# Patient Record
Sex: FEMALE | Race: WHITE | NOT HISPANIC OR LATINO | URBAN - METROPOLITAN AREA
[De-identification: names, ages, dates, MRNs, and addresses within clinical notes are randomized per-mention and may not be internally consistent; named-entity substitution may affect disease eponyms.]

---

## 2017-07-26 ENCOUNTER — INPATIENT (INPATIENT)
Facility: HOSPITAL | Age: 62
LOS: 2 days | Discharge: HOME | DRG: 601 | End: 2017-07-29
Attending: SURGERY | Admitting: SURGERY
Payer: MEDICARE

## 2017-07-26 VITALS
DIASTOLIC BLOOD PRESSURE: 86 MMHG | TEMPERATURE: 100 F | RESPIRATION RATE: 16 BRPM | SYSTOLIC BLOOD PRESSURE: 134 MMHG | OXYGEN SATURATION: 99 % | HEART RATE: 86 BPM | WEIGHT: 250 LBS | HEIGHT: 64 IN

## 2017-07-26 DIAGNOSIS — N61.1 ABSCESS OF THE BREAST AND NIPPLE: ICD-10-CM

## 2017-07-26 LAB
ALBUMIN SERPL ELPH-MCNC: 4 G/DL — SIGNIFICANT CHANGE UP (ref 3.3–5.2)
ALP SERPL-CCNC: 55 U/L — SIGNIFICANT CHANGE UP (ref 40–120)
ALT FLD-CCNC: 14 U/L — SIGNIFICANT CHANGE UP
ANION GAP SERPL CALC-SCNC: 15 MMOL/L — SIGNIFICANT CHANGE UP (ref 5–17)
AST SERPL-CCNC: 15 U/L — SIGNIFICANT CHANGE UP
BASOPHILS # BLD AUTO: 0.1 K/UL — SIGNIFICANT CHANGE UP (ref 0–0.2)
BILIRUB SERPL-MCNC: 1.4 MG/DL — SIGNIFICANT CHANGE UP (ref 0.4–2)
BUN SERPL-MCNC: 12 MG/DL — SIGNIFICANT CHANGE UP (ref 8–20)
CALCIUM SERPL-MCNC: 9.3 MG/DL — SIGNIFICANT CHANGE UP (ref 8.6–10.2)
CHLORIDE SERPL-SCNC: 100 MMOL/L — SIGNIFICANT CHANGE UP (ref 98–107)
CO2 SERPL-SCNC: 24 MMOL/L — SIGNIFICANT CHANGE UP (ref 22–29)
CREAT SERPL-MCNC: 0.67 MG/DL — SIGNIFICANT CHANGE UP (ref 0.5–1.3)
EOSINOPHIL # BLD AUTO: 0.1 K/UL — SIGNIFICANT CHANGE UP (ref 0–0.5)
EOSINOPHIL NFR BLD AUTO: 1 % — SIGNIFICANT CHANGE UP (ref 0–5)
GLUCOSE SERPL-MCNC: 119 MG/DL — HIGH (ref 70–115)
HCT VFR BLD CALC: 37.2 % — SIGNIFICANT CHANGE UP (ref 37–47)
HGB BLD-MCNC: 12.5 G/DL — SIGNIFICANT CHANGE UP (ref 12–16)
LACTATE BLDV-MCNC: 0.8 MMOL/L — SIGNIFICANT CHANGE UP (ref 0.5–2)
LYMPHOCYTES # BLD AUTO: 17 % — LOW (ref 20–55)
LYMPHOCYTES # BLD AUTO: 2.5 K/UL — SIGNIFICANT CHANGE UP (ref 1–4.8)
MCHC RBC-ENTMCNC: 30 PG — SIGNIFICANT CHANGE UP (ref 27–31)
MCHC RBC-ENTMCNC: 33.6 G/DL — SIGNIFICANT CHANGE UP (ref 32–36)
MCV RBC AUTO: 89.2 FL — SIGNIFICANT CHANGE UP (ref 81–99)
MONOCYTES # BLD AUTO: 2 K/UL — HIGH (ref 0–0.8)
MONOCYTES NFR BLD AUTO: 11 % — HIGH (ref 3–10)
NEUTROPHILS # BLD AUTO: 10.5 K/UL — HIGH (ref 1.8–8)
NEUTROPHILS NFR BLD AUTO: 71 % — SIGNIFICANT CHANGE UP (ref 37–73)
PLAT MORPH BLD: NORMAL — SIGNIFICANT CHANGE UP
PLATELET # BLD AUTO: 380 K/UL — SIGNIFICANT CHANGE UP (ref 150–400)
POTASSIUM SERPL-MCNC: 4 MMOL/L — SIGNIFICANT CHANGE UP (ref 3.5–5.3)
POTASSIUM SERPL-SCNC: 4 MMOL/L — SIGNIFICANT CHANGE UP (ref 3.5–5.3)
PROT SERPL-MCNC: 8.2 G/DL — SIGNIFICANT CHANGE UP (ref 6.6–8.7)
RBC # BLD: 4.17 M/UL — LOW (ref 4.4–5.2)
RBC # FLD: 14.8 % — SIGNIFICANT CHANGE UP (ref 11–15.6)
RBC BLD AUTO: SIGNIFICANT CHANGE UP
SODIUM SERPL-SCNC: 139 MMOL/L — SIGNIFICANT CHANGE UP (ref 135–145)
WBC # BLD: 15.1 K/UL — HIGH (ref 4.8–10.8)
WBC # FLD AUTO: 15.1 K/UL — HIGH (ref 4.8–10.8)

## 2017-07-26 PROCEDURE — 99223 1ST HOSP IP/OBS HIGH 75: CPT | Mod: 57,AI,GC

## 2017-07-26 PROCEDURE — 99285 EMERGENCY DEPT VISIT HI MDM: CPT

## 2017-07-26 PROCEDURE — 76604 US EXAM CHEST: CPT | Mod: 26

## 2017-07-26 PROCEDURE — 19000 PUNCTURE ASPIR CYST BREAST: CPT

## 2017-07-26 RX ORDER — OXYCODONE AND ACETAMINOPHEN 5; 325 MG/1; MG/1
1 TABLET ORAL EVERY 6 HOURS
Qty: 0 | Refills: 0 | Status: DISCONTINUED | OUTPATIENT
Start: 2017-07-26 | End: 2017-07-29

## 2017-07-26 RX ORDER — SODIUM CHLORIDE 9 MG/ML
3 INJECTION INTRAMUSCULAR; INTRAVENOUS; SUBCUTANEOUS EVERY 8 HOURS
Qty: 0 | Refills: 0 | Status: DISCONTINUED | OUTPATIENT
Start: 2017-07-26 | End: 2017-07-29

## 2017-07-26 RX ORDER — IBUPROFEN 200 MG
600 TABLET ORAL EVERY 6 HOURS
Qty: 0 | Refills: 0 | Status: DISCONTINUED | OUTPATIENT
Start: 2017-07-26 | End: 2017-07-27

## 2017-07-26 RX ORDER — VANCOMYCIN HCL 1 G
1000 VIAL (EA) INTRAVENOUS ONCE
Qty: 0 | Refills: 0 | Status: COMPLETED | OUTPATIENT
Start: 2017-07-26 | End: 2017-07-26

## 2017-07-26 RX ADMIN — Medication 600 MILLIGRAM(S): at 23:10

## 2017-07-26 RX ADMIN — Medication 250 MILLIGRAM(S): at 14:06

## 2017-07-26 RX ADMIN — Medication 600 MILLIGRAM(S): at 22:50

## 2017-07-26 RX ADMIN — SODIUM CHLORIDE 3 MILLILITER(S): 9 INJECTION INTRAMUSCULAR; INTRAVENOUS; SUBCUTANEOUS at 14:06

## 2017-07-26 RX ADMIN — Medication 100 MILLIGRAM(S): at 22:50

## 2017-07-26 RX ADMIN — SODIUM CHLORIDE 3 MILLILITER(S): 9 INJECTION INTRAMUSCULAR; INTRAVENOUS; SUBCUTANEOUS at 22:51

## 2017-07-26 NOTE — ED ADULT TRIAGE NOTE - CHIEF COMPLAINT QUOTE
patient reports swelling and discharge from right breast and drainage from right eye. sent from [facility]

## 2017-07-26 NOTE — ED ADULT NURSE NOTE - CHIEF COMPLAINT QUOTE
patient reports swelling and discharge from right breast and drainage from right eye. sent from [facility]

## 2017-07-26 NOTE — ED PROCEDURE NOTE - PROCEDURE ADDITIONAL DETAILS
Sterile precautions were followed during procedure. Attempt to visualized abscess through ultrasound was unsuccessful to right breast abscess. Ultrasound Guided Needle Aspiration of Right Breast Abscess  Sterile precautions were followed during procedure. Attempt to visualize abscess through ultrasound was unsuccessful to right breast. Ultrasound Guided Needle Aspiration of Right Breast Abscess  After obtaining informed consent, breast was prepped and draped in a sterile fashion.  1% lidocaine s/ epi used to numb skin above what appeared to be apex of collection.  18 gauge needle used under US guidance- min blood expressed.

## 2017-07-26 NOTE — H&P ADULT - ASSESSMENT
62yo female presents with right breast abscess  -Bedside ultrasound guidance drainage of right breast abscess was unsuccessful  -Will admit pt to ACS for observation  -CT guided drainage or IR drainage tomorrow  -Regular diet  -IV Clindamycin 60yo female presents with right breast abscess  -Bedside ultrasound guidance needle aspiration of right breast abscess was unsuccessful  -Will admit pt to ACS for observation  -CT guided drainage or IR drainage tomorrow  -Regular diet  -IV Clindamycin

## 2017-07-26 NOTE — ED STATDOCS - OBJECTIVE STATEMENT
60 y/o F w/ PMHx of presents to the ED c/o fever, chills, R breast swelling and discharge along with conjunctivitis in the R eye x1 week. Pt denies cough, rhinorrhea or any other complaints at this time. 60 y/o F w/ no significant PMHx presents to the ED c/o fever, chills, R breast swelling and discharge along with conjunctivitis in the R eye x1 week. Pt denies cough, rhinorrhea or any other complaints at this time.

## 2017-07-26 NOTE — ED STATDOCS - SHIFT CHANGE DETAILS
61 yr old female presents with RT breast abscess, finding of evaded WBC, surgical consult seeing pt, will revaluate after.

## 2017-07-26 NOTE — ED STATDOCS - ATTENDING CONTRIBUTION TO CARE
I, Roge Gutierrez, performed the initial face to face bedside interview with this patient regarding history of present illness, review of symptoms and relevant past medical, social and family history.  I completed an independent physical examination.  I was the initial provider who evaluated this patient. I have signed out the follow up of any pending tests (i.e. labs, radiological studies) to the ACP.  I have communicated the patient’s plan of care and disposition with the ACP.

## 2017-07-26 NOTE — H&P ADULT - ATTENDING COMMENTS
I have read, reviewed, edited and approve of above H+P.  Please see procedure note.  Will plan on IR guided drainage in AM, treat cellulitis c/ IV abx.  Pot plan for eval by breast surgeon on this admission.

## 2017-07-26 NOTE — H&P ADULT - HISTORY OF PRESENT ILLNESS
62yo female presents with one week of intermittent drainage of brownish fluid from right areolar of breast. One day ago pt reported increased swelling and erythema with subjective fever. She visited an urgent clinic in AM who referred pt to ED. She denies nausea, vomiting, and back pain. No complaints to left breast. Pt had similar sx two years ago which was treated with PO antibiotics. Pt last mammogram was one year ago which was benign.     Allergies: none  Past Medical hx: None  Surgical hx: None  Family hx: Breast cancer-sister and maternal aunt

## 2017-07-26 NOTE — ED STATDOCS - PROGRESS NOTE DETAILS
PA NOTE: pt discuseed at length with FLASH Vaughn, PE/HPI/ROS confirmed.  Pt evaluated by surgical consult who would like pt to be Admitted to hospital for further evaluation.

## 2017-07-26 NOTE — H&P ADULT - NSHPLABSRESULTS_GEN_ALL_CORE
US Chest: Suspect abscess in the subcutaneous soft tissues of the right breast 9:00 position 4 cm from nipple margin. Note this examination does not substitute for a formal breast ultrasound or mammogram for evaluation of other pathologies. US Chest: Suspect abscess in the subcutaneous soft tissues of the right breast 9:00 position 4 cm from nipple margin. Radiology noted this examination does not substitute for a formal breast ultrasound or mammogram for evaluation of other pathologies.

## 2017-07-26 NOTE — ED ADULT NURSE NOTE - OBJECTIVE STATEMENT
Pt admitted to ED c/o pain, redness and swelling to rt breast x a few days. Sm amt sero-sanguinous fld from nipple.  States + fever yesterday. pt states similar episode same breast tong 1 yr ago.

## 2017-07-26 NOTE — H&P ADULT - NSHPPHYSICALEXAM_GEN_ALL_CORE
Constitutional: Obese female in no acute distress  HEENT: Atraumatic. PEERLA.   Pulm: Clear to auscultation bilaterally  Right Breast: Erythema extending from nipple superiorly to mid-breast.   CV: S1/S2  Abd: Soft, nontender Constitutional: Obese female in no acute distress  HEENT: Atraumatic. PEERLA.   Pulm: Clear to auscultation bilaterally  Right Breast: Erythema extending from nipple superiorly to mid-breast. Brownish discharge noted to lateral areolar. Breast is tender to palpation  Left Breast: nontender. non-erythemous  CV: S1/S2  Abd: Soft, nontender

## 2017-07-27 LAB
APTT BLD: 28.8 SEC — SIGNIFICANT CHANGE UP (ref 27.5–37.4)
INR BLD: 1.26 RATIO — HIGH (ref 0.88–1.16)
PROTHROM AB SERPL-ACNC: 13.9 SEC — HIGH (ref 9.8–12.7)

## 2017-07-27 PROCEDURE — 10160 PNXR ASPIR ABSC HMTMA BULLA: CPT

## 2017-07-27 PROCEDURE — 76942 ECHO GUIDE FOR BIOPSY: CPT | Mod: 26

## 2017-07-27 RX ORDER — ACETAMINOPHEN 500 MG
650 TABLET ORAL EVERY 6 HOURS
Qty: 0 | Refills: 0 | Status: DISCONTINUED | OUTPATIENT
Start: 2017-07-27 | End: 2017-07-29

## 2017-07-27 RX ORDER — DOCUSATE SODIUM 100 MG
100 CAPSULE ORAL THREE TIMES A DAY
Qty: 0 | Refills: 0 | Status: DISCONTINUED | OUTPATIENT
Start: 2017-07-27 | End: 2017-07-29

## 2017-07-27 RX ORDER — ENOXAPARIN SODIUM 100 MG/ML
40 INJECTION SUBCUTANEOUS DAILY
Qty: 0 | Refills: 0 | Status: DISCONTINUED | OUTPATIENT
Start: 2017-07-27 | End: 2017-07-27

## 2017-07-27 RX ADMIN — Medication 100 MILLIGRAM(S): at 06:04

## 2017-07-27 RX ADMIN — Medication 100 MILLIGRAM(S): at 13:08

## 2017-07-27 RX ADMIN — OXYCODONE AND ACETAMINOPHEN 1 TABLET(S): 5; 325 TABLET ORAL at 22:16

## 2017-07-27 RX ADMIN — SODIUM CHLORIDE 3 MILLILITER(S): 9 INJECTION INTRAMUSCULAR; INTRAVENOUS; SUBCUTANEOUS at 13:03

## 2017-07-27 RX ADMIN — SODIUM CHLORIDE 3 MILLILITER(S): 9 INJECTION INTRAMUSCULAR; INTRAVENOUS; SUBCUTANEOUS at 06:07

## 2017-07-27 RX ADMIN — Medication 650 MILLIGRAM(S): at 06:06

## 2017-07-27 RX ADMIN — Medication 100 MILLIGRAM(S): at 21:04

## 2017-07-27 RX ADMIN — SODIUM CHLORIDE 3 MILLILITER(S): 9 INJECTION INTRAMUSCULAR; INTRAVENOUS; SUBCUTANEOUS at 21:07

## 2017-07-27 RX ADMIN — OXYCODONE AND ACETAMINOPHEN 1 TABLET(S): 5; 325 TABLET ORAL at 21:04

## 2017-07-27 RX ADMIN — Medication 650 MILLIGRAM(S): at 06:55

## 2017-07-27 NOTE — CONSULT NOTE ADULT - PROBLEM SELECTOR RECOMMENDATION 9
Right breast ultrasound guided drainage-with cultures sent  Rx abx (may continue clindamycin until cultures are back)  Check glucose*  May follow up in office for continued care  [address] [address] [phone] (can ask for Evelin Walker; ) for apointment

## 2017-07-27 NOTE — ED ADULT NURSE REASSESSMENT NOTE - NS ED NURSE REASSESS COMMENT FT1
Spoke with Radiology charge nurse, Anuradha Carbajal at nurses station listening to conversation about when patient will go to IR procedure-as per charge nurse in radiology they will sending for patient in 30 minutes.
IV SL placed. labs sent inc blood cultures. IV ABX started. Sono completed.
Patient recd from [facility] Johnson, discussed plan of care with patient, will continue to monitor.
Pt. remains in hallway, awaiting room. ambulates to bathroom without difficulty, no distress noted, continue to monitor and maintain safety
Pt. received at 2200 from [facility], pt. to be admitted to surgery for abscess to right breast. previously drained by surgery, bandage currently in place, medicated for pain. antibiotic to be given
Pt received Alert and Oriented to person, place, and time pt states she is feeling well, awaiting procedure.

## 2017-07-27 NOTE — PROGRESS NOTE ADULT - SUBJECTIVE AND OBJECTIVE BOX
INTERVAL HPI/OVERNIGHT EVENTS:  Pt seen and examined at bedside. No acute events overnight. Pt reports resolution of fever. Right Breast pain from abscess well controlled with pain meds.      SUBJECTIVE:      MEDICATIONS  (STANDING):  sodium chloride 0.9% lock flush 3 milliLiter(s) IV Push every 8 hours  clindamycin IVPB   IV Intermittent   clindamycin IVPB 600 milliGRAM(s) IV Intermittent every 8 hours  enoxaparin Injectable 40 milliGRAM(s) SubCutaneous daily  docusate sodium 100 milliGRAM(s) Oral three times a day    MEDICATIONS  (PRN):  oxyCODONE    5 mG/acetaminophen 325 mG 1 Tablet(s) Oral every 6 hours PRN Moderate Pain  acetaminophen   Tablet. 650 milliGRAM(s) Oral every 6 hours PRN Mild Pain (1 - 3)      Vital Signs Last 24 Hrs  T(C): 37.1 (27 Jul 2017 03:39), Max: 37.6 (26 Jul 2017 12:59)  T(F): 98.8 (27 Jul 2017 03:39), Max: 99.7 (26 Jul 2017 12:59)  HR: 77 (27 Jul 2017 03:39) (77 - 86)  BP: 122/75 (27 Jul 2017 03:39) (120/79 - 134/86)  BP(mean): --  RR: 18 (27 Jul 2017 03:39) (16 - 18)  SpO2: 96% (27 Jul 2017 03:39) (96% - 99%)    PE  Gen: No acute distress  Pulm: Clear to auscultation bilaterally  CV: s1/s2  Abd: Soft, nontender      I&O's Detail      LABS:                        12.5   15.1  )-----------( 380      ( 26 Jul 2017 14:10 )             37.2     07-26    139  |  100  |  12.0  ----------------------------<  119<H>  4.0   |  24.0  |  0.67    Ca    9.3      26 Jul 2017 14:10    TPro  8.2  /  Alb  4.0  /  TBili  1.4  /  DBili  x   /  AST  15  /  ALT  14  /  AlkPhos  55  07-26    PT/INR - ( 27 Jul 2017 08:46 )   PT: 13.9 sec;   INR: 1.26 ratio         PTT - ( 27 Jul 2017 08:46 )  PTT:28.8 sec      RADIOLOGY & ADDITIONAL STUDIES:

## 2017-07-27 NOTE — PROGRESS NOTE ADULT - ASSESSMENT
62yo female presents with right breast abscess  -Planning draining of abscess (IR vs CT guided drainage)  -Pain control PRN  -IV clindamycin  -Diet: Regular  DVT: SCDs

## 2017-07-27 NOTE — CONSULT NOTE ADULT - ASSESSMENT
62 yo postmenopausal female with morbid obesity presents with right breast mastitis/abscess.  Recommendation for antibiotic course, sitz baths, and ultrasound guided drainage.

## 2017-07-27 NOTE — CONSULT NOTE ADULT - SUBJECTIVE AND OBJECTIVE BOX
I had the pleasure of seeing Natasha Toussaint in the ED for right breast infection.  She is a 60 yo postmenopausal female who is morbidly obese.    She denies DM, or smoking.  She states that for the past week she has noticed increasingly worsening right breast pain, swelling and redness until a couple of days ago when she noticed drainage from the superior aspect.  She also reports subjective fevers.    She is G0, started menses at age 13 with menopause at age 50    She denies a personal history of malignancy.  Family history is significant for sister dx with breast cancer at age 45 and alive and well now age 60, also her MAunt dx with breast cancer >age 60 and PAunt dx with brain cancer >age 60    She reports annual bilateral screening mammogram and last mammogram within the year in [facility].    She is scheduled to undergo US guided FNA of right breast    Exam: No supraclavicular or axillary adenopathy  Right breast; edema throughout the breast with inverted nipple areolar complex   Drainage noted at the 10-11:00 position 6cm from the nipple (superior)  Indurated and erythema throughout breast.  Tenderness limits examination

## 2017-07-28 ENCOUNTER — TRANSCRIPTION ENCOUNTER (OUTPATIENT)
Age: 62
End: 2017-07-28

## 2017-07-28 LAB
ANION GAP SERPL CALC-SCNC: 13 MMOL/L — SIGNIFICANT CHANGE UP (ref 5–17)
BASOPHILS # BLD AUTO: 0.1 K/UL — SIGNIFICANT CHANGE UP (ref 0–0.2)
BASOPHILS NFR BLD AUTO: 0.6 % — SIGNIFICANT CHANGE UP (ref 0–2)
BUN SERPL-MCNC: 16 MG/DL — SIGNIFICANT CHANGE UP (ref 8–20)
CALCIUM SERPL-MCNC: 9.1 MG/DL — SIGNIFICANT CHANGE UP (ref 8.6–10.2)
CHLORIDE SERPL-SCNC: 100 MMOL/L — SIGNIFICANT CHANGE UP (ref 98–107)
CO2 SERPL-SCNC: 27 MMOL/L — SIGNIFICANT CHANGE UP (ref 22–29)
CREAT SERPL-MCNC: 0.65 MG/DL — SIGNIFICANT CHANGE UP (ref 0.5–1.3)
EOSINOPHIL # BLD AUTO: 0.3 K/UL — SIGNIFICANT CHANGE UP (ref 0–0.5)
EOSINOPHIL NFR BLD AUTO: 2.9 % — SIGNIFICANT CHANGE UP (ref 0–6)
GLUCOSE SERPL-MCNC: 110 MG/DL — SIGNIFICANT CHANGE UP (ref 70–115)
HCT VFR BLD CALC: 37.9 % — SIGNIFICANT CHANGE UP (ref 37–47)
HGB BLD-MCNC: 12.2 G/DL — SIGNIFICANT CHANGE UP (ref 12–16)
LYMPHOCYTES # BLD AUTO: 2.5 K/UL — SIGNIFICANT CHANGE UP (ref 1–4.8)
LYMPHOCYTES # BLD AUTO: 25.7 % — SIGNIFICANT CHANGE UP (ref 20–55)
MAGNESIUM SERPL-MCNC: 2.3 MG/DL — SIGNIFICANT CHANGE UP (ref 1.6–2.6)
MCHC RBC-ENTMCNC: 29.6 PG — SIGNIFICANT CHANGE UP (ref 27–31)
MCHC RBC-ENTMCNC: 32.2 G/DL — SIGNIFICANT CHANGE UP (ref 32–36)
MCV RBC AUTO: 92 FL — SIGNIFICANT CHANGE UP (ref 81–99)
MONOCYTES # BLD AUTO: 1.2 K/UL — HIGH (ref 0–0.8)
MONOCYTES NFR BLD AUTO: 12.6 % — HIGH (ref 3–10)
NEUTROPHILS # BLD AUTO: 5.6 K/UL — SIGNIFICANT CHANGE UP (ref 1.8–8)
NEUTROPHILS NFR BLD AUTO: 57.9 % — SIGNIFICANT CHANGE UP (ref 37–73)
PHOSPHATE SERPL-MCNC: 3.6 MG/DL — SIGNIFICANT CHANGE UP (ref 2.4–4.7)
PLATELET # BLD AUTO: 410 K/UL — HIGH (ref 150–400)
POTASSIUM SERPL-MCNC: 4.3 MMOL/L — SIGNIFICANT CHANGE UP (ref 3.5–5.3)
POTASSIUM SERPL-SCNC: 4.3 MMOL/L — SIGNIFICANT CHANGE UP (ref 3.5–5.3)
RBC # BLD: 4.12 M/UL — LOW (ref 4.4–5.2)
RBC # FLD: 14.3 % — SIGNIFICANT CHANGE UP (ref 11–15.6)
SODIUM SERPL-SCNC: 140 MMOL/L — SIGNIFICANT CHANGE UP (ref 135–145)
WBC # BLD: 9.7 K/UL — SIGNIFICANT CHANGE UP (ref 4.8–10.8)
WBC # FLD AUTO: 9.7 K/UL — SIGNIFICANT CHANGE UP (ref 4.8–10.8)

## 2017-07-28 PROCEDURE — 73620 X-RAY EXAM OF FOOT: CPT | Mod: 26,RT

## 2017-07-28 RX ORDER — DOCUSATE SODIUM 100 MG
1 CAPSULE ORAL
Qty: 0 | Refills: 0 | COMMUNITY
Start: 2017-07-28

## 2017-07-28 RX ORDER — OXYCODONE HYDROCHLORIDE 5 MG/1
1 TABLET ORAL
Qty: 24 | Refills: 0 | OUTPATIENT
Start: 2017-07-28

## 2017-07-28 RX ORDER — ACETAMINOPHEN 500 MG
2 TABLET ORAL
Qty: 0 | Refills: 0 | COMMUNITY
Start: 2017-07-28

## 2017-07-28 RX ORDER — CEPHALEXIN 500 MG
500 CAPSULE ORAL EVERY 6 HOURS
Qty: 0 | Refills: 0 | Status: DISCONTINUED | OUTPATIENT
Start: 2017-07-28 | End: 2017-07-29

## 2017-07-28 RX ADMIN — SODIUM CHLORIDE 3 MILLILITER(S): 9 INJECTION INTRAMUSCULAR; INTRAVENOUS; SUBCUTANEOUS at 06:05

## 2017-07-28 RX ADMIN — Medication 100 MILLIGRAM(S): at 06:07

## 2017-07-28 RX ADMIN — Medication 500 MILLIGRAM(S): at 23:04

## 2017-07-28 RX ADMIN — Medication 100 MILLIGRAM(S): at 13:53

## 2017-07-28 RX ADMIN — SODIUM CHLORIDE 3 MILLILITER(S): 9 INJECTION INTRAMUSCULAR; INTRAVENOUS; SUBCUTANEOUS at 13:07

## 2017-07-28 RX ADMIN — Medication 100 MILLIGRAM(S): at 13:47

## 2017-07-28 RX ADMIN — OXYCODONE AND ACETAMINOPHEN 1 TABLET(S): 5; 325 TABLET ORAL at 10:47

## 2017-07-28 RX ADMIN — OXYCODONE AND ACETAMINOPHEN 1 TABLET(S): 5; 325 TABLET ORAL at 04:37

## 2017-07-28 RX ADMIN — SODIUM CHLORIDE 3 MILLILITER(S): 9 INJECTION INTRAMUSCULAR; INTRAVENOUS; SUBCUTANEOUS at 22:06

## 2017-07-28 RX ADMIN — OXYCODONE AND ACETAMINOPHEN 1 TABLET(S): 5; 325 TABLET ORAL at 11:30

## 2017-07-28 RX ADMIN — OXYCODONE AND ACETAMINOPHEN 1 TABLET(S): 5; 325 TABLET ORAL at 20:10

## 2017-07-28 RX ADMIN — OXYCODONE AND ACETAMINOPHEN 1 TABLET(S): 5; 325 TABLET ORAL at 19:40

## 2017-07-28 RX ADMIN — Medication 30 MILLILITER(S): at 01:13

## 2017-07-28 NOTE — PROGRESS NOTE ADULT - SUBJECTIVE AND OBJECTIVE BOX
INTERVAL HPI/OVERNIGHT EVENTS:  Pt was seen and examined at bedside this AM  No complaints overnight  R breast was still erythematous, soft, and only mildly tender  Denies fevers, chills, SOB, chest pain, diarrhea, and constipation overnight      STATUS POST:  IR drainage of R breast abscess    POST OPERATIVE DAY #: 1      MEDICATIONS  (STANDING):  sodium chloride 0.9% lock flush 3 milliLiter(s) IV Push every 8 hours  clindamycin IVPB   IV Intermittent   clindamycin IVPB 600 milliGRAM(s) IV Intermittent every 8 hours  docusate sodium 100 milliGRAM(s) Oral three times a day    MEDICATIONS  (PRN):  oxyCODONE    5 mG/acetaminophen 325 mG 1 Tablet(s) Oral every 6 hours PRN Moderate Pain  acetaminophen   Tablet. 650 milliGRAM(s) Oral every 6 hours PRN Mild Pain (1 - 3)      Vital Signs Last 24 Hrs  T(C): 36.4 (28 Jul 2017 07:28), Max: 37.2 (27 Jul 2017 14:09)  T(F): 97.5 (28 Jul 2017 07:28), Max: 98.9 (27 Jul 2017 14:09)  HR: 68 (28 Jul 2017 07:28) (68 - 78)  BP: 120/78 (28 Jul 2017 07:28) (104/63 - 133/84)  BP(mean): --  RR: 18 (28 Jul 2017 07:28) (18 - 18)  SpO2: 98% (28 Jul 2017 07:28) (93% - 99%)    Physical Exam:    Neurological:  No sensory/motor deficits  HEENT: PERRLA, EOMI  Respiratory: Breath Sounds equal & CTA bilaterally, no accessory muscle use  Cardiovascular: Regular rate & rhythm, normal S1, S2; no murmurs, gallops or rubs  Gastrointestinal: Soft, non-tender, nondistended  Vascular: Equal and normal pulses: 2+ peripheral pulses throughout  Musculoskeletal: No joint pain, swelling or deformity; no limitation of movement  Skin: Right breast erythematous, no drainage    I&O's Detail      LABS:                        12.2   9.7   )-----------( 410      ( 28 Jul 2017 08:53 )             37.9     07-28    140  |  100  |  16.0  ----------------------------<  110  4.3   |  27.0  |  0.65    Ca    9.1      28 Jul 2017 08:51  Phos  3.6     07-28  Mg     2.3     07-28    TPro  8.2  /  Alb  4.0  /  TBili  1.4  /  DBili  x   /  AST  15  /  ALT  14  /  AlkPhos  55  07-26    PT/INR - ( 27 Jul 2017 08:46 )   PT: 13.9 sec;   INR: 1.26 ratio         PTT - ( 27 Jul 2017 08:46 )  PTT:28.8 sec      RADIOLOGY & ADDITIONAL STUDIES:

## 2017-07-28 NOTE — DISCHARGE NOTE ADULT - PATIENT PORTAL LINK FT
“You can access the [facility] Patient Portal, offered by [facility], by registering with the following website: [email or web address]”

## 2017-07-28 NOTE — DISCHARGE NOTE ADULT - PLAN OF CARE
drainage of mass Pt may resume regular diet as tolerated.  Showering is permitted, DO NOT submerge your wound in water x 1 week.  Activity as tolerated.  Follow up Dr Gregg as an outpatient for further workup of breast mass.  Return to the ED immediately for worsening redness or drainage, high fever, worsening pain. Drainage of mass Follow up: Please call and make an appointment with Dr. Gregg for futher work-up of breast mass. Also, please call and make an appointment with your primary care physician as per your usual schedule.   Activity: May return to normal activities as tolerated.  Diet: May continue regular diet.  Medications: Please take all home medications as prescribed by your primary care doctor. Pain medication has been prescribed for you. Please, take it as it has been prescribed, do not drive or operate heavy machinery while taking narcotics.  You are encouraged to take over-the-counter tylenol and/or ibuprofen for pain relief when you feel your pain no longer warrants the use of narcotic pain medications, however DO NOT TAKE percocet and tylenol at the same time as they contain the same active ingredient (acetaminophen). Take only percocet OR tylenol.  Wound Care: Please, keep wound site clean and dry. You may shower, but do not bathe or submerge your wound in water for AT LEAST 1 week.  Patient is advised to RETURN TO THE EMERGENCY DEPARTMENT for any of the following - worsening redness / drainage, worsening pain, high fever, nausea/vomiting, altered mental status, chest pain, shortness of breath, or any other new / worsening symptom.

## 2017-07-28 NOTE — DISCHARGE NOTE ADULT - CARE PROVIDER_API CALL
Arlene Gregg), Surgery  [address] [place] [id number]  Phone: [phone]  Fax: [phone]

## 2017-07-28 NOTE — DISCHARGE NOTE ADULT - MEDICATION SUMMARY - MEDICATIONS TO STOP TAKING
I will STOP taking the medications listed below when I get home from the hospital:    doxycycline monohydrate 100 mg oral tablet  -- 1 tab(s) by mouth 2 times a day  -- Avoid prolonged or excessive exposure to direct and/or artificial sunlight while taking this medication.  Do not take this drug if you are pregnant.  Finish all this medication unless otherwise directed by prescriber.  Medication should be taken with plenty of water.

## 2017-07-28 NOTE — PROGRESS NOTE ADULT - ASSESSMENT
Pt is a 61 F s/p IR drainage of R breast abscess (7/27). Aspiration revealed hypoechoic solid tissue consistent with a mass or phlegmon. Recommends f/u with Dr. Arlene Gregg, breast surgeon as outpatient. WBC down to 9.7 from 15.1. Dispo planning.   - Continue Regular diet  - Continue Clindamycin  - Lovenox and SCDs for DVT PPx  - f/u Dr. Gregg as outpatient

## 2017-07-28 NOTE — DISCHARGE NOTE ADULT - HOSPITAL COURSE
62yo female presents with one week of intermittent drainage of brownish fluid from right areolar of breast. One day ago pt reported increased swelling and erythema with subjective fever. She visited an urgent clinic in AM who referred pt to ED. She denies nausea, vomiting, and back pain. No complaints to left breast. Pt had similar sx two years ago which was treated with PO antibiotics. Pt last mammogram was one year ago which was benign.  US Chest demonstrated possible abscess in subq soft tissue of the right breast at 9:00 position 4cm from nipple margin.  Pt taken to IR on 7/27 for drainage, no abscess noted, hypoechoic solid tissue consistent with mass or phelgmon.  Post procedure, pt doing well.  Stable for d/c home with outpatient follow up Dr Gregg for further evaluation of breast mass.

## 2017-07-28 NOTE — PROGRESS NOTE ADULT - ATTENDING COMMENTS
Pt seen and examined.      No complaints today.    IR unable to drain any purulence.  No systemic signs of infection.  Further evaluation and management as per breast surgery.

## 2017-07-28 NOTE — DISCHARGE NOTE ADULT - CARE PLAN
Principal Discharge DX:	Breast mass in female  Goal:	drainage of mass  Instructions for follow-up, activity and diet:	Pt may resume regular diet as tolerated.  Showering is permitted, DO NOT submerge your wound in water x 1 week.  Activity as tolerated.  Follow up Dr Gregg as an outpatient for further workup of breast mass.  Return to the ED immediately for worsening redness or drainage, high fever, worsening pain. Principal Discharge DX:	Breast mass in female  Goal:	Drainage of mass  Instructions for follow-up, activity and diet:	Follow up: Please call and make an appointment with Dr. Gregg for futher work-up of breast mass. Also, please call and make an appointment with your primary care physician as per your usual schedule.   Activity: May return to normal activities as tolerated.  Diet: May continue regular diet.  Medications: Please take all home medications as prescribed by your primary care doctor. Pain medication has been prescribed for you. Please, take it as it has been prescribed, do not drive or operate heavy machinery while taking narcotics.  You are encouraged to take over-the-counter tylenol and/or ibuprofen for pain relief when you feel your pain no longer warrants the use of narcotic pain medications, however DO NOT TAKE percocet and tylenol at the same time as they contain the same active ingredient (acetaminophen). Take only percocet OR tylenol.  Wound Care: Please, keep wound site clean and dry. You may shower, but do not bathe or submerge your wound in water for AT LEAST 1 week.  Patient is advised to RETURN TO THE EMERGENCY DEPARTMENT for any of the following - worsening redness / drainage, worsening pain, high fever, nausea/vomiting, altered mental status, chest pain, shortness of breath, or any other new / worsening symptom.

## 2017-07-29 VITALS
RESPIRATION RATE: 18 BRPM | TEMPERATURE: 98 F | SYSTOLIC BLOOD PRESSURE: 145 MMHG | OXYGEN SATURATION: 98 % | DIASTOLIC BLOOD PRESSURE: 82 MMHG | HEART RATE: 78 BPM

## 2017-07-29 RX ORDER — CEPHALEXIN 500 MG
1 CAPSULE ORAL
Qty: 28 | Refills: 0 | OUTPATIENT
Start: 2017-07-29 | End: 2017-08-05

## 2017-07-29 RX ADMIN — Medication 100 MILLIGRAM(S): at 14:55

## 2017-07-29 RX ADMIN — OXYCODONE AND ACETAMINOPHEN 1 TABLET(S): 5; 325 TABLET ORAL at 10:35

## 2017-07-29 RX ADMIN — Medication 500 MILLIGRAM(S): at 17:20

## 2017-07-29 RX ADMIN — OXYCODONE AND ACETAMINOPHEN 1 TABLET(S): 5; 325 TABLET ORAL at 04:07

## 2017-07-29 RX ADMIN — OXYCODONE AND ACETAMINOPHEN 1 TABLET(S): 5; 325 TABLET ORAL at 09:45

## 2017-07-29 RX ADMIN — OXYCODONE AND ACETAMINOPHEN 1 TABLET(S): 5; 325 TABLET ORAL at 03:37

## 2017-07-29 RX ADMIN — Medication 1 TABLET(S): at 17:20

## 2017-07-29 RX ADMIN — Medication 500 MILLIGRAM(S): at 12:17

## 2017-07-29 RX ADMIN — Medication 1 TABLET(S): at 06:00

## 2017-07-29 RX ADMIN — OXYCODONE AND ACETAMINOPHEN 1 TABLET(S): 5; 325 TABLET ORAL at 17:20

## 2017-07-29 RX ADMIN — OXYCODONE AND ACETAMINOPHEN 1 TABLET(S): 5; 325 TABLET ORAL at 08:26

## 2017-07-29 RX ADMIN — Medication 100 MILLIGRAM(S): at 06:00

## 2017-07-29 RX ADMIN — Medication 500 MILLIGRAM(S): at 06:00

## 2017-07-31 LAB
CULTURE RESULTS: SIGNIFICANT CHANGE UP
CULTURE RESULTS: SIGNIFICANT CHANGE UP
SPECIMEN SOURCE: SIGNIFICANT CHANGE UP
SPECIMEN SOURCE: SIGNIFICANT CHANGE UP

## 2017-08-01 ENCOUNTER — TRANSCRIPTION ENCOUNTER (OUTPATIENT)
Age: 62
End: 2017-08-01

## 2017-10-06 ENCOUNTER — APPOINTMENT (OUTPATIENT)
Dept: SURGERY | Facility: CLINIC | Age: 62
End: 2017-10-06
Payer: MEDICARE

## 2017-10-06 VITALS
WEIGHT: 293 LBS | HEIGHT: 62 IN | RESPIRATION RATE: 13 BRPM | TEMPERATURE: 98.8 F | BODY MASS INDEX: 53.92 KG/M2 | HEART RATE: 86 BPM | SYSTOLIC BLOOD PRESSURE: 124 MMHG | DIASTOLIC BLOOD PRESSURE: 76 MMHG

## 2017-10-06 DIAGNOSIS — Z87.39 PERSONAL HISTORY OF OTHER DISEASES OF THE MUSCULOSKELETAL SYSTEM AND CONNECTIVE TISSUE: ICD-10-CM

## 2017-10-06 PROCEDURE — 99215 OFFICE O/P EST HI 40 MIN: CPT

## 2017-10-06 PROCEDURE — 99205 OFFICE O/P NEW HI 60 MIN: CPT

## 2017-10-11 PROCEDURE — 80048 BASIC METABOLIC PNL TOTAL CA: CPT

## 2017-10-11 PROCEDURE — 73620 X-RAY EXAM OF FOOT: CPT

## 2017-10-11 PROCEDURE — 99285 EMERGENCY DEPT VISIT HI MDM: CPT | Mod: 25

## 2017-10-11 PROCEDURE — 80053 COMPREHEN METABOLIC PANEL: CPT

## 2017-10-11 PROCEDURE — 85027 COMPLETE CBC AUTOMATED: CPT

## 2017-10-11 PROCEDURE — 85730 THROMBOPLASTIN TIME PARTIAL: CPT

## 2017-10-11 PROCEDURE — 87040 BLOOD CULTURE FOR BACTERIA: CPT

## 2017-10-11 PROCEDURE — 84100 ASSAY OF PHOSPHORUS: CPT

## 2017-10-11 PROCEDURE — 83735 ASSAY OF MAGNESIUM: CPT

## 2017-10-11 PROCEDURE — 76604 US EXAM CHEST: CPT

## 2017-10-11 PROCEDURE — 85610 PROTHROMBIN TIME: CPT

## 2017-10-11 PROCEDURE — 10021 FNA BX W/O IMG GDN 1ST LES: CPT

## 2017-10-11 PROCEDURE — 96374 THER/PROPH/DIAG INJ IV PUSH: CPT | Mod: XU

## 2017-10-11 PROCEDURE — 36415 COLL VENOUS BLD VENIPUNCTURE: CPT

## 2017-10-11 PROCEDURE — 83605 ASSAY OF LACTIC ACID: CPT

## 2017-10-18 ENCOUNTER — APPOINTMENT (OUTPATIENT)
Dept: ULTRASOUND IMAGING | Facility: CLINIC | Age: 62
End: 2017-10-18
Payer: MEDICARE

## 2017-10-18 ENCOUNTER — APPOINTMENT (OUTPATIENT)
Dept: MAMMOGRAPHY | Facility: CLINIC | Age: 62
End: 2017-10-18
Payer: MEDICARE

## 2017-10-18 ENCOUNTER — OUTPATIENT (OUTPATIENT)
Dept: OUTPATIENT SERVICES | Facility: HOSPITAL | Age: 62
LOS: 1 days | End: 2017-10-18
Payer: MEDICARE

## 2017-10-18 DIAGNOSIS — N64.52 NIPPLE DISCHARGE: ICD-10-CM

## 2017-10-18 PROCEDURE — G0279: CPT | Mod: 26

## 2017-10-18 PROCEDURE — 77066 DX MAMMO INCL CAD BI: CPT

## 2017-10-18 PROCEDURE — G0204: CPT | Mod: 26

## 2017-10-18 PROCEDURE — 76641 ULTRASOUND BREAST COMPLETE: CPT

## 2017-10-18 PROCEDURE — G0279: CPT

## 2017-10-18 PROCEDURE — 76641 ULTRASOUND BREAST COMPLETE: CPT | Mod: 26,50

## 2017-10-20 ENCOUNTER — MESSAGE (OUTPATIENT)
Age: 62
End: 2017-10-20

## 2017-10-20 ENCOUNTER — MOBILE ON CALL (OUTPATIENT)
Age: 62
End: 2017-10-20

## 2017-10-24 ENCOUNTER — APPOINTMENT (OUTPATIENT)
Dept: SURGERY | Facility: CLINIC | Age: 62
End: 2017-10-24

## 2017-11-01 ENCOUNTER — OUTPATIENT (OUTPATIENT)
Dept: OUTPATIENT SERVICES | Facility: HOSPITAL | Age: 62
LOS: 1 days | End: 2017-11-01
Payer: MEDICARE

## 2017-11-01 ENCOUNTER — APPOINTMENT (OUTPATIENT)
Dept: ULTRASOUND IMAGING | Facility: CLINIC | Age: 62
End: 2017-11-01
Payer: MEDICARE

## 2017-11-01 ENCOUNTER — APPOINTMENT (OUTPATIENT)
Dept: MAMMOGRAPHY | Facility: CLINIC | Age: 62
End: 2017-11-01
Payer: MEDICARE

## 2017-11-01 ENCOUNTER — RESULT REVIEW (OUTPATIENT)
Age: 62
End: 2017-11-01

## 2017-11-01 DIAGNOSIS — Z00.00 ENCOUNTER FOR GENERAL ADULT MEDICAL EXAMINATION WITHOUT ABNORMAL FINDINGS: ICD-10-CM

## 2017-11-01 PROCEDURE — G0206: CPT | Mod: 26,RT

## 2017-11-01 PROCEDURE — 88305 TISSUE EXAM BY PATHOLOGIST: CPT | Mod: 26

## 2017-11-01 PROCEDURE — 19083 BX BREAST 1ST LESION US IMAG: CPT

## 2017-11-01 PROCEDURE — 77065 DX MAMMO INCL CAD UNI: CPT

## 2017-11-01 PROCEDURE — A4648: CPT

## 2017-11-01 PROCEDURE — 19083 BX BREAST 1ST LESION US IMAG: CPT | Mod: RT

## 2017-11-01 PROCEDURE — 88305 TISSUE EXAM BY PATHOLOGIST: CPT

## 2017-11-01 PROCEDURE — 19084 BX BREAST ADD LESION US IMAG: CPT

## 2017-11-01 PROCEDURE — 19084 BX BREAST ADD LESION US IMAG: CPT | Mod: 59,RT

## 2017-11-03 DIAGNOSIS — N63.13 UNSPECIFIED LUMP IN THE RIGHT BREAST, LOWER OUTER QUADRANT: ICD-10-CM

## 2017-11-03 DIAGNOSIS — R92.8 OTHER ABNORMAL AND INCONCLUSIVE FINDINGS ON DIAGNOSTIC IMAGING OF BREAST: ICD-10-CM

## 2017-11-03 DIAGNOSIS — N63.11 UNSPECIFIED LUMP IN THE RIGHT BREAST, UPPER OUTER QUADRANT: ICD-10-CM

## 2017-11-14 ENCOUNTER — APPOINTMENT (OUTPATIENT)
Dept: SURGERY | Facility: CLINIC | Age: 62
End: 2017-11-14
Payer: MEDICARE

## 2017-11-14 VITALS
TEMPERATURE: 98.2 F | WEIGHT: 293 LBS | DIASTOLIC BLOOD PRESSURE: 95 MMHG | HEIGHT: 62 IN | RESPIRATION RATE: 12 BRPM | SYSTOLIC BLOOD PRESSURE: 175 MMHG | BODY MASS INDEX: 53.92 KG/M2 | HEART RATE: 80 BPM

## 2017-11-14 PROCEDURE — 99215 OFFICE O/P EST HI 40 MIN: CPT

## 2017-11-16 PROCEDURE — 99204 OFFICE O/P NEW MOD 45 MIN: CPT | Mod: 25

## 2017-11-17 ENCOUNTER — OTHER (OUTPATIENT)
Age: 62
End: 2017-11-17

## 2017-11-20 ENCOUNTER — OTHER (OUTPATIENT)
Age: 62
End: 2017-11-20

## 2017-11-20 ENCOUNTER — OUTPATIENT (OUTPATIENT)
Dept: OUTPATIENT SERVICES | Facility: HOSPITAL | Age: 62
LOS: 1 days | Discharge: HOME | End: 2017-11-20

## 2017-11-21 ENCOUNTER — APPOINTMENT (OUTPATIENT)
Dept: PLASTIC SURGERY | Facility: CLINIC | Age: 62
End: 2017-11-21
Payer: MEDICARE

## 2017-11-21 ENCOUNTER — APPOINTMENT (OUTPATIENT)
Dept: MRI IMAGING | Facility: CLINIC | Age: 62
End: 2017-11-21

## 2017-11-21 ENCOUNTER — APPOINTMENT (OUTPATIENT)
Dept: RADIATION ONCOLOGY | Facility: CLINIC | Age: 62
End: 2017-11-21
Payer: MEDICARE

## 2017-11-21 VITALS
WEIGHT: 214 LBS | HEART RATE: 94 BPM | DIASTOLIC BLOOD PRESSURE: 90 MMHG | OXYGEN SATURATION: 94 % | SYSTOLIC BLOOD PRESSURE: 165 MMHG | BODY MASS INDEX: 39.38 KG/M2 | RESPIRATION RATE: 16 BRPM | HEIGHT: 62 IN

## 2017-11-21 VITALS
BODY MASS INDEX: 53.92 KG/M2 | RESPIRATION RATE: 16 BRPM | TEMPERATURE: 98.2 F | WEIGHT: 293 LBS | HEART RATE: 71 BPM | OXYGEN SATURATION: 95 % | DIASTOLIC BLOOD PRESSURE: 86 MMHG | SYSTOLIC BLOOD PRESSURE: 152 MMHG | HEIGHT: 62 IN

## 2017-11-21 PROCEDURE — 99214 OFFICE O/P EST MOD 30 MIN: CPT

## 2017-11-21 PROCEDURE — 99203 OFFICE O/P NEW LOW 30 MIN: CPT

## 2017-11-21 RX ORDER — SULFAMETHOXAZOLE AND TRIMETHOPRIM 400; 80 MG/1; MG/1
400-80 TABLET ORAL
Qty: 14 | Refills: 0 | Status: COMPLETED | COMMUNITY
Start: 2017-07-29 | End: 2017-11-21

## 2017-11-27 ENCOUNTER — APPOINTMENT (OUTPATIENT)
Dept: SURGERY | Facility: CLINIC | Age: 62
End: 2017-11-27
Payer: MEDICARE

## 2017-11-27 PROCEDURE — 99214 OFFICE O/P EST MOD 30 MIN: CPT

## 2017-12-27 ENCOUNTER — APPOINTMENT (OUTPATIENT)
Dept: SURGERY | Facility: CLINIC | Age: 62
End: 2017-12-27
Payer: MEDICARE

## 2017-12-27 PROCEDURE — 99215 OFFICE O/P EST HI 40 MIN: CPT

## 2018-01-16 ENCOUNTER — APPOINTMENT (OUTPATIENT)
Dept: SURGERY | Facility: CLINIC | Age: 63
End: 2018-01-16
Payer: MEDICARE

## 2018-01-16 VITALS — TEMPERATURE: 78 F | OXYGEN SATURATION: 93 % | HEART RATE: 78 BPM | RESPIRATION RATE: 18 BRPM

## 2018-01-16 PROCEDURE — 99215 OFFICE O/P EST HI 40 MIN: CPT

## 2018-01-16 RX ORDER — CEPHALEXIN 500 MG/1
500 CAPSULE ORAL
Qty: 28 | Refills: 0 | Status: DISCONTINUED | COMMUNITY
Start: 2017-07-29 | End: 2018-01-16

## 2018-01-16 RX ORDER — OXYCODONE AND ACETAMINOPHEN 5; 325 MG/1; MG/1
5-325 TABLET ORAL
Qty: 24 | Refills: 0 | Status: DISCONTINUED | COMMUNITY
Start: 2017-07-28 | End: 2018-01-16

## 2018-01-16 RX ORDER — DIAZEPAM 5 MG/1
5 TABLET ORAL
Qty: 5 | Refills: 0 | Status: DISCONTINUED | COMMUNITY
Start: 2017-11-20 | End: 2018-01-16

## 2018-01-16 RX ORDER — ASPIRIN ENTERIC COATED TABLETS 81 MG 81 MG/1
81 TABLET, DELAYED RELEASE ORAL
Refills: 0 | Status: DISCONTINUED | COMMUNITY
End: 2018-01-16

## 2018-01-16 RX ORDER — ACETAMINOPHEN 325 MG/1
TABLET, FILM COATED ORAL
Refills: 0 | Status: DISCONTINUED | COMMUNITY
End: 2018-01-16

## 2018-01-29 ENCOUNTER — APPOINTMENT (OUTPATIENT)
Dept: FAMILY MEDICINE | Facility: CLINIC | Age: 63
End: 2018-01-29
Payer: MEDICARE

## 2018-01-29 VITALS
WEIGHT: 293 LBS | HEIGHT: 62 IN | SYSTOLIC BLOOD PRESSURE: 152 MMHG | RESPIRATION RATE: 14 BRPM | HEART RATE: 80 BPM | TEMPERATURE: 98.3 F | BODY MASS INDEX: 53.92 KG/M2 | OXYGEN SATURATION: 96 % | DIASTOLIC BLOOD PRESSURE: 90 MMHG

## 2018-01-29 PROCEDURE — 99215 OFFICE O/P EST HI 40 MIN: CPT

## 2018-01-30 ENCOUNTER — APPOINTMENT (OUTPATIENT)
Dept: CARDIOLOGY | Facility: CLINIC | Age: 63
End: 2018-01-30
Payer: MEDICARE

## 2018-01-30 ENCOUNTER — OUTPATIENT (OUTPATIENT)
Dept: OUTPATIENT SERVICES | Facility: HOSPITAL | Age: 63
LOS: 1 days | End: 2018-01-30
Payer: COMMERCIAL

## 2018-01-30 ENCOUNTER — NON-APPOINTMENT (OUTPATIENT)
Age: 63
End: 2018-01-30

## 2018-01-30 VITALS
HEIGHT: 63 IN | OXYGEN SATURATION: 96 % | SYSTOLIC BLOOD PRESSURE: 150 MMHG | HEART RATE: 73 BPM | DIASTOLIC BLOOD PRESSURE: 92 MMHG | WEIGHT: 293 LBS | BODY MASS INDEX: 51.91 KG/M2

## 2018-01-30 VITALS
HEART RATE: 86 BPM | DIASTOLIC BLOOD PRESSURE: 101 MMHG | SYSTOLIC BLOOD PRESSURE: 159 MMHG | RESPIRATION RATE: 16 BRPM | TEMPERATURE: 97 F | WEIGHT: 293 LBS | HEIGHT: 63 IN

## 2018-01-30 DIAGNOSIS — D64.9 ANEMIA, UNSPECIFIED: ICD-10-CM

## 2018-01-30 DIAGNOSIS — I10 ESSENTIAL (PRIMARY) HYPERTENSION: ICD-10-CM

## 2018-01-30 DIAGNOSIS — C50.911 MALIGNANT NEOPLASM OF UNSPECIFIED SITE OF RIGHT FEMALE BREAST: ICD-10-CM

## 2018-01-30 DIAGNOSIS — Z98.890 OTHER SPECIFIED POSTPROCEDURAL STATES: Chronic | ICD-10-CM

## 2018-01-30 DIAGNOSIS — Z01.818 ENCOUNTER FOR OTHER PREPROCEDURAL EXAMINATION: ICD-10-CM

## 2018-01-30 LAB
ANION GAP SERPL CALC-SCNC: 15 MMOL/L — SIGNIFICANT CHANGE UP (ref 5–17)
APTT BLD: 31 SEC — SIGNIFICANT CHANGE UP (ref 27.5–37.4)
BASOPHILS # BLD AUTO: 0.1 K/UL — SIGNIFICANT CHANGE UP (ref 0–0.2)
BASOPHILS NFR BLD AUTO: 0.7 % — SIGNIFICANT CHANGE UP (ref 0–2)
BLD GP AB SCN SERPL QL: SIGNIFICANT CHANGE UP
BUN SERPL-MCNC: 15 MG/DL — SIGNIFICANT CHANGE UP (ref 8–20)
CALCIUM SERPL-MCNC: 9.8 MG/DL — SIGNIFICANT CHANGE UP (ref 8.6–10.2)
CHLORIDE SERPL-SCNC: 101 MMOL/L — SIGNIFICANT CHANGE UP (ref 98–107)
CO2 SERPL-SCNC: 26 MMOL/L — SIGNIFICANT CHANGE UP (ref 22–29)
CREAT SERPL-MCNC: 0.7 MG/DL — SIGNIFICANT CHANGE UP (ref 0.5–1.3)
EOSINOPHIL # BLD AUTO: 0.2 K/UL — SIGNIFICANT CHANGE UP (ref 0–0.5)
EOSINOPHIL NFR BLD AUTO: 2 % — SIGNIFICANT CHANGE UP (ref 0–6)
GLUCOSE SERPL-MCNC: 110 MG/DL — SIGNIFICANT CHANGE UP (ref 70–115)
HCT VFR BLD CALC: 42.8 % — SIGNIFICANT CHANGE UP (ref 37–47)
HCV AB S/CO SERPL IA: 0.13 S/CO — SIGNIFICANT CHANGE UP
HCV AB SERPL-IMP: SIGNIFICANT CHANGE UP
HGB BLD-MCNC: 13.9 G/DL — SIGNIFICANT CHANGE UP (ref 12–16)
INR BLD: 1.09 RATIO — SIGNIFICANT CHANGE UP (ref 0.88–1.16)
LYMPHOCYTES # BLD AUTO: 3.1 K/UL — SIGNIFICANT CHANGE UP (ref 1–4.8)
LYMPHOCYTES # BLD AUTO: 35.9 % — SIGNIFICANT CHANGE UP (ref 20–55)
MCHC RBC-ENTMCNC: 29.7 PG — SIGNIFICANT CHANGE UP (ref 27–31)
MCHC RBC-ENTMCNC: 32.5 G/DL — SIGNIFICANT CHANGE UP (ref 32–36)
MCV RBC AUTO: 91.5 FL — SIGNIFICANT CHANGE UP (ref 81–99)
MONOCYTES # BLD AUTO: 0.7 K/UL — SIGNIFICANT CHANGE UP (ref 0–0.8)
MONOCYTES NFR BLD AUTO: 8.6 % — SIGNIFICANT CHANGE UP (ref 3–10)
NEUTROPHILS # BLD AUTO: 4.5 K/UL — SIGNIFICANT CHANGE UP (ref 1.8–8)
NEUTROPHILS NFR BLD AUTO: 52.5 % — SIGNIFICANT CHANGE UP (ref 37–73)
PLATELET # BLD AUTO: 357 K/UL — SIGNIFICANT CHANGE UP (ref 150–400)
POTASSIUM SERPL-MCNC: 3.8 MMOL/L — SIGNIFICANT CHANGE UP (ref 3.5–5.3)
POTASSIUM SERPL-SCNC: 3.8 MMOL/L — SIGNIFICANT CHANGE UP (ref 3.5–5.3)
PROTHROM AB SERPL-ACNC: 12 SEC — SIGNIFICANT CHANGE UP (ref 9.8–12.7)
RBC # BLD: 4.68 M/UL — SIGNIFICANT CHANGE UP (ref 4.4–5.2)
RBC # FLD: 14.3 % — SIGNIFICANT CHANGE UP (ref 11–15.6)
SODIUM SERPL-SCNC: 142 MMOL/L — SIGNIFICANT CHANGE UP (ref 135–145)
TYPE + AB SCN PNL BLD: SIGNIFICANT CHANGE UP
WBC # BLD: 8.6 K/UL — SIGNIFICANT CHANGE UP (ref 4.8–10.8)
WBC # FLD AUTO: 8.6 K/UL — SIGNIFICANT CHANGE UP (ref 4.8–10.8)

## 2018-01-30 PROCEDURE — 85730 THROMBOPLASTIN TIME PARTIAL: CPT

## 2018-01-30 PROCEDURE — 99205 OFFICE O/P NEW HI 60 MIN: CPT

## 2018-01-30 PROCEDURE — G0463: CPT

## 2018-01-30 PROCEDURE — 86900 BLOOD TYPING SEROLOGIC ABO: CPT

## 2018-01-30 PROCEDURE — 86803 HEPATITIS C AB TEST: CPT

## 2018-01-30 PROCEDURE — 86901 BLOOD TYPING SEROLOGIC RH(D): CPT

## 2018-01-30 PROCEDURE — 93000 ELECTROCARDIOGRAM COMPLETE: CPT

## 2018-01-30 PROCEDURE — 85610 PROTHROMBIN TIME: CPT

## 2018-01-30 PROCEDURE — 86850 RBC ANTIBODY SCREEN: CPT

## 2018-01-30 PROCEDURE — 36415 COLL VENOUS BLD VENIPUNCTURE: CPT

## 2018-01-30 PROCEDURE — 80048 BASIC METABOLIC PNL TOTAL CA: CPT

## 2018-01-30 PROCEDURE — 85027 COMPLETE CBC AUTOMATED: CPT

## 2018-01-30 NOTE — H&P PST ADULT - FAMILY HISTORY
Mother  Still living? Yes, Estimated age: 81-90  Maternal family history of emphysema, Age at diagnosis: 51-60     Father  Still living? Yes, Estimated age:   Family history of hypertension in father, Age at diagnosis: 71-80

## 2018-01-30 NOTE — H&P PST ADULT - NSANTHOSAYNRD_GEN_A_CORE
No. DEE DEE screening performed.  STOP BANG Legend: 0-2 = LOW Risk; 3-4 = INTERMEDIATE Risk; 5-8 = HIGH Risk

## 2018-01-30 NOTE — H&P PST ADULT - ASSESSMENT
Pt is a 62 y.o female with PMH of hypertension and anemia undergoing  bilateral mastectomy right sentinal lymph node biopsy. Instructed to hold medications containing ibuprofen and aspirin starting today. Hold all vitamins starting today. Patient started on new BP medication does not recall the name, instructed to take with sip of water.

## 2018-01-30 NOTE — H&P PST ADULT - ATTENDING COMMENTS
Patient understands that she has evidence of right breast cancer (invasive and ductal) and suspicious area in left breast on MRI.  She is undergoing right mastectomy with SLNB possible ALND and left mastectomy with SLNB.  All questions were answered.

## 2018-01-30 NOTE — H&P PST ADULT - HISTORY OF PRESENT ILLNESS
Pt is a 62 y.o female with right breast bloody discharge 2 years ago and swelling lymph nodes treated with antibiotics. Patient did well for a year then right breast bloody discharge started again with increase swelling and positive cancer found in bilateral breasts found on MRI now scheduled for bilateral mastectomy right sentinal lymph node biopsy. Pt is a 62 y.o female with right breast bloody discharge 2 years ago and swelling lymph nodes treated with antibiotics in ED. Patient did well for a year then right breast bloody discharge started again with increase swelling and positive cancer found in bilateral breasts on recent MRI now scheduled for bilateral mastectomy right sentinal lymph node biopsy. Pt is a 62 y.o female with right breast bloody discharge 2 years ago and swelling lymph nodes treated with antibiotics in ED. Patient did well for a year then right breast bloody discharge started again with increase swelling and positive cancer found in right breast and suspicious area on left breast on recent MRI now scheduled for bilateral mastectomy right sentinal lymph node biopsy possible left sentinel node biopsy.

## 2018-01-31 ENCOUNTER — RESULT REVIEW (OUTPATIENT)
Age: 63
End: 2018-01-31

## 2018-02-01 ENCOUNTER — TRANSCRIPTION ENCOUNTER (OUTPATIENT)
Age: 63
End: 2018-02-01

## 2018-02-01 ENCOUNTER — OUTPATIENT (OUTPATIENT)
Dept: OUTPATIENT SERVICES | Facility: HOSPITAL | Age: 63
LOS: 1 days | End: 2018-02-01
Payer: MEDICAID

## 2018-02-01 ENCOUNTER — INPATIENT (INPATIENT)
Facility: HOSPITAL | Age: 63
LOS: 1 days | Discharge: HOME | DRG: 580 | End: 2018-02-03
Attending: SURGERY | Admitting: SURGERY
Payer: COMMERCIAL

## 2018-02-01 ENCOUNTER — APPOINTMENT (OUTPATIENT)
Dept: SURGERY | Facility: HOSPITAL | Age: 63
End: 2018-02-01

## 2018-02-01 VITALS
HEART RATE: 80 BPM | TEMPERATURE: 97 F | DIASTOLIC BLOOD PRESSURE: 81 MMHG | HEIGHT: 63 IN | OXYGEN SATURATION: 96 % | RESPIRATION RATE: 16 BRPM | WEIGHT: 293 LBS | SYSTOLIC BLOOD PRESSURE: 156 MMHG

## 2018-02-01 DIAGNOSIS — Z98.890 OTHER SPECIFIED POSTPROCEDURAL STATES: Chronic | ICD-10-CM

## 2018-02-01 DIAGNOSIS — C50.911 MALIGNANT NEOPLASM OF UNSPECIFIED SITE OF RIGHT FEMALE BREAST: ICD-10-CM

## 2018-02-01 LAB — ABO RH CONFIRMATION: SIGNIFICANT CHANGE UP

## 2018-02-01 PROCEDURE — 71045 X-RAY EXAM CHEST 1 VIEW: CPT | Mod: 26

## 2018-02-01 PROCEDURE — 19303 MAST SIMPLE COMPLETE: CPT | Mod: 50

## 2018-02-01 PROCEDURE — 19303 MAST SIMPLE COMPLETE: CPT | Mod: AS,50

## 2018-02-01 PROCEDURE — 88307 TISSUE EXAM BY PATHOLOGIST: CPT | Mod: 26

## 2018-02-01 PROCEDURE — G9001: CPT

## 2018-02-01 PROCEDURE — 38790 INJECT FOR LYMPHATIC X-RAY: CPT | Mod: 50

## 2018-02-01 PROCEDURE — 38792 RA TRACER ID OF SENTINL NODE: CPT | Mod: 50,59

## 2018-02-01 PROCEDURE — 88334 PATH CONSLTJ SURG CYTO XM EA: CPT | Mod: 26,59

## 2018-02-01 PROCEDURE — 88331 PATH CONSLTJ SURG 1 BLK 1SPC: CPT | Mod: 26

## 2018-02-01 RX ORDER — FENTANYL CITRATE 50 UG/ML
50 INJECTION INTRAVENOUS
Qty: 0 | Refills: 0 | Status: DISCONTINUED | OUTPATIENT
Start: 2018-02-01 | End: 2018-02-01

## 2018-02-01 RX ORDER — SODIUM CHLORIDE 9 MG/ML
1000 INJECTION, SOLUTION INTRAVENOUS
Qty: 0 | Refills: 0 | Status: DISCONTINUED | OUTPATIENT
Start: 2018-02-01 | End: 2018-02-02

## 2018-02-01 RX ORDER — OXYCODONE AND ACETAMINOPHEN 5; 325 MG/1; MG/1
1 TABLET ORAL EVERY 4 HOURS
Qty: 0 | Refills: 0 | Status: DISCONTINUED | OUTPATIENT
Start: 2018-02-01 | End: 2018-02-03

## 2018-02-01 RX ORDER — HYDROMORPHONE HYDROCHLORIDE 2 MG/ML
0.5 INJECTION INTRAMUSCULAR; INTRAVENOUS; SUBCUTANEOUS
Qty: 0 | Refills: 0 | Status: DISCONTINUED | OUTPATIENT
Start: 2018-02-01 | End: 2018-02-03

## 2018-02-01 RX ORDER — SODIUM CHLORIDE 9 MG/ML
1000 INJECTION, SOLUTION INTRAVENOUS
Qty: 0 | Refills: 0 | Status: DISCONTINUED | OUTPATIENT
Start: 2018-02-01 | End: 2018-02-01

## 2018-02-01 RX ORDER — ONDANSETRON 8 MG/1
4 TABLET, FILM COATED ORAL EVERY 6 HOURS
Qty: 0 | Refills: 0 | Status: DISCONTINUED | OUTPATIENT
Start: 2018-02-02 | End: 2018-02-03

## 2018-02-01 RX ORDER — ONDANSETRON 8 MG/1
8 TABLET, FILM COATED ORAL ONCE
Qty: 0 | Refills: 0 | Status: COMPLETED | OUTPATIENT
Start: 2018-02-01 | End: 2018-02-02

## 2018-02-01 RX ORDER — CEFAZOLIN SODIUM 1 G
2000 VIAL (EA) INJECTION ONCE
Qty: 0 | Refills: 0 | Status: DISCONTINUED | OUTPATIENT
Start: 2018-02-01 | End: 2018-02-01

## 2018-02-01 RX ORDER — SODIUM CHLORIDE 9 MG/ML
3 INJECTION INTRAMUSCULAR; INTRAVENOUS; SUBCUTANEOUS EVERY 8 HOURS
Qty: 0 | Refills: 0 | Status: DISCONTINUED | OUTPATIENT
Start: 2018-02-01 | End: 2018-02-01

## 2018-02-01 RX ORDER — OXYCODONE AND ACETAMINOPHEN 5; 325 MG/1; MG/1
2 TABLET ORAL EVERY 4 HOURS
Qty: 0 | Refills: 0 | Status: DISCONTINUED | OUTPATIENT
Start: 2018-02-01 | End: 2018-02-03

## 2018-02-01 RX ORDER — FENTANYL CITRATE 50 UG/ML
25 INJECTION INTRAVENOUS
Qty: 0 | Refills: 0 | Status: DISCONTINUED | OUTPATIENT
Start: 2018-02-01 | End: 2018-02-01

## 2018-02-01 RX ORDER — ONDANSETRON 8 MG/1
4 TABLET, FILM COATED ORAL ONCE
Qty: 0 | Refills: 0 | Status: COMPLETED | OUTPATIENT
Start: 2018-02-01 | End: 2018-02-01

## 2018-02-01 RX ADMIN — OXYCODONE AND ACETAMINOPHEN 1 TABLET(S): 5; 325 TABLET ORAL at 23:52

## 2018-02-01 RX ADMIN — SODIUM CHLORIDE 100 MILLILITER(S): 9 INJECTION, SOLUTION INTRAVENOUS at 22:47

## 2018-02-01 RX ADMIN — OXYCODONE AND ACETAMINOPHEN 1 TABLET(S): 5; 325 TABLET ORAL at 22:52

## 2018-02-01 RX ADMIN — ONDANSETRON 4 MILLIGRAM(S): 8 TABLET, FILM COATED ORAL at 18:37

## 2018-02-01 NOTE — BRIEF OPERATIVE NOTE - PRE-OP DX
Malignant neoplasm of right female breast, unspecified estrogen receptor status, unspecified site of breast  02/01/2018    Active  Trevor Traore

## 2018-02-01 NOTE — ASU PREOP CHECKLIST - SURGICAL CONSENT
Consult urology for chronic dysuria  
Patient called stating that he continues to have the same issue that he was seen for in December.  He wanted to know if he should see a specialist.  Please advise.  
Patient notified.  
done

## 2018-02-01 NOTE — BRIEF OPERATIVE NOTE - PROCEDURE
<<-----Click on this checkbox to enter Procedure Mastectomy, bilateral  02/01/2018    Active  [id number]  [place] node mapping with biopsy  02/01/2018    Active  [id number]

## 2018-02-02 ENCOUNTER — MOBILE ON CALL (OUTPATIENT)
Age: 63
End: 2018-02-02

## 2018-02-02 ENCOUNTER — TRANSCRIPTION ENCOUNTER (OUTPATIENT)
Age: 63
End: 2018-02-02

## 2018-02-02 DIAGNOSIS — Z90.13 ACQUIRED ABSENCE OF BILATERAL BREASTS AND NIPPLES: ICD-10-CM

## 2018-02-02 LAB
ANION GAP SERPL CALC-SCNC: 14 MMOL/L — SIGNIFICANT CHANGE UP (ref 5–17)
BUN SERPL-MCNC: 14 MG/DL — SIGNIFICANT CHANGE UP (ref 8–20)
CALCIUM SERPL-MCNC: 8.6 MG/DL — SIGNIFICANT CHANGE UP (ref 8.6–10.2)
CHLORIDE SERPL-SCNC: 102 MMOL/L — SIGNIFICANT CHANGE UP (ref 98–107)
CO2 SERPL-SCNC: 25 MMOL/L — SIGNIFICANT CHANGE UP (ref 22–29)
CREAT SERPL-MCNC: 0.74 MG/DL — SIGNIFICANT CHANGE UP (ref 0.5–1.3)
GLUCOSE SERPL-MCNC: 147 MG/DL — HIGH (ref 70–115)
HCT VFR BLD CALC: 35.7 % — LOW (ref 37–47)
HGB BLD-MCNC: 11.6 G/DL — LOW (ref 12–16)
MAGNESIUM SERPL-MCNC: 1.9 MG/DL — SIGNIFICANT CHANGE UP (ref 1.6–2.6)
MCHC RBC-ENTMCNC: 30.2 PG — SIGNIFICANT CHANGE UP (ref 27–31)
MCHC RBC-ENTMCNC: 32.5 G/DL — SIGNIFICANT CHANGE UP (ref 32–36)
MCV RBC AUTO: 93 FL — SIGNIFICANT CHANGE UP (ref 81–99)
PHOSPHATE SERPL-MCNC: 3.5 MG/DL — SIGNIFICANT CHANGE UP (ref 2.4–4.7)
PLATELET # BLD AUTO: 329 K/UL — SIGNIFICANT CHANGE UP (ref 150–400)
POTASSIUM SERPL-MCNC: 4.9 MMOL/L — SIGNIFICANT CHANGE UP (ref 3.5–5.3)
POTASSIUM SERPL-SCNC: 4.9 MMOL/L — SIGNIFICANT CHANGE UP (ref 3.5–5.3)
RBC # BLD: 3.84 M/UL — LOW (ref 4.4–5.2)
RBC # FLD: 14.7 % — SIGNIFICANT CHANGE UP (ref 11–15.6)
SODIUM SERPL-SCNC: 141 MMOL/L — SIGNIFICANT CHANGE UP (ref 135–145)
WBC # BLD: 16.4 K/UL — HIGH (ref 4.8–10.8)
WBC # FLD AUTO: 16.4 K/UL — HIGH (ref 4.8–10.8)

## 2018-02-02 RX ORDER — ASPIRIN/CALCIUM CARB/MAGNESIUM 324 MG
2 TABLET ORAL
Qty: 0 | Refills: 0 | COMMUNITY

## 2018-02-02 RX ADMIN — OXYCODONE AND ACETAMINOPHEN 1 TABLET(S): 5; 325 TABLET ORAL at 06:47

## 2018-02-02 RX ADMIN — OXYCODONE AND ACETAMINOPHEN 1 TABLET(S): 5; 325 TABLET ORAL at 20:35

## 2018-02-02 RX ADMIN — ONDANSETRON 8 MILLIGRAM(S): 8 TABLET, FILM COATED ORAL at 01:15

## 2018-02-02 RX ADMIN — SODIUM CHLORIDE 100 MILLILITER(S): 9 INJECTION, SOLUTION INTRAVENOUS at 05:49

## 2018-02-02 RX ADMIN — OXYCODONE AND ACETAMINOPHEN 1 TABLET(S): 5; 325 TABLET ORAL at 20:02

## 2018-02-02 RX ADMIN — OXYCODONE AND ACETAMINOPHEN 1 TABLET(S): 5; 325 TABLET ORAL at 13:00

## 2018-02-02 RX ADMIN — OXYCODONE AND ACETAMINOPHEN 1 TABLET(S): 5; 325 TABLET ORAL at 12:32

## 2018-02-02 RX ADMIN — OXYCODONE AND ACETAMINOPHEN 1 TABLET(S): 5; 325 TABLET ORAL at 05:47

## 2018-02-02 NOTE — DISCHARGE NOTE ADULT - PLAN OF CARE
Alleviation of pain and symptoms, back to baseline WOUND CARE: Apply clean gauze and paper tape over drain site once a day.   BATHING: Please do not submerge wound underwater. You may shower and/or sponge bathe after 48 hours. It is OK to wash drain wound site.  ACTIVITY: No heavy lifting or straining. Otherwise, you may return to your usual level of physical activity. If you are taking narcotic pain medication (such as Percocet) DO NOT drive a car, operate machinery or make important decisions.  DIET: Return to your usual diet.  NOTIFY YOUR SURGEON IF: You have any bleeding that does not stop, any pus draining from your wound(s), any fever (over 100.4 F) or chills, persistent nausea/vomiting, persistent diarrhea, chest pain, shortness of breath, or if your pain is not controlled on your discharge pain medications.  FOLLOW-UP: Please follow up with your primary care physician, Dr. Gregg, and Dr. Souza in one week regarding your hospitalization. Call for appointment upon discharge. WOUND CARE: Apply clean gauze and tape over drain site once a day.   BATHING: Please do not submerge wound underwater. You may shower and/or sponge bathe after 48 hours. It is OK to wash drain wound site.  ACTIVITY: No heavy lifting or straining. Otherwise, you may return to your usual level of physical activity. If you are taking narcotic pain medication (such as Percocet) DO NOT drive a car, operate machinery or make important decisions.  DIET: Return to your usual diet.  NOTIFY YOUR SURGEON IF: You have any bleeding that does not stop, any pus draining from your wound(s), any fever (over 100.4 F) or chills, persistent nausea/vomiting, persistent diarrhea, chest pain, shortness of breath, or if your pain is not controlled on your discharge pain medications.  FOLLOW-UP: Please follow up with your primary care physician, Dr. Gregg, and Dr. Souza in one week regarding your hospitalization. Call for appointment upon discharge. WOUND CARE: Apply clean gauze and tape over drain site once a day.   BATHING: Please do not submerge wounds underwater. You may shower and/or sponge bathe after 48 hours. It is OK to wash drain wound site.  ACTIVITY: No heavy lifting or straining. Otherwise, you may return to your usual level of physical activity. If you are taking narcotic pain medication (such as Percocet) DO NOT drive a car, operate machinery or make important decisions.  DIET: Return to your usual diet.  NOTIFY YOUR SURGEON IF: You have any bleeding that does not stop, any pus draining from your wound(s), any fever (over 100.4 F) or chills, persistent nausea/vomiting, persistent diarrhea, chest pain, shortness of breath, or if your pain is not controlled on your discharge pain medications.  FOLLOW-UP: Please follow up with your primary care physician, Dr. Gregg, and Dr. Souza in one week regarding your hospitalization. Call for appointment upon discharge.

## 2018-02-02 NOTE — DISCHARGE NOTE ADULT - MEDICATION SUMMARY - MEDICATIONS TO TAKE
I will START or STAY ON the medications listed below when I get home from the hospital:    ibuprofen 200 mg oral tablet  -- 2 tab(s) by mouth every 6 hours, As Needed  -- Indication: For Pain    oxyCODONE-acetaminophen 5 mg-325 mg oral tablet  -- 1-2 tab(s) by mouth every 4 hours, As needed MDD:10  -- Indication: For Pain    oxyCODONE-acetaminophen 5 mg-325 mg oral tablet  -- 2 tab(s) by mouth every 4 hours, As needed, Severe Pain (7 - 10)  -- Indication: For pain    amLODIPine 5 mg oral tablet  -- 1 tab(s) by mouth once a day  -- Indication: For HTN    ferrous sulfate 325 mg (65 mg elemental iron) oral tablet  -- 1 tab(s) by mouth once a day  -- Indication: For anemia    Fish Oil 1000 mg oral capsule  -- 3 cap(s) by mouth once a day  -- Indication: For nutritional supplementation    Multiple Vitamins oral tablet  -- 1 tab(s) by mouth once a day  -- Indication: For Nutritional supplementatio

## 2018-02-02 NOTE — DISCHARGE NOTE ADULT - CARE PLAN
Principal Discharge DX:	Breast cancer in female  Goal:	Alleviation of pain and symptoms, back to baseline  Assessment and plan of treatment:	WOUND CARE: Apply clean gauze and paper tape over drain site once a day.   BATHING: Please do not submerge wound underwater. You may shower and/or sponge bathe after 48 hours. It is OK to wash drain wound site.  ACTIVITY: No heavy lifting or straining. Otherwise, you may return to your usual level of physical activity. If you are taking narcotic pain medication (such as Percocet) DO NOT drive a car, operate machinery or make important decisions.  DIET: Return to your usual diet.  NOTIFY YOUR SURGEON IF: You have any bleeding that does not stop, any pus draining from your wound(s), any fever (over 100.4 F) or chills, persistent nausea/vomiting, persistent diarrhea, chest pain, shortness of breath, or if your pain is not controlled on your discharge pain medications.  FOLLOW-UP: Please follow up with your primary care physician, Dr. Gregg, and Dr. Souza in one week regarding your hospitalization. Call for appointment upon discharge. Principal Discharge DX:	Breast cancer in female  Goal:	Alleviation of pain and symptoms, back to baseline  Assessment and plan of treatment:	WOUND CARE: Apply clean gauze and tape over drain site once a day.   BATHING: Please do not submerge wound underwater. You may shower and/or sponge bathe after 48 hours. It is OK to wash drain wound site.  ACTIVITY: No heavy lifting or straining. Otherwise, you may return to your usual level of physical activity. If you are taking narcotic pain medication (such as Percocet) DO NOT drive a car, operate machinery or make important decisions.  DIET: Return to your usual diet.  NOTIFY YOUR SURGEON IF: You have any bleeding that does not stop, any pus draining from your wound(s), any fever (over 100.4 F) or chills, persistent nausea/vomiting, persistent diarrhea, chest pain, shortness of breath, or if your pain is not controlled on your discharge pain medications.  FOLLOW-UP: Please follow up with your primary care physician, Dr. Gregg, and Dr. Souza in one week regarding your hospitalization. Call for appointment upon discharge. Principal Discharge DX:	Breast cancer in female  Goal:	Alleviation of pain and symptoms, back to baseline  Assessment and plan of treatment:	WOUND CARE: Apply clean gauze and tape over drain site once a day.   BATHING: Please do not submerge wounds underwater. You may shower and/or sponge bathe after 48 hours. It is OK to wash drain wound site.  ACTIVITY: No heavy lifting or straining. Otherwise, you may return to your usual level of physical activity. If you are taking narcotic pain medication (such as Percocet) DO NOT drive a car, operate machinery or make important decisions.  DIET: Return to your usual diet.  NOTIFY YOUR SURGEON IF: You have any bleeding that does not stop, any pus draining from your wound(s), any fever (over 100.4 F) or chills, persistent nausea/vomiting, persistent diarrhea, chest pain, shortness of breath, or if your pain is not controlled on your discharge pain medications.  FOLLOW-UP: Please follow up with your primary care physician, Dr. Gregg, and Dr. Souza in one week regarding your hospitalization. Call for appointment upon discharge.

## 2018-02-02 NOTE — DISCHARGE NOTE ADULT - PATIENT PORTAL LINK FT
“You can access the FollowHealth Patient Portal, offered by [facility], by registering with the following website: [email or web address]”

## 2018-02-02 NOTE — DISCHARGE NOTE ADULT - CARE PROVIDER_API CALL
Arlene Gregg), Surgery  [address] [place] [address]  Phone: [phone]  Fax: [phone]    Lokesh Souza (MD), Plastic Surgery; Surgery; Surgery of the Hand  [address]  [address]  [place] [address]  Phone: [phone]  Fax: [phone]

## 2018-02-02 NOTE — DISCHARGE NOTE ADULT - CARE PROVIDERS DIRECT ADDRESSES
[email or web address]

## 2018-02-02 NOTE — DISCHARGE NOTE ADULT - HOSPITAL COURSE
Pt presented to [facility] on 2/1 for bilateral mastectomy. The patient tolerated procedure well, she was extubated and transferred to PACU then floor. Her hospital stay was uneventful. Upon discharge, her pain is well controlled, she is tolerating her diet without nausea or vomiting, voiding and ambulating independently, labs/vitals reviewed and are within normal limits, remains hemodynamically well and stable.   Pt is to f/u with Dr. Gregg and Dr. Souza,  The appropriate medications were sent to her pharmacy. Pt presented to [facility] on 2/1 for bilateral mastectomy. The patient tolerated procedure well, she was extubated and transferred to PACU then floor. Her hospital stay was uneventful. Upon discharge, her pain is well controlled, she is tolerating her diet without nausea or vomiting, voiding and ambulating independently, labs/vitals reviewed and are within normal limits, remains hemodynamically well and stable.     Pt is to f/u with Dr. Gregg and Dr. Souza,    The appropriate medications were sent to her pharmacy.

## 2018-02-02 NOTE — PROGRESS NOTE ADULT - PROBLEM SELECTOR PLAN 1
DC in AM  Ambulate  Follow up with primary care  VNS  Follow up in office in 2 weeks
CHange dressings prn, karley, shola HOUSTON output, ROSEMARIE teaching, home care, discharge home later today, plan per Dr Gregg

## 2018-02-02 NOTE — PROGRESS NOTE ADULT - ASSESSMENT
61 yo postmenopausal female POD 1 s/p bilateral mastectomy with bilateral SLNB for right breast cancer.  Nodes negative on frozen sectioning.  Patient is doing well with some pain control issues.  She will stay overnight and be discharged in the morning with plan for VNS.  1. OOB and ambulate with assistance  2. Regular diet  3. DC IV fluids  4. Pain control (transition to oral pain meds)  5. DC in AM with VNS  6. Follow up in office in 2 weeks to review pathology  7. Follow up with primary care physician
62 y F POD 0 b/l mastectomy with sentinel lymph node biopsy. c/o some nausea, however pain is well controlled at this time. No acute concerns at present.  -pain control prn  -regular diet  -f/u TOV , will bladder scan at 8 hrs post-procedure if she has not voided   -zofran for nausea  -oob, iss  -dvt ppx SCD's   -vitals reviewed, will monitor  -f/u ROSEMARIE outputs  -most likely d/c in AM
POD #1 s/p GETA. A&Ox3/VSS. Denies any dysphagia, hoarseness - mild sore throat. No N/V and pain controlled. No complications r/t yest GA. No questions at this time.
stable

## 2018-02-03 VITALS
RESPIRATION RATE: 20 BRPM | HEART RATE: 78 BPM | OXYGEN SATURATION: 96 % | DIASTOLIC BLOOD PRESSURE: 70 MMHG | SYSTOLIC BLOOD PRESSURE: 124 MMHG | TEMPERATURE: 98 F

## 2018-02-03 PROCEDURE — 88331 PATH CONSLTJ SURG 1 BLK 1SPC: CPT

## 2018-02-03 PROCEDURE — 88333 PATH CONSLTJ SURG CYTO XM 1: CPT

## 2018-02-03 PROCEDURE — 71045 X-RAY EXAM CHEST 1 VIEW: CPT

## 2018-02-03 PROCEDURE — A9541: CPT

## 2018-02-03 PROCEDURE — 80048 BASIC METABOLIC PNL TOTAL CA: CPT

## 2018-02-03 PROCEDURE — 36415 COLL VENOUS BLD VENIPUNCTURE: CPT

## 2018-02-03 PROCEDURE — 83735 ASSAY OF MAGNESIUM: CPT

## 2018-02-03 PROCEDURE — 88307 TISSUE EXAM BY PATHOLOGIST: CPT

## 2018-02-03 PROCEDURE — 85027 COMPLETE CBC AUTOMATED: CPT

## 2018-02-03 PROCEDURE — 38792 RA TRACER ID OF SENTINL NODE: CPT

## 2018-02-03 PROCEDURE — 84100 ASSAY OF PHOSPHORUS: CPT

## 2018-02-03 RX ADMIN — OXYCODONE AND ACETAMINOPHEN 1 TABLET(S): 5; 325 TABLET ORAL at 08:15

## 2018-02-03 RX ADMIN — OXYCODONE AND ACETAMINOPHEN 1 TABLET(S): 5; 325 TABLET ORAL at 08:44

## 2018-02-03 RX ADMIN — OXYCODONE AND ACETAMINOPHEN 1 TABLET(S): 5; 325 TABLET ORAL at 13:45

## 2018-02-03 RX ADMIN — OXYCODONE AND ACETAMINOPHEN 1 TABLET(S): 5; 325 TABLET ORAL at 14:15

## 2018-02-03 RX ADMIN — ONDANSETRON 4 MILLIGRAM(S): 8 TABLET, FILM COATED ORAL at 10:11

## 2018-02-03 RX ADMIN — OXYCODONE AND ACETAMINOPHEN 1 TABLET(S): 5; 325 TABLET ORAL at 01:54

## 2018-02-03 RX ADMIN — OXYCODONE AND ACETAMINOPHEN 1 TABLET(S): 5; 325 TABLET ORAL at 02:30

## 2018-02-03 NOTE — PROGRESS NOTE ADULT - SUBJECTIVE AND OBJECTIVE BOX
Anesthesia Post Op Note:      INTERVAL HPI/OVERNIGHT EVENTS:    MEDICATIONS  (STANDING):  multiple electrolytes Injection Type 1 1000 milliLiter(s) (100 mL/Hr) IV Continuous <Continuous>    MEDICATIONS  (PRN):  HYDROmorphone  Injectable 0.5 milliGRAM(s) IV Push every 3 hours PRN Pain refractory to percocet  ondansetron    Tablet 4 milliGRAM(s) Oral every 6 hours PRN Nausea  oxyCODONE    5 mG/acetaminophen 325 mG 1 Tablet(s) Oral every 4 hours PRN Moderate Pain (4 - 6)  oxyCODONE    5 mG/acetaminophen 325 mG 2 Tablet(s) Oral every 4 hours PRN Severe Pain (7 - 10)      Allergies    latex (Rash)  sulfa drugs (Hives)    Intolerances    codeine (Vomiting)      REVIEW OF SYSTEMS:    CONSTITUTIONAL: No fever, weight loss, or fatigue  EYES: No eye pain, visual disturbances, or discharge  ENMT:  No difficulty hearing, tinnitus, vertigo; No sinus or throat pain  NECK: No pain or stiffness  BREASTS: No pain, masses, or nipple discharge  RESPIRATORY: No cough, wheezing, chills or hemoptysis; No shortness of breath  CARDIOVASCULAR: No chest pain, palpitations, dizziness, or leg swelling  GASTROINTESTINAL: No abdominal or epigastric pain. No nausea, vomiting, or hematemesis; No diarrhea or constipation. No melena or hematochezia.  GENITOURINARY: No dysuria, frequency, hematuria, or incontinence  NEUROLOGICAL: No headaches, memory loss, loss of strength, numbness, or tremors  SKIN: No itching, burning, rashes, or lesions   LYMPH NODES: No enlarged glands  ENDOCRINE: No heat or cold intolerance; No hair loss  MUSCULOSKELETAL: No joint pain or swelling; No muscle, back, or extremity pain  PSYCHIATRIC: No depression, anxiety, mood swings, or difficulty sleeping  HEME/LYMPH: No easy bruising, or bleeding gums  ALLERY AND IMMUNOLOGIC: No hives or eczema    Vital Signs Last 24 Hrs  T(C): 36.8 (02 Feb 2018 05:07), Max: 36.8 (02 Feb 2018 05:07)  T(F): 98.2 (02 Feb 2018 05:07), Max: 98.2 (02 Feb 2018 05:07)  HR: 74 (02 Feb 2018 05:07) (72 - 84)  BP: 111/58 (02 Feb 2018 05:07) (98/79 - 132/71)  BP(mean): --  RR: 16 (02 Feb 2018 05:07) (12 - 18)  SpO2: 100% (02 Feb 2018 05:07) (95% - 100%)        PHYSICAL EXAM:    GENERAL: NAD, well-groomed, well-developed  HEAD:  Atraumatic, Normocephalic  EYES: EOMI, PERRLA, conjunctiva and sclera clear  ENMT: No tonsillar erythema, exudates, or enlargement; Moist mucous membranes, Good dentition, No lesions  NECK: Supple, No JVD, Normal thyroid  NERVOUS SYSTEM:  Alert & Oriented X3, Good concentration; Motor Strength 5/5 B/L upper and lower extremities; DTRs 2+ intact and symmetric  CHEST/LUNG: Clear to percussion bilaterally; No rales, rhonchi, wheezing, or rubs  HEART: Regular rate and rhythm; No murmurs, rubs, or gallops  ABDOMEN: Soft, Nontender, Nondistended; Bowel sounds present  EXTREMITIES:  2+ Peripheral Pulses, No clubbing, cyanosis, or edema  LYMPH: No lymphadenopathy noted  SKIN: No rashes or lesions      LABS:                        11.6   16.4  )-----------( 329      ( 02 Feb 2018 07:04 )             35.7     02-02    141  |  102  |  14.0  ----------------------------<  147<H>  4.9   |  25.0  |  0.74    Ca    8.6      02 Feb 2018 07:04  Phos  3.5     02-02  Mg     1.9     02-02            RADIOLOGY & ADDITIONAL TESTS:
Hospital Day #    POD # 2 s/p bilateral mastectomies, with sentinel node biopsies.     IV: SL    I&O's Summary    02 Feb 2018 07:01  -  03 Feb 2018 07:00  --------------------------------------------------------  IN: 0 mL / OUT: 220 mL / NET: -220 mL        diet: regular- tolerating PO denies nausea or vomiting    Patient: afebrile VSS awake and alert resting in bed comfortable, no acute distress, feeling better today.     T(C): 36.7 (02-02-18 @ 23:52), Max: 37 (02-02-18 @ 19:46)  HR: 77 (02-02-18 @ 23:52) (76 - 77)  BP: 149/69 (02-02-18 @ 23:52) (116/66 - 149/69)  RR: 18 (02-02-18 @ 23:52) (18 - 18)  SpO2: 97% (02-02-18 @ 23:52) (97% - 99%)  Wt(kg): --    chest: good air exchange, clear BS, denies SOB, or chest pain           surgical sites , clean and dry , mild incisional discomfort. relief with meds. with out erythemia, or drainage at present. dry dressings in place.  ROSEMARIE's X 2 in place and draining serosangenous fluid          Left drain with 40cc last shift/ 110 cc 24 hrs.         Right drain with 40cc last shift/ 110cc 24 hrs.   Abdomen: obese, soft non-distended, non-tender on palpation  output: voiding adequate  Extremities: warm to toes , OOB and ambulates at home with cane.                           11.6   16.4  )-----------( 329      ( 02 Feb 2018 07:04 )        **LABS REVIEWED**             35.7     02-02    141  |  102  |  14.0  ----------------------------<  147<H>  4.9   |  25.0  |  0.74    Ca    8.6      02 Feb 2018 07:04  Phos  3.5     02-02  Mg     1.9     02-02          xrays:    PAST MEDICAL & SURGICAL HISTORY:  Anemia  Osteoarthritis: left knee  Gallstones  Breast cancer in female  Hypertension  History of tonsillectomy        discuss with Surgeon present situation and continued care.   Impression: stable POD # 2 tolerated surgery well, slowly improving surgical site clean and stable, ROSEMARIE drains continue with drainage. ( home with drains )   Plan: discharge home today with full follow up instructions given to patient and with pain medication.
Patient is POD 1 s/p bilateral mastectomy with bilateral sentinel lymph node biopsy.  She is doing well with moderate pain, controlled by pain medication.  She is sitting in a chair and denies SOB, chest pain.      ROSEMARIE drains are in place b/l with serousanguinous drainage.     Breast: Right mastectomy with horizontal incision, clean, dry and intact. Right sentinel biopsy was performed through the mastectomy incision. ROSEMARIE in place.   Left mastectomy with horizontal incision, clean, dry and intact.  Left axillary incision, clean, dry and intact. ROSEMARIE in place. No evidence of infection or hematoma.
Post-op Check    Subjective:  Pt seen and examined at bedside for POC. C/o some nausea. Pain well controlled on current regiment. Has not been out of bed since procedure, has not voided yet. Denies chest pain and SOB. vomiting/diarrhea.     STATUS POST:  b/l masectomy, sentinel lymph node biopsy    POST OPERATIVE DAY #: 0    MEDICATIONS  (STANDING):  multiple electrolytes Injection Type 1 1000 milliLiter(s) (100 mL/Hr) IV Continuous <Continuous>    MEDICATIONS  (PRN):  HYDROmorphone  Injectable 0.5 milliGRAM(s) IV Push every 3 hours PRN Pain refractory to percocet  oxyCODONE    5 mG/acetaminophen 325 mG 1 Tablet(s) Oral every 4 hours PRN Moderate Pain (4 - 6)  oxyCODONE    5 mG/acetaminophen 325 mG 2 Tablet(s) Oral every 4 hours PRN Severe Pain (7 - 10)      Vital Signs Last 24 Hrs  T(C): 36.4 (01 Feb 2018 22:25), Max: 36.4 (01 Feb 2018 22:25)  T(F): 97.5 (01 Feb 2018 22:25), Max: 97.5 (01 Feb 2018 22:25)  HR: 78 (01 Feb 2018 22:25) (72 - 83)  BP: 126/77 (01 Feb 2018 22:25) (98/79 - 156/81)  BP(mean): --  RR: 18 (01 Feb 2018 22:25) (12 - 18)  SpO2: 95% (01 Feb 2018 22:25) (95% - 98%)    Physical Exam:    Constitutional: NAD  Neck: No JVD, FROM without pain  Respiratory: no accessory muscle use, CTAB/l, non-labored  cardio: normal s1/s2, RRR  Neurological: A&O x 3; without gross deficit  Chest: abdominal binder in place, incisions with no active bleeding or drainage. ROSEMARIE to left breast with 50 cc sang fluid, ROSEMARIE r breast 50 cc sang fluid.
SURGICAL PA NOTE:     STATUS POST:      Diagnosis:    Pre-Op Diagnosis:  Malignant neoplasm of right female breast, unspecified estrogen receptor status, unspecified site of breast  02/01/2018    Active  Trevor Traore     Post-Op Dx:  Malignant neoplasm of right female breast, unspecified estrogen receptor status, unspecified site of breast  02/01/2018    Active  Trevor Traore    Procedure:    Procedure:  Mastectomy, bilateral  02/01/2018    Active  [id number]  [place] node mapping with biopsy  02/01/2018    Active  [id number].       Operative Findings:  · Operative Findings	No acute findings	    Specimens/Blood Loss/IV/Output/Protocol/VTE:    Specimens/Blood Loss/IV/Output/Protocol/VTE:  · Specimens	bilateral breasts, sentinel node biopsies	  · Estimated Blood Loss	200 milliLiter(s)	      POST OPERATIVE DAY #: 1    Vital Signs Last 24 Hrs  T(C): 36.8 (02 Feb 2018 05:07), Max: 36.8 (02 Feb 2018 05:07)  T(F): 98.2 (02 Feb 2018 05:07), Max: 98.2 (02 Feb 2018 05:07)  HR: 74 (02 Feb 2018 05:07) (72 - 84)  BP: 111/58 (02 Feb 2018 05:07) (98/79 - 156/81)  BP(mean): --  RR: 16 (02 Feb 2018 05:07) (12 - 18)  SpO2: 100% (02 Feb 2018 05:07) (95% - 100%)    HPI:      Malignant neoplasm of right female breast  No h/o HF  Family history of hypertension in father (Father)  Maternal family history of emphysema (Mother)  MEWS Score  Anemia  Osteoarthritis  Gallstones  Breast cancer in female  Hypertension  No pertinent past medical history  Malignant neoplasm of right female breast, unspecified estrogen receptor status, unspecified site of breast  Malignant neoplasm of right female breast, unspecified estrogen receptor status, unspecified site of breast  Mastectomy, bilateral  Breast cancer in female  [place] node mapping with biopsy  Mastectomy, bilateral  History of tonsillectomy  No significant past surgical history  MALIGNANT NEOPLASM OF UNSP SIT  [place] node mapping with biopsy      SUBJECTIVE: Pt seen lying supine with HOB up, minimal c/o incisional pain, able to get OOB for BRP, joel po well, + void    Diet: regular    Activity: OOB    Fevers: [ ]Yes [ x]NO  Chills: [ ] Yes [ x] NO  SOB:  [ ] YES [x ] NO  Dyspnea: [ ]YES [x ]NO  Chest Discomfort: [ ] YES [x ] NO    Nausea: [ ] YES [ x] NO           Vomiting: [ ] YES [x ] NO  Flatus: [x ] YES [ ] NO             Bowel Movement: [ ] YES [x ] NO  Diarrhea: [ ] YES [ x] NO         Void: [x ]YES [ ]No  Constipation: [ ] YES [ ] NO       Pain (0-10):    3          Pain Control Adequate: [ ] YES [ ] NO    Jamin:    [facility]:      I&O's Detail    01 Feb 2018 07:01  -  02 Feb 2018 07:00  --------------------------------------------------------  IN:    multiple electrolytes Injection Type 1: 900 mL  Total IN: 900 mL    OUT:    Bulb: 115 mL    Bulb: 105 mL    Voided: 1000 mL  Total OUT: 1220 mL    Total NET: -320 mL        I&O's Summary    01 Feb 2018 07:01  -  02 Feb 2018 07:00  --------------------------------------------------------  IN: 900 mL / OUT: 1220 mL / NET: -320 mL      I&O's Detail    01 Feb 2018 07:01  -  02 Feb 2018 07:00  --------------------------------------------------------  IN:    multiple electrolytes Injection Type 1: 900 mL  Total IN: 900 mL    OUT:    Bulb: 115 mL    Bulb: 105 mL    Voided: 1000 mL  Total OUT: 1220 mL    Total NET: -320 mL          MEDICATIONS  (STANDING):  multiple electrolytes Injection Type 1 1000 milliLiter(s) (100 mL/Hr) IV Continuous <Continuous>    MEDICATIONS  (PRN):  HYDROmorphone  Injectable 0.5 milliGRAM(s) IV Push every 3 hours PRN Pain refractory to percocet  ondansetron    Tablet 4 milliGRAM(s) Oral every 6 hours PRN Nausea  oxyCODONE    5 mG/acetaminophen 325 mG 1 Tablet(s) Oral every 4 hours PRN Moderate Pain (4 - 6)  oxyCODONE    5 mG/acetaminophen 325 mG 2 Tablet(s) Oral every 4 hours PRN Severe Pain (7 - 10)      LABS:                        11.6   16.4  )-----------( 329      ( 02 Feb 2018 07:04 )             35.7     02-02    141  |  102  |  14.0  ----------------------------<  147<H>  4.9   |  25.0  |  0.74    Ca    8.6      02 Feb 2018 07:04  Phos  3.5     02-02  Mg     1.9     02-02              RADIOLOGY & ADDITIONAL STUDIES:

## 2018-02-07 DIAGNOSIS — R69 ILLNESS, UNSPECIFIED: ICD-10-CM

## 2018-02-12 ENCOUNTER — APPOINTMENT (OUTPATIENT)
Dept: SURGERY | Facility: CLINIC | Age: 63
End: 2018-02-12
Payer: MEDICARE

## 2018-02-12 VITALS
TEMPERATURE: 98.2 F | HEART RATE: 83 BPM | BODY MASS INDEX: 51.91 KG/M2 | DIASTOLIC BLOOD PRESSURE: 83 MMHG | WEIGHT: 293 LBS | HEIGHT: 63 IN | OXYGEN SATURATION: 96 % | SYSTOLIC BLOOD PRESSURE: 144 MMHG

## 2018-02-12 PROCEDURE — 99024 POSTOP FOLLOW-UP VISIT: CPT

## 2018-02-13 LAB — SURGICAL PATHOLOGY FINAL REPORT - CH: SIGNIFICANT CHANGE UP

## 2018-02-15 ENCOUNTER — APPOINTMENT (OUTPATIENT)
Dept: PLASTIC SURGERY | Facility: CLINIC | Age: 63
End: 2018-02-15
Payer: MEDICARE

## 2018-02-15 VITALS
DIASTOLIC BLOOD PRESSURE: 77 MMHG | HEART RATE: 53 BPM | BODY MASS INDEX: 51.91 KG/M2 | HEIGHT: 63 IN | WEIGHT: 293 LBS | OXYGEN SATURATION: 97 % | SYSTOLIC BLOOD PRESSURE: 178 MMHG | TEMPERATURE: 98.4 F

## 2018-02-15 PROCEDURE — 99024 POSTOP FOLLOW-UP VISIT: CPT

## 2018-02-20 ENCOUNTER — APPOINTMENT (OUTPATIENT)
Dept: SURGERY | Facility: CLINIC | Age: 63
End: 2018-02-20
Payer: MEDICARE

## 2018-02-20 VITALS
BODY MASS INDEX: 51.91 KG/M2 | WEIGHT: 293 LBS | HEART RATE: 83 BPM | HEIGHT: 63 IN | DIASTOLIC BLOOD PRESSURE: 85 MMHG | SYSTOLIC BLOOD PRESSURE: 132 MMHG | OXYGEN SATURATION: 95 % | TEMPERATURE: 98.2 F

## 2018-02-20 PROCEDURE — 99024 POSTOP FOLLOW-UP VISIT: CPT

## 2018-03-05 ENCOUNTER — OUTPATIENT (OUTPATIENT)
Dept: OUTPATIENT SERVICES | Facility: HOSPITAL | Age: 63
LOS: 1 days | Discharge: HOME | End: 2018-03-05

## 2018-03-05 ENCOUNTER — OTHER (OUTPATIENT)
Age: 63
End: 2018-03-05

## 2018-03-05 DIAGNOSIS — D05.90 UNSPECIFIED TYPE OF CARCINOMA IN SITU OF UNSPECIFIED BREAST: ICD-10-CM

## 2018-03-05 DIAGNOSIS — Z98.890 OTHER SPECIFIED POSTPROCEDURAL STATES: Chronic | ICD-10-CM

## 2018-03-07 LAB — PATH REPORT ADDENDUM.SYNOPTIC DOC: SIGNIFICANT CHANGE UP

## 2018-03-09 ENCOUNTER — RESULT REVIEW (OUTPATIENT)
Age: 63
End: 2018-03-09

## 2018-03-09 ENCOUNTER — APPOINTMENT (OUTPATIENT)
Dept: HEMATOLOGY ONCOLOGY | Facility: CLINIC | Age: 63
End: 2018-03-09
Payer: MEDICARE

## 2018-03-09 VITALS
TEMPERATURE: 98.4 F | DIASTOLIC BLOOD PRESSURE: 97 MMHG | SYSTOLIC BLOOD PRESSURE: 168 MMHG | HEIGHT: 63.11 IN | WEIGHT: 293 LBS | HEART RATE: 84 BPM | BODY MASS INDEX: 51.91 KG/M2 | OXYGEN SATURATION: 98 %

## 2018-03-09 LAB
BASOPHILS # BLD AUTO: 0.2 K/UL — SIGNIFICANT CHANGE UP (ref 0–0.2)
BASOPHILS NFR BLD AUTO: 2 % — SIGNIFICANT CHANGE UP (ref 0–2)
EOSINOPHIL # BLD AUTO: 0.3 K/UL — SIGNIFICANT CHANGE UP (ref 0–0.5)
EOSINOPHIL NFR BLD AUTO: 3.1 % — SIGNIFICANT CHANGE UP (ref 0–6)
HCT VFR BLD CALC: 41.1 % — SIGNIFICANT CHANGE UP (ref 34.5–45)
HGB BLD-MCNC: 14.1 G/DL — SIGNIFICANT CHANGE UP (ref 11.5–15.5)
LYMPHOCYTES # BLD AUTO: 3.2 K/UL — SIGNIFICANT CHANGE UP (ref 1–3.3)
LYMPHOCYTES # BLD AUTO: 35.1 % — SIGNIFICANT CHANGE UP (ref 13–44)
MCHC RBC-ENTMCNC: 30.8 PG — SIGNIFICANT CHANGE UP (ref 27–34)
MCHC RBC-ENTMCNC: 34.3 GM/DL — SIGNIFICANT CHANGE UP (ref 32–36)
MCV RBC AUTO: 89.7 FL — SIGNIFICANT CHANGE UP (ref 80–100)
MONOCYTES # BLD AUTO: 1 K/UL — HIGH (ref 0–0.9)
MONOCYTES NFR BLD AUTO: 10.8 % — SIGNIFICANT CHANGE UP (ref 2–14)
NEUTROPHILS # BLD AUTO: 4.4 K/UL — SIGNIFICANT CHANGE UP (ref 1.8–7.4)
NEUTROPHILS NFR BLD AUTO: 49 % — SIGNIFICANT CHANGE UP (ref 43–77)
PLATELET # BLD AUTO: 362 K/UL — SIGNIFICANT CHANGE UP (ref 150–400)
RBC # BLD: 4.58 M/UL — SIGNIFICANT CHANGE UP (ref 3.8–5.2)
RBC # FLD: 13 % — SIGNIFICANT CHANGE UP (ref 10.3–14.5)
WBC # BLD: 9 K/UL — SIGNIFICANT CHANGE UP (ref 3.8–10.5)
WBC # FLD AUTO: 9 K/UL — SIGNIFICANT CHANGE UP (ref 3.8–10.5)

## 2018-03-09 PROCEDURE — 99205 OFFICE O/P NEW HI 60 MIN: CPT

## 2018-03-09 RX ORDER — CIPROFLOXACIN HYDROCHLORIDE 500 MG/1
500 TABLET, FILM COATED ORAL
Qty: 14 | Refills: 0 | Status: DISCONTINUED | COMMUNITY
Start: 2018-02-20 | End: 2018-03-09

## 2018-03-12 ENCOUNTER — RESULT REVIEW (OUTPATIENT)
Age: 63
End: 2018-03-12

## 2018-03-20 ENCOUNTER — APPOINTMENT (OUTPATIENT)
Dept: PLASTIC SURGERY | Facility: CLINIC | Age: 63
End: 2018-03-20
Payer: MEDICAID

## 2018-03-20 VITALS
DIASTOLIC BLOOD PRESSURE: 83 MMHG | TEMPERATURE: 98.1 F | HEIGHT: 63 IN | BODY MASS INDEX: 51.91 KG/M2 | SYSTOLIC BLOOD PRESSURE: 148 MMHG | OXYGEN SATURATION: 97 % | RESPIRATION RATE: 14 BRPM | WEIGHT: 293 LBS | HEART RATE: 80 BPM

## 2018-03-20 PROCEDURE — 99024 POSTOP FOLLOW-UP VISIT: CPT

## 2018-03-27 ENCOUNTER — OUTPATIENT (OUTPATIENT)
Dept: OUTPATIENT SERVICES | Facility: HOSPITAL | Age: 63
LOS: 1 days | Discharge: HOME | End: 2018-03-27

## 2018-03-27 ENCOUNTER — APPOINTMENT (OUTPATIENT)
Dept: RADIATION ONCOLOGY | Facility: CLINIC | Age: 63
End: 2018-03-27
Payer: MEDICARE

## 2018-03-27 DIAGNOSIS — Z98.890 OTHER SPECIFIED POSTPROCEDURAL STATES: Chronic | ICD-10-CM

## 2018-03-27 PROCEDURE — 99024 POSTOP FOLLOW-UP VISIT: CPT

## 2018-04-02 ENCOUNTER — FORM ENCOUNTER (OUTPATIENT)
Age: 63
End: 2018-04-02

## 2018-04-03 ENCOUNTER — APPOINTMENT (OUTPATIENT)
Dept: RADIOLOGY | Facility: CLINIC | Age: 63
End: 2018-04-03
Payer: MEDICARE

## 2018-04-03 ENCOUNTER — OUTPATIENT (OUTPATIENT)
Dept: OUTPATIENT SERVICES | Facility: HOSPITAL | Age: 63
LOS: 1 days | End: 2018-04-03
Payer: COMMERCIAL

## 2018-04-03 DIAGNOSIS — C50.911 MALIGNANT NEOPLASM OF UNSPECIFIED SITE OF RIGHT FEMALE BREAST: ICD-10-CM

## 2018-04-03 DIAGNOSIS — Z98.890 OTHER SPECIFIED POSTPROCEDURAL STATES: Chronic | ICD-10-CM

## 2018-04-03 PROCEDURE — 77080 DXA BONE DENSITY AXIAL: CPT

## 2018-04-03 PROCEDURE — 77080 DXA BONE DENSITY AXIAL: CPT | Mod: 26

## 2018-05-11 ENCOUNTER — OUTPATIENT (OUTPATIENT)
Dept: OUTPATIENT SERVICES | Facility: HOSPITAL | Age: 63
LOS: 1 days | Discharge: HOME | End: 2018-05-11

## 2018-05-11 DIAGNOSIS — Z98.890 OTHER SPECIFIED POSTPROCEDURAL STATES: Chronic | ICD-10-CM

## 2018-05-11 DIAGNOSIS — D05.90 UNSPECIFIED TYPE OF CARCINOMA IN SITU OF UNSPECIFIED BREAST: ICD-10-CM

## 2018-05-15 ENCOUNTER — APPOINTMENT (OUTPATIENT)
Dept: SURGERY | Facility: CLINIC | Age: 63
End: 2018-05-15
Payer: MEDICARE

## 2018-05-15 VITALS
SYSTOLIC BLOOD PRESSURE: 150 MMHG | TEMPERATURE: 98.2 F | HEART RATE: 94 BPM | WEIGHT: 293 LBS | DIASTOLIC BLOOD PRESSURE: 84 MMHG | OXYGEN SATURATION: 94 % | BODY MASS INDEX: 51.91 KG/M2 | HEIGHT: 63 IN

## 2018-05-15 PROCEDURE — 99214 OFFICE O/P EST MOD 30 MIN: CPT

## 2018-05-21 ENCOUNTER — APPOINTMENT (OUTPATIENT)
Dept: HEMATOLOGY ONCOLOGY | Facility: CLINIC | Age: 63
End: 2018-05-21
Payer: MEDICARE

## 2018-05-21 VITALS
BODY MASS INDEX: 51.91 KG/M2 | SYSTOLIC BLOOD PRESSURE: 158 MMHG | HEART RATE: 85 BPM | WEIGHT: 293 LBS | HEIGHT: 63 IN | TEMPERATURE: 98.2 F | OXYGEN SATURATION: 90 % | DIASTOLIC BLOOD PRESSURE: 94 MMHG

## 2018-05-21 PROCEDURE — 99214 OFFICE O/P EST MOD 30 MIN: CPT

## 2018-05-21 RX ORDER — ANASTROZOLE TABLETS 1 MG/1
1 TABLET ORAL DAILY
Qty: 90 | Refills: 3 | Status: DISCONTINUED | COMMUNITY
Start: 2018-03-09 | End: 2018-05-21

## 2018-06-11 ENCOUNTER — APPOINTMENT (OUTPATIENT)
Dept: FAMILY MEDICINE | Facility: CLINIC | Age: 63
End: 2018-06-11
Payer: MEDICARE

## 2018-06-11 VITALS
RESPIRATION RATE: 13 BRPM | HEART RATE: 72 BPM | DIASTOLIC BLOOD PRESSURE: 90 MMHG | WEIGHT: 293 LBS | TEMPERATURE: 98.3 F | OXYGEN SATURATION: 93 % | BODY MASS INDEX: 51.91 KG/M2 | SYSTOLIC BLOOD PRESSURE: 162 MMHG | HEIGHT: 63 IN

## 2018-06-11 PROCEDURE — 99213 OFFICE O/P EST LOW 20 MIN: CPT

## 2018-07-09 ENCOUNTER — APPOINTMENT (OUTPATIENT)
Dept: OBGYN | Facility: CLINIC | Age: 63
End: 2018-07-09
Payer: MEDICARE

## 2018-07-09 VITALS
BODY MASS INDEX: 53.92 KG/M2 | HEART RATE: 90 BPM | SYSTOLIC BLOOD PRESSURE: 181 MMHG | WEIGHT: 293 LBS | DIASTOLIC BLOOD PRESSURE: 96 MMHG | HEIGHT: 62 IN

## 2018-07-09 PROCEDURE — 99386 PREV VISIT NEW AGE 40-64: CPT

## 2018-07-12 LAB
C TRACH RRNA SPEC QL NAA+PROBE: NOT DETECTED
HPV HIGH+LOW RISK DNA PNL CVX: NOT DETECTED
N GONORRHOEA RRNA SPEC QL NAA+PROBE: NOT DETECTED
SOURCE TP AMPLIFICATION: NORMAL

## 2018-07-18 LAB — CYTOLOGY CVX/VAG DOC THIN PREP: NORMAL

## 2018-07-26 ENCOUNTER — APPOINTMENT (OUTPATIENT)
Dept: OBGYN | Facility: CLINIC | Age: 63
End: 2018-07-26
Payer: MEDICARE

## 2018-07-26 ENCOUNTER — ASOB RESULT (OUTPATIENT)
Age: 63
End: 2018-07-26

## 2018-07-26 PROBLEM — C50.919 MALIGNANT NEOPLASM OF UNSPECIFIED SITE OF UNSPECIFIED FEMALE BREAST: Chronic | Status: ACTIVE | Noted: 2018-01-30

## 2018-07-26 PROBLEM — I10 ESSENTIAL (PRIMARY) HYPERTENSION: Chronic | Status: ACTIVE | Noted: 2018-01-30

## 2018-07-26 PROBLEM — K80.20 CALCULUS OF GALLBLADDER WITHOUT CHOLECYSTITIS WITHOUT OBSTRUCTION: Chronic | Status: ACTIVE | Noted: 2018-01-30

## 2018-07-26 PROBLEM — D64.9 ANEMIA, UNSPECIFIED: Chronic | Status: ACTIVE | Noted: 2018-01-30

## 2018-07-26 PROCEDURE — 76830 TRANSVAGINAL US NON-OB: CPT

## 2018-08-03 ENCOUNTER — RESULT REVIEW (OUTPATIENT)
Age: 63
End: 2018-08-03

## 2018-08-07 ENCOUNTER — OUTPATIENT (OUTPATIENT)
Dept: OUTPATIENT SERVICES | Facility: HOSPITAL | Age: 63
LOS: 1 days | Discharge: HOME | End: 2018-08-07

## 2018-08-07 DIAGNOSIS — D05.90 UNSPECIFIED TYPE OF CARCINOMA IN SITU OF UNSPECIFIED BREAST: ICD-10-CM

## 2018-08-07 DIAGNOSIS — Z98.890 OTHER SPECIFIED POSTPROCEDURAL STATES: Chronic | ICD-10-CM

## 2018-08-13 ENCOUNTER — APPOINTMENT (OUTPATIENT)
Dept: SURGERY | Facility: CLINIC | Age: 63
End: 2018-08-13
Payer: MEDICARE

## 2018-08-13 ENCOUNTER — RESULT REVIEW (OUTPATIENT)
Age: 63
End: 2018-08-13

## 2018-08-13 ENCOUNTER — APPOINTMENT (OUTPATIENT)
Dept: HEMATOLOGY ONCOLOGY | Facility: CLINIC | Age: 63
End: 2018-08-13
Payer: MEDICARE

## 2018-08-13 VITALS
TEMPERATURE: 98.7 F | BODY MASS INDEX: 53.92 KG/M2 | HEIGHT: 62 IN | WEIGHT: 293 LBS | DIASTOLIC BLOOD PRESSURE: 97 MMHG | SYSTOLIC BLOOD PRESSURE: 185 MMHG | HEART RATE: 86 BPM | OXYGEN SATURATION: 92 %

## 2018-08-13 LAB
BASOPHILS # BLD AUTO: 0.1 K/UL — SIGNIFICANT CHANGE UP (ref 0–0.2)
BASOPHILS NFR BLD AUTO: 1.5 % — SIGNIFICANT CHANGE UP (ref 0–2)
EOSINOPHIL # BLD AUTO: 0.3 K/UL — SIGNIFICANT CHANGE UP (ref 0–0.5)
EOSINOPHIL NFR BLD AUTO: 3.5 % — SIGNIFICANT CHANGE UP (ref 0–6)
HCT VFR BLD CALC: 42.5 % — SIGNIFICANT CHANGE UP (ref 34.5–45)
HGB BLD-MCNC: 13.9 G/DL — SIGNIFICANT CHANGE UP (ref 11.5–15.5)
LYMPHOCYTES # BLD AUTO: 2.4 K/UL — SIGNIFICANT CHANGE UP (ref 1–3.3)
LYMPHOCYTES # BLD AUTO: 26.6 % — SIGNIFICANT CHANGE UP (ref 13–44)
MCHC RBC-ENTMCNC: 29.5 PG — SIGNIFICANT CHANGE UP (ref 27–34)
MCHC RBC-ENTMCNC: 32.7 GM/DL — SIGNIFICANT CHANGE UP (ref 32–36)
MCV RBC AUTO: 90 FL — SIGNIFICANT CHANGE UP (ref 80–100)
MONOCYTES # BLD AUTO: 0.9 K/UL — SIGNIFICANT CHANGE UP (ref 0–0.9)
MONOCYTES NFR BLD AUTO: 9.9 % — SIGNIFICANT CHANGE UP (ref 2–14)
NEUTROPHILS # BLD AUTO: 5.3 K/UL — SIGNIFICANT CHANGE UP (ref 1.8–7.4)
NEUTROPHILS NFR BLD AUTO: 58.5 % — SIGNIFICANT CHANGE UP (ref 43–77)
PLATELET # BLD AUTO: 363 K/UL — SIGNIFICANT CHANGE UP (ref 150–400)
RBC # BLD: 4.72 M/UL — SIGNIFICANT CHANGE UP (ref 3.8–5.2)
RBC # FLD: 13.6 % — SIGNIFICANT CHANGE UP (ref 10.3–14.5)
WBC # BLD: 9.1 K/UL — SIGNIFICANT CHANGE UP (ref 3.8–10.5)
WBC # FLD AUTO: 9.1 K/UL — SIGNIFICANT CHANGE UP (ref 3.8–10.5)

## 2018-08-13 PROCEDURE — 99214 OFFICE O/P EST MOD 30 MIN: CPT

## 2018-08-15 ENCOUNTER — RESULT REVIEW (OUTPATIENT)
Age: 63
End: 2018-08-15

## 2018-08-16 LAB
ALBUMIN SERPL ELPH-MCNC: 4.7 G/DL
ALP BLD-CCNC: 71 U/L
ALT SERPL-CCNC: 26 U/L
ANION GAP SERPL CALC-SCNC: 16 MMOL/L
AST SERPL-CCNC: 22 U/L
BILIRUB SERPL-MCNC: 0.6 MG/DL
BUN SERPL-MCNC: 18 MG/DL
CALCIUM SERPL-MCNC: 11.1 MG/DL
CHLORIDE SERPL-SCNC: 100 MMOL/L
CO2 SERPL-SCNC: 26 MMOL/L
CREAT SERPL-MCNC: 0.78 MG/DL
FERRITIN SERPL-MCNC: 106 NG/ML
IRON SATN MFR SERPL: 18 %
IRON SERPL-MCNC: 65 UG/DL
POTASSIUM SERPL-SCNC: 5.3 MMOL/L
PROT SERPL-MCNC: 7.8 G/DL
SODIUM SERPL-SCNC: 142 MMOL/L
TIBC SERPL-MCNC: 355 UG/DL
UIBC SERPL-MCNC: 290 UG/DL

## 2018-08-20 ENCOUNTER — RESULT REVIEW (OUTPATIENT)
Age: 63
End: 2018-08-20

## 2018-08-20 ENCOUNTER — APPOINTMENT (OUTPATIENT)
Dept: GASTROENTEROLOGY | Facility: CLINIC | Age: 63
End: 2018-08-20
Payer: MEDICARE

## 2018-08-20 VITALS
HEIGHT: 62 IN | DIASTOLIC BLOOD PRESSURE: 82 MMHG | BODY MASS INDEX: 53.92 KG/M2 | HEART RATE: 75 BPM | WEIGHT: 293 LBS | RESPIRATION RATE: 15 BRPM | SYSTOLIC BLOOD PRESSURE: 156 MMHG | OXYGEN SATURATION: 93 %

## 2018-08-20 DIAGNOSIS — Z86.39 PERSONAL HISTORY OF OTHER ENDOCRINE, NUTRITIONAL AND METABOLIC DISEASE: ICD-10-CM

## 2018-08-20 LAB
BASOPHILS # BLD AUTO: 0.1 K/UL — SIGNIFICANT CHANGE UP (ref 0–0.2)
BASOPHILS NFR BLD AUTO: 1.2 % — SIGNIFICANT CHANGE UP (ref 0–2)
EOSINOPHIL # BLD AUTO: 0.2 K/UL — SIGNIFICANT CHANGE UP (ref 0–0.5)
EOSINOPHIL NFR BLD AUTO: 2.3 % — SIGNIFICANT CHANGE UP (ref 0–6)
HCT VFR BLD CALC: 40.1 % — SIGNIFICANT CHANGE UP (ref 34.5–45)
HGB BLD-MCNC: 13.6 G/DL — SIGNIFICANT CHANGE UP (ref 11.5–15.5)
LYMPHOCYTES # BLD AUTO: 3.3 K/UL — SIGNIFICANT CHANGE UP (ref 1–3.3)
LYMPHOCYTES # BLD AUTO: 31.9 % — SIGNIFICANT CHANGE UP (ref 13–44)
MCHC RBC-ENTMCNC: 30.3 PG — SIGNIFICANT CHANGE UP (ref 27–34)
MCHC RBC-ENTMCNC: 34 GM/DL — SIGNIFICANT CHANGE UP (ref 32–36)
MCV RBC AUTO: 89.1 FL — SIGNIFICANT CHANGE UP (ref 80–100)
MONOCYTES # BLD AUTO: 0.9 K/UL — SIGNIFICANT CHANGE UP (ref 0–0.9)
MONOCYTES NFR BLD AUTO: 8.7 % — SIGNIFICANT CHANGE UP (ref 2–14)
NEUTROPHILS # BLD AUTO: 5.7 K/UL — SIGNIFICANT CHANGE UP (ref 1.8–7.4)
NEUTROPHILS NFR BLD AUTO: 55.9 % — SIGNIFICANT CHANGE UP (ref 43–77)
PLATELET # BLD AUTO: 345 K/UL — SIGNIFICANT CHANGE UP (ref 150–400)
RBC # BLD: 4.5 M/UL — SIGNIFICANT CHANGE UP (ref 3.8–5.2)
RBC # FLD: 13.2 % — SIGNIFICANT CHANGE UP (ref 10.3–14.5)
WBC # BLD: 10.2 K/UL — SIGNIFICANT CHANGE UP (ref 3.8–10.5)
WBC # FLD AUTO: 10.2 K/UL — SIGNIFICANT CHANGE UP (ref 3.8–10.5)

## 2018-08-20 PROCEDURE — 99204 OFFICE O/P NEW MOD 45 MIN: CPT

## 2018-08-20 RX ORDER — FERROUS SULFATE 325(65) MG
325 TABLET ORAL
Refills: 0 | Status: DISCONTINUED | COMMUNITY
End: 2018-08-20

## 2018-08-20 RX ORDER — LETROZOLE TABLETS 2.5 MG/1
2.5 TABLET, FILM COATED ORAL
Refills: 0 | Status: DISCONTINUED | COMMUNITY

## 2018-08-27 ENCOUNTER — APPOINTMENT (OUTPATIENT)
Dept: OBGYN | Facility: CLINIC | Age: 63
End: 2018-08-27
Payer: MEDICARE

## 2018-08-27 VITALS
HEART RATE: 94 BPM | WEIGHT: 293 LBS | DIASTOLIC BLOOD PRESSURE: 98 MMHG | HEIGHT: 62 IN | SYSTOLIC BLOOD PRESSURE: 172 MMHG | BODY MASS INDEX: 53.92 KG/M2

## 2018-08-27 PROCEDURE — 99214 OFFICE O/P EST MOD 30 MIN: CPT

## 2018-08-29 ENCOUNTER — MEDICATION RENEWAL (OUTPATIENT)
Age: 63
End: 2018-08-29

## 2018-10-01 ENCOUNTER — OUTPATIENT (OUTPATIENT)
Dept: OUTPATIENT SERVICES | Facility: HOSPITAL | Age: 63
LOS: 1 days | End: 2018-10-01
Payer: COMMERCIAL

## 2018-10-01 VITALS
HEIGHT: 62 IN | DIASTOLIC BLOOD PRESSURE: 98 MMHG | TEMPERATURE: 98 F | RESPIRATION RATE: 20 BRPM | WEIGHT: 293 LBS | SYSTOLIC BLOOD PRESSURE: 147 MMHG | HEART RATE: 81 BPM

## 2018-10-01 DIAGNOSIS — R93.8 ABNORMAL FINDINGS ON DIAGNOSTIC IMAGING OF OTHER SPECIFIED BODY STRUCTURES: ICD-10-CM

## 2018-10-01 DIAGNOSIS — I10 ESSENTIAL (PRIMARY) HYPERTENSION: ICD-10-CM

## 2018-10-01 DIAGNOSIS — Z01.818 ENCOUNTER FOR OTHER PREPROCEDURAL EXAMINATION: ICD-10-CM

## 2018-10-01 DIAGNOSIS — C50.919 MALIGNANT NEOPLASM OF UNSPECIFIED SITE OF UNSPECIFIED FEMALE BREAST: ICD-10-CM

## 2018-10-01 DIAGNOSIS — Z90.13 ACQUIRED ABSENCE OF BILATERAL BREASTS AND NIPPLES: Chronic | ICD-10-CM

## 2018-10-01 DIAGNOSIS — Z12.11 ENCOUNTER FOR SCREENING FOR MALIGNANT NEOPLASM OF COLON: ICD-10-CM

## 2018-10-01 DIAGNOSIS — Z98.890 OTHER SPECIFIED POSTPROCEDURAL STATES: Chronic | ICD-10-CM

## 2018-10-01 DIAGNOSIS — Z29.9 ENCOUNTER FOR PROPHYLACTIC MEASURES, UNSPECIFIED: ICD-10-CM

## 2018-10-01 LAB
ANION GAP SERPL CALC-SCNC: 14 MMOL/L — SIGNIFICANT CHANGE UP (ref 5–17)
APPEARANCE UR: CLEAR — SIGNIFICANT CHANGE UP
APTT BLD: 31.4 SEC — SIGNIFICANT CHANGE UP (ref 27.5–37.4)
BASOPHILS # BLD AUTO: 0.1 K/UL — SIGNIFICANT CHANGE UP (ref 0–0.2)
BASOPHILS NFR BLD AUTO: 0.7 % — SIGNIFICANT CHANGE UP (ref 0–2)
BILIRUB UR-MCNC: NEGATIVE — SIGNIFICANT CHANGE UP
BLD GP AB SCN SERPL QL: SIGNIFICANT CHANGE UP
BUN SERPL-MCNC: 22 MG/DL — HIGH (ref 8–20)
CALCIUM SERPL-MCNC: 10.1 MG/DL — SIGNIFICANT CHANGE UP (ref 8.6–10.2)
CHLORIDE SERPL-SCNC: 101 MMOL/L — SIGNIFICANT CHANGE UP (ref 98–107)
CO2 SERPL-SCNC: 26 MMOL/L — SIGNIFICANT CHANGE UP (ref 22–29)
COLOR SPEC: YELLOW — SIGNIFICANT CHANGE UP
CREAT SERPL-MCNC: 0.65 MG/DL — SIGNIFICANT CHANGE UP (ref 0.5–1.3)
DIFF PNL FLD: ABNORMAL
EOSINOPHIL # BLD AUTO: 0.2 K/UL — SIGNIFICANT CHANGE UP (ref 0–0.5)
EOSINOPHIL NFR BLD AUTO: 2.8 % — SIGNIFICANT CHANGE UP (ref 0–6)
EPI CELLS # UR: SIGNIFICANT CHANGE UP
GLUCOSE SERPL-MCNC: 113 MG/DL — SIGNIFICANT CHANGE UP (ref 70–115)
GLUCOSE UR QL: NEGATIVE MG/DL — SIGNIFICANT CHANGE UP
HCT VFR BLD CALC: 43.4 % — SIGNIFICANT CHANGE UP (ref 37–47)
HGB BLD-MCNC: 13.7 G/DL — SIGNIFICANT CHANGE UP (ref 12–16)
INR BLD: 1.05 RATIO — SIGNIFICANT CHANGE UP (ref 0.88–1.16)
KETONES UR-MCNC: NEGATIVE — SIGNIFICANT CHANGE UP
LEUKOCYTE ESTERASE UR-ACNC: NEGATIVE — SIGNIFICANT CHANGE UP
LYMPHOCYTES # BLD AUTO: 2.7 K/UL — SIGNIFICANT CHANGE UP (ref 1–4.8)
LYMPHOCYTES # BLD AUTO: 32.1 % — SIGNIFICANT CHANGE UP (ref 20–55)
MCHC RBC-ENTMCNC: 28.8 PG — SIGNIFICANT CHANGE UP (ref 27–31)
MCHC RBC-ENTMCNC: 31.6 G/DL — LOW (ref 32–36)
MCV RBC AUTO: 91.4 FL — SIGNIFICANT CHANGE UP (ref 81–99)
MONOCYTES # BLD AUTO: 0.9 K/UL — HIGH (ref 0–0.8)
MONOCYTES NFR BLD AUTO: 10.5 % — HIGH (ref 3–10)
NEUTROPHILS # BLD AUTO: 4.5 K/UL — SIGNIFICANT CHANGE UP (ref 1.8–8)
NEUTROPHILS NFR BLD AUTO: 53.7 % — SIGNIFICANT CHANGE UP (ref 37–73)
NITRITE UR-MCNC: NEGATIVE — SIGNIFICANT CHANGE UP
PH UR: 5 — SIGNIFICANT CHANGE UP (ref 5–8)
PLATELET # BLD AUTO: 365 K/UL — SIGNIFICANT CHANGE UP (ref 150–400)
POTASSIUM SERPL-MCNC: 4.2 MMOL/L — SIGNIFICANT CHANGE UP (ref 3.5–5.3)
POTASSIUM SERPL-SCNC: 4.2 MMOL/L — SIGNIFICANT CHANGE UP (ref 3.5–5.3)
PROT UR-MCNC: NEGATIVE MG/DL — SIGNIFICANT CHANGE UP
PROTHROM AB SERPL-ACNC: 11.6 SEC — SIGNIFICANT CHANGE UP (ref 9.8–12.7)
RBC # BLD: 4.75 M/UL — SIGNIFICANT CHANGE UP (ref 4.4–5.2)
RBC # FLD: 15 % — SIGNIFICANT CHANGE UP (ref 11–15.6)
RBC CASTS # UR COMP ASSIST: SIGNIFICANT CHANGE UP /HPF (ref 0–4)
SODIUM SERPL-SCNC: 141 MMOL/L — SIGNIFICANT CHANGE UP (ref 135–145)
SP GR SPEC: 1.02 — SIGNIFICANT CHANGE UP (ref 1.01–1.02)
TYPE + AB SCN PNL BLD: SIGNIFICANT CHANGE UP
UROBILINOGEN FLD QL: NEGATIVE MG/DL — SIGNIFICANT CHANGE UP
WBC # BLD: 8.4 K/UL — SIGNIFICANT CHANGE UP (ref 4.8–10.8)
WBC # FLD AUTO: 8.4 K/UL — SIGNIFICANT CHANGE UP (ref 4.8–10.8)
WBC UR QL: SIGNIFICANT CHANGE UP

## 2018-10-01 PROCEDURE — 81001 URINALYSIS AUTO W/SCOPE: CPT

## 2018-10-01 PROCEDURE — 80048 BASIC METABOLIC PNL TOTAL CA: CPT

## 2018-10-01 PROCEDURE — 36415 COLL VENOUS BLD VENIPUNCTURE: CPT

## 2018-10-01 PROCEDURE — 85610 PROTHROMBIN TIME: CPT

## 2018-10-01 PROCEDURE — 86901 BLOOD TYPING SEROLOGIC RH(D): CPT

## 2018-10-01 PROCEDURE — G0463: CPT

## 2018-10-01 PROCEDURE — 85730 THROMBOPLASTIN TIME PARTIAL: CPT

## 2018-10-01 PROCEDURE — 93010 ELECTROCARDIOGRAM REPORT: CPT

## 2018-10-01 PROCEDURE — 86850 RBC ANTIBODY SCREEN: CPT

## 2018-10-01 PROCEDURE — 93005 ELECTROCARDIOGRAM TRACING: CPT

## 2018-10-01 PROCEDURE — 86900 BLOOD TYPING SEROLOGIC ABO: CPT

## 2018-10-01 PROCEDURE — 85027 COMPLETE CBC AUTOMATED: CPT

## 2018-10-01 RX ORDER — FERROUS SULFATE 325(65) MG
1 TABLET ORAL
Qty: 0 | Refills: 0 | COMMUNITY

## 2018-10-01 NOTE — H&P PST ADULT - HISTORY OF PRESENT ILLNESS
61 yo  with hx of HTN and  right breast cancer s/p bilateral mastectomy presents with c/o endometrial thickening noted on ultrasound.  She has no complaints. She denies PM vaginal bleeding.   Of note patient schedule for routine colonoscopy with Dr. Mcnair after D and C

## 2018-10-01 NOTE — H&P PST ADULT - ASSESSMENT
63 yo  with hx of HTN and  right breast cancer s/p bilateral mastectomy presents with c/o endometrial thickening noted on ultrasound.     CAPRINI SCORE [CLOT]    AGE RELATED RISK FACTORS                                                       MOBILITY RELATED FACTORS  [ ] Age 41-60 years                                            (1 Point)                  [ ] Bed rest                                                        (1 Point)  [x ] Age: 61-74 years                                           (2 Points)                 [ ] Plaster cast                                                   (2 Points)  [ ] Age= 75 years                                              (3 Points)                 [ ] Bed bound for more than 72 hours                 (2 Points)    DISEASE RELATED RISK FACTORS                                               GENDER SPECIFIC FACTORS  [x ] Edema in the lower extremities                       (1 Point)                  [ ] Pregnancy                                                     (1 Point)  [x] Varicose veins                                               (1 Point)                  [ ] Post-partum < 6 weeks                                   (1 Point)             [x ] BMI > 25 Kg/m2                                            (1 Point)                  [ ] Hormonal therapy  or oral contraception          (1 Point)                 [ ] Sepsis (in the previous month)                        (1 Point)                  [ ] History of pregnancy complications                 (1 point)  [ ] Pneumonia or serious lung disease                                               [ ] Unexplained or recurrent                     (1 Point)           (in the previous month)                               (1 Point)  [ ] Abnormal pulmonary function test                     (1 Point)                 SURGERY RELATED RISK FACTORS  [ ] Acute myocardial infarction                              (1 Point)                 [ ]  Section                                             (1 Point)  [ ] Congestive heart failure (in the previous month)  (1 Point)               [x ] Minor surgery                                                  (1 Point)   [ ] Inflammatory bowel disease                             (1 Point)                 [ ] Arthroscopic surgery                                        (2 Points)  [ ] Central venous access                                      (2 Points)                [ ] General surgery lasting more than 45 minutes   (2 Points)       [ ] Stroke (in the previous month)                          (5 Points)               [ ] Elective arthroplasty                                         (5 Points)                                                                                                                                               HEMATOLOGY RELATED FACTORS                                                 TRAUMA RELATED RISK FACTORS  [ ] Prior episodes of VTE                                     (3 Points)                [ ] Fracture of the hip, pelvis, or leg                       (5 Points)  [ ] Positive family history for VTE                         (3 Points)                 [ ] Acute spinal cord injury (in the previous month)  (5 Points)  [ ] Prothrombin 11156 A                                     (3 Points)                 [ ] Paralysis  (less than 1 month)                             (5 Points)  [ ] Factor V Leiden                                             (3 Points)                  [ ] Multiple Trauma within 1 month                        (5 Points)  [ ] Lupus anticoagulants                                     (3 Points)                                                           [ ] Anticardiolipin antibodies                               (3 Points)                                                       [ ] High homocysteine in the blood                      (3 Points)                                             [ ] Other congenital or acquired thrombophilia      (3 Points)                                                [ ] Heparin induced thrombocytopenia                  (3 Points)                                          Total Score [   6    ]

## 2018-10-01 NOTE — H&P PST ADULT - RS GEN PE MLT RESP DETAILS PC
good air movement/airway patent/breath sounds equal/respirations non-labored/clear to auscultation bilaterally

## 2018-10-01 NOTE — H&P PST ADULT - REASON FOR ADMISSION
I have endometrial thicken  Routine colonoscopy
Bladder non-tender and non-distended. Urine clear yellow.

## 2018-10-02 ENCOUNTER — APPOINTMENT (OUTPATIENT)
Dept: FAMILY MEDICINE | Facility: CLINIC | Age: 63
End: 2018-10-02
Payer: MEDICARE

## 2018-10-02 ENCOUNTER — APPOINTMENT (OUTPATIENT)
Dept: CARDIOLOGY | Facility: CLINIC | Age: 63
End: 2018-10-02
Payer: MEDICARE

## 2018-10-02 VITALS
HEIGHT: 62 IN | DIASTOLIC BLOOD PRESSURE: 80 MMHG | TEMPERATURE: 98.1 F | RESPIRATION RATE: 16 BRPM | HEART RATE: 94 BPM | BODY MASS INDEX: 53.92 KG/M2 | OXYGEN SATURATION: 94 % | WEIGHT: 293 LBS | SYSTOLIC BLOOD PRESSURE: 130 MMHG

## 2018-10-02 VITALS
OXYGEN SATURATION: 96 % | BODY MASS INDEX: 53.92 KG/M2 | SYSTOLIC BLOOD PRESSURE: 159 MMHG | HEART RATE: 87 BPM | DIASTOLIC BLOOD PRESSURE: 87 MMHG | WEIGHT: 293 LBS | HEIGHT: 62 IN

## 2018-10-02 VITALS — SYSTOLIC BLOOD PRESSURE: 148 MMHG | DIASTOLIC BLOOD PRESSURE: 84 MMHG

## 2018-10-02 DIAGNOSIS — R92.8 OTHER ABNORMAL AND INCONCLUSIVE FINDINGS ON DIAGNOSTIC IMAGING OF BREAST: ICD-10-CM

## 2018-10-02 DIAGNOSIS — Z87.2 PERSONAL HISTORY OF DISEASES OF THE SKIN AND SUBCUTANEOUS TISSUE: ICD-10-CM

## 2018-10-02 DIAGNOSIS — Z87.898 PERSONAL HISTORY OF OTHER SPECIFIED CONDITIONS: ICD-10-CM

## 2018-10-02 DIAGNOSIS — N64.52 NIPPLE DISCHARGE: ICD-10-CM

## 2018-10-02 DIAGNOSIS — Z85.3 PERSONAL HISTORY OF MALIGNANT NEOPLASM OF BREAST: ICD-10-CM

## 2018-10-02 DIAGNOSIS — T81.4XXA INFECTION FOLLOWING A PROCEDURE, INITIAL ENCOUNTER: ICD-10-CM

## 2018-10-02 DIAGNOSIS — L53.9 ERYTHEMATOUS CONDITION, UNSPECIFIED: ICD-10-CM

## 2018-10-02 PROCEDURE — 99215 OFFICE O/P EST HI 40 MIN: CPT

## 2018-10-02 PROCEDURE — 99214 OFFICE O/P EST MOD 30 MIN: CPT

## 2018-10-02 RX ORDER — AMLODIPINE BESYLATE 5 MG/1
5 TABLET ORAL DAILY
Qty: 90 | Refills: 3 | Status: DISCONTINUED | COMMUNITY
Start: 2018-01-30 | End: 2018-10-02

## 2018-10-02 NOTE — HISTORY OF PRESENT ILLNESS
[No Pertinent Cardiac History] : no history of aortic stenosis, atrial fibrillation, coronary artery disease, recent myocardial infarction, or implantable device/pacemaker [No Pertinent Pulmonary History] : no history of asthma, COPD, sleep apnea, or smoking [No Adverse Anesthesia Reaction] : no adverse anesthesia reaction in self or family member [Chronic Anticoagulation] : no chronic anticoagulation [Chronic Kidney Disease] : no chronic kidney disease [Diabetes] : no diabetes [FreeTextEntry1] : D and C [FreeTextEntry2] : 10/09/18 [FreeTextEntry3] : Dr Gonzalez

## 2018-10-02 NOTE — PHYSICAL EXAM
[No Acute Distress] : no acute distress [Well Nourished] : well nourished [Well Developed] : well developed [Normal Sclera/Conjunctiva] : normal sclera/conjunctiva [PERRL] : pupils equal round and reactive to light [EOMI] : extraocular movements intact [Normal Outer Ear/Nose] : the outer ears and nose were normal in appearance [Normal Oropharynx] : the oropharynx was normal [Supple] : supple [No Lymphadenopathy] : no lymphadenopathy [No Respiratory Distress] : no respiratory distress  [Clear to Auscultation] : lungs were clear to auscultation bilaterally [No Accessory Muscle Use] : no accessory muscle use [Normal Rate] : normal rate  [Soft] : abdomen soft [Non Tender] : non-tender [Normal Posterior Cervical Nodes] : no posterior cervical lymphadenopathy [Normal Anterior Cervical Nodes] : no anterior cervical lymphadenopathy [Normal Gait] : normal gait [Coordination Grossly Intact] : coordination grossly intact [Normal Affect] : the affect was normal [Normal Insight/Judgement] : insight and judgment were intact [de-identified] : Uses cane to stabilize

## 2018-10-02 NOTE — COUNSELING
[Healthy eating counseling provided] : healthy eating [Activity counseling provided] : activity [Low Fat Diet] : Low fat diet [Low Salt Diet] : Low salt diet [Walking] : Walking [Engage in a relaxing activity] : Engage in a relaxing activity

## 2018-10-03 ENCOUNTER — APPOINTMENT (OUTPATIENT)
Dept: CARDIOLOGY | Facility: CLINIC | Age: 63
End: 2018-10-03

## 2018-10-04 ENCOUNTER — APPOINTMENT (OUTPATIENT)
Dept: CARDIOLOGY | Facility: CLINIC | Age: 63
End: 2018-10-04

## 2018-10-11 ENCOUNTER — FORM ENCOUNTER (OUTPATIENT)
Age: 63
End: 2018-10-11

## 2018-10-11 ENCOUNTER — APPOINTMENT (OUTPATIENT)
Dept: OBGYN | Facility: HOSPITAL | Age: 63
End: 2018-10-11

## 2018-10-12 ENCOUNTER — OUTPATIENT (OUTPATIENT)
Dept: OUTPATIENT SERVICES | Facility: HOSPITAL | Age: 63
LOS: 1 days | End: 2018-10-12
Payer: COMMERCIAL

## 2018-10-12 DIAGNOSIS — R06.09 OTHER FORMS OF DYSPNEA: ICD-10-CM

## 2018-10-12 DIAGNOSIS — Z98.890 OTHER SPECIFIED POSTPROCEDURAL STATES: Chronic | ICD-10-CM

## 2018-10-12 DIAGNOSIS — Z90.13 ACQUIRED ABSENCE OF BILATERAL BREASTS AND NIPPLES: Chronic | ICD-10-CM

## 2018-10-12 DIAGNOSIS — Z01.818 ENCOUNTER FOR OTHER PREPROCEDURAL EXAMINATION: ICD-10-CM

## 2018-10-12 DIAGNOSIS — I10 ESSENTIAL (PRIMARY) HYPERTENSION: ICD-10-CM

## 2018-10-12 PROCEDURE — A9500: CPT

## 2018-10-12 PROCEDURE — 93306 TTE W/DOPPLER COMPLETE: CPT

## 2018-10-12 PROCEDURE — 78452 HT MUSCLE IMAGE SPECT MULT: CPT

## 2018-10-12 PROCEDURE — 78452 HT MUSCLE IMAGE SPECT MULT: CPT | Mod: 26

## 2018-10-12 PROCEDURE — 93016 CV STRESS TEST SUPVJ ONLY: CPT

## 2018-10-12 PROCEDURE — 93018 CV STRESS TEST I&R ONLY: CPT

## 2018-10-12 PROCEDURE — 93306 TTE W/DOPPLER COMPLETE: CPT | Mod: 26

## 2018-10-12 PROCEDURE — 93017 CV STRESS TEST TRACING ONLY: CPT

## 2018-10-15 ENCOUNTER — OUTPATIENT (OUTPATIENT)
Dept: OUTPATIENT SERVICES | Facility: HOSPITAL | Age: 63
LOS: 1 days | End: 2018-10-15
Payer: COMMERCIAL

## 2018-10-15 ENCOUNTER — APPOINTMENT (OUTPATIENT)
Dept: GASTROENTEROLOGY | Facility: HOSPITAL | Age: 63
End: 2018-10-15

## 2018-10-15 DIAGNOSIS — R06.09 OTHER FORMS OF DYSPNEA: ICD-10-CM

## 2018-10-15 DIAGNOSIS — Z90.13 ACQUIRED ABSENCE OF BILATERAL BREASTS AND NIPPLES: Chronic | ICD-10-CM

## 2018-10-15 DIAGNOSIS — Z98.890 OTHER SPECIFIED POSTPROCEDURAL STATES: Chronic | ICD-10-CM

## 2018-10-15 DIAGNOSIS — Z12.11 ENCOUNTER FOR SCREENING FOR MALIGNANT NEOPLASM OF COLON: ICD-10-CM

## 2018-10-15 DIAGNOSIS — I10 ESSENTIAL (PRIMARY) HYPERTENSION: ICD-10-CM

## 2018-10-15 DIAGNOSIS — Z01.818 ENCOUNTER FOR OTHER PREPROCEDURAL EXAMINATION: ICD-10-CM

## 2018-10-15 PROCEDURE — G0105: CPT

## 2018-10-15 PROCEDURE — 45378 DIAGNOSTIC COLONOSCOPY: CPT

## 2018-10-19 NOTE — DISCHARGE NOTE ADULT - NSFTFSTATUSABRD_GEN_ALL_CORE
Patient pre 4th vanco trough resulted supratherapeutic (24.7)
PATIENT NEEDS ASSISTANCE TO LEAVE RESIDENCE:

## 2018-11-05 ENCOUNTER — OUTPATIENT (OUTPATIENT)
Dept: OUTPATIENT SERVICES | Facility: HOSPITAL | Age: 63
LOS: 1 days | End: 2018-11-05
Payer: COMMERCIAL

## 2018-11-05 VITALS
RESPIRATION RATE: 16 BRPM | HEIGHT: 63 IN | WEIGHT: 293 LBS | DIASTOLIC BLOOD PRESSURE: 90 MMHG | SYSTOLIC BLOOD PRESSURE: 142 MMHG | TEMPERATURE: 97 F | HEART RATE: 88 BPM

## 2018-11-05 DIAGNOSIS — Z29.9 ENCOUNTER FOR PROPHYLACTIC MEASURES, UNSPECIFIED: ICD-10-CM

## 2018-11-05 DIAGNOSIS — N84.0 POLYP OF CORPUS UTERI: ICD-10-CM

## 2018-11-05 DIAGNOSIS — Z98.890 OTHER SPECIFIED POSTPROCEDURAL STATES: Chronic | ICD-10-CM

## 2018-11-05 DIAGNOSIS — I10 ESSENTIAL (PRIMARY) HYPERTENSION: ICD-10-CM

## 2018-11-05 DIAGNOSIS — Z90.13 ACQUIRED ABSENCE OF BILATERAL BREASTS AND NIPPLES: Chronic | ICD-10-CM

## 2018-11-05 DIAGNOSIS — Z01.818 ENCOUNTER FOR OTHER PREPROCEDURAL EXAMINATION: ICD-10-CM

## 2018-11-05 LAB
ANION GAP SERPL CALC-SCNC: 15 MMOL/L — SIGNIFICANT CHANGE UP (ref 5–17)
BUN SERPL-MCNC: 17 MG/DL — SIGNIFICANT CHANGE UP (ref 8–20)
CALCIUM SERPL-MCNC: 10.6 MG/DL — HIGH (ref 8.6–10.2)
CHLORIDE SERPL-SCNC: 102 MMOL/L — SIGNIFICANT CHANGE UP (ref 98–107)
CO2 SERPL-SCNC: 22 MMOL/L — SIGNIFICANT CHANGE UP (ref 22–29)
CREAT SERPL-MCNC: 0.74 MG/DL — SIGNIFICANT CHANGE UP (ref 0.5–1.3)
GLUCOSE SERPL-MCNC: 129 MG/DL — HIGH (ref 70–115)
HCT VFR BLD CALC: 38.9 % — SIGNIFICANT CHANGE UP (ref 37–47)
HGB BLD-MCNC: 13 G/DL — SIGNIFICANT CHANGE UP (ref 12–16)
MCHC RBC-ENTMCNC: 30.2 PG — SIGNIFICANT CHANGE UP (ref 27–31)
MCHC RBC-ENTMCNC: 33.4 G/DL — SIGNIFICANT CHANGE UP (ref 32–36)
MCV RBC AUTO: 90.5 FL — SIGNIFICANT CHANGE UP (ref 81–99)
PLATELET # BLD AUTO: 359 K/UL — SIGNIFICANT CHANGE UP (ref 150–400)
POTASSIUM SERPL-MCNC: 3.9 MMOL/L — SIGNIFICANT CHANGE UP (ref 3.5–5.3)
POTASSIUM SERPL-SCNC: 3.9 MMOL/L — SIGNIFICANT CHANGE UP (ref 3.5–5.3)
RBC # BLD: 4.3 M/UL — LOW (ref 4.4–5.2)
RBC # FLD: 14.7 % — SIGNIFICANT CHANGE UP (ref 11–15.6)
SODIUM SERPL-SCNC: 139 MMOL/L — SIGNIFICANT CHANGE UP (ref 135–145)
WBC # BLD: 7.5 K/UL — SIGNIFICANT CHANGE UP (ref 4.8–10.8)
WBC # FLD AUTO: 7.5 K/UL — SIGNIFICANT CHANGE UP (ref 4.8–10.8)

## 2018-11-05 PROCEDURE — 36415 COLL VENOUS BLD VENIPUNCTURE: CPT

## 2018-11-05 PROCEDURE — G0463: CPT

## 2018-11-05 PROCEDURE — 85027 COMPLETE CBC AUTOMATED: CPT

## 2018-11-05 PROCEDURE — 80048 BASIC METABOLIC PNL TOTAL CA: CPT

## 2018-11-05 RX ORDER — AMLODIPINE BESYLATE 2.5 MG/1
1 TABLET ORAL
Qty: 0 | Refills: 0 | COMMUNITY

## 2018-11-05 NOTE — H&P PST ADULT - ASSESSMENT
61 yo female with thickened endometrium, endometrial polyp.    CAPRINI SCORE [CLOT]    AGE RELATED RISK FACTORS                                                       MOBILITY RELATED FACTORS  [ ] Age 41-60 years                                            (1 Point)                  [ ] Bed rest                                                        (1 Point)  [ x ] Age: 61-74 years                                           (2 Points)                 [ ] Plaster cast                                                   (2 Points)  [ ] Age= 75 years                                              (3 Points)                 [ ] Bed bound for more than 72 hours                 (2 Points)    DISEASE RELATED RISK FACTORS                                               GENDER SPECIFIC FACTORS  [ ] Edema in the lower extremities                       (1 Point)                  [ ] Pregnancy                                                     (1 Point)  [ x ] Varicose veins                                               (1 Point)                  [ ] Post-partum < 6 weeks                                   (1 Point)             [ x ] BMI > 25 Kg/m2                                            (1 Point)                  [ ] Hormonal therapy  or oral contraception          (1 Point)                 [ ] Sepsis (in the previous month)                        (1 Point)                  [ ] History of pregnancy complications                 (1 point)  [ ] Pneumonia or serious lung disease                                               [ ] Unexplained or recurrent                     (1 Point)           (in the previous month)                               (1 Point)  [ ] Abnormal pulmonary function test                     (1 Point)                 SURGERY RELATED RISK FACTORS  [ ] Acute myocardial infarction                              (1 Point)                 [ ]  Section                                             (1 Point)  [ ] Congestive heart failure (in the previous month)  (1 Point)               [ x ] Minor surgery                                                  (1 Point)   [ ] Inflammatory bowel disease                             (1 Point)                 [ ] Arthroscopic surgery                                        (2 Points)  [ ] Central venous access                                      (2 Points)                [ ] General surgery lasting more than 45 minutes   (2 Points)       [ ] Stroke (in the previous month)                          (5 Points)               [ ] Elective arthroplasty                                         (5 Points)                                                                                                                                               HEMATOLOGY RELATED FACTORS                                                 TRAUMA RELATED RISK FACTORS  [ ] Prior episodes of VTE                                     (3 Points)                 [ ] Fracture of the hip, pelvis, or leg                       (5 Points)  [ ] Positive family history for VTE                         (3 Points)                 [ ] Acute spinal cord injury (in the previous month)  (5 Points)  [ ] Prothrombin 68380 A                                     (3 Points)                 [ ] Paralysis  (less than 1 month)                             (5 Points)  [ ] Factor V Leiden                                             (3 Points)                  [ ] Multiple Trauma within 1 month                        (5 Points)  [ ] Lupus anticoagulants                                     (3 Points)                                                           [ ] Anticardiolipin antibodies                               (3 Points)                                                       [ ] High homocysteine in the blood                      (3 Points)                                             [ ] Other congenital or acquired thrombophilia      (3 Points)                                                [ ] Heparin induced thrombocytopenia                  (3 Points)                                          Total Score [      5    ]

## 2018-11-05 NOTE — H&P PST ADULT - ATTENDING COMMENTS
63 yo  with hx of breast cancer dx 2007 s/ bilateral mastectomy, morbidly obese, hx of HTN with thickened endometrium, endometrial polyp presents for scheduled hysteroscopy, D&C polypectomy using myosure. She has no complaints. She is medically cleared. She was counseled at length on risks and benefits of the procedure in the office. Today she had no questions, informed consent obtained.  Pelvic sono done on 7/26/2081 Uterus- 6.4 x 4.3 x 3.2 cm small myomas largest 1 cm, 1.1 x 1.7 x 0.7 cm endometrial polyp, ovaries wnl

## 2018-11-05 NOTE — H&P PST ADULT - FAMILY HISTORY
Mother  Still living? Yes, Estimated age: 81-90  Maternal family history of emphysema, Age at diagnosis: 51-60     Father  Still living? Yes, Estimated age:   Family history of hypertension in father, Age at diagnosis: 71-80     Sibling  Still living? Yes, Estimated age: 61-70  Family history of breast cancer, Age at diagnosis: 41-50

## 2018-11-07 ENCOUNTER — APPOINTMENT (OUTPATIENT)
Dept: FAMILY MEDICINE | Facility: CLINIC | Age: 63
End: 2018-11-07
Payer: MEDICARE

## 2018-11-07 ENCOUNTER — TRANSCRIPTION ENCOUNTER (OUTPATIENT)
Age: 63
End: 2018-11-07

## 2018-11-07 VITALS
TEMPERATURE: 98.4 F | SYSTOLIC BLOOD PRESSURE: 134 MMHG | HEIGHT: 62 IN | OXYGEN SATURATION: 95 % | WEIGHT: 293 LBS | HEART RATE: 95 BPM | DIASTOLIC BLOOD PRESSURE: 80 MMHG | BODY MASS INDEX: 53.92 KG/M2 | RESPIRATION RATE: 14 BRPM

## 2018-11-07 PROCEDURE — 99214 OFFICE O/P EST MOD 30 MIN: CPT

## 2018-11-07 NOTE — PHYSICAL EXAM
[No Acute Distress] : no acute distress [Well Nourished] : well nourished [Well Developed] : well developed [Normal Sclera/Conjunctiva] : normal sclera/conjunctiva [PERRL] : pupils equal round and reactive to light [Normal Outer Ear/Nose] : the outer ears and nose were normal in appearance [Normal Oropharynx] : the oropharynx was normal [Supple] : supple [No Respiratory Distress] : no respiratory distress  [Clear to Auscultation] : lungs were clear to auscultation bilaterally [Normal Rate] : normal rate  [Regular Rhythm] : with a regular rhythm [Normal S1, S2] : normal S1 and S2 [Normal Posterior Cervical Nodes] : no posterior cervical lymphadenopathy [Normal Anterior Cervical Nodes] : no anterior cervical lymphadenopathy [Grossly Normal Strength/Tone] : grossly normal strength/tone [Coordination Grossly Intact] : coordination grossly intact [Normal Affect] : the affect was normal [Normal Insight/Judgement] : insight and judgment were intact

## 2018-11-07 NOTE — ASSESSMENT
[Patient Optimized for Surgery] : Patient optimized for surgery Oriented - self; Oriented - place; Oriented - time

## 2018-11-07 NOTE — COUNSELING
[Weight management counseling provided] : Weight management [Healthy eating counseling provided] : healthy eating [Low Fat Diet] : Low fat diet [Decrease Portions] : Decrease food portions [Low Salt Diet] : Low salt diet [Sleep ___ hours/day] : Sleep [unfilled] hours/day [Engage in a relaxing activity] : Engage in a relaxing activity

## 2018-11-08 ENCOUNTER — APPOINTMENT (OUTPATIENT)
Dept: OBGYN | Facility: HOSPITAL | Age: 63
End: 2018-11-08

## 2018-11-08 ENCOUNTER — RESULT REVIEW (OUTPATIENT)
Age: 63
End: 2018-11-08

## 2018-11-08 ENCOUNTER — OUTPATIENT (OUTPATIENT)
Dept: OUTPATIENT SERVICES | Facility: HOSPITAL | Age: 63
LOS: 1 days | End: 2018-11-08
Payer: COMMERCIAL

## 2018-11-08 VITALS
SYSTOLIC BLOOD PRESSURE: 118 MMHG | OXYGEN SATURATION: 96 % | RESPIRATION RATE: 14 BRPM | HEART RATE: 66 BPM | DIASTOLIC BLOOD PRESSURE: 80 MMHG

## 2018-11-08 VITALS
HEART RATE: 86 BPM | TEMPERATURE: 97 F | DIASTOLIC BLOOD PRESSURE: 87 MMHG | RESPIRATION RATE: 20 BRPM | WEIGHT: 293 LBS | SYSTOLIC BLOOD PRESSURE: 140 MMHG | HEIGHT: 63 IN

## 2018-11-08 DIAGNOSIS — Z90.13 ACQUIRED ABSENCE OF BILATERAL BREASTS AND NIPPLES: Chronic | ICD-10-CM

## 2018-11-08 DIAGNOSIS — Z98.890 OTHER SPECIFIED POSTPROCEDURAL STATES: Chronic | ICD-10-CM

## 2018-11-08 DIAGNOSIS — N84.0 POLYP OF CORPUS UTERI: ICD-10-CM

## 2018-11-08 PROCEDURE — 88305 TISSUE EXAM BY PATHOLOGIST: CPT

## 2018-11-08 PROCEDURE — 88305 TISSUE EXAM BY PATHOLOGIST: CPT | Mod: 26

## 2018-11-08 PROCEDURE — 58558 HYSTEROSCOPY BIOPSY: CPT

## 2018-11-08 RX ORDER — IBUPROFEN 200 MG
2 TABLET ORAL
Qty: 0 | Refills: 0 | COMMUNITY

## 2018-11-08 RX ORDER — ACETAMINOPHEN 500 MG
2 TABLET ORAL
Qty: 0 | Refills: 0 | COMMUNITY

## 2018-11-08 RX ORDER — AMLODIPINE AND VALSARTAN 5; 320 MG/1; MG/1
1 TABLET, FILM COATED ORAL
Qty: 0 | Refills: 0 | COMMUNITY

## 2018-11-08 RX ORDER — SODIUM CHLORIDE 9 MG/ML
3 INJECTION INTRAMUSCULAR; INTRAVENOUS; SUBCUTANEOUS ONCE
Qty: 0 | Refills: 0 | Status: DISCONTINUED | OUTPATIENT
Start: 2018-11-08 | End: 2018-11-08

## 2018-11-08 RX ORDER — OMEGA-3 ACID ETHYL ESTERS 1 G
3 CAPSULE ORAL
Qty: 0 | Refills: 0 | COMMUNITY

## 2018-11-08 RX ORDER — ONDANSETRON 8 MG/1
4 TABLET, FILM COATED ORAL ONCE
Qty: 0 | Refills: 0 | Status: DISCONTINUED | OUTPATIENT
Start: 2018-11-08 | End: 2018-11-08

## 2018-11-08 RX ORDER — FENTANYL CITRATE 50 UG/ML
25 INJECTION INTRAVENOUS
Qty: 0 | Refills: 0 | Status: DISCONTINUED | OUTPATIENT
Start: 2018-11-08 | End: 2018-11-08

## 2018-11-08 RX ORDER — SODIUM CHLORIDE 9 MG/ML
1000 INJECTION, SOLUTION INTRAVENOUS
Qty: 0 | Refills: 0 | Status: DISCONTINUED | OUTPATIENT
Start: 2018-11-08 | End: 2018-11-08

## 2018-11-08 RX ORDER — LETROZOLE 2.5 MG/1
1 TABLET, FILM COATED ORAL
Qty: 0 | Refills: 0 | COMMUNITY

## 2018-11-08 RX ORDER — IBUPROFEN 200 MG
1 TABLET ORAL
Qty: 28 | Refills: 0
Start: 2018-11-08 | End: 2018-11-14

## 2018-11-08 NOTE — BRIEF OPERATIVE NOTE - ESTIMATED BLOOD LOSS
SUBJECTIVE:                                                    Oleg Ford is a 54 year old male who presents to clinic today for the following health issues:        Chronic Pain Follow-Up       Type / Location of Pain: back, R knee  Analgesia/pain control:       Recent changes:  same      Overall control: Tolerable with discomfort  Activity level/function:      Daily activities:  Some days    Work:  not applicable  Adverse effects:  No  Adherance    Taking medication as directed?  Yes    Participating in other treatments: yes  Risk Factors:    Sleep:  Fair    Mood/anxiety:  Same -     Recent family or social stressors:  none noted    Other aggravating factors: none  PHQ-9 SCORE 8/10/2016 9/9/2016 2/7/2017   Total Score - - -   Total Score MyChart - - -   Total Score 27 27 27     GINI-7 SCORE 8/10/2016 9/9/2016 2/7/2017   Total Score - - -   Total Score 21 21 19     Encounter-Level CSA - 12/01/2015:                 Controlled Substance Agreement - Scan on 12/14/2015 11:49 AM : CONTROLLED SUBSTANCE AGREEMENT (below)                 Amount of exercise or physical activity: None    Problems taking medications regularly: No    Medication side effects: possibly - nausea (had some vomiting last week)  Diet: regular (no restrictions)    SUBJECTIVE:  Here today in follow-up of chronic pain. Actually has right knee arthroscopy pending in a couple of weeks and will be meeting with his back surgeon, Dr Vasquez, later today to discuss the possibility of setting up an SI joint fusion.  He is both hopeful and nervous regarding these procedures. Overall things are at least stable.    Review of systems otherwise negative.  Past medical, family, and social history reviewed and updated in chart.    OBJECTIVE:  /82 (BP Location: Right arm, Patient Position: Right side, Cuff Size: Adult Regular)  Pulse 68  Temp 97.8  F (36.6  C) (Oral)  Resp 16  Ht 1.829 m (6')  Wt 129.5 kg (285 lb 6.4 oz)  SpO2 99%  BMI 38.71 kg/m2  Alert,  pleasant, upbeat, and in no apparent discomfort.  S1 and S2 normal, no murmurs, clicks, gallops or rubs. Regular rate and rhythm. Chest is clear; no wheezes or rales. No edema or JVD.  Past labs reviewed with the patient.     ASSESSMENT / PLAN:  (G89.4) Chronic pain syndrome  Comment: Stable on current dosage. Refilled and monitored  Plan: HYDROmorphone (DILAUDID) 4 MG tablet, morphine         (MS CONTIN) 30 MG 12 hr tablet            (Z98.1) S/P lumbar fusion  Comment: As above  Plan: HYDROmorphone (DILAUDID) 4 MG tablet, morphine         (MS CONTIN) 30 MG 12 hr tablet            (M51.36) DDD (degenerative disc disease), lumbar  Comment: As above  Plan: diazepam (VALIUM) 5 MG tablet            Follow up one month  SANTIAGO Shaw MD    (Chart documentation completed in part with Dragon voice-recognition software.  Even though reviewed some grammatical, spelling, and word errors may remain.)         25

## 2018-11-08 NOTE — BRIEF OPERATIVE NOTE - PROCEDURE
<<-----Click on this checkbox to enter Procedure Hysteroscopy with dilation and curettage of uterus  11/08/2018    Active  PAUL

## 2018-11-08 NOTE — BRIEF OPERATIVE NOTE - OPERATION/FINDINGS
Extremely difficult procedure due to the patient's body habitus. Deep vagina, prolapse vaginal side wall. cervix grossly normal. curettage done with minimal tissue. see operative report

## 2018-11-08 NOTE — ASU DISCHARGE PLAN (ADULT/PEDIATRIC). - NOTIFY
Persistent Nausea and Vomiting/Inability to Tolerate Liquids or Foods/Bleeding that does not stop/Pain not relieved by Medications/GYN Fever>100.4/Swelling that continues Swelling that continues/Inability to Tolerate Liquids or Foods/Persistent Nausea and Vomiting/Unable to Urinate/Bleeding that does not stop/Pain not relieved by Medications/GYN Fever>100.4

## 2018-11-12 LAB — SURGICAL PATHOLOGY FINAL REPORT - CH: SIGNIFICANT CHANGE UP

## 2018-11-20 ENCOUNTER — APPOINTMENT (OUTPATIENT)
Dept: OBGYN | Facility: CLINIC | Age: 63
End: 2018-11-20
Payer: MEDICARE

## 2018-11-20 VITALS
DIASTOLIC BLOOD PRESSURE: 81 MMHG | HEIGHT: 62 IN | BODY MASS INDEX: 53.92 KG/M2 | WEIGHT: 293 LBS | HEART RATE: 78 BPM | SYSTOLIC BLOOD PRESSURE: 137 MMHG

## 2018-11-20 PROCEDURE — 99213 OFFICE O/P EST LOW 20 MIN: CPT

## 2018-11-23 ENCOUNTER — OUTPATIENT (OUTPATIENT)
Dept: OUTPATIENT SERVICES | Facility: HOSPITAL | Age: 63
LOS: 1 days | Discharge: HOME | End: 2018-11-23

## 2018-11-23 DIAGNOSIS — Z98.890 OTHER SPECIFIED POSTPROCEDURAL STATES: Chronic | ICD-10-CM

## 2018-11-23 DIAGNOSIS — Z90.13 ACQUIRED ABSENCE OF BILATERAL BREASTS AND NIPPLES: Chronic | ICD-10-CM

## 2018-11-23 DIAGNOSIS — D05.90 UNSPECIFIED TYPE OF CARCINOMA IN SITU OF UNSPECIFIED BREAST: ICD-10-CM

## 2018-11-26 ENCOUNTER — RX RENEWAL (OUTPATIENT)
Age: 63
End: 2018-11-26

## 2018-12-04 ENCOUNTER — APPOINTMENT (OUTPATIENT)
Dept: HEMATOLOGY ONCOLOGY | Facility: CLINIC | Age: 63
End: 2018-12-04
Payer: MEDICARE

## 2018-12-04 VITALS
SYSTOLIC BLOOD PRESSURE: 162 MMHG | WEIGHT: 293 LBS | HEIGHT: 62 IN | OXYGEN SATURATION: 91 % | HEART RATE: 89 BPM | TEMPERATURE: 98.2 F | BODY MASS INDEX: 53.92 KG/M2 | DIASTOLIC BLOOD PRESSURE: 96 MMHG

## 2018-12-04 PROCEDURE — 99214 OFFICE O/P EST MOD 30 MIN: CPT

## 2018-12-04 NOTE — HISTORY OF PRESENT ILLNESS
[T: ___] : T[unfilled] [N: ___] : N[unfilled] [AJCC Stage: ____] : AJCC Stage: [unfilled] [de-identified] : Ms. Toussaint was diagnosed with right breast cancer in November 2017 at age 61. \par \par In Fall 2017, she presented to ED with inflamed and erythematous right breast and was given antibiotics with resolution of symptoms.  She was evaluated by Dr. Arlene Gregg of breast surgery.\par \par She subsequently had a diagnostic mammo + US on 10/18/17: Multiple irregular masses in the upper outer right breast. Recommend representative ultrasound-guided core biopsy of two masses at the 11:00 axis and 10:00 axis, as above. Numerous associated distended ducts containing intraductal masses and calcifications throughout the upper outer right breast, in region of palpable concern, 10:00 axis, suspicious for extensive DCIS.Rounded indeterminate mass in the right breast at the 8:00 position, 16 \par cm the nipple, possible abnormal lymph node. Ultrasound-guided core biopsy is advised.\par \par On 11/1/18 she had a core biopsies:\par Final Diagnosis\par 1     Breast biopsy core, right 10:00 4 cm FN, Site #1:- detached fragments of papillary and micropapillar ductal carcinoma with extensive necrosis, probably ductal carcinoma in situ with intermediate to high nuclear grade\par 2     Breast biopsy core, right 11:00 16 cm FN, Site #;- Invasive moderately differentiated ductal carcinoma, [place] 7/9, 1.1 cm. ER>90%, LA> 90%, HER2 negative.\par +DCIS, cribriform and solid with intermediate grade. \par 3     Breast biopsy core, right 8:00 16 cm FN;-   Ductal carcinoma in situ, micropapillary type with intermediate nuclear grade.\par \par She subsequently had non compliance with obtaining an MRI as well as follow ups with Dr. Gregg.  She eventually underwent bilateral mastectomy and bilateral SNL biopsy on 2/1/2018.  \par \par Pathology:\par Right breast simple mastectomy - Infiltrating moderately differentiated ductal carcinoma and DCIS, micropapillary type with tumor necrosis. 1 axillary LN negative.  Approximately 2 cm, grade 2.  Isolate tumor cells in 3 LNs.  ER>90%, LA 90%, HER2 negative.  pT1c pN0(i+)\par \par Left mastectomy and left SNL biopsy - negative\par \par 2/1/18 Oncotype Dx - 17 (low risk) - 5 year recurrence with Tamoxifen 11%, with gutierrez+ chemo 12%\par 11/17/2017 BRCA1/2 negative\par \par Family history is significant for a sister with breast cancer at age 43, and neice (brothers daugther) in her 40s.  Paternal GM had uterine cancer.  Paternal aunt - brain cancer.\par \par She started anastrazole on 3/9/18 and switched to Letrozole on 5/21/18 given hot flashes. \par \par  [de-identified] : Returns for follow up. She started anastrazole on 3/9/18 and switched to Letrozole on 5/21/18 due to hot flashes.\par Since last visit, she has bilateral hip pain, shoulder pain, neck pain, elbow pain and reports that her muscles hurt every day. Pain is persistent. She has tried tylenol and does not find it helpful. Ibuprofen is slightly more effective. She was previously using a cane but now user a walker to walk due to pain. \par No hot flashes. No leg swelling. \par She has lost 4 kgs. \par s/p colonoscopy on 10/15/18 -->negative per patient\par S/p D&C for polyp --> negative results.\par \par \par \par

## 2018-12-04 NOTE — PHYSICAL EXAM
[Obese] : obese [Normal] : affect appropriate [de-identified] : poor dentition [de-identified] : supple [de-identified] : clear bilaterally [de-identified] : S1/S2 [de-identified] : No edema [de-identified] : walking with a walker

## 2018-12-04 NOTE — CONSULT LETTER
[Dear  ___] : Dear  [unfilled], [Consult Letter:] : I had the pleasure of evaluating your patient, [unfilled]. [Please see my note below.] : Please see my note below. [Consult Closing:] : Thank you very much for allowing me to participate in the care of this patient.  If you have any questions, please do not hesitate to contact me. [Sincerely,] : Sincerely, [FreeTextEntry3] : Francine Jara MD\par Medical Oncology/Hematology\par [facility] [place]\par [facility] [place]

## 2018-12-04 NOTE — PHYSICAL EXAM
[Obese] : obese [Normal] : affect appropriate [de-identified] : poor dentition [de-identified] : supple [de-identified] : clear bilaterally [de-identified] : S1/S2 [de-identified] : No edema [de-identified] : walking with a walker

## 2018-12-04 NOTE — CONSULT LETTER
[Dear  ___] : Dear  [unfilled], [Consult Letter:] : I had the pleasure of evaluating your patient, [unfilled]. [Please see my note below.] : Please see my note below. [Consult Closing:] : Thank you very much for allowing me to participate in the care of this patient.  If you have any questions, please do not hesitate to contact me. [Sincerely,] : Sincerely, [FreeTextEntry3] : Francine Jara MD\par Medical Oncology/Hematology\par [facility] [place]\par [facility] [place]

## 2018-12-04 NOTE — ASSESSMENT
[FreeTextEntry1] : 63 year old female with stage IA right breast infiltrating ductal carcinoma (T1c N0(i+)). ER>90%, SC 90%, HER2 negative, S/p bilateral mastectomy + SNL biopsy.  BRCA negative.  Oncotype Dx 17 (low risk).   Began anastrazole on 3/9/18 and switched for Letrozole on 5/21/18 given hot flashes impacting QOL.  4/3/18 bone density - normal.\par \par She has significal MSK symptoms on Letrozole, and pain in her joints and muscles daily.  Now using a walker to walk. Tylenol + ibuprofen not very effective.  Discussed to stop Letrozole for 2 months to see if her symptoms improve.  Also discussed switching to Tamoxifen and reviewed side effects including VTE/stroke, and endometrial ca risk, hepatotoxicity, hot flashes.   Given morbid obesity, risk of VTE and endometrial ca might be more pronounced. \par \par Plan:\par 2 month holiday off Letrozole.\par Advised continued weight loss\par Follow up in 2 months

## 2018-12-04 NOTE — ASSESSMENT
[FreeTextEntry1] : 63 year old female with stage IA right breast infiltrating ductal carcinoma (T1c N0(i+)). ER>90%, NC 90%, HER2 negative, S/p bilateral mastectomy + SNL biopsy.  BRCA negative.  Oncotype Dx 17 (low risk).   Began anastrazole on 3/9/18 and switched for Letrozole on 5/21/18 given hot flashes impacting QOL.  4/3/18 bone density - normal.\par \par She has significal MSK symptoms on Letrozole, and pain in her joints and muscles daily.  Now using a walker to walk. Tylenol + ibuprofen not very effective.  Discussed to stop Letrozole for 2 months to see if her symptoms improve.  Also discussed switching to Tamoxifen and reviewed side effects including VTE/stroke, and endometrial ca risk, hepatotoxicity, hot flashes.   Given morbid obesity, risk of VTE and endometrial ca might be more pronounced. \par \par Plan:\par 2 month holiday off Letrozole.\par Advised continued weight loss\par Follow up in 2 months

## 2019-01-08 NOTE — H&P PST ADULT - BREASTS
PATIENT: Jeffrey Freye   MRN: [id number] : 1954  AGE: 64 year old    Date: 2019                                       Behavioral Health Adult Visit Progress Report     Time Session Began: 1000  Time Session Ended: 1050    Session Type: Individual Followup    Other Present: N/A    Intervention: Behavioral, Cognitive, Insight, Supportive    Suicide/Homicide/Violence Ideation: No signs of suicidal ideation were observed by writer or noted by patient. Writer will continue to assess and monitor.  If Yes, explain: N/A    Current Outpatient Prescriptions:  Medications    Medication Directions   risperiDONE (RISPERDAL) 1 MG tablet Take 1 tablet by mouth daily.   betamethasone dipropionate (DIPROSONE) 0.05 % cream Apply on the skin daily.   benztropine (COGENTIN) 1 MG tablet Take 1 tablet by mouth 2 times daily.   HYDROcodone-acetaminophen (NORCO) 5-325 MG per tablet Take 1 tablet by mouth daily as needed for pain.   LORazepam (ATIVAN) 0.5 MG tablet Take 1 tablet by mouth every 4 hours as needed for Anxiety.   acetaminophen-codeine (TYLENOL NO.3) 300-30 MG per tablet Take 1 tablet by mouth 2 times daily as needed for Pain (Back pain).   UNABLE TO FIND Apply 2 drops to eye nightly. Indications: Dry Eyes caused by Deficiency of Tears Thera Tear liquid gel   Multiple Vitamins-Minerals (CENTRUM SILVER ULTRA MENS) TABS Take 1 tablet by mouth daily.   DHA-EPA-Flaxseed Oil-Vitamin E (THERA TEARS) CAPS Take 1 capsule by mouth daily.        Change in Medication(s) Reported: No   If Yes, explain:     Patient/Family Education Provided: Yes  Patient/Family Displays Understanding: Yes  If No, explain: N/A    Chief complaint in patient's own words: \"I'm calm; I'm happy\"    Progress Note containing chief complaint and symptoms/problems related to the complaint:    Jeffrey Freye presents in person for individual follow-up for paranoid schizophrenia. No SI/HI/AVH noted by patient. Patient discussed recent events and stressors  since last appointment.  Patient states he has remained active with several social and volunteer activities. He is looking forward to beginning classes at the [facility]. We discussed ongoing use of social skills that patient has found helpful and building appropriate healthy friendships with others. Writer provided emotional support, encouragement, and active listening to patient. Previous coping skills were reviewed.    MENTAL STATUS:  The patient is alert and oriented 3x. Patient is pleasant and engaged in session today. Patient displays good eye contact. Speech is of normal rate and tone. Thought processes are linear and goal directed. Mood is anxious and labile today as he changes from tearfulness to irritable to euphoric throughout the course of our visit today.    Patient is encouraged to exercise on regular basis, implement healthy diet, read self-help literature, keep journal, and take medications if/as prescribed by PCP and/or psychiatrist. Patient to return for follow-up in 6-8 weeks. 50 minutes spent in face-to-face psychotherapy.    Need for Community Resources Assessed: Yes    Resources Needed: No    If Yes, what resources: N/A    Primary Diagnosis: F20.0 Schizophrenia, paranoid type (CMS/HCC)  (primary encounter diagnosis)  F41.1 Anxiety state      Treatment Plan: Unchanged    Discharge Plan: Strategies Discussed to Maintain Gains    Raquel Field, MSW, LCSW, CSAC  1/8/2019           detailed exam

## 2019-01-14 ENCOUNTER — APPOINTMENT (OUTPATIENT)
Dept: FAMILY MEDICINE | Facility: CLINIC | Age: 64
End: 2019-01-14
Payer: MEDICARE

## 2019-01-14 VITALS
BODY MASS INDEX: 53.92 KG/M2 | DIASTOLIC BLOOD PRESSURE: 90 MMHG | HEART RATE: 81 BPM | TEMPERATURE: 97.7 F | OXYGEN SATURATION: 97 % | HEIGHT: 62 IN | SYSTOLIC BLOOD PRESSURE: 148 MMHG | WEIGHT: 293 LBS | RESPIRATION RATE: 12 BRPM

## 2019-01-14 PROCEDURE — 99214 OFFICE O/P EST MOD 30 MIN: CPT | Mod: 25

## 2019-01-14 PROCEDURE — 36415 COLL VENOUS BLD VENIPUNCTURE: CPT

## 2019-01-14 NOTE — HEALTH RISK ASSESSMENT
[] : No [No falls in past year] : Patient reported no falls in the past year [0] : 2) Feeling down, depressed, or hopeless: Not at all (0) [de-identified] : Onco,Cardio [[id number]] : 0

## 2019-01-14 NOTE — PHYSICAL EXAM

## 2019-01-14 NOTE — REVIEW OF SYSTEMS
[Fever] : no fever [Chills] : no chills [Fatigue] : fatigue [Discharge] : no discharge [Vision Problems] : no vision problems [Earache] : no earache [Hoarseness] : no hoarseness [Nasal Discharge] : no nasal discharge [Sore Throat] : no sore throat [Postnasal Drip] : no postnasal drip [Chest Pain] : no chest pain [Palpitations] : no palpitations [Lower Ext Edema] : no lower extremity edema [Shortness Of Breath] : no shortness of breath [Wheezing] : no wheezing [Cough] : no cough [Abdominal Pain] : no abdominal pain [Nausea] : no nausea [Constipation] : no constipation [Diarrhea] : diarrhea [Dysuria] : no dysuria [Hematuria] : no hematuria [Frequency] : no frequency [Joint Pain] : joint pain [Muscle Pain] : muscle pain [Back Pain] : back pain [Itching] : no itching [Headache] : no headache [Dizziness] : no dizziness [Insomnia] : no insomnia [Anxiety] : no anxiety [Depression] : no depression [Swollen Glands] : no swollen glands

## 2019-01-14 NOTE — HISTORY OF PRESENT ILLNESS
[FreeTextEntry8] : Patient is a 63-year-old white female here for an acute visit.\par Patient states she has been having total body pain along with multiple joint pains for the past one month. Patient is undergoing treatment for right breast ductal carcinoma. She saw her oncologist who stopped letrozole for body and joint pains. Patient states numbness and tingling has almost resolved, however joint pains and body pain continue. She then stopped her blood pressure medication thinking it might be the cause, however body pain continues.

## 2019-01-14 NOTE — ASSESSMENT
[FreeTextEntry1] : Total body pain with multiple joint pains-----patient has stopped letrozole would not much change in pain. No obvious source of pain noted. Neck collected today for CBC, CMP, sedimentation rate, CRP, TSH, Lyme, KETURAH, anti-double-stranded DNA, rheumatoid factor, uric acid., Smooth muscle antibody.\par Patient to apply ice packs 10 minutes 3-4 times a day.\par Hypertension-----patient has also stopped blood pressure medication thinking it might be the cause of total body pain. Patient to restart amlodipine/valsartan 5/160 mg once daily.\par Ductal carcinoma in situ----patient is undergoing treatment with oncologist. Needs to followup for plan of care.\par \par Followup one week for results.

## 2019-01-15 LAB
ALBUMIN SERPL ELPH-MCNC: 4.5 G/DL
ALP BLD-CCNC: 87 U/L
ALT SERPL-CCNC: 19 U/L
ANION GAP SERPL CALC-SCNC: 14 MMOL/L
AST SERPL-CCNC: 16 U/L
BASOPHILS # BLD AUTO: 0.07 K/UL
BASOPHILS NFR BLD AUTO: 0.9 %
BILIRUB SERPL-MCNC: 0.5 MG/DL
BUN SERPL-MCNC: 14 MG/DL
CALCIUM SERPL-MCNC: 10.2 MG/DL
CHLORIDE SERPL-SCNC: 103 MMOL/L
CO2 SERPL-SCNC: 22 MMOL/L
CREAT SERPL-MCNC: 0.77 MG/DL
CRP SERPL-MCNC: 0.71 MG/DL
EOSINOPHIL # BLD AUTO: 0.29 K/UL
EOSINOPHIL NFR BLD AUTO: 3.9 %
ERYTHROCYTE [SEDIMENTATION RATE] IN BLOOD BY WESTERGREN METHOD: 24 MM/HR
GLUCOSE SERPL-MCNC: 103 MG/DL
HCT VFR BLD CALC: 42.1 %
HGB BLD-MCNC: 13.2 G/DL
IMM GRANULOCYTES NFR BLD AUTO: 0.1 %
LYMPHOCYTES # BLD AUTO: 2.47 K/UL
LYMPHOCYTES NFR BLD AUTO: 33.4 %
MAN DIFF?: NORMAL
MCHC RBC-ENTMCNC: 29.7 PG
MCHC RBC-ENTMCNC: 31.4 GM/DL
MCV RBC AUTO: 94.8 FL
MONOCYTES # BLD AUTO: 0.87 K/UL
MONOCYTES NFR BLD AUTO: 11.8 %
NEUTROPHILS # BLD AUTO: 3.68 K/UL
NEUTROPHILS NFR BLD AUTO: 49.9 %
PLATELET # BLD AUTO: 404 K/UL
POTASSIUM SERPL-SCNC: 4.5 MMOL/L
PROT SERPL-MCNC: 7.6 G/DL
RBC # BLD: 4.44 M/UL
RBC # FLD: 15.3 %
RHEUMATOID FACT SER QL: <10 IU/ML
SODIUM SERPL-SCNC: 139 MMOL/L
TSH SERPL-ACNC: 2.78 UIU/ML
URATE SERPL-MCNC: 7.8 MG/DL
WBC # FLD AUTO: 7.39 K/UL

## 2019-01-16 LAB
DSDNA AB SER-ACNC: <12 IU/ML
SMOOTH MUSCLE AB SER QL IF: NORMAL

## 2019-01-17 LAB — ANA SER IF-ACNC: NEGATIVE

## 2019-01-21 ENCOUNTER — APPOINTMENT (OUTPATIENT)
Dept: FAMILY MEDICINE | Facility: CLINIC | Age: 64
End: 2019-01-21
Payer: MEDICARE

## 2019-01-21 VITALS
DIASTOLIC BLOOD PRESSURE: 88 MMHG | WEIGHT: 285 LBS | OXYGEN SATURATION: 96 % | BODY MASS INDEX: 52.44 KG/M2 | HEIGHT: 62 IN | RESPIRATION RATE: 13 BRPM | SYSTOLIC BLOOD PRESSURE: 138 MMHG | HEART RATE: 89 BPM | TEMPERATURE: 97.3 F

## 2019-01-21 DIAGNOSIS — R79.82 ELEVATED C-REACTIVE PROTEIN (CRP): ICD-10-CM

## 2019-01-21 PROCEDURE — 99213 OFFICE O/P EST LOW 20 MIN: CPT

## 2019-01-21 NOTE — REVIEW OF SYSTEMS
[Fever] : no fever [Chills] : no chills [Fatigue] : fatigue [Discharge] : no discharge [Vision Problems] : no vision problems [Earache] : no earache [Hoarseness] : no hoarseness [Nasal Discharge] : no nasal discharge [Sore Throat] : no sore throat [Postnasal Drip] : no postnasal drip [Chest Pain] : no chest pain [Palpitations] : no palpitations [Lower Ext Edema] : no lower extremity edema [Shortness Of Breath] : no shortness of breath [Wheezing] : no wheezing [Cough] : no cough [Abdominal Pain] : no abdominal pain [Nausea] : no nausea [Constipation] : no constipation [Diarrhea] : diarrhea [Dysuria] : no dysuria [Nocturia] : nocturia [Hematuria] : no hematuria [Frequency] : no frequency [Joint Pain] : joint pain [Muscle Pain] : muscle pain [Back Pain] : back pain [Itching] : no itching [Headache] : no headache [Dizziness] : no dizziness [Insomnia] : no insomnia [Anxiety] : no anxiety [Depression] : no depression [Swollen Glands] : no swollen glands

## 2019-01-21 NOTE — ASSESSMENT
[FreeTextEntry1] : Test results discussed with patient\par Elevated CRP 0.71\par Elevated uric acid 7.8\par Elevated sedimentation rate 24\par Negative rheumatoid factor, KETURAH, double-stranded DNA, anti-smooth muscle antibody and TSH. CBC is normal. Serum glucose mildly elevated to 103.\par Lyme titer is pending.\par Patient given copy of lab results to take to her oncologist.unclear whether muscle inflammation is secondary to meds given by oncologist or connective tissue disease .\par Lyme titer results will be called in to patient when they are received.\par \par Followup for complete physical exam and blood work in 2 months.

## 2019-01-21 NOTE — HEALTH RISK ASSESSMENT
[] : No [No falls in past year] : Patient reported no falls in the past year [0] : 2) Feeling down, depressed, or hopeless: Not at all (0) [de-identified] : Onco [[id number]] : 0

## 2019-01-21 NOTE — HISTORY OF PRESENT ILLNESS
[FreeTextEntry1] : 63-year-old white female here to followup on test results done one week ago for total body pain and multiple joint pains. Patient states she is using ibuprofen/Tylenol off and on for body aches. Patient follows up with oncologist next week.

## 2019-01-23 ENCOUNTER — RESULT REVIEW (OUTPATIENT)
Age: 64
End: 2019-01-23

## 2019-01-23 LAB

## 2019-01-24 ENCOUNTER — OUTPATIENT (OUTPATIENT)
Dept: OUTPATIENT SERVICES | Facility: HOSPITAL | Age: 64
LOS: 1 days | Discharge: HOME | End: 2019-01-24

## 2019-01-24 DIAGNOSIS — Z90.13 ACQUIRED ABSENCE OF BILATERAL BREASTS AND NIPPLES: Chronic | ICD-10-CM

## 2019-01-24 DIAGNOSIS — Z98.890 OTHER SPECIFIED POSTPROCEDURAL STATES: Chronic | ICD-10-CM

## 2019-01-24 DIAGNOSIS — D05.90 UNSPECIFIED TYPE OF CARCINOMA IN SITU OF UNSPECIFIED BREAST: ICD-10-CM

## 2019-01-28 ENCOUNTER — APPOINTMENT (OUTPATIENT)
Dept: HEMATOLOGY ONCOLOGY | Facility: CLINIC | Age: 64
End: 2019-01-28
Payer: MEDICARE

## 2019-01-28 VITALS
BODY MASS INDEX: 52.44 KG/M2 | SYSTOLIC BLOOD PRESSURE: 135 MMHG | TEMPERATURE: 98.2 F | HEIGHT: 62 IN | HEART RATE: 89 BPM | DIASTOLIC BLOOD PRESSURE: 87 MMHG | OXYGEN SATURATION: 95 % | WEIGHT: 285 LBS

## 2019-01-28 PROCEDURE — 99214 OFFICE O/P EST MOD 30 MIN: CPT

## 2019-01-28 NOTE — CONSULT LETTER
[Dear  ___] : Dear  [unfilled], [Consult Letter:] : I had the pleasure of evaluating your patient, [unfilled]. [Please see my note below.] : Please see my note below. [Consult Closing:] : Thank you very much for allowing me to participate in the care of this patient.  If you have any questions, please do not hesitate to contact me. [Sincerely,] : Sincerely, [FreeTextEntry3] : Francine Jara MD\par Medical Oncology/Hematology\par [facility] [place]\par [facility] [place]

## 2019-01-28 NOTE — HISTORY OF PRESENT ILLNESS
[T: ___] : T[unfilled] [N: ___] : N[unfilled] [AJCC Stage: ____] : AJCC Stage: [unfilled] [de-identified] : Ms. Toussaint was diagnosed with right breast cancer in November 2017 at age 61. \par \par In Fall 2017, she presented to ED with inflamed and erythematous right breast and was given antibiotics with resolution of symptoms.  She was evaluated by Dr. Arlene Gregg of breast surgery.\par \par She subsequently had a diagnostic mammo + US on 10/18/17: Multiple irregular masses in the upper outer right breast. Recommend representative ultrasound-guided core biopsy of two masses at the 11:00 axis and 10:00 axis, as above. Numerous associated distended ducts containing intraductal masses and calcifications throughout the upper outer right breast, in region of palpable concern, 10:00 axis, suspicious for extensive DCIS.Rounded indeterminate mass in the right breast at the 8:00 position, 16 \par cm the nipple, possible abnormal lymph node. Ultrasound-guided core biopsy is advised.\par \par On 11/1/18 she had a core biopsies:\par Final Diagnosis\par 1     Breast biopsy core, right 10:00 4 cm FN, Site #1:- detached fragments of papillary and micropapillar ductal carcinoma with extensive necrosis, probably ductal carcinoma in situ with intermediate to high nuclear grade\par 2     Breast biopsy core, right 11:00 16 cm FN, Site #;- Invasive moderately differentiated ductal carcinoma, [place] 7/9, 1.1 cm. ER>90%, PA> 90%, HER2 negative.\par +DCIS, cribriform and solid with intermediate grade. \par 3     Breast biopsy core, right 8:00 16 cm FN;-   Ductal carcinoma in situ, micropapillary type with intermediate nuclear grade.\par \par She subsequently had non compliance with obtaining an MRI as well as follow ups with Dr. Gregg.  She eventually underwent bilateral mastectomy and bilateral SNL biopsy on 2/1/2018.  \par \par Pathology:\par Right breast simple mastectomy - Infiltrating moderately differentiated ductal carcinoma and DCIS, micropapillary type with tumor necrosis. 1 axillary LN negative.  Approximately 2 cm, grade 2.  Isolate tumor cells in 3 LNs.  ER>90%, PA 90%, HER2 negative.  pT1c pN0(i+)\par \par Left mastectomy and left SNL biopsy - negative\par \par 2/1/18 Oncotype Dx - 17 (low risk) - 5 year recurrence with Tamoxifen 11%, with gutierrez+ chemo 12%\par 11/17/2017 BRCA1/2 negative\par \par Family history is significant for a sister with breast cancer at age 43, and neice (brothers daugther) in her 40s.  Paternal GM had uterine cancer.  Paternal aunt - brain cancer.\par \par She started anastrazole on 3/9/18 and switched to Letrozole on 5/21/18 given hot flashes. \par \par  [de-identified] : Returns for follow up. She started anastrazole on 3/9/18 and switched to Letrozole on 5/21/18 due to hot flashes. She stopped Letrozole on 12/4/18 due to multiple MSK symptoms including muscle pain and generalized joint pains. \par \par Since stopping Letrozole, she continues to have muscle pain in her thighs, and upper back. She also has pain in multiple joints, right shoulder, and bilateral\par hips.  She is now walking with a walker. \par No fever. +chills. Appetite is good. \par \par \par s/p colonoscopy on 10/15/18 -->negative per patient\par S/p D&C for polyp --> negative results.\par \par \par \par

## 2019-01-28 NOTE — PHYSICAL EXAM
[Obese] : obese [Normal] : affect appropriate [de-identified] : poor dentition [de-identified] : supple [de-identified] : clear bilaterally [de-identified] : S1/S2 [de-identified] : No edema [de-identified] : walking with a walker

## 2019-01-28 NOTE — ASSESSMENT
[FreeTextEntry1] : 63 year old female with stage IA right breast infiltrating ductal carcinoma (T1c N0(i+)). ER>90%, IL 90%, HER2 negative, S/p bilateral mastectomy + SNL biopsy.  BRCA negative.  Oncotype Dx 17 (low risk).   Began anastrazole on 3/9/18 and switched for Letrozole on 5/21/18 given hot flashes impacting QOL. \par  4/3/18 bone density - normal.\par \par She was thought to have significant MSK symptoms on Letrozole, with pain in her joints and muscles daily.  Now using a walker to walk.  Her symptoms have not improved while being off Letrozole.   Previously have discussed switching to Tamoxifen and reviewed side effects including VTE/stroke, and endometrial ca risk, hepatotoxicity, hot flashes.   Given morbid obesity, risk of VTE and endometrial ca might be more pronounced. \par \par Plan:\par Remain off Letrozole\par Referral to rheumatology\par Follow up in 3 months

## 2019-01-31 ENCOUNTER — FORM ENCOUNTER (OUTPATIENT)
Age: 64
End: 2019-01-31

## 2019-02-01 ENCOUNTER — OUTPATIENT (OUTPATIENT)
Dept: OUTPATIENT SERVICES | Facility: HOSPITAL | Age: 64
LOS: 1 days | End: 2019-02-01

## 2019-02-01 ENCOUNTER — APPOINTMENT (OUTPATIENT)
Dept: NUCLEAR MEDICINE | Facility: CLINIC | Age: 64
End: 2019-02-01
Payer: MEDICARE

## 2019-02-01 DIAGNOSIS — C50.911 MALIGNANT NEOPLASM OF UNSPECIFIED SITE OF RIGHT FEMALE BREAST: ICD-10-CM

## 2019-02-01 DIAGNOSIS — Z98.890 OTHER SPECIFIED POSTPROCEDURAL STATES: Chronic | ICD-10-CM

## 2019-02-01 DIAGNOSIS — Z90.13 ACQUIRED ABSENCE OF BILATERAL BREASTS AND NIPPLES: Chronic | ICD-10-CM

## 2019-02-01 PROCEDURE — 78306 BONE IMAGING WHOLE BODY: CPT | Mod: 26

## 2019-02-04 ENCOUNTER — APPOINTMENT (OUTPATIENT)
Dept: BREAST CENTER | Facility: CLINIC | Age: 64
End: 2019-02-04
Payer: MEDICARE

## 2019-02-04 ENCOUNTER — APPOINTMENT (OUTPATIENT)
Dept: SURGERY | Facility: CLINIC | Age: 64
End: 2019-02-04

## 2019-02-04 ENCOUNTER — APPOINTMENT (OUTPATIENT)
Dept: FAMILY MEDICINE | Facility: CLINIC | Age: 64
End: 2019-02-04
Payer: MEDICARE

## 2019-02-04 VITALS
HEIGHT: 62 IN | OXYGEN SATURATION: 95 % | SYSTOLIC BLOOD PRESSURE: 148 MMHG | RESPIRATION RATE: 13 BRPM | BODY MASS INDEX: 53 KG/M2 | DIASTOLIC BLOOD PRESSURE: 76 MMHG | HEART RATE: 93 BPM | WEIGHT: 288 LBS | TEMPERATURE: 97.9 F

## 2019-02-04 VITALS
SYSTOLIC BLOOD PRESSURE: 142 MMHG | TEMPERATURE: 97.9 F | HEART RATE: 83 BPM | DIASTOLIC BLOOD PRESSURE: 86 MMHG | BODY MASS INDEX: 52.68 KG/M2 | WEIGHT: 288 LBS

## 2019-02-04 DIAGNOSIS — E83.52 HYPERCALCEMIA: ICD-10-CM

## 2019-02-04 PROCEDURE — 99214 OFFICE O/P EST MOD 30 MIN: CPT

## 2019-02-04 PROCEDURE — 36415 COLL VENOUS BLD VENIPUNCTURE: CPT

## 2019-02-04 PROCEDURE — 99213 OFFICE O/P EST LOW 20 MIN: CPT | Mod: 25

## 2019-02-04 RX ORDER — LETROZOLE TABLETS 2.5 MG/1
2.5 TABLET, FILM COATED ORAL DAILY
Qty: 90 | Refills: 1 | Status: DISCONTINUED | COMMUNITY
Start: 2018-05-21 | End: 2019-02-04

## 2019-02-04 NOTE — ASSESSMENT
[FreeTextEntry1] : 63 yo postmenopausal female with right breast cancer IDC grade 2 ER + MI+ [id number] negative with DCIS grade 2 ER+ MI + s/p bilateral mastectomy with right SLNB (2/1/2018).  She presents today for CBE and CBE is benign and she is well healed. She has stopped taking hormonal therapy since October 2018 due to side effects.  She felt relief for MSK symptoms when she was off both her HTN and Hormonal therapy but has recently had to go back on HTN medication.  Recommendation for Naproxen and Gabapentin for pain.  Consult with pain specialist and follow up in 3 months.\par 1.  Gabapentin 600 mg daily\par 2.  Naproxen BID as needed\par 3.  Consult with pain specialist\par 4.  Follow up in 3 months.\par \par \par

## 2019-02-04 NOTE — DATA REVIEWED
[No studies available for review at this time.] : No studies available for review at this time. [FreeTextEntry1] : \par

## 2019-02-04 NOTE — HISTORY OF PRESENT ILLNESS
[FreeTextEntry1] : I had the pleasure of seeing Natasha Toussaint in the office for a post a follow up visit.  Natasha is a lorie 63 yo postmenopausal female who was diagnosed with grade 2 right breast invasive ductal carcinoma ER+GA+.  She underwent bilateral mastectomy with right SLNB on 2/1/2018.\par \par She is presents today for clinical breast evaluation.  She states multiple MSK symptoms including muscle pain and generalized joint pain due to hormonal therapy and no is longer is taking any hormonal therapy due to pain and is being followed by Dr. Jara.  She reports some relief when she was off her HTN medication and hormonal therapy but had to go back onto her BP medications as per cardiologist.  Dr. Jara recommended for rheumatology consult and follow up in 3 months.  She is not undergoing radiation as discussed with Dr. Jaeger due to Oncotype score of 17.  \par  \par SURGICAL PATHOLOGY REPORT\par 2/01/2018:  FINAL DIAGNOSIS\par 1. sentinel lymph node left axilla #1 biopsy: one lymph node no tumor identified 2. [place] lymph node left axilla #2 biopsy: One lymph node, no tumor identified. 3. [place] lymph node, left axilla #3 biopsy: Fibroadipose tissue, no tumor identified.  4. Right breast sentinel lymph node #1 biopsy:  Isolated tumor cells in three of five lymph nodes.  5. Left breast, simple mastectomy: Fatty breast tissue, no tumor identified.  6.  Right breast, simple mastectomy:  Infiltrating ductal carcinoma and extensive intraductal carcinoma.  One axillary lymph node, no tumor identified.  Nipple with lactiferous duct and atypical intraductal papilloma and underlying intraductal carcinoma.\par \par We discussed clinical examination.  Clinical exam is benign and she looks well from a surgical standpoint.  She has stopped letrozole for the past 3 months and when she was off both hormonal therapy and HTN medication she did have relief.  She had to be put back on her HTN medication as per cardiologist and now has complaints of generalized muscle pain and joint pain.  Her bone scan was negative for osseous metastasis.  She is following up with her cardiologist to see if she can be put on another medication.  Recommendation for Naproxen BID and Gabapentin 600 mg for pain.  Consultation with pain specialist will also be arranged.  She is to follow up in 3 months.  She understands and agrees to plan.  All questions answered.\par

## 2019-02-04 NOTE — PHYSICAL EXAM
[Normocephalic] : normocephalic [Atraumatic] : atraumatic [Supple] : supple [No Supraclavicular Adenopathy] : no supraclavicular adenopathy [Examined in the supine and seated position] : examined in the supine and seated position [No dominant masses] : no dominant masses in right breast  [No dominant masses] : no dominant masses left breast [No Axillary Lymphadenopathy] : no left axillary lymphadenopathy [No Edema] : no edema [No Rashes] : no rashes [No Ulceration] : no ulceration [de-identified] : Right mastectomy site well healed.  No supraclavicular or axillary adenopathy. No dominant masses, normal to palpation.\par  [de-identified] : Left mastectomy site well healed. No supraclavicular or axillary adenopathy. No dominant masses, normal to palpation.

## 2019-02-05 LAB
ALBUMIN SERPL ELPH-MCNC: 4.1 G/DL
ALP BLD-CCNC: 86 U/L
ALT SERPL-CCNC: 15 U/L
ANION GAP SERPL CALC-SCNC: 16 MMOL/L
AST SERPL-CCNC: 16 U/L
BASOPHILS # BLD AUTO: 0.07 K/UL
BASOPHILS NFR BLD AUTO: 0.7 %
BILIRUB SERPL-MCNC: 0.4 MG/DL
BUN SERPL-MCNC: 22 MG/DL
CALCIUM SERPL-MCNC: 9.7 MG/DL
CHLORIDE SERPL-SCNC: 102 MMOL/L
CO2 SERPL-SCNC: 23 MMOL/L
CREAT SERPL-MCNC: 0.89 MG/DL
EOSINOPHIL # BLD AUTO: 0.24 K/UL
EOSINOPHIL NFR BLD AUTO: 2.6 %
ERYTHROCYTE [SEDIMENTATION RATE] IN BLOOD BY WESTERGREN METHOD: 16 MM/HR
GLUCOSE SERPL-MCNC: 117 MG/DL
HCT VFR BLD CALC: 40.1 %
HGB BLD-MCNC: 12.8 G/DL
IMM GRANULOCYTES NFR BLD AUTO: 0.2 %
LYMPHOCYTES # BLD AUTO: 2.66 K/UL
LYMPHOCYTES NFR BLD AUTO: 28.5 %
MAN DIFF?: NORMAL
MCHC RBC-ENTMCNC: 29 PG
MCHC RBC-ENTMCNC: 31.9 GM/DL
MCV RBC AUTO: 90.9 FL
MONOCYTES # BLD AUTO: 0.91 K/UL
MONOCYTES NFR BLD AUTO: 9.7 %
NEUTROPHILS # BLD AUTO: 5.44 K/UL
NEUTROPHILS NFR BLD AUTO: 58.3 %
PLATELET # BLD AUTO: 381 K/UL
POTASSIUM SERPL-SCNC: 4.4 MMOL/L
PROT SERPL-MCNC: 7.1 G/DL
RBC # BLD: 4.41 M/UL
RBC # FLD: 14.9 %
RHEUMATOID FACT SER QL: <10 IU/ML
SODIUM SERPL-SCNC: 141 MMOL/L
URATE SERPL-MCNC: 8.3 MG/DL
WBC # FLD AUTO: 9.34 K/UL

## 2019-02-05 NOTE — HISTORY OF PRESENT ILLNESS
[FreeTextEntry1] : Patient claims 2 month hx Joint and Muscle pain  see Hemo onc for Breast Cancer  seen by Heme and surgery started on naproxen  Needs uric acid repeated pain comes and go at times

## 2019-02-05 NOTE — PLAN
[FreeTextEntry1] : Patient claims 2 month hx Joint and Muscle pain  see Hemo onc for Breast Cancer  seen by Jam and surgery started on naproxen  Needs uric acid repeated pain comes and go at times \par Care plan and review  advised follow up with Heme Onc

## 2019-02-05 NOTE — HEALTH RISK ASSESSMENT
[No falls in past year] : Patient reported no falls in the past year [0] : 2) Feeling down, depressed, or hopeless: Not at all (0) [] : No [[id number]] : 0

## 2019-02-08 LAB
B BURGDOR AB SER-IMP: NEGATIVE
B BURGDOR IGM PATRN SER IB-IMP: NEGATIVE
B BURGDOR18/20KD IGM SER QL IB: NORMAL
B BURGDOR18KD IGG SER QL IB: NORMAL
B BURGDOR23KD IGG SER QL IB: NORMAL
B BURGDOR23KD IGM SER QL IB: NORMAL
B BURGDOR28KD AB SER QL IB: NORMAL
B BURGDOR28KD IGG SER QL IB: NORMAL
B BURGDOR30KD AB SER QL IB: NORMAL
B BURGDOR30KD IGG SER QL IB: NORMAL
B BURGDOR31KD IGG SER QL IB: NORMAL
B BURGDOR31KD IGM SER QL IB: NORMAL
B BURGDOR39KD IGG SER QL IB: NORMAL
B BURGDOR39KD IGM SER QL IB: PRESENT
B BURGDOR41KD IGG SER QL IB: NORMAL
B BURGDOR41KD IGM SER QL IB: NORMAL
B BURGDOR45KD AB SER QL IB: NORMAL
B BURGDOR45KD IGG SER QL IB: NORMAL
B BURGDOR58KD AB SER QL IB: NORMAL
B BURGDOR58KD IGG SER QL IB: NORMAL
B BURGDOR66KD IGG SER QL IB: NORMAL
B BURGDOR66KD IGM SER QL IB: NORMAL
B BURGDOR93KD IGG SER QL IB: NORMAL
B BURGDOR93KD IGM SER QL IB: NORMAL

## 2019-02-15 RX ORDER — AMLODIPINE AND VALSARTAN 5; 160 MG/1; MG/1
5-160 TABLET, FILM COATED ORAL DAILY
Qty: 60 | Refills: 5 | Status: DISCONTINUED | COMMUNITY
Start: 2018-10-02 | End: 2019-02-15

## 2019-02-25 ENCOUNTER — APPOINTMENT (OUTPATIENT)
Dept: FAMILY MEDICINE | Facility: CLINIC | Age: 64
End: 2019-02-25
Payer: MEDICARE

## 2019-02-25 VITALS — HEART RATE: 91 BPM | DIASTOLIC BLOOD PRESSURE: 78 MMHG | SYSTOLIC BLOOD PRESSURE: 146 MMHG | OXYGEN SATURATION: 95 %

## 2019-02-25 VITALS
RESPIRATION RATE: 13 BRPM | SYSTOLIC BLOOD PRESSURE: 146 MMHG | OXYGEN SATURATION: 96 % | WEIGHT: 288 LBS | HEIGHT: 55 IN | BODY MASS INDEX: 66.65 KG/M2 | DIASTOLIC BLOOD PRESSURE: 80 MMHG | HEART RATE: 76 BPM | TEMPERATURE: 98.1 F

## 2019-02-25 PROCEDURE — 99214 OFFICE O/P EST MOD 30 MIN: CPT

## 2019-02-25 RX ORDER — POLYETHYLENE GLYOCOL 3350, SODIUM CHLORIDE, SODIUM BICARBONATE AND POTASSIUM CHLORIDE 420; 11.2; 5.72; 1.48 G/4L; G/4L; G/4L; G/4L
420 POWDER, FOR SOLUTION NASOGASTRIC; ORAL
Qty: 1 | Refills: 0 | Status: DISCONTINUED | COMMUNITY
Start: 2018-08-20 | End: 2019-02-25

## 2019-02-25 NOTE — ASSESSMENT
[FreeTextEntry1] : Elevated uric acid----patient will start allopurinol 100 mg 3 times a day. Discussed with patient need to decrease intake of animal protein and to stop intake of alcohol completely.\par Multiple joint pains and muscle spasms----advised patient to start gabapentin 600 mg at bedtime as recommended by her surgeon. Continue naproxen. Appointment to be scheduled with rheumatology.\par Hypertension -----amlodipine has been discontinued by cardiology patient is now on valsartan/hfliqpkifhlyfnxdhca116/12.5mg daily. .\par \par Followup one month.

## 2019-02-25 NOTE — HEALTH RISK ASSESSMENT
[] : No [No falls in past year] : Patient reported no falls in the past year [0] : 2) Feeling down, depressed, or hopeless: Not at all (0) [de-identified] : surg,cardio,onc [[id number]] : 0

## 2019-02-25 NOTE — PHYSICAL EXAM
[No Acute Distress] : no acute distress [Well Nourished] : well nourished [Well Developed] : well developed [Well-Appearing] : well-appearing [No JVD] : no jugular venous distention [Supple] : supple [No Respiratory Distress] : no respiratory distress  [Clear to Auscultation] : lungs were clear to auscultation bilaterally [No Accessory Muscle Use] : no accessory muscle use [Normal Rate] : normal rate  [Regular Rhythm] : with a regular rhythm [Normal S1, S2] : normal S1 and S2 [No Murmur] : no murmur heard [Pedal Pulses Present] : the pedal pulses are present [No Edema] : there was no peripheral edema [Soft] : abdomen soft [Non Tender] : non-tender [No Masses] : no abdominal mass palpated [No HSM] : no HSM [Normal Bowel Sounds] : normal bowel sounds [No CVA Tenderness] : no CVA  tenderness [No Spinal Tenderness] : no spinal tenderness [No Joint Swelling] : no joint swelling [Grossly Normal Strength/Tone] : grossly normal strength/tone [No Rash] : no rash [Coordination Grossly Intact] : coordination grossly intact [No Focal Deficits] : no focal deficits [Deep Tendon Reflexes (DTR)] : deep tendon reflexes were 2+ and symmetric [Normal Affect] : the affect was normal [Normal Insight/Judgement] : insight and judgment were intact [de-identified] : Uses walker

## 2019-02-25 NOTE — HISTORY OF PRESENT ILLNESS
[FreeTextEntry1] : She is a 63-year-old white female here to followup on elevated uric acid. Patient was told by her surgeon that uric acid is elevated and she has gout. Patient was not started on any medication.\par Patient has seen cardiologist for elevated blood pressure and was started on valsartan/hydrochlorothiazide instead of amlodipine which was discontinued as a possible cause of muscle spasms.\par Patient was also started on gabapentin 600 mg at bedtime by the surgeon for bilateral leg pains. Patient has not yet started medication although she has started naproxen 500 mg b.i.d..\par Patient was referred to rheumatology, she has not yet called to make an appointment.

## 2019-03-04 ENCOUNTER — APPOINTMENT (OUTPATIENT)
Dept: RHEUMATOLOGY | Facility: CLINIC | Age: 64
End: 2019-03-04
Payer: MEDICARE

## 2019-03-04 VITALS
WEIGHT: 288 LBS | OXYGEN SATURATION: 97 % | BODY MASS INDEX: 51.03 KG/M2 | SYSTOLIC BLOOD PRESSURE: 140 MMHG | DIASTOLIC BLOOD PRESSURE: 86 MMHG | RESPIRATION RATE: 17 BRPM | HEART RATE: 90 BPM | HEIGHT: 63 IN | TEMPERATURE: 97 F

## 2019-03-04 DIAGNOSIS — M15.9 POLYOSTEOARTHRITIS, UNSPECIFIED: ICD-10-CM

## 2019-03-04 DIAGNOSIS — Z78.0 ASYMPTOMATIC MENOPAUSAL STATE: ICD-10-CM

## 2019-03-04 DIAGNOSIS — Z86.79 PERSONAL HISTORY OF OTHER DISEASES OF THE CIRCULATORY SYSTEM: ICD-10-CM

## 2019-03-04 DIAGNOSIS — R21 RASH AND OTHER NONSPECIFIC SKIN ERUPTION: ICD-10-CM

## 2019-03-04 PROCEDURE — 99205 OFFICE O/P NEW HI 60 MIN: CPT | Mod: 25

## 2019-03-04 PROCEDURE — 36415 COLL VENOUS BLD VENIPUNCTURE: CPT

## 2019-03-06 ENCOUNTER — CLINICAL ADVICE (OUTPATIENT)
Age: 64
End: 2019-03-06

## 2019-03-08 PROBLEM — Z78.0 HISTORY OF MENOPAUSE: Status: RESOLVED | Noted: 2019-03-08 | Resolved: 2019-03-08

## 2019-03-08 PROBLEM — R21 RASH: Status: RESOLVED | Noted: 2019-03-04 | Resolved: 2019-03-08

## 2019-03-08 PROBLEM — M15.9 OSTEOARTHRITIS, MULTIPLE SITES: Status: RESOLVED | Noted: 2018-08-20 | Resolved: 2019-03-08

## 2019-03-08 PROBLEM — Z86.79 HISTORY OF HYPERTENSION: Status: RESOLVED | Noted: 2019-03-04 | Resolved: 2019-03-08

## 2019-03-08 RX ORDER — ALLOPURINOL 100 MG/1
100 TABLET ORAL EVERY 8 HOURS
Qty: 90 | Refills: 2 | Status: DISCONTINUED | COMMUNITY
Start: 2019-02-25 | End: 2019-03-08

## 2019-03-08 RX ORDER — IBUPROFEN 200 MG
200 TABLET ORAL
Refills: 0 | Status: DISCONTINUED | COMMUNITY
End: 2019-03-08

## 2019-03-08 RX ORDER — ASPIRIN 325 MG
325 TABLET ORAL
Refills: 0 | Status: DISCONTINUED | COMMUNITY
End: 2019-03-08

## 2019-03-08 NOTE — HISTORY OF PRESENT ILLNESS
[FreeTextEntry1] : 63-year-old  woman referred for further evaluation of her joint symptoms, accompanied by her .\par \par 2 months ago developed intermittent pain bilateral shoulders, face of the left thumb, hips, knees. No joint swelling, erythema, very. She does not feel worse in the morning. No morning stiffness. No trauma. Her PCP or labs which revealed "gout". Treated naproxen 500 mg b.i.d. with some relief and allopurinol 100 mg t.i.d. In the interim she was seen by her oncologist .  She was referred here for further evaluation. She has chronic low back pain with radiation to bilateral thighs. She has dry mouth which she treats with oral hydration but denies dry eyes. She has a history of a "torn ligament" in her left knee.\par 4 month history of pruritic erythematous maculopapular rash left upper arm--she was told secondary to unknown medication. Her mother and father had arthritis of unknown type.several months ago she had a bone densitometry which she states was negative.\par

## 2019-03-08 NOTE — CONSULT LETTER
[Dear  ___] : Dear  [unfilled], [Consult Letter:] : I had the pleasure of evaluating your patient, [unfilled]. [Please see my note below.] : Please see my note below. [Consult Closing:] : Thank you very much for allowing me to participate in the care of this patient.  If you have any questions, please do not hesitate to contact me. [Sincerely,] : Sincerely, [FreeTextEntry3] : [place]\par Myron I. Kleiner, M.D., [facility]\par

## 2019-03-08 NOTE — ASSESSMENT
[FreeTextEntry1] : Impression:   63-year-old woman referred for further evaluation of her joint symptoms, accompanied by her .\par \par Two-month history of intermittent joint pain--history and physical consistent with osteoarthritis. I will also evaluate her for various types of inflammatory arthritis. Although she has had mild hyperuricemia (serum uric acid 8.3), there is no good evidence for a diagnosis of gout in this patient at this time.\par \par Plan: I reviewed previous lab results with patient\par Laboratory tests ordered today-see list below\par X-rays ordered\par Diagnosis and prognosis discussed\par Continue other current medications (other than those changed below)\par Discontinue allopurinol\par Discontinue naproxen\par Etodolac 400 mg b.i.d. at end of breakfast and supper(Possible side effects explained including cardiovascular risk/MI/CVA)\par Prophylactic aspirin 162 mg q.d. at end of lunch(Possible side effects explained)\par Omeprazole 20 mg q.d. a.c. breakfast(Possible side effects explained)\par Bilateral knee exercises--instruction sheet given and discussed\par Back exercises--instruction sheet given and discussed\par I requested that she have a bone densitometry report done recently sent to me\par Dermatology consultation via PCP\par Return visit 2-3 weeks

## 2019-03-08 NOTE — PHYSICAL EXAM
[General Appearance - Alert] : alert [General Appearance - In No Acute Distress] : in no acute distress [General Appearance - Well Nourished] : well nourished [General Appearance - Well Developed] : well developed [General Appearance - Well-Appearing] : healthy appearing [Sclera] : the sclera and conjunctiva were normal [PERRL With Normal Accommodation] : pupils were equal in size, round, and reactive to light [Extraocular Movements] : extraocular movements were intact [Outer Ear] : the ears and nose were normal in appearance [Oropharynx] : the oropharynx was normal [Neck Appearance] : the appearance of the neck was normal [Neck Cervical Mass (___cm)] : no neck mass was observed [Jugular Venous Distention Increased] : there was no jugular-venous distention [Thyroid Diffuse Enlargement] : the thyroid was not enlarged [Thyroid Nodule] : there were no palpable thyroid nodules [Auscultation Breath Sounds / Voice Sounds] : lungs were clear to auscultation bilaterally [Lungs Percussion] : the lungs were normal to percussion [Heart Rate And Rhythm] : heart rate was normal and rhythm regular [Heart Sounds] : normal S1 and S2 [Heart Sounds Gallop] : no gallops [Murmurs] : no murmurs [Heart Sounds Pericardial Friction Rub] : no pericardial rub [Edema] : there was no peripheral edema [Bowel Sounds] : normal bowel sounds [Abdomen Soft] : soft [Abdomen Tenderness] : non-tender [] : no hepato-splenomegaly [Abdomen Mass (___ Cm)] : no abdominal mass palpated [Cervical Lymph Nodes Enlarged Posterior Bilaterally] : posterior cervical [Cervical Lymph Nodes Enlarged Anterior Bilaterally] : anterior cervical [Supraclavicular Lymph Nodes Enlarged Bilaterally] : supraclavicular [Axillary Lymph Nodes Enlarged Bilaterally] : axillary [No CVA Tenderness] : no ~M costovertebral angle tenderness [No Spinal Tenderness] : no spinal tenderness [Skin Color & Pigmentation] : normal skin color and pigmentation [Skin Turgor] : normal skin turgor [Cranial Nerves] : cranial nerves 2-12 were intact [Deep Tendon Reflexes (DTR)] : deep tendon reflexes were 2+ and symmetric [Sensation] : the sensory exam was normal to light touch and pinprick [Motor Exam] : the motor exam was normal [No Focal Deficits] : no focal deficits [Oriented To Time, Place, And Person] : oriented to person, place, and time [Impaired Insight] : insight and judgment were intact [Affect] : the affect was normal [Mood] : the mood was normal [FreeTextEntry1] : Strength-5/5

## 2019-03-08 NOTE — REASON FOR VISIT
[Consultation] : a consultation visit [Spouse] : spouse [FreeTextEntry1] : Patient referred for further evaluation of her joint pains

## 2019-03-10 ENCOUNTER — FORM ENCOUNTER (OUTPATIENT)
Age: 64
End: 2019-03-10

## 2019-03-10 LAB
25(OH)D3 SERPL-MCNC: 23.7 NG/ML
ALBUMIN SERPL ELPH-MCNC: 4.3 G/DL
ALP BLD-CCNC: 82 U/L
ALT SERPL-CCNC: 25 U/L
ANA SER IF-ACNC: NEGATIVE
ANION GAP SERPL CALC-SCNC: 14 MMOL/L
APPEARANCE: ABNORMAL
AST SERPL-CCNC: 19 U/L
BACTERIA: ABNORMAL
BASOPHILS # BLD AUTO: 0.09 K/UL
BASOPHILS NFR BLD AUTO: 1.3 %
BILIRUB SERPL-MCNC: 0.5 MG/DL
BILIRUBIN URINE: NEGATIVE
BLOOD URINE: NEGATIVE
BUN SERPL-MCNC: 16 MG/DL
CALCIUM OXALATE CRYSTALS: ABNORMAL
CALCIUM SERPL-MCNC: 10.5 MG/DL
CCP AB SER IA-ACNC: <8 UNITS
CHLORIDE SERPL-SCNC: 102 MMOL/L
CK SERPL-CCNC: 76 U/L
CO2 SERPL-SCNC: 26 MMOL/L
COLOR: YELLOW
CREAT SERPL-MCNC: 0.77 MG/DL
CRP SERPL-MCNC: 0.55 MG/DL
DEPRECATED KAPPA LC FREE/LAMBDA SER: 0.95 RATIO
DSDNA AB SER-ACNC: <12 IU/ML
ENA SS-A AB SER IA-ACNC: <0.2 AL
ENA SS-B AB SER IA-ACNC: <0.2 AL
EOSINOPHIL # BLD AUTO: 0.22 K/UL
EOSINOPHIL NFR BLD AUTO: 3.1 %
ERYTHROCYTE [SEDIMENTATION RATE] IN BLOOD BY WESTERGREN METHOD: 31 MM/HR
GLUCOSE QUALITATIVE U: NEGATIVE
GLUCOSE SERPL-MCNC: 98 MG/DL
HCT VFR BLD CALC: 41.4 %
HGB BLD-MCNC: 13.2 G/DL
HLA-B27 RELATED AG QL: NORMAL
HYALINE CASTS: 0 /LPF
IGA SER QL IEP: 417 MG/DL
IGG SER QL IEP: 1185 MG/DL
IGM SER QL IEP: 118 MG/DL
IMM GRANULOCYTES NFR BLD AUTO: 0.1 %
KAPPA LC CSF-MCNC: 2.42 MG/DL
KAPPA LC SERPL-MCNC: 2.29 MG/DL
KETONES URINE: NEGATIVE
LDH SERPL-CCNC: 187 U/L
LEUKOCYTE ESTERASE URINE: ABNORMAL
LYMPHOCYTES # BLD AUTO: 2.04 K/UL
LYMPHOCYTES NFR BLD AUTO: 28.7 %
M PROTEIN SPEC IFE-MCNC: NORMAL
MAGNESIUM SERPL-MCNC: 1.9 MG/DL
MAN DIFF?: NORMAL
MCHC RBC-ENTMCNC: 29.5 PG
MCHC RBC-ENTMCNC: 31.9 GM/DL
MCV RBC AUTO: 92.6 FL
MICROSCOPIC-UA: NORMAL
MONOCYTES # BLD AUTO: 0.86 K/UL
MONOCYTES NFR BLD AUTO: 12.1 %
NEUTROPHILS # BLD AUTO: 3.89 K/UL
NEUTROPHILS NFR BLD AUTO: 54.7 %
NITRITE URINE: POSITIVE
PH URINE: 6
PHOSPHATE SERPL-MCNC: 4 MG/DL
PLATELET # BLD AUTO: 378 K/UL
POTASSIUM SERPL-SCNC: 4.1 MMOL/L
PROT SERPL-MCNC: 7.4 G/DL
PROTEIN URINE: NORMAL
RBC # BLD: 4.47 M/UL
RBC # FLD: 13.8 %
RED BLOOD CELLS URINE: 2 /HPF
RF+CCP IGG SER-IMP: NEGATIVE
SODIUM SERPL-SCNC: 142 MMOL/L
SPECIFIC GRAVITY URINE: 1.02
SQUAMOUS EPITHELIAL CELLS: 13 /HPF
THYROGLOB AB SERPL-ACNC: <20 IU/ML
THYROPEROXIDASE AB SERPL IA-ACNC: <10 IU/ML
URATE SERPL-MCNC: 5.3 MG/DL
URINE COMMENTS: NORMAL
UROBILINOGEN URINE: NORMAL
WBC # FLD AUTO: 7.11 K/UL
WHITE BLOOD CELLS URINE: 94 /HPF

## 2019-03-11 ENCOUNTER — APPOINTMENT (OUTPATIENT)
Dept: RADIOLOGY | Facility: CLINIC | Age: 64
End: 2019-03-11
Payer: MEDICARE

## 2019-03-11 ENCOUNTER — CLINICAL ADVICE (OUTPATIENT)
Age: 64
End: 2019-03-11

## 2019-03-11 ENCOUNTER — LABORATORY RESULT (OUTPATIENT)
Age: 64
End: 2019-03-11

## 2019-03-11 ENCOUNTER — OUTPATIENT (OUTPATIENT)
Dept: OUTPATIENT SERVICES | Facility: HOSPITAL | Age: 64
LOS: 1 days | End: 2019-03-11
Payer: COMMERCIAL

## 2019-03-11 DIAGNOSIS — Z90.13 ACQUIRED ABSENCE OF BILATERAL BREASTS AND NIPPLES: Chronic | ICD-10-CM

## 2019-03-11 DIAGNOSIS — Z98.890 OTHER SPECIFIED POSTPROCEDURAL STATES: Chronic | ICD-10-CM

## 2019-03-11 DIAGNOSIS — M19.90 UNSPECIFIED OSTEOARTHRITIS, UNSPECIFIED SITE: ICD-10-CM

## 2019-03-11 PROCEDURE — 73630 X-RAY EXAM OF FOOT: CPT | Mod: 26,50

## 2019-03-11 PROCEDURE — 73110 X-RAY EXAM OF WRIST: CPT | Mod: 26,50

## 2019-03-11 PROCEDURE — 73130 X-RAY EXAM OF HAND: CPT | Mod: 26,50

## 2019-03-11 PROCEDURE — 73522 X-RAY EXAM HIPS BI 3-4 VIEWS: CPT | Mod: 26

## 2019-03-11 PROCEDURE — 71046 X-RAY EXAM CHEST 2 VIEWS: CPT | Mod: 26

## 2019-03-11 PROCEDURE — 73030 X-RAY EXAM OF SHOULDER: CPT | Mod: 26,50

## 2019-03-11 PROCEDURE — 73030 X-RAY EXAM OF SHOULDER: CPT

## 2019-03-11 PROCEDURE — 72110 X-RAY EXAM L-2 SPINE 4/>VWS: CPT | Mod: 26

## 2019-03-11 PROCEDURE — 73562 X-RAY EXAM OF KNEE 3: CPT | Mod: 26,50

## 2019-03-11 PROCEDURE — 72202 X-RAY EXAM SI JOINTS 3/> VWS: CPT | Mod: 26

## 2019-03-11 PROCEDURE — 71046 X-RAY EXAM CHEST 2 VIEWS: CPT

## 2019-03-11 PROCEDURE — 73110 X-RAY EXAM OF WRIST: CPT

## 2019-03-11 PROCEDURE — 72202 X-RAY EXAM SI JOINTS 3/> VWS: CPT

## 2019-03-11 PROCEDURE — 73130 X-RAY EXAM OF HAND: CPT

## 2019-03-11 PROCEDURE — 73562 X-RAY EXAM OF KNEE 3: CPT

## 2019-03-11 PROCEDURE — 73630 X-RAY EXAM OF FOOT: CPT

## 2019-03-11 PROCEDURE — 72110 X-RAY EXAM L-2 SPINE 4/>VWS: CPT

## 2019-03-11 PROCEDURE — 73522 X-RAY EXAM HIPS BI 3-4 VIEWS: CPT

## 2019-03-12 DIAGNOSIS — Z87.440 PERSONAL HISTORY OF URINARY (TRACT) INFECTIONS: ICD-10-CM

## 2019-03-26 ENCOUNTER — LABORATORY RESULT (OUTPATIENT)
Age: 64
End: 2019-03-26

## 2019-03-26 ENCOUNTER — APPOINTMENT (OUTPATIENT)
Dept: RHEUMATOLOGY | Facility: CLINIC | Age: 64
End: 2019-03-26
Payer: MEDICARE

## 2019-03-26 VITALS
SYSTOLIC BLOOD PRESSURE: 126 MMHG | RESPIRATION RATE: 17 BRPM | WEIGHT: 288 LBS | DIASTOLIC BLOOD PRESSURE: 84 MMHG | HEART RATE: 78 BPM | OXYGEN SATURATION: 98 % | BODY MASS INDEX: 51.03 KG/M2 | TEMPERATURE: 97.6 F | HEIGHT: 63 IN

## 2019-03-26 PROCEDURE — 36415 COLL VENOUS BLD VENIPUNCTURE: CPT

## 2019-03-26 PROCEDURE — 99214 OFFICE O/P EST MOD 30 MIN: CPT | Mod: 25

## 2019-03-26 RX ORDER — ETODOLAC 400 MG/1
400 TABLET, FILM COATED ORAL
Qty: 60 | Refills: 3 | Status: DISCONTINUED | COMMUNITY
Start: 2019-03-04 | End: 2019-03-26

## 2019-03-26 RX ORDER — SULFAMETHOXAZOLE AND TRIMETHOPRIM 800; 160 MG/1; MG/1
800-160 TABLET ORAL TWICE DAILY
Qty: 14 | Refills: 0 | Status: DISCONTINUED | COMMUNITY
Start: 2019-03-12 | End: 2019-03-26

## 2019-03-26 RX ORDER — NAPROXEN 500 MG/1
500 TABLET, DELAYED RELEASE ORAL
Qty: 60 | Refills: 0 | Status: DISCONTINUED | COMMUNITY
Start: 2019-02-04 | End: 2019-03-26

## 2019-03-27 LAB
CALCIUM SERPL-MCNC: 10 MG/DL
PARATHYROID HORMONE INTACT: 14 PG/ML

## 2019-03-29 LAB
ENDOMYSIUM IGA SER QL: NEGATIVE
ENDOMYSIUM IGA TITR SER: NORMAL
GLIADIN IGA SER QL: 6 UNITS
GLIADIN IGG SER QL: <5 UNITS
GLIADIN PEPTIDE IGA SER-ACNC: NEGATIVE
GLIADIN PEPTIDE IGG SER-ACNC: NEGATIVE
TTG IGG SER IA-ACNC: 2 U/ML
TTG IGG SER IA-ACNC: NEGATIVE

## 2019-03-30 ENCOUNTER — OUTPATIENT (OUTPATIENT)
Dept: OUTPATIENT SERVICES | Facility: HOSPITAL | Age: 64
LOS: 1 days | Discharge: HOME | End: 2019-03-30

## 2019-03-30 DIAGNOSIS — D05.90 UNSPECIFIED TYPE OF CARCINOMA IN SITU OF UNSPECIFIED BREAST: ICD-10-CM

## 2019-03-30 DIAGNOSIS — Z98.890 OTHER SPECIFIED POSTPROCEDURAL STATES: Chronic | ICD-10-CM

## 2019-03-30 DIAGNOSIS — Z90.13 ACQUIRED ABSENCE OF BILATERAL BREASTS AND NIPPLES: Chronic | ICD-10-CM

## 2019-03-30 LAB
TTG IGA SER IA-ACNC: <1.2 U/ML
TTG IGA SER-ACNC: NEGATIVE

## 2019-04-01 NOTE — ASSESSMENT
[FreeTextEntry1] : Impression: 63-year-old woman for her initial followup visit regarding her joint symptoms and rheumatic diseases.\par \par Patient has persistent pain in both thighs especially with walking secondary to her osteoarthritis with much involvement of both hips and decreased range of motion there. She also has bilateral bicipital tendinitis. Also chronic low back pain without radiation secondary to her osteoarthritis/biomechanical bases. The etodolac did not give her adequate relief. She also has vitamin D deficiency. Her previous bone densitometry was normal.  She denies rash or side effects of medications.\par \par Plan: I reviewed previous lab results with patient\par I reviewed previous bone densitometry report with the patient\par Laboratory tests ordered today-see list below\par Diagnosis and prognosis discussed\par Continue other current medications (other than those changed below)\par Weight reduction--emphasized\par Orthopedic consultation to consider bilateral hip replacements--Dr. Germán Henderson\par Discontinue etodolac\par Meloxicam 15 mg q.d. at end of supper(Possible side effects explained including cardiovascular risk/MI/CVA)\par Vitamin D 50,000 units twice a week--Monday and Thursday--for only 12 weeks,  then OTC vitamin D 2000 units q.d. (possible side affects explained)\par Return visit 2 months\par \par \par Follow up with PCP regarding weight reduction\par Return visit 2 month

## 2019-04-01 NOTE — CONSULT LETTER
[Dear  ___] : Dear  [unfilled], [Consult Letter:] : I had the pleasure of evaluating your patient, [unfilled]. [Please see my note below.] : Please see my note below. [Consult Closing:] : Thank you very much for allowing me to participate in the care of this patient.  If you have any questions, please do not hesitate to contact me. [Sincerely,] : Sincerely, [FreeTextEntry3] : [place]on\par Myron I. Kleiner, M.D., [facility]\par

## 2019-04-01 NOTE — HISTORY OF PRESENT ILLNESS
[FreeTextEntry1] : 63-year-old woman for her initial followup visit regarding her joint symptoms and rheumatic disease.\par \par She does not feel any better. She has persistent pain in both eyes especially with walking. Also low back pain without radiation with prolonged sitting. She is doing her knee and back exercises at 30 repetitions per session each day. She denies headache, jaw claudication, visual symptoms. She denies rash or side effects of the medications.\par \par PMH: Persistent rash left upper arm== did not yet see dermatology\par PCP treated UTI with antibiotic

## 2019-04-15 ENCOUNTER — APPOINTMENT (OUTPATIENT)
Dept: HEMATOLOGY ONCOLOGY | Facility: CLINIC | Age: 64
End: 2019-04-15
Payer: MEDICARE

## 2019-04-15 VITALS
OXYGEN SATURATION: 97 % | WEIGHT: 278 LBS | BODY MASS INDEX: 49.26 KG/M2 | HEART RATE: 91 BPM | DIASTOLIC BLOOD PRESSURE: 81 MMHG | SYSTOLIC BLOOD PRESSURE: 155 MMHG | TEMPERATURE: 98.6 F | HEIGHT: 63 IN

## 2019-04-15 PROCEDURE — 99214 OFFICE O/P EST MOD 30 MIN: CPT

## 2019-04-15 NOTE — PHYSICAL EXAM
[Obese] : obese [Normal] : affect appropriate [de-identified] : S1/S2 [de-identified] : supple [de-identified] : poor dentition [de-identified] : clear bilaterally [de-identified] : No edema [de-identified] : walking with a walker

## 2019-04-15 NOTE — ASSESSMENT
[FreeTextEntry1] : 63 year old female with stage IA right breast infiltrating ductal carcinoma (T1c N0(i+)). ER>90%, NC 90%, HER2 negative, S/p bilateral mastectomy + SNL biopsy.  BRCA negative.  Oncotype Dx 17 (low risk).   Began anastrazole on 3/9/18 and switched for Letrozole on 5/21/18 given hot flashes impacting QOL.  Letrozole stopped on 12/4/18 due to pain in multiple joints.  Since stopping Letrozole, pain in hands is better but same in hips and knees. \par  4/3/18 bone density - normal.\par \par We discussed either resuming AIs or switching to Tamoxifen and reviewed side effects including VTE/stroke, and endometrial ca risk, hepatotoxicity, hot flashes.   Given morbid obesity, risk of VTE and endometrial ca might be more pronounced. She is willing to give Tamoxifen a try.  S/S of VTE were reviewed\par \par Plan:\par Start Tamoxifen\par Encouraged to continue weight loss\par GYN follow up every 6 months\par Follow up with me in 3 months

## 2019-04-15 NOTE — HISTORY OF PRESENT ILLNESS
[T: ___] : T[unfilled] [N: ___] : N[unfilled] [AJCC Stage: ____] : AJCC Stage: [unfilled] [de-identified] : Ms. Toussaint was diagnosed with right breast cancer in November 2017 at age 61. \par \par In Fall 2017, she presented to ED with inflamed and erythematous right breast and was given antibiotics with resolution of symptoms.  She was evaluated by Dr. Arlene Gregg of breast surgery.\par \par She subsequently had a diagnostic mammo + US on 10/18/17: Multiple irregular masses in the upper outer right breast. Recommend representative ultrasound-guided core biopsy of two masses at the 11:00 axis and 10:00 axis, as above. Numerous associated distended ducts containing intraductal masses and calcifications throughout the upper outer right breast, in region of palpable concern, 10:00 axis, suspicious for extensive DCIS.Rounded indeterminate mass in the right breast at the 8:00 position, 16 \par cm the nipple, possible abnormal lymph node. Ultrasound-guided core biopsy is advised.\par \par On 11/1/18 she had a core biopsies:\par Final Diagnosis\par 1     Breast biopsy core, right 10:00 4 cm FN, Site #1:- detached fragments of papillary and micropapillar ductal carcinoma with extensive necrosis, probably ductal carcinoma in situ with intermediate to high nuclear grade\par 2     Breast biopsy core, right 11:00 16 cm FN, Site #;- Invasive moderately differentiated ductal carcinoma, [place] 7/9, 1.1 cm. ER>90%, IN> 90%, HER2 negative.\par +DCIS, cribriform and solid with intermediate grade. \par 3     Breast biopsy core, right 8:00 16 cm FN;-   Ductal carcinoma in situ, micropapillary type with intermediate nuclear grade.\par \par She subsequently had non compliance with obtaining an MRI as well as follow ups with Dr. Gregg.  She eventually underwent bilateral mastectomy and bilateral SNL biopsy on 2/1/2018.  \par \par Pathology:\par Right breast simple mastectomy - Infiltrating moderately differentiated ductal carcinoma and DCIS, micropapillary type with tumor necrosis. 1 axillary LN negative.  Approximately 2 cm, grade 2.  Isolate tumor cells in 3 LNs.  ER>90%, IN 90%, HER2 negative.  pT1c pN0(i+)\par \par Left mastectomy and left SNL biopsy - negative\par \par 2/1/18 Oncotype Dx - 17 (low risk) - 5 year recurrence with Tamoxifen 11%, with gutierrez+ chemo 12%\par 11/17/2017 BRCA1/2 negative\par \par Family history is significant for a sister with breast cancer at age 43, and neice (brothers daugther) in her 40s.  Paternal GM had uterine cancer.  Paternal aunt - brain cancer.\par \par She started anastrazole on 3/9/18 and switched to Letrozole on 5/21/18 given hot flashes. \par \par  [de-identified] : Returns for follow up. She started anastrazole on 3/9/18 and switched to Letrozole on 5/21/18 due to hot flashes. She stopped Letrozole on 12/4/18 due to multiple MSK symptoms including muscle pain and generalized joint pains. Since stopping Letrozole, her pain has not gotten better in her hips and knees but is better in her hands. \par She saw Dr. Kleiner of rheumatology, she was diagnosed with OA of hips and knees. She is considering hip replacement and has yet to see orthopedics.\par She is trying to lose weight and has lost 2-3 kgs with diet modification. \par \par s/p colonoscopy on 10/15/18 -->negative per patient\par S/p D&C for polyp --> negative results.\par \par \par \par

## 2019-04-15 NOTE — CONSULT LETTER
[Dear  ___] : Dear  [unfilled], [Consult Letter:] : I had the pleasure of evaluating your patient, [unfilled]. [Consult Closing:] : Thank you very much for allowing me to participate in the care of this patient.  If you have any questions, please do not hesitate to contact me. [Please see my note below.] : Please see my note below. [Sincerely,] : Sincerely, [FreeTextEntry3] : Francine Jara MD\par Medical Oncology/Hematology\par [facility] [place]\par [facility] [place]

## 2019-04-16 ENCOUNTER — APPOINTMENT (OUTPATIENT)
Dept: CARDIOLOGY | Facility: CLINIC | Age: 64
End: 2019-04-16

## 2019-05-13 ENCOUNTER — APPOINTMENT (OUTPATIENT)
Dept: SURGERY | Facility: CLINIC | Age: 64
End: 2019-05-13
Payer: MEDICARE

## 2019-05-13 VITALS
WEIGHT: 278 LBS | BODY MASS INDEX: 49.26 KG/M2 | SYSTOLIC BLOOD PRESSURE: 133 MMHG | DIASTOLIC BLOOD PRESSURE: 84 MMHG | HEART RATE: 76 BPM | HEIGHT: 63 IN

## 2019-05-13 PROCEDURE — 99214 OFFICE O/P EST MOD 30 MIN: CPT

## 2019-06-03 ENCOUNTER — RESULT CHARGE (OUTPATIENT)
Age: 64
End: 2019-06-03

## 2019-06-03 ENCOUNTER — APPOINTMENT (OUTPATIENT)
Age: 64
End: 2019-06-03
Payer: MEDICARE

## 2019-06-03 VITALS
SYSTOLIC BLOOD PRESSURE: 120 MMHG | HEIGHT: 63 IN | OXYGEN SATURATION: 96 % | DIASTOLIC BLOOD PRESSURE: 80 MMHG | HEART RATE: 98 BPM | WEIGHT: 273 LBS | RESPIRATION RATE: 17 BRPM | BODY MASS INDEX: 48.37 KG/M2 | TEMPERATURE: 98.4 F

## 2019-06-03 PROCEDURE — 36415 COLL VENOUS BLD VENIPUNCTURE: CPT

## 2019-06-03 PROCEDURE — 99214 OFFICE O/P EST MOD 30 MIN: CPT | Mod: 25

## 2019-06-03 PROCEDURE — 81003 URINALYSIS AUTO W/O SCOPE: CPT | Mod: QW

## 2019-06-04 LAB
ALBUMIN SERPL ELPH-MCNC: 4.3 G/DL
ALP BLD-CCNC: 94 U/L
ALT SERPL-CCNC: 16 U/L
ANION GAP SERPL CALC-SCNC: 13 MMOL/L
AST SERPL-CCNC: 13 U/L
BASOPHILS # BLD AUTO: 0.09 K/UL
BASOPHILS NFR BLD AUTO: 0.8 %
BILIRUB SERPL-MCNC: 0.5 MG/DL
BILIRUB UR QL STRIP: NORMAL
BUN SERPL-MCNC: 15 MG/DL
CALCIUM SERPL-MCNC: 9.7 MG/DL
CHLORIDE SERPL-SCNC: 104 MMOL/L
CK SERPL-CCNC: 79 U/L
CLARITY UR: CLEAR
CO2 SERPL-SCNC: 24 MMOL/L
COLLECTION METHOD: NORMAL
CREAT SERPL-MCNC: 0.82 MG/DL
CRP SERPL-MCNC: 0.25 MG/DL
EOSINOPHIL # BLD AUTO: 0.12 K/UL
EOSINOPHIL NFR BLD AUTO: 1.1 %
ERYTHROCYTE [SEDIMENTATION RATE] IN BLOOD BY WESTERGREN METHOD: 19 MM/HR
GLUCOSE SERPL-MCNC: 104 MG/DL
GLUCOSE UR-MCNC: NORMAL
HCG UR QL: 0.2 EU/DL
HCT VFR BLD CALC: 41.6 %
HGB BLD-MCNC: 13.2 G/DL
HGB UR QL STRIP.AUTO: NORMAL
IMM GRANULOCYTES NFR BLD AUTO: 0.3 %
KETONES UR-MCNC: NORMAL
LEUKOCYTE ESTERASE UR QL STRIP: NORMAL
LYMPHOCYTES # BLD AUTO: 2.99 K/UL
LYMPHOCYTES NFR BLD AUTO: 27.1 %
MAN DIFF?: NORMAL
MCHC RBC-ENTMCNC: 29.5 PG
MCHC RBC-ENTMCNC: 31.7 GM/DL
MCV RBC AUTO: 92.9 FL
MONOCYTES # BLD AUTO: 1.05 K/UL
MONOCYTES NFR BLD AUTO: 9.5 %
NEUTROPHILS # BLD AUTO: 6.76 K/UL
NEUTROPHILS NFR BLD AUTO: 61.2 %
NITRITE UR QL STRIP: NORMAL
PH UR STRIP: 5.5
PHOSPHATE SERPL-MCNC: 3.2 MG/DL
PLATELET # BLD AUTO: 370 K/UL
POTASSIUM SERPL-SCNC: 4.3 MMOL/L
PROT SERPL-MCNC: 7.4 G/DL
PROT UR STRIP-MCNC: NORMAL
RBC # BLD: 4.48 M/UL
RBC # FLD: 14.5 %
SODIUM SERPL-SCNC: 141 MMOL/L
SP GR UR STRIP: 1.02
WBC # FLD AUTO: 11.04 K/UL

## 2019-06-05 ENCOUNTER — RX RENEWAL (OUTPATIENT)
Age: 64
End: 2019-06-05

## 2019-07-22 ENCOUNTER — APPOINTMENT (OUTPATIENT)
Dept: HEMATOLOGY ONCOLOGY | Facility: CLINIC | Age: 64
End: 2019-07-22

## 2019-07-24 ENCOUNTER — OUTPATIENT (OUTPATIENT)
Dept: OUTPATIENT SERVICES | Facility: HOSPITAL | Age: 64
LOS: 1 days | Discharge: HOME | End: 2019-07-24

## 2019-07-24 DIAGNOSIS — Z98.890 OTHER SPECIFIED POSTPROCEDURAL STATES: Chronic | ICD-10-CM

## 2019-07-24 DIAGNOSIS — D05.90 UNSPECIFIED TYPE OF CARCINOMA IN SITU OF UNSPECIFIED BREAST: ICD-10-CM

## 2019-07-24 DIAGNOSIS — Z90.13 ACQUIRED ABSENCE OF BILATERAL BREASTS AND NIPPLES: Chronic | ICD-10-CM

## 2019-07-27 ENCOUNTER — MEDICATION RENEWAL (OUTPATIENT)
Age: 64
End: 2019-07-27

## 2019-07-29 ENCOUNTER — RESULT REVIEW (OUTPATIENT)
Age: 64
End: 2019-07-29

## 2019-07-29 ENCOUNTER — APPOINTMENT (OUTPATIENT)
Dept: HEMATOLOGY ONCOLOGY | Facility: CLINIC | Age: 64
End: 2019-07-29
Payer: MEDICARE

## 2019-07-29 ENCOUNTER — APPOINTMENT (OUTPATIENT)
Dept: OBGYN | Facility: CLINIC | Age: 64
End: 2019-07-29
Payer: MEDICARE

## 2019-07-29 VITALS
DIASTOLIC BLOOD PRESSURE: 89 MMHG | OXYGEN SATURATION: 95 % | WEIGHT: 280.01 LBS | HEIGHT: 63 IN | TEMPERATURE: 98.3 F | HEART RATE: 101 BPM | SYSTOLIC BLOOD PRESSURE: 144 MMHG | BODY MASS INDEX: 49.61 KG/M2

## 2019-07-29 VITALS
BODY MASS INDEX: 49.61 KG/M2 | HEART RATE: 90 BPM | WEIGHT: 280 LBS | DIASTOLIC BLOOD PRESSURE: 82 MMHG | SYSTOLIC BLOOD PRESSURE: 133 MMHG | HEIGHT: 63 IN

## 2019-07-29 DIAGNOSIS — Z01.419 ENCOUNTER FOR GYNECOLOGICAL EXAMINATION (GENERAL) (ROUTINE) W/OUT ABNORMAL FINDINGS: ICD-10-CM

## 2019-07-29 LAB
BASOPHILS # BLD AUTO: 0.1 K/UL — SIGNIFICANT CHANGE UP (ref 0–0.2)
BASOPHILS NFR BLD AUTO: 1.3 % — SIGNIFICANT CHANGE UP (ref 0–2)
EOSINOPHIL # BLD AUTO: 0.1 K/UL — SIGNIFICANT CHANGE UP (ref 0–0.5)
EOSINOPHIL NFR BLD AUTO: 1.1 % — SIGNIFICANT CHANGE UP (ref 0–6)
HCT VFR BLD CALC: 40.9 % — SIGNIFICANT CHANGE UP (ref 34.5–45)
HGB BLD-MCNC: 13.4 G/DL — SIGNIFICANT CHANGE UP (ref 11.5–15.5)
LYMPHOCYTES # BLD AUTO: 3.5 K/UL — HIGH (ref 1–3.3)
LYMPHOCYTES # BLD AUTO: 33.4 % — SIGNIFICANT CHANGE UP (ref 13–44)
MCHC RBC-ENTMCNC: 29.5 PG — SIGNIFICANT CHANGE UP (ref 27–34)
MCHC RBC-ENTMCNC: 32.7 G/DL — SIGNIFICANT CHANGE UP (ref 32–36)
MCV RBC AUTO: 90.1 FL — SIGNIFICANT CHANGE UP (ref 80–100)
MONOCYTES # BLD AUTO: 0.9 K/UL — SIGNIFICANT CHANGE UP (ref 0–0.9)
MONOCYTES NFR BLD AUTO: 9 % — SIGNIFICANT CHANGE UP (ref 2–14)
NEUTROPHILS # BLD AUTO: 5.7 K/UL — SIGNIFICANT CHANGE UP (ref 1.8–7.4)
NEUTROPHILS NFR BLD AUTO: 55.1 % — SIGNIFICANT CHANGE UP (ref 43–77)
PLATELET # BLD AUTO: 378 K/UL — SIGNIFICANT CHANGE UP (ref 150–400)
RBC # BLD: 4.54 M/UL — SIGNIFICANT CHANGE UP (ref 3.8–5.2)
RBC # FLD: 12.9 % — SIGNIFICANT CHANGE UP (ref 10.3–14.5)
WBC # BLD: 10.4 K/UL — SIGNIFICANT CHANGE UP (ref 3.8–10.5)
WBC # FLD AUTO: 10.4 K/UL — SIGNIFICANT CHANGE UP (ref 3.8–10.5)

## 2019-07-29 PROCEDURE — 99396 PREV VISIT EST AGE 40-64: CPT

## 2019-07-29 PROCEDURE — 99214 OFFICE O/P EST MOD 30 MIN: CPT

## 2019-07-29 RX ORDER — MISOPROSTOL 200 UG/1
200 TABLET ORAL
Qty: 2 | Refills: 0 | Status: DISCONTINUED | COMMUNITY
Start: 2018-08-27 | End: 2019-07-29

## 2019-07-29 NOTE — CHIEF COMPLAINT
[FreeTextEntry1] : 63 yo  with hx  of breast cancer presents for annual Gyn. She has no gyn complaint. She reports has been on tamoxifen for 3 months. She denies any PM vaginal bleeding.\par She had Hysteroscopy, D&C on 11/8/2019

## 2019-07-29 NOTE — ASSESSMENT
[FreeTextEntry1] : 63 year old female with stage IA right breast infiltrating ductal carcinoma (T1c N0(i+)). ER>90%, LA 90%, HER2 negative, S/p bilateral mastectomy + SNL biopsy.  BRCA negative.  Oncotype Dx 17 (low risk).   Began anastrazole on 3/9/18 and switched for Letrozole on 5/21/18 given hot flashes impacting QOL.  Letrozole stopped on 12/4/18 due to pain in multiple joints.  She was switched to Tamoxifen on 4/15/19. \par  4/3/18 bone density - normal.\par \par Tolerating Tamoxifen better aromatase inhibitors.\par Right neck swelling - palpable ?lymph node ~2 cm in right neck.\par \par Plan:\par Continue Tamoxifen\par Encouraged to continue weight loss\par GYN follow up every 6 months, will be seeing them today\par Right neck US\par Follow up in 5 months

## 2019-07-29 NOTE — HISTORY OF PRESENT ILLNESS
[T: ___] : T[unfilled] [N: ___] : N[unfilled] [AJCC Stage: ____] : AJCC Stage: [unfilled] [de-identified] : Ms. Toussaint was diagnosed with right breast cancer in November 2017 at age 61. \par \par In Fall 2017, she presented to ED with inflamed and erythematous right breast and was given antibiotics with resolution of symptoms.  She was evaluated by Dr. Arlene Gregg of breast surgery.\par \par She subsequently had a diagnostic mammo + US on 10/18/17: Multiple irregular masses in the upper outer right breast. Recommend representative ultrasound-guided core biopsy of two masses at the 11:00 axis and 10:00 axis, as above. Numerous associated distended ducts containing intraductal masses and calcifications throughout the upper outer right breast, in region of palpable concern, 10:00 axis, suspicious for extensive DCIS.Rounded indeterminate mass in the right breast at the 8:00 position, 16 \par cm the nipple, possible abnormal lymph node. Ultrasound-guided core biopsy is advised.\par \par On 11/1/18 she had a core biopsies:\par Final Diagnosis\par 1     Breast biopsy core, right 10:00 4 cm FN, Site #1:- detached fragments of papillary and micropapillar ductal carcinoma with extensive necrosis, probably ductal carcinoma in situ with intermediate to high nuclear grade\par 2     Breast biopsy core, right 11:00 16 cm FN, Site #;- Invasive moderately differentiated ductal carcinoma, [place] 7/9, 1.1 cm. ER>90%, MN> 90%, HER2 negative.\par +DCIS, cribriform and solid with intermediate grade. \par 3     Breast biopsy core, right 8:00 16 cm FN;-   Ductal carcinoma in situ, micropapillary type with intermediate nuclear grade.\par \par She subsequently had non compliance with obtaining an MRI as well as follow ups with Dr. Gregg.  She eventually underwent bilateral mastectomy and bilateral SNL biopsy on 2/1/2018.  \par \par Pathology:\par Right breast simple mastectomy - Infiltrating moderately differentiated ductal carcinoma and DCIS, micropapillary type with tumor necrosis. 1 axillary LN negative.  Approximately 2 cm, grade 2.  Isolate tumor cells in 3 LNs.  ER>90%, MN 90%, HER2 negative.  pT1c pN0(i+)\par \par Left mastectomy and left SNL biopsy - negative\par \par 2/1/18 Oncotype Dx - 17 (low risk) - 5 year recurrence with Tamoxifen 11%, with gutierrez+ chemo 12%\par 11/17/2017 BRCA1/2 negative\par \par Family history is significant for a sister with breast cancer at age 43, and neice (brothers daugther) in her 40s.  Paternal GM had uterine cancer.  Paternal aunt - brain cancer.\par \par She started anastrazole on 3/9/18 and switched to Letrozole on 5/21/18 given hot flashes. She stopped Letrozole on 12/4/18 due to multiple MSK symptoms including muscle pain and generalized joint pains. Since stopping Letrozole, her pain did not get better in her hips and knees but was better in her hands. She was subsequently switched to Tamoxifen on 4/15/19. \par \par  [de-identified] : Returns for follow up. \par Began Tamoxifen on 4/15/19.\par B/l hip and knee pain remains.  Seeing Dr. Kleiner.  No leg cramping.No leg swelling. No SOB. \par No vaginal bleeding.\par She is seeing her GYN today.\par Denies fevers, night sweats, weight loss.\par Reports right neck swelling x 1 year.\par \par s/p colonoscopy on 10/15/18 -->negative per patient\par S/p D&C for polyp --> negative results.\par \par \par \par

## 2019-07-29 NOTE — PHYSICAL EXAM
[Obese] : obese [Normal] : grossly intact [de-identified] : poor dentition [de-identified] : supple [de-identified] : clear bilaterally [de-identified] : S1/S2 [de-identified] : No edema [de-identified] : walking with a walker [de-identified] : Right neck lymph node ~2 cm

## 2019-07-29 NOTE — HISTORY OF PRESENT ILLNESS
[Last Colonoscopy ___] : Last colonoscopy [unfilled] [Last Pap ___] : Last cervical pap smear was [unfilled] [Postmenopausal] : is postmenopausal [Sexually Active] : is sexually active [Male ___] : [unfilled] male [de-identified] : with  Paulo x 28 years

## 2019-07-29 NOTE — CONSULT LETTER
[Dear  ___] : Dear  [unfilled], [Please see my note below.] : Please see my note below. [Consult Letter:] : I had the pleasure of evaluating your patient, [unfilled]. [Sincerely,] : Sincerely, [Consult Closing:] : Thank you very much for allowing me to participate in the care of this patient.  If you have any questions, please do not hesitate to contact me. [FreeTextEntry3] : Francine Jara MD\par Medical Oncology/Hematology\par [facility] [place]\par [facility] [place]

## 2019-07-29 NOTE — PHYSICAL EXAM
[Alert] : alert [Awake] : awake [Oriented x3] : oriented to person, place, and time [Soft] : soft [Normal] : cervix [No Bleeding] : there was no active vaginal bleeding [Acute Distress] : no acute distress [Mass] : no breast mass [Axillary LAD] : no axillary lymphadenopathy [Nipple Discharge] : no nipple discharge [Tender] : non tender [FreeTextEntry7] : unable to assess secondary to body habitus

## 2019-07-30 LAB
ALBUMIN SERPL ELPH-MCNC: 4.3 G/DL
ALP BLD-CCNC: 76 U/L
ALT SERPL-CCNC: 12 U/L
ANION GAP SERPL CALC-SCNC: 15 MMOL/L
AST SERPL-CCNC: 10 U/L
BILIRUB SERPL-MCNC: 0.3 MG/DL
BUN SERPL-MCNC: 16 MG/DL
CALCIUM SERPL-MCNC: 10.4 MG/DL
CHLORIDE SERPL-SCNC: 102 MMOL/L
CO2 SERPL-SCNC: 25 MMOL/L
CREAT SERPL-MCNC: 0.73 MG/DL
GLUCOSE SERPL-MCNC: 98 MG/DL
POTASSIUM SERPL-SCNC: 4.3 MMOL/L
PROT SERPL-MCNC: 7.2 G/DL
SODIUM SERPL-SCNC: 142 MMOL/L

## 2019-08-01 LAB — HPV HIGH+LOW RISK DNA PNL CVX: NOT DETECTED

## 2019-08-04 ENCOUNTER — FORM ENCOUNTER (OUTPATIENT)
Age: 64
End: 2019-08-04

## 2019-08-05 ENCOUNTER — APPOINTMENT (OUTPATIENT)
Dept: RHEUMATOLOGY | Facility: CLINIC | Age: 64
End: 2019-08-05
Payer: MEDICARE

## 2019-08-05 ENCOUNTER — APPOINTMENT (OUTPATIENT)
Dept: ULTRASOUND IMAGING | Facility: CLINIC | Age: 64
End: 2019-08-05
Payer: MEDICARE

## 2019-08-05 ENCOUNTER — OUTPATIENT (OUTPATIENT)
Dept: OUTPATIENT SERVICES | Facility: HOSPITAL | Age: 64
LOS: 1 days | End: 2019-08-05

## 2019-08-05 DIAGNOSIS — Z90.13 ACQUIRED ABSENCE OF BILATERAL BREASTS AND NIPPLES: Chronic | ICD-10-CM

## 2019-08-05 DIAGNOSIS — Z98.890 OTHER SPECIFIED POSTPROCEDURAL STATES: Chronic | ICD-10-CM

## 2019-08-05 DIAGNOSIS — R22.1 LOCALIZED SWELLING, MASS AND LUMP, NECK: ICD-10-CM

## 2019-08-05 PROCEDURE — 76536 US EXAM OF HEAD AND NECK: CPT | Mod: 26

## 2019-08-05 PROCEDURE — 99214 OFFICE O/P EST MOD 30 MIN: CPT

## 2019-08-05 RX ORDER — MULTIVITAMIN
TABLET ORAL
Refills: 0 | Status: DISCONTINUED | COMMUNITY
End: 2019-08-05

## 2019-08-06 LAB — CYTOLOGY CVX/VAG DOC THIN PREP: NORMAL

## 2019-08-07 ENCOUNTER — RESULT REVIEW (OUTPATIENT)
Age: 64
End: 2019-08-07

## 2019-08-07 RX ORDER — CALCIUM 500 MG
500 TABLET ORAL
Qty: 100 | Refills: 0 | Status: ACTIVE | COMMUNITY
Start: 2019-08-07

## 2019-08-07 RX ORDER — ERGOCALCIFEROL 1.25 MG/1
1.25 MG CAPSULE, LIQUID FILLED ORAL
Qty: 24 | Refills: 0 | Status: DISCONTINUED | COMMUNITY
Start: 2019-03-26 | End: 2019-08-07

## 2019-08-12 ENCOUNTER — CLINICAL ADVICE (OUTPATIENT)
Age: 64
End: 2019-08-12

## 2019-08-13 ENCOUNTER — APPOINTMENT (OUTPATIENT)
Dept: ANTEPARTUM | Facility: CLINIC | Age: 64
End: 2019-08-13
Payer: MEDICARE

## 2019-08-19 ENCOUNTER — RX RENEWAL (OUTPATIENT)
Age: 64
End: 2019-08-19

## 2019-08-22 ENCOUNTER — APPOINTMENT (OUTPATIENT)
Dept: ANTEPARTUM | Facility: CLINIC | Age: 64
End: 2019-08-22
Payer: MEDICARE

## 2019-08-22 ENCOUNTER — ASOB RESULT (OUTPATIENT)
Age: 64
End: 2019-08-22

## 2019-08-22 PROCEDURE — 76856 US EXAM PELVIC COMPLETE: CPT | Mod: 59

## 2019-08-22 PROCEDURE — 76830 TRANSVAGINAL US NON-OB: CPT

## 2019-08-27 LAB
25(OH)D3 SERPL-MCNC: 24.1 NG/ML
ALBUMIN SERPL ELPH-MCNC: 4.4 G/DL
ALP BLD-CCNC: 73 U/L
ALT SERPL-CCNC: 15 U/L
ANION GAP SERPL CALC-SCNC: 14 MMOL/L
AST SERPL-CCNC: 15 U/L
BASOPHILS # BLD AUTO: 0.07 K/UL
BASOPHILS NFR BLD AUTO: 0.7 %
BILIRUB SERPL-MCNC: 0.4 MG/DL
BUN SERPL-MCNC: 15 MG/DL
CALCIUM SERPL-MCNC: 9.8 MG/DL
CHLORIDE SERPL-SCNC: 106 MMOL/L
CO2 SERPL-SCNC: 23 MMOL/L
CREAT SERPL-MCNC: 0.8 MG/DL
CRP SERPL-MCNC: 0.23 MG/DL
EOSINOPHIL # BLD AUTO: 0.15 K/UL
EOSINOPHIL NFR BLD AUTO: 1.5 %
ERYTHROCYTE [SEDIMENTATION RATE] IN BLOOD BY WESTERGREN METHOD: 14 MM/HR
GLUCOSE SERPL-MCNC: 102 MG/DL
HCT VFR BLD CALC: 40.1 %
HGB BLD-MCNC: 13 G/DL
IMM GRANULOCYTES NFR BLD AUTO: 0.4 %
LYMPHOCYTES # BLD AUTO: 3.19 K/UL
LYMPHOCYTES NFR BLD AUTO: 32.8 %
MAN DIFF?: NORMAL
MCHC RBC-ENTMCNC: 30.1 PG
MCHC RBC-ENTMCNC: 32.4 GM/DL
MCV RBC AUTO: 92.8 FL
MONOCYTES # BLD AUTO: 1 K/UL
MONOCYTES NFR BLD AUTO: 10.3 %
NEUTROPHILS # BLD AUTO: 5.27 K/UL
NEUTROPHILS NFR BLD AUTO: 54.3 %
PHOSPHATE SERPL-MCNC: 2.9 MG/DL
PLATELET # BLD AUTO: 352 K/UL
POTASSIUM SERPL-SCNC: 4.6 MMOL/L
PROT SERPL-MCNC: 7.3 G/DL
RBC # BLD: 4.32 M/UL
RBC # FLD: 15.1 %
SODIUM SERPL-SCNC: 143 MMOL/L
WBC # FLD AUTO: 9.72 K/UL

## 2019-08-28 ENCOUNTER — APPOINTMENT (OUTPATIENT)
Dept: CARDIOLOGY | Facility: CLINIC | Age: 64
End: 2019-08-28
Payer: MEDICARE

## 2019-08-28 ENCOUNTER — RX CHANGE (OUTPATIENT)
Age: 64
End: 2019-08-28

## 2019-08-28 ENCOUNTER — NON-APPOINTMENT (OUTPATIENT)
Age: 64
End: 2019-08-28

## 2019-08-28 VITALS
RESPIRATION RATE: 18 BRPM | HEART RATE: 69 BPM | DIASTOLIC BLOOD PRESSURE: 80 MMHG | HEIGHT: 63 IN | WEIGHT: 281 LBS | BODY MASS INDEX: 49.79 KG/M2 | SYSTOLIC BLOOD PRESSURE: 131 MMHG | OXYGEN SATURATION: 96 %

## 2019-08-28 PROCEDURE — 99215 OFFICE O/P EST HI 40 MIN: CPT

## 2019-08-28 PROCEDURE — 93000 ELECTROCARDIOGRAM COMPLETE: CPT

## 2019-08-28 RX ORDER — LOSARTAN POTASSIUM AND HYDROCHLOROTHIAZIDE 25; 100 MG/1; MG/1
100-25 TABLET ORAL
Qty: 90 | Refills: 3 | Status: DISCONTINUED | COMMUNITY
Start: 2019-08-28 | End: 2019-08-28

## 2019-08-28 RX ORDER — THIAMINE HCL 100 MG
500 TABLET ORAL
Refills: 0 | Status: ACTIVE | COMMUNITY

## 2019-08-28 RX ORDER — VALSARTAN AND HYDROCHLOROTHIAZIDE 160; 12.5 MG/1; MG/1
160-12.5 TABLET, FILM COATED ORAL DAILY
Qty: 90 | Refills: 1 | Status: DISCONTINUED | COMMUNITY
Start: 2019-02-15 | End: 2019-08-28

## 2019-08-28 RX ORDER — PSYLLIUM HUSK 0.4 G
CAPSULE ORAL
Refills: 0 | Status: ACTIVE | COMMUNITY

## 2019-08-28 RX ORDER — MELOXICAM 15 MG/1
15 TABLET ORAL
Qty: 10 | Refills: 0 | Status: DISCONTINUED | COMMUNITY
Start: 2019-03-26 | End: 2019-08-28

## 2019-08-28 RX ORDER — ZOLPIDEM TARTRATE 5 MG/1
5 TABLET ORAL
Qty: 30 | Refills: 0 | Status: DISCONTINUED | COMMUNITY
Start: 2019-02-04 | End: 2019-08-28

## 2019-08-28 NOTE — DISCUSSION/SUMMARY
[Patient] : the patient [Risks] : risks [Benefits] : benefits [Alternatives] : alternatives [___ Month(s)] : [unfilled] month(s) [With Me] : with me [FreeTextEntry1] : This is a 63 year old woman with history of obesity recent diagnosis  of bilateral breast cancer for bilateral mastectomy\par 1) Pre-operative cardiovascular risk evaluation and management .  tolerated surgery well. \par 2) Dyspnea on exertion : negative work up. bubbles salien contrast study. obesity. \par 2) Hypertension:. diet and exercise counselling.   change valsartan to losartan patient is concerned about the additive. Even though its no longer in the medicine, psychologically. to reassure i will change it to losartan. \par 3) enlarged RV: repat echo with bubble study. likeluy PUlm HTn due to obesity and Sleep apnea. \par 4) Daytime sleepiness, and snoring and insomnia : Sleep study \par

## 2019-08-28 NOTE — ADDENDUM
[FreeTextEntry1] : Pre-operative cardiovascular risk evaluation and management : Nuclear stress test is normal. Normal EF> \par no further cardiac work up is needed. Proceed for surgery as indicated.

## 2019-08-28 NOTE — CARDIOLOGY SUMMARY
[No Ischemia] : no Ischemia [___] : [unfilled] [LVEF ___%] : LVEF [unfilled]% [None] : no pulmonary hypertension [Normal] : normal LA size

## 2019-08-28 NOTE — PHYSICAL EXAM
[General Appearance - Well Developed] : well developed [Normal Appearance] : normal appearance [Well Groomed] : well groomed [General Appearance - Well Nourished] : well nourished [No Deformities] : no deformities [General Appearance - In No Acute Distress] : no acute distress [Normal Conjunctiva] : the conjunctiva exhibited no abnormalities [Eyelids - No Xanthelasma] : the eyelids demonstrated no xanthelasmas [Normal Oral Mucosa] : normal oral mucosa [No Oral Pallor] : no oral pallor [No Oral Cyanosis] : no oral cyanosis [Normal Jugular Venous A Waves Present] : normal jugular venous A waves present [No Jugular Venous Larson A Waves] : no jugular venous larson A waves [Normal Jugular Venous V Waves Present] : normal jugular venous V waves present [Respiration, Rhythm And Depth] : normal respiratory rhythm and effort [Exaggerated Use Of Accessory Muscles For Inspiration] : no accessory muscle use [Auscultation Breath Sounds / Voice Sounds] : lungs were clear to auscultation bilaterally [Heart Rate And Rhythm] : heart rate and rhythm were normal [Heart Sounds] : normal S1 and S2 [Murmurs] : no murmurs present [Abdomen Soft] : soft [Abdomen Tenderness] : non-tender [Abdomen Mass (___ Cm)] : no abdominal mass palpated [Abnormal Walk] : normal gait [Gait - Sufficient For Exercise Testing] : the gait was sufficient for exercise testing [Nail Clubbing] : no clubbing of the fingernails [Cyanosis, Localized] : no localized cyanosis [Petechial Hemorrhages (___cm)] : no petechial hemorrhages [Skin Color & Pigmentation] : normal skin color and pigmentation [] : no rash [No Venous Stasis] : no venous stasis [Skin Lesions] : no skin lesions [No Skin Ulcers] : no skin ulcer [No Xanthoma] : no  xanthoma was observed [Oriented To Time, Place, And Person] : oriented to person, place, and time [Affect] : the affect was normal [Mood] : the mood was normal [No Anxiety] : not feeling anxious [FreeTextEntry1] : morbid obesity .

## 2019-08-28 NOTE — HISTORY OF PRESENT ILLNESS
[FreeTextEntry1] : encounter for cardiotoxic therapeutic agents \par Bilateral mastectomy, no chemo. \par \par HPI for today:  she has been taking tamoxifen. no headchaes. sometimes  dizziness when she moves her head\par No syncope.   no chest pain. no palpitations. no dyspnea. No LE edema. \par She has enlarged RV. she wakes up frequently at night. She snores.  She feels ffatgieu and tired during the4 day time. day time sleepiness.\par \par old note: Pre-operative cardiovascular risk evaluation and management and hypertension \par patient had bilateral mastectomy for cancer. Feb 4, 2018. tolerated surgery. NO palpitations. no chestp ain. +  dyspnea. no dizziness. no syncope. \par When she goes up the staris she feels short of breathe. onset: few mths.  \par Now schedule  for DNC next tuesday. \par W

## 2019-08-28 NOTE — REASON FOR VISIT
[Follow-Up - Clinic] : a clinic follow-up of [FreeTextEntry2] : Pre-operative cardiovascular risk evaluation and management  [FreeTextEntry1] : Pre-operative cardiovascular risk evaluation and management

## 2019-08-29 ENCOUNTER — CLINICAL ADVICE (OUTPATIENT)
Age: 64
End: 2019-08-29

## 2019-09-06 ENCOUNTER — RESULT REVIEW (OUTPATIENT)
Age: 64
End: 2019-09-06

## 2019-09-08 ENCOUNTER — RX RENEWAL (OUTPATIENT)
Age: 64
End: 2019-09-08

## 2019-09-09 ENCOUNTER — APPOINTMENT (OUTPATIENT)
Dept: CARDIOLOGY | Facility: CLINIC | Age: 64
End: 2019-09-09
Payer: MEDICARE

## 2019-09-09 PROCEDURE — 93306 TTE W/DOPPLER COMPLETE: CPT

## 2019-09-12 NOTE — HISTORY OF PRESENT ILLNESS
[FreeTextEntry1] : I had the pleasure of seeing Natasha Toussaint in the office for a follow up clinical breast exam.  \par \par Natasha is a lorie 64 yo postmenopausal female who was diagnosed with grade 2 right breast invasive ductal carcinoma ER+CA+.  She underwent bilateral mastectomy with right SLNB on 2/1/2018.\par \par She is presents today for clinical breast evaluation.  She states multiple MSK symptoms including muscle pain and generalized joint pain due to hormonal therapy and switched from Letrozole to Tamoxifen.  She is tolerating medication well.\par  \par SURGICAL PATHOLOGY REPORT\par 2/01/2018:  FINAL DIAGNOSIS\par 1. sentinel lymph node left axilla #1 biopsy: one lymph node no tumor identified 2. Dike lymph node left axilla #2 biopsy: One lymph node, no tumor identified. 3. Dike lymph node, left axilla #3 biopsy: Fibroadipose tissue, no tumor identified.  4. Right breast sentinel lymph node #1 biopsy:  Isolated tumor cells in three of five lymph nodes.  5. Left breast, simple mastectomy: Fatty breast tissue, no tumor identified.  6.  Right breast, simple mastectomy:  Infiltrating ductal carcinoma and extensive intraductal carcinoma.  One axillary lymph node, no tumor identified.  Nipple with lactiferous duct and atypical intraductal papilloma and underlying intraductal carcinoma.\par \par We discussed clinical examination.  She is presents today for clinical breast evaluation.  She states multiple MSK symptoms including muscle pain and generalized joint pain due to hormonal therapy and switched from Letrozole to Tamoxifen.  She is tolerating medication well.  She states she needs to undergo bilateral hip surgery and is working on losing weight before she is cleared to undergo surgery.  She is taking meloxicam and seeing a pain specialist during the interim.  Recommendation to continue taking hormonal and followup in 4 months.  She understands and agrees to plan.  All questions answered.\par

## 2019-09-12 NOTE — ASSESSMENT
[FreeTextEntry1] : 63 yo postmenopausal female with right breast cancer IDC grade 2 ER + SD+ Lmn0kan negative with DCIS grade 2 ER+ SD + s/p bilateral mastectomy with right SLNB (2/1/2018).  She presents today for CBE and CBE is benign and she is well healed.  She states multiple MSK symptoms including muscle pain and generalized joint pain due to hormonal therapy and switched from Letrozole to Tamoxifen.  She is tolerating medication well.  She states she needs to undergo bilateral hip surgery and is working on losing weight before she is cleared to undergo surgery.  She is taking meloxicam and seeing a pain specialist during the interim.  Recommendation to continue taking hormonal and followup in 4 months.\par 1.  Follow up with pain specialist\par 2.  Continue taking hormonal therapy\par 4.  Follow up in 4 months.\par \par \par

## 2019-09-12 NOTE — PHYSICAL EXAM
[Normocephalic] : normocephalic [Atraumatic] : atraumatic [Supple] : supple [No Supraclavicular Adenopathy] : no supraclavicular adenopathy [Examined in the supine and seated position] : examined in the supine and seated position [No dominant masses] : no dominant masses in right breast  [No dominant masses] : no dominant masses left breast [No Axillary Lymphadenopathy] : no left axillary lymphadenopathy [No Edema] : no edema [No Rashes] : no rashes [No Ulceration] : no ulceration [de-identified] : Left mastectomy site well healed. No supraclavicular or axillary adenopathy. No dominant masses, normal to palpation. [de-identified] : Right mastectomy site well healed.  No supraclavicular or axillary adenopathy. No dominant masses, normal to palpation.\par

## 2019-09-16 ENCOUNTER — APPOINTMENT (OUTPATIENT)
Dept: SURGERY | Facility: CLINIC | Age: 64
End: 2019-09-16

## 2019-09-23 ENCOUNTER — MESSAGE (OUTPATIENT)
Age: 64
End: 2019-09-23

## 2019-09-23 ENCOUNTER — APPOINTMENT (OUTPATIENT)
Dept: OBGYN | Facility: CLINIC | Age: 64
End: 2019-09-23
Payer: MEDICARE

## 2019-09-23 VITALS
BODY MASS INDEX: 49.61 KG/M2 | SYSTOLIC BLOOD PRESSURE: 140 MMHG | DIASTOLIC BLOOD PRESSURE: 80 MMHG | WEIGHT: 280 LBS | HEIGHT: 63 IN

## 2019-09-23 PROCEDURE — 99214 OFFICE O/P EST MOD 30 MIN: CPT

## 2019-09-24 NOTE — CHIEF COMPLAINT
[FreeTextEntry1] : 63 yo  with hx of breast cancer, morbidly obese presents to discuss pelvic sono results. Patient reports for past 6 months urinating every 2 hours, incontinence for past 6 month.\par Patient on tamoxifen since 4/2019\par denies PM vaginal bleeding.\par Transvaginal sono done on 8/22/19-  Uterus- 7 x 4.69x 4.04 cm, posterior myoma 1.95 x 1.97 x 1.75 cm, EMT 13 mm, ovaries not visualized

## 2019-09-25 ENCOUNTER — RESULT REVIEW (OUTPATIENT)
Age: 64
End: 2019-09-25

## 2019-09-26 ENCOUNTER — RX RENEWAL (OUTPATIENT)
Age: 64
End: 2019-09-26

## 2019-09-29 ENCOUNTER — RESULT REVIEW (OUTPATIENT)
Age: 64
End: 2019-09-29

## 2019-09-29 LAB
APPEARANCE: CLEAR
BACTERIA UR CULT: ABNORMAL
BACTERIA: NEGATIVE
BILIRUBIN URINE: NEGATIVE
BLOOD URINE: NEGATIVE
COLOR: NORMAL
GLUCOSE QUALITATIVE U: NEGATIVE
HYALINE CASTS: 1 /LPF
KETONES URINE: NEGATIVE
LEUKOCYTE ESTERASE URINE: NEGATIVE
MICROSCOPIC-UA: NORMAL
NITRITE URINE: NEGATIVE
PH URINE: 6
PROTEIN URINE: NEGATIVE
RED BLOOD CELLS URINE: 1 /HPF
SPECIFIC GRAVITY URINE: 1.01
SQUAMOUS EPITHELIAL CELLS: 3 /HPF
UROBILINOGEN URINE: NORMAL
WHITE BLOOD CELLS URINE: 1 /HPF

## 2019-10-01 ENCOUNTER — RESULT REVIEW (OUTPATIENT)
Age: 64
End: 2019-10-01

## 2019-10-18 ENCOUNTER — CLINICAL ADVICE (OUTPATIENT)
Age: 64
End: 2019-10-18

## 2019-10-21 ENCOUNTER — APPOINTMENT (OUTPATIENT)
Dept: OBGYN | Facility: CLINIC | Age: 64
End: 2019-10-21
Payer: MEDICARE

## 2019-10-21 VITALS
DIASTOLIC BLOOD PRESSURE: 90 MMHG | SYSTOLIC BLOOD PRESSURE: 129 MMHG | WEIGHT: 284 LBS | BODY MASS INDEX: 52.26 KG/M2 | HEIGHT: 62 IN

## 2019-10-21 DIAGNOSIS — R93.89 ABNORMAL FINDINGS ON DIAGNOSTIC IMAGING OF OTHER SPECIFIED BODY STRUCTURES: ICD-10-CM

## 2019-10-21 DIAGNOSIS — Z85.3 PERSONAL HISTORY OF MALIGNANT NEOPLASM OF BREAST: ICD-10-CM

## 2019-10-21 PROCEDURE — 58558Z: CUSTOM

## 2019-10-23 LAB — CORE LAB BIOPSY: NORMAL

## 2019-11-04 ENCOUNTER — APPOINTMENT (OUTPATIENT)
Dept: OBGYN | Facility: CLINIC | Age: 64
End: 2019-11-04
Payer: MEDICARE

## 2019-11-04 VITALS
HEIGHT: 62 IN | SYSTOLIC BLOOD PRESSURE: 134 MMHG | WEIGHT: 284 LBS | DIASTOLIC BLOOD PRESSURE: 90 MMHG | BODY MASS INDEX: 52.26 KG/M2

## 2019-11-04 PROCEDURE — 99213 OFFICE O/P EST LOW 20 MIN: CPT

## 2019-11-06 NOTE — CHIEF COMPLAINT
[FreeTextEntry1] : 63 yo   with hx of breast cancer, with multiple medical problems presents for EMB result. She has no complaints. \par Path- lower segment polyp atrophic endometrium

## 2019-11-19 ENCOUNTER — APPOINTMENT (OUTPATIENT)
Dept: SURGERY | Facility: CLINIC | Age: 64
End: 2019-11-19

## 2019-11-21 ENCOUNTER — APPOINTMENT (OUTPATIENT)
Age: 64
End: 2019-11-21
Payer: MEDICARE

## 2019-11-21 ENCOUNTER — RESULT CHARGE (OUTPATIENT)
Age: 64
End: 2019-11-21

## 2019-11-21 VITALS
HEIGHT: 62 IN | SYSTOLIC BLOOD PRESSURE: 137 MMHG | WEIGHT: 248 LBS | DIASTOLIC BLOOD PRESSURE: 87 MMHG | BODY MASS INDEX: 45.64 KG/M2

## 2019-11-21 DIAGNOSIS — Z86.018 PERSONAL HISTORY OF OTHER BENIGN NEOPLASM: ICD-10-CM

## 2019-11-21 LAB
BILIRUB UR QL STRIP: NEGATIVE
CLARITY UR: CLEAR
COLLECTION METHOD: NORMAL
GLUCOSE UR-MCNC: NEGATIVE
HCG UR QL: 0.2 EU/DL
HGB UR QL STRIP.AUTO: NORMAL
KETONES UR-MCNC: NEGATIVE
LEUKOCYTE ESTERASE UR QL STRIP: NORMAL
NITRITE UR QL STRIP: NEGATIVE
PH UR STRIP: 6
PROT UR STRIP-MCNC: NEGATIVE
SP GR UR STRIP: 1.02

## 2019-11-21 PROCEDURE — 51701 INSERT BLADDER CATHETER: CPT

## 2019-11-21 PROCEDURE — 99215 OFFICE O/P EST HI 40 MIN: CPT | Mod: 25

## 2019-11-22 ENCOUNTER — RESULT REVIEW (OUTPATIENT)
Age: 64
End: 2019-11-22

## 2019-11-22 LAB
APPEARANCE: ABNORMAL
BACTERIA: ABNORMAL
BILIRUBIN URINE: NEGATIVE
BLOOD URINE: NORMAL
COLOR: YELLOW
GLUCOSE QUALITATIVE U: NEGATIVE
HYALINE CASTS: 1 /LPF
KETONES URINE: NEGATIVE
LEUKOCYTE ESTERASE URINE: ABNORMAL
MICROSCOPIC-UA: NORMAL
NITRITE URINE: POSITIVE
PH URINE: 6
PROTEIN URINE: NORMAL
RED BLOOD CELLS URINE: 4 /HPF
SPECIFIC GRAVITY URINE: 1.02
SQUAMOUS EPITHELIAL CELLS: 1 /HPF
UROBILINOGEN URINE: NORMAL
WHITE BLOOD CELLS URINE: 215 /HPF

## 2019-11-23 ENCOUNTER — MOBILE ON CALL (OUTPATIENT)
Age: 64
End: 2019-11-23

## 2019-11-24 ENCOUNTER — RX RENEWAL (OUTPATIENT)
Age: 64
End: 2019-11-24

## 2019-11-25 ENCOUNTER — RESULT REVIEW (OUTPATIENT)
Age: 64
End: 2019-11-25

## 2019-11-25 LAB — BACTERIA UR CULT: ABNORMAL

## 2019-11-27 ENCOUNTER — RX RENEWAL (OUTPATIENT)
Age: 64
End: 2019-11-27

## 2019-12-02 ENCOUNTER — APPOINTMENT (OUTPATIENT)
Dept: ORTHOPEDIC SURGERY | Facility: CLINIC | Age: 64
End: 2019-12-02
Payer: MEDICARE

## 2019-12-02 VITALS
HEIGHT: 62 IN | BODY MASS INDEX: 53.92 KG/M2 | WEIGHT: 293 LBS | HEART RATE: 87 BPM | DIASTOLIC BLOOD PRESSURE: 68 MMHG | SYSTOLIC BLOOD PRESSURE: 149 MMHG | TEMPERATURE: 99.1 F

## 2019-12-02 LAB — CALCIUM SERPL-MCNC: 10.5 MG/DL

## 2019-12-02 PROCEDURE — 99204 OFFICE O/P NEW MOD 45 MIN: CPT

## 2019-12-02 NOTE — DISCUSSION/SUMMARY
[Surgical risks reviewed] : Surgical risks reviewed [de-identified] : 64 year old  female with bone-on-bone osteoarthritis of the bilateral knees and bone-on-bone osteoarthritis of the bilateral hips, left greater than right. Based on imaging she is a candidate for staged bilateral TKA and staged bilateral URBANO . I recommended that she begin with left URBANO first. We discussed the role of total hip replacement including the risks. She is obese with BMI greater than 45 today, and we discussed the importance of weight loss prior to surgery. She will need cardiac clearance and clearance from her internist. She can schedule left URBANO. \par She also elected to receive bilateral knee cortisone injection. \par \par Total hip arthroplasty: A hip replacement means a resurfacing of both surfaces of the hip, with metal, ceramic and/or plastic parts. The prosthetic parts are usually press fit into bone, and only rarely cemented into position. Patients are allowed to weight bear as tolerated in most cases. The postoperative motion is determined by multiple factors the most important of which is preoperative motion. In general, the better the motion pre operatively, the better the motion post operatively. The operation, pending medical clearance, generally requires a hospitalization of 3-4 days. In general, we prefer to perform the procedure under epidural or general anesthesia. Preoperatively we institute a nomogram for blood management which may include the administration of procrit. The operative procedure takes approximately 1-2 hours. The operation requires an oblique incision anywhere from 4-6 inches over the posterolateral hip region. Post operatively the patient is walking the day of or the day after surgery. The first couple of days are very painful and the pain medication will alleviate but not eliminate the pain. Thus the patient must really push hard to get their mobility back. Our goal for having the person go home is that they can walk with a walker or a cane. The walking aide is to be dispensed with once the patient is secure enough. In general, there is no cast or braces required with a routine hip replacement. In the long term, we do not encourage our patients to run for the sake of running; although, pending their pre operative status, we often allow patients to play doubles tennis or comparable activities. We also allow them to do gentle intermediate downhill skiing if they are truly an expert skier. Biking is encouraged as well as swimming. The follow up periods are usually at 3 weeks, 6 weeks, 3 months, and yearly intervals. Potential complications with total hip replacements include anaesthetic complications and death, infection (less than 1%), nerve damage, and in the case of a sciatic nerve injury, a permanent foot drop, (a flapping foot with ambulation that requires bracing). There are always areas of skin numbness but this is not an untoward effect nor do we consider it a complication. Other potential complications include dislocation of the hip component (less than 5%). In cases of recurrent dislocation revision surgery may be required. The loosening of either the acetabular or femoral component is much more infrequent. The components may wear, creating particulate debris, loosening and systemic complications. Specific concerns exist with all bearing surfaces such as metal sensitivity with metal on metal components, and the risk of fracture and squeaking with ceramic components. Major blood vessel damage is also extremely rare. Theoretically because of the anatomic proximity of the femoral artery, it could be lacerated with subsequent repairs required. Albeit unlikely, a disruption of the femoral artery could theoretically result in an amputation. Similarly, infection could theoretically result in an amputation if one were to grow out an organism that cannot be controlled with antibiotics. Most infections require removal of the prosthesis, placement of an antibiotic spacer, IV antibiotics for eradication, prior to reimplantation. General medical complications include phlebitis for which we prophylactically anticoagulate but it could still occur and fatal pulmonary embolus has been reported. Cardiovascular problems, such as a heart attack or ischemia are always a concern with such hemodynamic changes in the blood vascular system. There is a risk of leg length discrepancy and the need for a shoe lift. Other general complications are very rare but anything in medicine could theoretically happen. I think the patient understands the risk benefit ratio of total hip replacement and will think about whether they would like to pursue an operative or non-operative option.  I reviewed the plan of care as well as a model of a total hip implant equivalent to the one that will be used for their total hip joint replacement. The patient agreed to the plan of care as well as the use of implants for their hip total joint replacement.\par

## 2019-12-02 NOTE — REVIEW OF SYSTEMS
[Joint Pain] : joint pain [Negative] : Heme/Lymph [FreeTextEntry9] : bilateral knees and bilateral hips

## 2019-12-02 NOTE — PHYSICAL EXAM
[LE] : Sensory: Intact in bilateral lower extremities [ALL] : dorsalis pedis, posterior tibial, femoral, popliteal, and radial 2+ and symmetric bilaterally [Wheelchair] : uses a wheelchair [Normal] : Alert and in no acute distress [Obese] : obese [Antalgic] : antalgic [Poor Appearance] : well-appearing [Acute Distress] : not in acute distress [de-identified] : GENERAL APPEARANCE: Well nourished and hydrated, pleasant, alert, and oriented x 3. Appears their stated age. \par HEENT: Normocephalic, extraocular eye motion intact. Nasal septum midline. Oral cavity clear. External auditory canal clear. \par RESPIRATORY: Breath sounds clear and audible in all lobes. No wheezing, No accessory muscle use.\par CARDIOVASCULAR: No apparent abnormalities. No lower leg edema. No varicosities. Pedal pulses are palpable.\par NEUROLOGIC: Sensation is normal, no muscle weakness in the upper or lower extremities.\par DERMATOLOGIC: No apparent skin lesions, moist, warm, no rash.\par SPINE: Cervical spine appears normal and moves freely; thoracic spine appears normal and moves freely; lumbosacral spine appears normal and moves freely, normal, nontender.\par MUSCULOSKELETAL: Hands, wrists, and elbows are normal and move freely, shoulders are normal and move freely.  [de-identified] : bilateral knee exam shows varus deformity bilaterally.\par Bilateral hip exam shows severe stiffness with complete loss of internal and external rotation. \par Utilizing a wheelchair.  [de-identified] : 1V Xray of the pelvis done in office today and reviewed by Dr. Germán Henderson demonstrates bone-on-bone osteoarthritis of the bilateral hips, left greater than right.\par 5V Xray of the bilateral knees done in office today and reviewed by Dr. Germán Henderson demonstrates bone-on-bone osteoarthritis of the bilateral knees\par \par

## 2019-12-02 NOTE — REASON FOR VISIT
[Initial Visit] : an initial visit for [Knee Pain] : knee pain [Hip Pain] : hip pain [FreeTextEntry2] : Bilateral knee pain, bilateral hip pain.

## 2019-12-02 NOTE — ADDENDUM
[FreeTextEntry1] : I, Robbi Lara, acted solely as a scribe for Dr. Germán Henderson on this date 12/02/2019.

## 2019-12-02 NOTE — PROCEDURE
[de-identified] : I injected the patient's bilateral knees today with cortisone.\par \par I discussed at length with the patient the planned steroid and lidocaine injection. The risks, benefits, convalescence and alternatives were reviewed. The possible side effects discussed included but were not limited to: pain, swelling, heat, bleeding, and redness. Symptoms are generally mild but if they are extensive then contact the office. Giving pain relievers by mouth such as NSAIDs or Tylenol can generally treat the reactions to steroid and lidocaine. Rare cases of infection have been noted. Rash, hives and itching may occur post injection. If you have muscle pain or cramps, flushing and or swelling of the face, rapid heart beat, nausea, dizziness, fever, chills, headache, difficulty breathing, swelling in the arms or legs, or have a prickly feeling of your skin, contact a health care provider immediately. Following this discussion, the knee was prepped with Alcohol and under sterile condition the 80 mg Depo-Medrol and 6 cc Lidocaine injection was performed with a 20 gauge needle through a superolateral injection site. The needle was introduced into the joint, aspiration was performed to ensure intra-articular placement and the medication was injected. Upon withdrawal of the needle the site was cleaned with alcohol and a band aid applied. The patient tolerated the injection well and there were no adverse effects. Post injection instructions included no strenuous activity for 24 hours, cryotherapy and if there are any adverse effects to contact the office. \par \par \par

## 2019-12-02 NOTE — HISTORY OF PRESENT ILLNESS
[___ yrs] : [unfilled] year(s) ago [8] : a current pain level of 8/10 [Bending] : worsened by bending [Walking] : worsened by walking [de-identified] : 64 year old female here for evaluation of bilateral knee pain and bilateral hip pain. She reports pain began about 2 to 3 years ago. She was treated by Dr. Kleiner, but did not have any cortisone injections. She is currently taking Diclofenac for pain relief twice per day, prescribed by Dr. Kleiner. She reports her bilateral knees and bilateral hips are all about equal in severity. She rates pain 8/10 in severity and describes pain as achy and stabbing. Pain is worse with walking, navigating stairs, and bending motions. She is utilizing a wheelchair today, but is able to ambulate with a cane at home. \par Patient denies hx of blood clots. She denies hx of DM.  [Rest] : not relieved with rest

## 2019-12-03 ENCOUNTER — OTHER (OUTPATIENT)
Age: 64
End: 2019-12-03

## 2019-12-03 RX ORDER — GABAPENTIN 600 MG/1
600 TABLET, COATED ORAL
Qty: 30 | Refills: 0 | Status: COMPLETED | COMMUNITY
Start: 2019-12-03

## 2019-12-11 ENCOUNTER — APPOINTMENT (OUTPATIENT)
Dept: FAMILY MEDICINE | Facility: CLINIC | Age: 64
End: 2019-12-11

## 2019-12-19 ENCOUNTER — OUTPATIENT (OUTPATIENT)
Dept: OUTPATIENT SERVICES | Facility: HOSPITAL | Age: 64
LOS: 1 days | Discharge: HOME | End: 2019-12-19

## 2019-12-19 DIAGNOSIS — Z98.890 OTHER SPECIFIED POSTPROCEDURAL STATES: Chronic | ICD-10-CM

## 2019-12-19 DIAGNOSIS — Z90.13 ACQUIRED ABSENCE OF BILATERAL BREASTS AND NIPPLES: Chronic | ICD-10-CM

## 2019-12-19 DIAGNOSIS — D05.90 UNSPECIFIED TYPE OF CARCINOMA IN SITU OF UNSPECIFIED BREAST: ICD-10-CM

## 2019-12-23 ENCOUNTER — RESULT REVIEW (OUTPATIENT)
Age: 64
End: 2019-12-23

## 2019-12-23 ENCOUNTER — APPOINTMENT (OUTPATIENT)
Dept: HEMATOLOGY ONCOLOGY | Facility: CLINIC | Age: 64
End: 2019-12-23
Payer: MEDICARE

## 2019-12-23 ENCOUNTER — APPOINTMENT (OUTPATIENT)
Dept: SURGERY | Facility: CLINIC | Age: 64
End: 2019-12-23
Payer: MEDICARE

## 2019-12-23 VITALS
HEIGHT: 62 IN | BODY MASS INDEX: 51.35 KG/M2 | DIASTOLIC BLOOD PRESSURE: 88 MMHG | HEART RATE: 67 BPM | OXYGEN SATURATION: 95 % | WEIGHT: 279.03 LBS | SYSTOLIC BLOOD PRESSURE: 146 MMHG | TEMPERATURE: 97.6 F

## 2019-12-23 LAB
BASOPHILS # BLD AUTO: 0.1 K/UL — SIGNIFICANT CHANGE UP (ref 0–0.2)
BASOPHILS NFR BLD AUTO: 1.3 % — SIGNIFICANT CHANGE UP (ref 0–2)
EOSINOPHIL # BLD AUTO: 0.2 K/UL — SIGNIFICANT CHANGE UP (ref 0–0.5)
EOSINOPHIL NFR BLD AUTO: 2.6 % — SIGNIFICANT CHANGE UP (ref 0–6)
HCT VFR BLD CALC: 41.1 % — SIGNIFICANT CHANGE UP (ref 34.5–45)
HGB BLD-MCNC: 13.1 G/DL — SIGNIFICANT CHANGE UP (ref 11.5–15.5)
LYMPHOCYTES # BLD AUTO: 2.5 K/UL — SIGNIFICANT CHANGE UP (ref 1–3.3)
LYMPHOCYTES # BLD AUTO: 33 % — SIGNIFICANT CHANGE UP (ref 13–44)
MCHC RBC-ENTMCNC: 29.2 PG — SIGNIFICANT CHANGE UP (ref 27–34)
MCHC RBC-ENTMCNC: 31.8 G/DL — LOW (ref 32–36)
MCV RBC AUTO: 91.8 FL — SIGNIFICANT CHANGE UP (ref 80–100)
MONOCYTES # BLD AUTO: 0.7 K/UL — SIGNIFICANT CHANGE UP (ref 0–0.9)
MONOCYTES NFR BLD AUTO: 8.5 % — SIGNIFICANT CHANGE UP (ref 2–14)
NEUTROPHILS # BLD AUTO: 4.2 K/UL — SIGNIFICANT CHANGE UP (ref 1.8–7.4)
NEUTROPHILS NFR BLD AUTO: 54.7 % — SIGNIFICANT CHANGE UP (ref 43–77)
PLATELET # BLD AUTO: 326 K/UL — SIGNIFICANT CHANGE UP (ref 150–400)
RBC # BLD: 4.47 M/UL — SIGNIFICANT CHANGE UP (ref 3.8–5.2)
RBC # FLD: 13.2 % — SIGNIFICANT CHANGE UP (ref 10.3–14.5)
WBC # BLD: 7.7 K/UL — SIGNIFICANT CHANGE UP (ref 3.8–10.5)
WBC # FLD AUTO: 7.7 K/UL — SIGNIFICANT CHANGE UP (ref 3.8–10.5)

## 2019-12-23 PROCEDURE — 99214 OFFICE O/P EST MOD 30 MIN: CPT

## 2019-12-23 NOTE — ASSESSMENT
[FreeTextEntry1] : 64 year old female with stage IA right breast infiltrating ductal carcinoma (T1c N0(i+)). ER>90%, WA 90%, HER2 negative, S/p bilateral mastectomy + SNL biopsy.  BRCA negative.  Oncotype Dx 17 (low risk).   Began anastrazole on 3/9/18 and switched for Letrozole on 5/21/18 given hot flashes impacting QOL.  Letrozole stopped on 12/4/18 due to pain in multiple joints.  She was switched to Tamoxifen on 4/15/19. \par  4/3/18 bone density - normal.\par \par Tolerating Tamoxifen better aromatase inhibitors.\par She has left hip replacement surgery coming up in March 2020.  \par \par Plan:\par Continue Tamoxifen, stop 2 weeks prior to hip replacement scheduled for 3/3/20 given increased risk of VTE. \par Encouraged to continue weight loss\par GYN follow up every 6 months\par Follow up in 4/2020

## 2019-12-23 NOTE — ASSESSMENT
[FreeTextEntry1] : 63 yo postmenopausal female with right breast cancer IDC grade 2 ER + [place]+ [id number] negative with DCIS grade 2 ER+ [place] + s/p bilateral mastectomy with right SLNB (2/1/2018).  She reports new bilateral intermittent breast pain. There are no dominant masses on exam.  She states multiple MSK symptoms including muscle pain and generalized joint pain due to hormonal therapy and switched from Letrozole to Tamoxifen.  She is tolerating medication well.  She will be undergoing left hip surgery in  March.  Recommendation for MRI breast and is benign then follow up in 4 months.\par 1.  Follow up with pain specialist\par 2.  Continue taking hormonal therapy\par 3.  MRI breast\par 4.  Follow up in 4 months.\par \par \par \par

## 2019-12-23 NOTE — HISTORY OF PRESENT ILLNESS
[FreeTextEntry1] : I had the pleasure of seeing Natasha Toussaint in the office for a follow up clinical breast exam.  \par \par Natasha is a lorie 64 yo postmenopausal female who was diagnosed with grade 2 right breast invasive ductal carcinoma ER+DE+.  She underwent bilateral mastectomy with right SLNB on 2/1/2018.\par \par She is presents today for clinical breast evaluation.  She states multiple MSK symptoms including muscle pain and generalized joint pain due to hormonal therapy and switched from Letrozole to Tamoxifen.  She is tolerating medication well.\par  \par SURGICAL PATHOLOGY REPORT\par 2/01/2018:  FINAL DIAGNOSIS\par 1. sentinel lymph node left axilla #1 biopsy: one lymph node no tumor identified 2. [place] lymph node left axilla #2 biopsy: One lymph node, no tumor identified. 3. [place] lymph node, left axilla #3 biopsy: Fibroadipose tissue, no tumor identified.  4. Right breast sentinel lymph node #1 biopsy:  Isolated tumor cells in three of five lymph nodes.  5. Left breast, simple mastectomy: Fatty breast tissue, no tumor identified.  6.  Right breast, simple mastectomy:  Infiltrating ductal carcinoma and extensive intraductal carcinoma.  One axillary lymph node, no tumor identified.  Nipple with lactiferous duct and atypical intraductal papilloma and underlying intraductal carcinoma.\par \par We discussed clinical examination.  She is presents today for clinical breast evaluation.  She states multiple MSK symptoms including muscle pain and generalized joint pain due to hormonal therapy and switched from Letrozole to Tamoxifen.  She is tolerating medication well.  She states she needs to undergo bilateral hip surgery and is undergoing left hip surgery in March 2020.  She is taking meloxicam and seeing a pain specialist during the interim. She also reports new onset of bilateral intermittent breast pain. She will also undergo MRI breast due to new breast pain and continue with physical therapy. If MRI is benign then recommendation to continue taking hormonal and followup in 4 months. She understands and agrees to plan.  All questions answered.\par

## 2019-12-23 NOTE — HISTORY OF PRESENT ILLNESS
[T: ___] : T[unfilled] [N: ___] : N[unfilled] [AJCC Stage: ____] : AJCC Stage: [unfilled] [de-identified] : Returns for follow up. \par Began Tamoxifen on 4/15/19.\par She saw GYN and had a pap smear which was negative. No vaginal bleeding. \par B/l hip and knee pain remains.  She is having left hip replacement on 3/3/2020 by Dr. Henderson.\par She has few hot flashes, not impacting QOL.\par No dizziness. \par No leg swelling.\par \par right neck US - lipoma\par \par \par  [de-identified] : Ms. Toussaint was diagnosed with right breast cancer in November 2017 at age 61. \par \par In Fall 2017, she presented to ED with inflamed and erythematous right breast and was given antibiotics with resolution of symptoms.  She was evaluated by Dr. Arelne Gregg of breast surgery.\par \par She subsequently had a diagnostic mammo + US on 10/18/17: Multiple irregular masses in the upper outer right breast. Recommend representative ultrasound-guided core biopsy of two masses at the 11:00 axis and 10:00 axis, as above. Numerous associated distended ducts containing intraductal masses and calcifications throughout the upper outer right breast, in region of palpable concern, 10:00 axis, suspicious for extensive DCIS.Rounded indeterminate mass in the right breast at the 8:00 position, 16 \par cm the nipple, possible abnormal lymph node. Ultrasound-guided core biopsy is advised.\par \par On 11/1/18 she had a core biopsies:\par Final Diagnosis\par 1     Breast biopsy core, right 10:00 4 cm FN, Site #1:- detached fragments of papillary and micropapillar ductal carcinoma with extensive necrosis, probably ductal carcinoma in situ with intermediate to high nuclear grade\par 2     Breast biopsy core, right 11:00 16 cm FN, Site #;- Invasive moderately differentiated ductal carcinoma, [place] 7/9, 1.1 cm. ER>90%, CT> 90%, HER2 negative.\par +DCIS, cribriform and solid with intermediate grade. \par 3     Breast biopsy core, right 8:00 16 cm FN;-   Ductal carcinoma in situ, micropapillary type with intermediate nuclear grade.\par \par She subsequently had non compliance with obtaining an MRI as well as follow ups with Dr. Gregg.  She eventually underwent bilateral mastectomy and bilateral SNL biopsy on 2/1/2018.  \par \par Pathology:\par Right breast simple mastectomy - Infiltrating moderately differentiated ductal carcinoma and DCIS, micropapillary type with tumor necrosis. 1 axillary LN negative.  Approximately 2 cm, grade 2.  Isolate tumor cells in 3 LNs.  ER>90%, CT 90%, HER2 negative.  pT1c pN0(i+)\par \par Left mastectomy and left SNL biopsy - negative\par \par 2/1/18 Oncotype Dx - 17 (low risk) - 5 year recurrence with Tamoxifen 11%, with gutierrez+ chemo 12%\par 11/17/2017 BRCA1/2 negative\par \par Family history is significant for a sister with breast cancer at age 43, and neice (brothers daugther) in her 40s.  Paternal GM had uterine cancer.  Paternal aunt - brain cancer.\par \par She started anastrazole on 3/9/18 and switched to Letrozole on 5/21/18 given hot flashes. She stopped Letrozole on 12/4/18 due to multiple MSK symptoms including muscle pain and generalized joint pains. Since stopping Letrozole, her pain did not get better in her hips and knees but was better in her hands. She was subsequently switched to Tamoxifen on 4/15/19. \par \par s/p colonoscopy on 10/15/18 -->negative per patient

## 2019-12-23 NOTE — PHYSICAL EXAM
[Obese] : obese [Normal] : grossly intact [de-identified] : supple [de-identified] : poor dentition [de-identified] : clear bilaterally [de-identified] : S1/S2 [de-identified] : No edema [de-identified] : right neck lipoma [de-identified] : walking with a walker

## 2019-12-23 NOTE — CONSULT LETTER
[Dear  ___] : Dear  [unfilled], [Consult Letter:] : I had the pleasure of evaluating your patient, [unfilled]. [Please see my note below.] : Please see my note below. [Consult Closing:] : Thank you very much for allowing me to participate in the care of this patient.  If you have any questions, please do not hesitate to contact me. [Sincerely,] : Sincerely, [FreeTextEntry3] : Francine Jara MD\par Medical Oncology/Hematology\par [facility] [place]\par [facility] [place]

## 2019-12-23 NOTE — PHYSICAL EXAM
[Normocephalic] : normocephalic [Atraumatic] : atraumatic [Examined in the supine and seated position] : examined in the supine and seated position [No Supraclavicular Adenopathy] : no supraclavicular adenopathy [Supple] : supple [No dominant masses] : no dominant masses left breast [No dominant masses] : no dominant masses in right breast  [No Axillary Lymphadenopathy] : no left axillary lymphadenopathy [No Edema] : no edema [No Rashes] : no rashes [de-identified] : Right mastectomy site well healed.  No supraclavicular or axillary adenopathy. No dominant masses, normal to palpation.\par  [No Ulceration] : no ulceration [de-identified] : Left mastectomy site well healed. No supraclavicular or axillary adenopathy. No dominant masses, normal to palpation.

## 2019-12-24 LAB
ALBUMIN SERPL ELPH-MCNC: 3.6 G/DL
ALP BLD-CCNC: 85 U/L
ALT SERPL-CCNC: 16 U/L
ANION GAP SERPL CALC-SCNC: 12 MMOL/L
AST SERPL-CCNC: 14 U/L
BILIRUB SERPL-MCNC: 0.4 MG/DL
BUN SERPL-MCNC: 18 MG/DL
CALCIUM SERPL-MCNC: 9.7 MG/DL
CHLORIDE SERPL-SCNC: 104 MMOL/L
CO2 SERPL-SCNC: 26 MMOL/L
CREAT SERPL-MCNC: 0.8 MG/DL
POTASSIUM SERPL-SCNC: 4.9 MMOL/L
PROT SERPL-MCNC: 6.7 G/DL
SODIUM SERPL-SCNC: 142 MMOL/L

## 2020-01-05 ENCOUNTER — MEDICATION RENEWAL (OUTPATIENT)
Age: 65
End: 2020-01-05

## 2020-01-13 ENCOUNTER — APPOINTMENT (OUTPATIENT)
Dept: UROGYNECOLOGY | Facility: CLINIC | Age: 65
End: 2020-01-13

## 2020-01-16 ENCOUNTER — MOBILE ON CALL (OUTPATIENT)
Age: 65
End: 2020-01-16

## 2020-01-16 ENCOUNTER — OTHER (OUTPATIENT)
Age: 65
End: 2020-01-16

## 2020-02-04 ENCOUNTER — OUTPATIENT (OUTPATIENT)
Dept: OUTPATIENT SERVICES | Facility: HOSPITAL | Age: 65
LOS: 1 days | End: 2020-02-04
Payer: COMMERCIAL

## 2020-02-04 VITALS
SYSTOLIC BLOOD PRESSURE: 158 MMHG | RESPIRATION RATE: 14 BRPM | HEART RATE: 66 BPM | DIASTOLIC BLOOD PRESSURE: 84 MMHG | HEIGHT: 62 IN | TEMPERATURE: 99 F | WEIGHT: 274.48 LBS

## 2020-02-04 DIAGNOSIS — Z98.890 OTHER SPECIFIED POSTPROCEDURAL STATES: Chronic | ICD-10-CM

## 2020-02-04 DIAGNOSIS — Z01.818 ENCOUNTER FOR OTHER PREPROCEDURAL EXAMINATION: ICD-10-CM

## 2020-02-04 DIAGNOSIS — Z90.13 ACQUIRED ABSENCE OF BILATERAL BREASTS AND NIPPLES: Chronic | ICD-10-CM

## 2020-02-04 LAB
ANION GAP SERPL CALC-SCNC: 13 MMOL/L — SIGNIFICANT CHANGE UP (ref 5–17)
APTT BLD: 29.2 SEC — SIGNIFICANT CHANGE UP (ref 27.5–36.3)
BASOPHILS # BLD AUTO: 0.08 K/UL — SIGNIFICANT CHANGE UP (ref 0–0.2)
BASOPHILS NFR BLD AUTO: 0.9 % — SIGNIFICANT CHANGE UP (ref 0–2)
BLD GP AB SCN SERPL QL: SIGNIFICANT CHANGE UP
BUN SERPL-MCNC: 19 MG/DL — SIGNIFICANT CHANGE UP (ref 8–20)
CALCIUM SERPL-MCNC: 9.4 MG/DL — SIGNIFICANT CHANGE UP (ref 8.6–10.2)
CHLORIDE SERPL-SCNC: 102 MMOL/L — SIGNIFICANT CHANGE UP (ref 98–107)
CO2 SERPL-SCNC: 25 MMOL/L — SIGNIFICANT CHANGE UP (ref 22–29)
CREAT SERPL-MCNC: 0.71 MG/DL — SIGNIFICANT CHANGE UP (ref 0.5–1.3)
EOSINOPHIL # BLD AUTO: 0.31 K/UL — SIGNIFICANT CHANGE UP (ref 0–0.5)
EOSINOPHIL NFR BLD AUTO: 3.6 % — SIGNIFICANT CHANGE UP (ref 0–6)
GLUCOSE SERPL-MCNC: 107 MG/DL — HIGH (ref 70–99)
HBA1C BLD-MCNC: 5.8 % — HIGH (ref 4–5.6)
HCT VFR BLD CALC: 38 % — SIGNIFICANT CHANGE UP (ref 34.5–45)
HGB BLD-MCNC: 12.3 G/DL — SIGNIFICANT CHANGE UP (ref 11.5–15.5)
IMM GRANULOCYTES NFR BLD AUTO: 0.4 % — SIGNIFICANT CHANGE UP (ref 0–1.5)
INR BLD: 1.06 RATIO — SIGNIFICANT CHANGE UP (ref 0.88–1.16)
LYMPHOCYTES # BLD AUTO: 2.54 K/UL — SIGNIFICANT CHANGE UP (ref 1–3.3)
LYMPHOCYTES # BLD AUTO: 29.8 % — SIGNIFICANT CHANGE UP (ref 13–44)
MCHC RBC-ENTMCNC: 30.1 PG — SIGNIFICANT CHANGE UP (ref 27–34)
MCHC RBC-ENTMCNC: 32.4 GM/DL — SIGNIFICANT CHANGE UP (ref 32–36)
MCV RBC AUTO: 92.9 FL — SIGNIFICANT CHANGE UP (ref 80–100)
MONOCYTES # BLD AUTO: 0.76 K/UL — SIGNIFICANT CHANGE UP (ref 0–0.9)
MONOCYTES NFR BLD AUTO: 8.9 % — SIGNIFICANT CHANGE UP (ref 2–14)
MRSA PCR RESULT.: SIGNIFICANT CHANGE UP
NEUTROPHILS # BLD AUTO: 4.8 K/UL — SIGNIFICANT CHANGE UP (ref 1.8–7.4)
NEUTROPHILS NFR BLD AUTO: 56.4 % — SIGNIFICANT CHANGE UP (ref 43–77)
PLATELET # BLD AUTO: 317 K/UL — SIGNIFICANT CHANGE UP (ref 150–400)
POTASSIUM SERPL-MCNC: 5.1 MMOL/L — SIGNIFICANT CHANGE UP (ref 3.5–5.3)
POTASSIUM SERPL-SCNC: 5.1 MMOL/L — SIGNIFICANT CHANGE UP (ref 3.5–5.3)
PROTHROM AB SERPL-ACNC: 12.2 SEC — SIGNIFICANT CHANGE UP (ref 10–12.9)
RBC # BLD: 4.09 M/UL — SIGNIFICANT CHANGE UP (ref 3.8–5.2)
RBC # FLD: 14.6 % — HIGH (ref 10.3–14.5)
S AUREUS DNA NOSE QL NAA+PROBE: SIGNIFICANT CHANGE UP
SODIUM SERPL-SCNC: 140 MMOL/L — SIGNIFICANT CHANGE UP (ref 135–145)
WBC # BLD: 8.52 K/UL — SIGNIFICANT CHANGE UP (ref 3.8–10.5)
WBC # FLD AUTO: 8.52 K/UL — SIGNIFICANT CHANGE UP (ref 3.8–10.5)

## 2020-02-04 PROCEDURE — 93005 ELECTROCARDIOGRAM TRACING: CPT

## 2020-02-04 PROCEDURE — G0463: CPT

## 2020-02-04 PROCEDURE — 93010 ELECTROCARDIOGRAM REPORT: CPT

## 2020-02-04 RX ORDER — SODIUM CHLORIDE 9 MG/ML
3 INJECTION INTRAMUSCULAR; INTRAVENOUS; SUBCUTANEOUS EVERY 8 HOURS
Refills: 0 | Status: DISCONTINUED | OUTPATIENT
Start: 2020-03-03 | End: 2020-03-06

## 2020-02-04 NOTE — H&P PST ADULT - NSICDXPASTMEDICALHX_GEN_ALL_CORE_FT
PAST MEDICAL HISTORY:  Anemia     Breast cancer in female     Gallstones     Hypertension     Osteoarthritis left knee

## 2020-02-04 NOTE — H&P PST ADULT - ASSESSMENT
OPIOID RISK TOOL    SIN EACH BOX THAT APPLIES AND ADD TOTALS AT THE END    FAMILY HISTORY OF SUBSTANCE ABUSE                 FEMALE         MALE                                                Alcohol                             [  ]1 pt          [  ]3pts                                               Illegal Drugs                     [  ]2 pts        [  ]3pts                                               Rx Drugs                           [  ]4 pts        [  ]4 pts    PERSONAL HISTORY OF SUBSTANCE ABUSE                                                                                          Alcohol                             [  ]3 pts       [  ]3 pts                                               Illegal Drugs                     [  ]4 pts        [  ]4 pts                                               Rx Drugs                           [  ]5 pts        [  ]5 pts    AGE BETWEEN 16-45 YEARS                                      [  ]1 pt         [  ]1 pt    HISTORY OF PREADOLESCENT   SEXUAL ABUSE                                                             [  ]3 pts        [  ]0pts    PSYCHOLOGICAL DISEASE                     ADD, OCD, Bipolar, Schizophrenia        [  ]2 pts         [  ]2 pts                      Depression                                               [  ]1 pt           [  ]1 pt           SCORING TOTAL   (add numbers and type here)              (**0*)                                     A score of 3 or lower indicated LOW risk for future opioid abuse  A score of 4 to 7 indicated moderate risk for future opioid abuse  A score of 8 or higher indicates a high risk for opioid abuse    CAPRINI VTE 2.0 SCORE [CLOT updated 2019]    AGE RELATED RISK FACTORS                                                       MOBILITY RELATED FACTORS  [ ] Age 41-60 years                                            (1 Point)                    [ ] Bed rest                                                        (1 Point)  [x ] Age: 61-74 years                                           (2 Points)                  [ ] Plaster cast                                                   (2 Points)  [ ] Age= 75 years                                              (3 Points)                    [ ] Bed bound for more than 72 hours                 (2 Points)    DISEASE RELATED RISK FACTORS                                               GENDER SPECIFIC FACTORS  [ ] Edema in the lower extremities                       (1 Point)              [ ] Pregnancy                                                     (1 Point)  [ ] Varicose veins                                               (1 Point)                     [ ] Post-partum < 6 weeks                                   (1 Point)             [x ] BMI > 25 Kg/m2                                            (1 Point)                     [ ] Hormonal therapy  or oral contraception          (1 Point)                 [ ] Sepsis (in the previous month)                        (1 Point)               [ ] History of pregnancy complications                 (1 point)  [ ] Pneumonia or serious lung disease                                               [ ] Unexplained or recurrent                     (1 Point)           (in the previous month)                               (1 Point)  [ ] Abnormal pulmonary function test                     (1 Point)                 SURGERY RELATED RISK FACTORS  [ ] Acute myocardial infarction                              (1 Point)               [ ]  Section                                             (1 Point)  [ ] Congestive heart failure (in the previous month)  (1 Point)      [ ] Minor surgery                                                  (1 Point)   [ ] Inflammatory bowel disease                             (1 Point)               [ ] Arthroscopic surgery                                        (2 Points)  [ ] Central venous access                                      (2 Points)                [x ] General surgery lasting more than 45 minutes (2 points)  [x ] Malignancy- Present or previous                   (2 Points)                [ ] Elective arthroplasty                                         (5 points)    [ ] Stroke (in the previous month)                          (5 Points)                                                                                                                                                           HEMATOLOGY RELATED FACTORS                                                 TRAUMA RELATED RISK FACTORS  [ ] Prior episodes of VTE                                     (3 Points)                [ ] Fracture of the hip, pelvis, or leg                       (5 Points)  [ ] Positive family history for VTE                         (3 Points)             [ ] Acute spinal cord injury (in the previous month)  (5 Points)  [ ] Prothrombin 60173 A                                     (3 Points)               [ ] Paralysis  (less than 1 month)                             (5 Points)  [ ] Factor V Leiden                                             (3 Points)                  [ ] Multiple Trauma within 1 month                        (5 Points)  [ ] Lupus anticoagulants                                     (3 Points)                                                           [ ] Anticardiolipin antibodies                               (3 Points)                                                       [ ] High homocysteine in the blood                      (3 Points)                                             [ ] Other congenital or acquired thrombophilia      (3 Points)                                                [ ] Heparin induced thrombocytopenia                  (3 Points)                                     Total Score [   7       ]

## 2020-02-04 NOTE — H&P PST ADULT - BACK
normal... Well appearing, well nourished, awake, alert, oriented to person, place, time/situation and in no apparent distress. No deformity or limitation of movement

## 2020-02-04 NOTE — H&P PST ADULT - NSICDXFAMILYHX_GEN_ALL_CORE_FT
FAMILY HISTORY:  Father  Still living? Yes, Estimated age:   Family history of hypertension in father, Age at diagnosis: 71-80    Mother  Still living? Yes, Estimated age: 81-90  Maternal family history of emphysema, Age at diagnosis: 51-60    Sibling  Still living? Yes, Estimated age: 61-70  Family history of breast cancer, Age at diagnosis: 41-50

## 2020-02-04 NOTE — H&P PST ADULT - HISTORY OF PRESENT ILLNESS
Patient is a 63 y/o female [employer] retired. Patient c/o left hip pain for approximately for 4 years, patient reports pain has pogressivly worsen over the last year , Patient reports pain level ranges from 4/10 to 9/10 with no relief from rest or medication . Patient followed up with her PCP who sent patient for an X-ray of her left hip as per patient , the findings of " bone on bone " . Patient was referred to Dr Jimenez . Patient present to PST for evaluation for  a left hip replacement on 03/03/20 with Dr jimenez

## 2020-02-10 ENCOUNTER — APPOINTMENT (OUTPATIENT)
Dept: UROGYNECOLOGY | Facility: CLINIC | Age: 65
End: 2020-02-10
Payer: MEDICARE

## 2020-02-10 VITALS
BODY MASS INDEX: 51.34 KG/M2 | SYSTOLIC BLOOD PRESSURE: 157 MMHG | WEIGHT: 279 LBS | HEIGHT: 62 IN | DIASTOLIC BLOOD PRESSURE: 81 MMHG

## 2020-02-10 PROCEDURE — 99214 OFFICE O/P EST MOD 30 MIN: CPT

## 2020-02-10 NOTE — PHYSICAL EXAM
[No Acute Distress] : in no acute distress [Well developed] : well developed [Well Nourished] : ~L well nourished [Oriented x3] : oriented to person, place, and time [Normal Memory] : ~T memory was ~L unimpaired [Normal Mood/Affect] : mood and affect are normal [No Edema] : ~T edema was not present [Obese] : obese [None] : no CVA tenderness [Warm and Dry] : was warm and dry to touch [Normal Gait] : gait was normal [Vulvar Atrophy] : vulvar atrophy [Labia Majora] : were normal [Labia Minora] : were normal [Atrophy] : atrophy [No Bleeding] : there was no active vaginal bleeding [2] : 2 [Aa ____] : Aa [unfilled] [Ba ____] : Ba [unfilled] [Soft] :  the cervix was soft [Uterine Adnexae] : were not tender and not enlarged [Normal] : no abnormalities [Post Void Residual ____ml] : post void residual via catheterization was [unfilled] ml [Cough] : no cough [Tenderness] : ~T no ~M abdominal tenderness observed [Distended] : not distended [Inguinal LAD] : no adenopathy was noted in the inguinal lymph nodes [de-identified] : n

## 2020-02-10 NOTE — OB HISTORY
[Vaginal ___] : [unfilled] vaginal delivery(s) [Definite ___ (Date)] : the last menstrual period was [unfilled] [Last Pap Smear ___] : date of last pap smear was on [unfilled] [Regular Cycle Intervals] : periods have been irregular [Abnormal Pap Smear] : normal pap smear [Taking Estrogens] : is not taking estrogen replacement [Abnormal Bleeding] : without bleeding

## 2020-02-10 NOTE — HISTORY OF PRESENT ILLNESS
[FreeTextEntry1] : \par 64 y/o presents with 1 yr of frequency, urgency, nocturia celestino 2 hours, no pads, reports daytime leakage is minimal, occasionally will have urgency incontinence if hold for too long, occasional stress incontinence, denies hematuria, occasional dysuria, not using pads, denies vaginal bulge, rare constipation, denies vaginal bleeding, rare leakage of stool (colonoscopy negative), not very sexually active, uses a wheel chair to get around\par \par on HCTZ in the AM, currently on tamoxifen \par \par 48 ounces of water, 16 ounces of coffee\par no h/o sleep apnea \par \par  h/o breast cancer 2 yrs ago

## 2020-02-10 NOTE — DISCUSSION/SUMMARY
[FreeTextEntry1] : \par Natasha reports for follow up, she tried trospium but did not tolerate it due to side effects. She would like to try another medication, discussed trial of vesicare. She is undergoing surgery in a few wks, advised to wait until 2 wks postop to start vesicare 10mg. She will start for 4 wks and if no improvement UDS. All questions were answered. \par \par [] vesicare trial, if no improvement UDS, otherwise f/u after 4-6 wks\par

## 2020-02-10 NOTE — PROCEDURE
[FreeTextEntry1] : sterile straight catheterization performed to assess post void residual due to ehsan\par

## 2020-02-10 NOTE — DISCUSSION/SUMMARY
[FreeTextEntry1] : Natasha presents with overactive bladder. On exam normal PVR, small asymptomatic cystocele. We reviewed her symptoms and exam findings. We discussed management options for overactive bladder including observation, behavorial modifications and bladder training, physical therapy and medications including anticholinergics and beta 3 agonists. We discussed if behavorial modifications and medications fail proceeding with urodynamics to further evaluate her symptoms. We discussed additional treatment options including sacral neuromodulation, PTNS and intra detrusor Botox. [facility] handout on overactive bladder and bladder training was given to her. She will start trospium, cannot take myrbetriq due to tamoxifen use.  All questions answered.\par \par [] u/a, culture\par [] trospium

## 2020-02-10 NOTE — HISTORY OF PRESENT ILLNESS
[FreeTextEntry1] : \par Natasha presents for f/u on OAB, she was started on trospium last visit but took it for 3 wks, reports she had body aches and subsequently stopped. She has no other changes in her symptoms\par \par she remains on tamoxifen\par \par she is going to get her hip/knees replaced shortly

## 2020-02-11 RX ORDER — RIBOFLAVIN (VITAMIN B2) 100 MG
100 TABLET ORAL DAILY
Refills: 0 | Status: ACTIVE | COMMUNITY

## 2020-02-11 RX ORDER — PNV NO.95/FERROUS FUM/FOLIC AC 28MG-0.8MG
TABLET ORAL
Refills: 0 | Status: ACTIVE | COMMUNITY

## 2020-02-12 ENCOUNTER — APPOINTMENT (OUTPATIENT)
Dept: CARDIOLOGY | Facility: CLINIC | Age: 65
End: 2020-02-12
Payer: MEDICARE

## 2020-02-12 VITALS
HEART RATE: 88 BPM | OXYGEN SATURATION: 95 % | RESPIRATION RATE: 16 BRPM | DIASTOLIC BLOOD PRESSURE: 78 MMHG | SYSTOLIC BLOOD PRESSURE: 136 MMHG

## 2020-02-12 VITALS — HEIGHT: 62 IN | BODY MASS INDEX: 49.5 KG/M2 | WEIGHT: 269 LBS

## 2020-02-12 PROCEDURE — 93000 ELECTROCARDIOGRAM COMPLETE: CPT

## 2020-02-12 PROCEDURE — 99215 OFFICE O/P EST HI 40 MIN: CPT

## 2020-02-12 RX ORDER — NITROFURANTOIN (MONOHYDRATE/MACROCRYSTALS) 25; 75 MG/1; MG/1
100 CAPSULE ORAL TWICE DAILY
Qty: 14 | Refills: 0 | Status: DISCONTINUED | COMMUNITY
Start: 2019-11-25 | End: 2020-02-12

## 2020-02-12 RX ORDER — SOLIFENACIN SUCCINATE 10 MG/1
10 TABLET ORAL
Qty: 30 | Refills: 5 | Status: DISCONTINUED | COMMUNITY
Start: 2020-02-10 | End: 2020-02-12

## 2020-02-12 RX ORDER — TROSPIUM CHLORIDE 60 MG/1
60 CAPSULE, EXTENDED RELEASE ORAL
Qty: 30 | Refills: 2 | Status: DISCONTINUED | COMMUNITY
Start: 2019-11-21 | End: 2020-02-12

## 2020-02-12 RX ORDER — GABAPENTIN 600 MG/1
600 TABLET, COATED ORAL
Qty: 30 | Refills: 6 | Status: DISCONTINUED | COMMUNITY
Start: 2019-12-23 | End: 2020-02-12

## 2020-02-12 RX ORDER — TROSPIUM CHLORIDE 60 MG/1
60 CAPSULE, EXTENDED RELEASE ORAL
Qty: 90 | Refills: 3 | Status: DISCONTINUED | COMMUNITY
Start: 2019-11-23 | End: 2020-02-12

## 2020-02-12 RX ORDER — MISOPROSTOL 200 UG/1
200 TABLET ORAL
Qty: 2 | Refills: 0 | Status: DISCONTINUED | COMMUNITY
Start: 2019-09-23 | End: 2020-02-12

## 2020-02-12 RX ORDER — IBUPROFEN 600 MG/1
600 TABLET, FILM COATED ORAL EVERY 6 HOURS
Refills: 0 | Status: DISCONTINUED | COMMUNITY
End: 2020-02-12

## 2020-02-12 RX ORDER — NITROFURANTOIN (MONOHYDRATE/MACROCRYSTALS) 25; 75 MG/1; MG/1
100 CAPSULE ORAL
Qty: 14 | Refills: 0 | Status: DISCONTINUED | COMMUNITY
Start: 2019-09-29 | End: 2020-02-12

## 2020-02-12 NOTE — DISCUSSION/SUMMARY
[Procedure Low Risk] : the procedure risk is low [Optimized for Surgery] : the patient is optimized for surgery [As per surgery] : as per surgery [Continue] : Continue medications as currently directed [FreeTextEntry1] : This is a 63 year old woman with history of obesity recent diagnosis  of bilateral breast cancer for bilateral mastectomy\par 1) Pre-operative cardiovascular risk evaluation and management .   Pre-operative cardiovascular risk evaluation and management : No cardiac Symptoms. No diagnostic ischemic changes on ECG. METs > 4.  RCRI score < 1%. Steve perioperative risk score < 1%. NSQIP score < 1%. No further cardiac work up is needed. Proceed for surgery as indicated.\par Any further work up will not change the risk of this patient. Benefits of surgery outweigh the risk. \par 2) Dyspnea on exertion : resolved. obesity \par 3) Hypertension:. diet and exercise counselling.  ct losartan.  ct HCTZ \par 3) enlarged RV: sleep apnea. need sleep study. weight loss. \par 4) Daytime sleepiness, and snoring and insomnia : Sleep study as outpaitent. did not get it done. \par  [Patient] : the patient [Risks] : risks [Benefits] : benefits [Alternatives] : alternatives [___ Month(s)] : [unfilled] month(s) [With Me] : with me

## 2020-02-12 NOTE — CARDIOLOGY SUMMARY
Bedside and Verbal shift change report given to Anabela (oncoming nurse) by Cassie Davis (offgoing nurse). Report included the following information SBAR, Kardex, Procedure Summary, Intake/Output, MAR, Recent Results and Med Rec Status. 0730: Per patients glucose reader blood glucose is 124, no insulin provided per sliding scale. [No Ischemia] : no Ischemia [___] : [unfilled] [LVEF ___%] : LVEF [unfilled]% [Normal] : normal LA size [None] : no mitral regurgitation

## 2020-02-12 NOTE — HISTORY OF PRESENT ILLNESS
[Preoperative Visit] : for a medical evaluation prior to surgery [Scheduled Procedure ___] : a [unfilled] [Date of Surgery ___] : on [unfilled] [Surgeon Name ___] : surgeon: [unfilled] [Good] : Good [Fever] : no fever [Chills] : chills [Fatigue] : fatigue [Chest Pain] : no chest pain [Dyspnea] : no dyspnea [Cough] : no cough [Dysuria] : no dysuria [Urinary Frequency] : urinary frequency [Nausea] : no nausea [Vomiting] : no vomiting [Diarrhea] : no diarrhea [Abdominal Pain] : no abdominal pain [Easy Bruising] : no easy bruising [Lower Extremity Swelling] : no lower extremity swelling [Poor Exercise Tolerance] : poor exercise tolerance [Diabetes] : no diabetes [Cardiovascular Disease] : no cardiovascular disease [Pulmonary Disease] : no pulmonary disease [Anti-Platelet Agents] : no anti-platelet agents [Nicotine Dependence] : no nicotine dependence [Alcohol Use] : no  alcohol use [Renal Disease] : no renal disease [GI Disease] : no gastrointestinal disease [Thromboembolic Problems] : no thromboembolic problems [Frequent use of NSAIDs] : no use of NSAIDs [Transfusion Reaction] : no transfusion reaction [Impaired Immunity] : no impaired immunity [Steroid Use in Last 6 Months] : no steroid use in the last six months [Frequent Aspirin Use] : no frequent aspirin use [Prior Anesthesia] : Prior anesthesia [Prev Anesthesia Reaction] : no previous anesthesia reaction [Electrocardiogram] : ~T an ECG ~C was performed [Echocardiogram] : ~T an echocardiogram ~C was performed [Metabolic Capacity ___Mets%] : The patient has a metabolic capacity of [unfilled] Mets%  [Poor] : Poor [Anesthesia Reaction] : no anesthesia reaction [Sudden Death] : no sudden death [Clotting Disorder] : no clotting disorder [Bleeding Disorder] : no bleeding disorder [de-identified] : [facility]  [FreeTextEntry1] : encounter for cardiotoxic therapeutic agents \par Bilateral mastectomy, no chemo. \par \par HPI for today: : feels good. here for Pre-operative cardiovascular risk evaluation and management for sleft hip surgery\par no chest pain. no plpatiatons. \par no dyspnea. no headaches. no dizziness.  \par \par old note: she has been taking tamoxifen. no headchaes. sometimes  dizziness when she moves her head\par No syncope.   no chest pain. no palpitations. no dyspnea. No LE edema. \par She has enlarged RV. she wakes up frequently at night. She snores.  She feels ffatgieu and tired during the4 day time. day time sleepiness.\par \par old note: Pre-operative cardiovascular risk evaluation and management and hypertension \par patient had bilateral mastectomy for cancer. Feb 4, 2018. tolerated surgery. NO palpitations. no chestp ain. +  dyspnea. no dizziness. no syncope. \par When she goes up the staris she feels short of breathe. onset: few mths.  \par Now schedule  for DNC next tuesday. \par W [de-identified] : 10 steps hip pain.  [de-identified] : Sister had trouble coming ou ot anesthesia after her surgery.

## 2020-02-12 NOTE — PHYSICAL EXAM
[General Appearance - Well Developed] : well developed [Normal Appearance] : normal appearance [General Appearance - Well Nourished] : well nourished [Well Groomed] : well groomed [No Deformities] : no deformities [General Appearance - In No Acute Distress] : no acute distress [Normal Conjunctiva] : the conjunctiva exhibited no abnormalities [Eyelids - No Xanthelasma] : the eyelids demonstrated no xanthelasmas [Normal Oral Mucosa] : normal oral mucosa [No Oral Pallor] : no oral pallor [No Oral Cyanosis] : no oral cyanosis [Normal Jugular Venous A Waves Present] : normal jugular venous A waves present [Normal Jugular Venous V Waves Present] : normal jugular venous V waves present [No Jugular Venous Larson A Waves] : no jugular venous larson A waves [Respiration, Rhythm And Depth] : normal respiratory rhythm and effort [Exaggerated Use Of Accessory Muscles For Inspiration] : no accessory muscle use [Auscultation Breath Sounds / Voice Sounds] : lungs were clear to auscultation bilaterally [Heart Rate And Rhythm] : heart rate and rhythm were normal [Heart Sounds] : normal S1 and S2 [Murmurs] : no murmurs present [Abdomen Tenderness] : non-tender [Abdomen Soft] : soft [Abdomen Mass (___ Cm)] : no abdominal mass palpated [FreeTextEntry1] : morbid obesity . [Abnormal Walk] : normal gait [Gait - Sufficient For Exercise Testing] : the gait was sufficient for exercise testing [Nail Clubbing] : no clubbing of the fingernails [Cyanosis, Localized] : no localized cyanosis [Petechial Hemorrhages (___cm)] : no petechial hemorrhages [Skin Color & Pigmentation] : normal skin color and pigmentation [] : no rash [No Venous Stasis] : no venous stasis [Skin Lesions] : no skin lesions [No Skin Ulcers] : no skin ulcer [No Xanthoma] : no  xanthoma was observed [Oriented To Time, Place, And Person] : oriented to person, place, and time [Affect] : the affect was normal [Mood] : the mood was normal [No Anxiety] : not feeling anxious

## 2020-02-17 ENCOUNTER — APPOINTMENT (OUTPATIENT)
Dept: MRI IMAGING | Facility: CLINIC | Age: 65
End: 2020-02-17
Payer: MEDICARE

## 2020-02-17 ENCOUNTER — OUTPATIENT (OUTPATIENT)
Dept: OUTPATIENT SERVICES | Facility: HOSPITAL | Age: 65
LOS: 1 days | End: 2020-02-17
Payer: COMMERCIAL

## 2020-02-17 DIAGNOSIS — Z98.890 OTHER SPECIFIED POSTPROCEDURAL STATES: Chronic | ICD-10-CM

## 2020-02-17 DIAGNOSIS — Z90.13 ACQUIRED ABSENCE OF BILATERAL BREASTS AND NIPPLES: Chronic | ICD-10-CM

## 2020-02-17 DIAGNOSIS — N64.4 MASTODYNIA: ICD-10-CM

## 2020-02-17 DIAGNOSIS — Z00.8 ENCOUNTER FOR OTHER GENERAL EXAMINATION: ICD-10-CM

## 2020-02-17 PROCEDURE — A9585: CPT

## 2020-02-17 PROCEDURE — C8908: CPT

## 2020-02-17 PROCEDURE — 77049 MRI BREAST C-+ W/CAD BI: CPT | Mod: 26

## 2020-02-17 PROCEDURE — C8937: CPT

## 2020-02-24 ENCOUNTER — APPOINTMENT (OUTPATIENT)
Dept: FAMILY MEDICINE | Facility: CLINIC | Age: 65
End: 2020-02-24
Payer: MEDICARE

## 2020-02-24 ENCOUNTER — APPOINTMENT (OUTPATIENT)
Dept: RHEUMATOLOGY | Facility: CLINIC | Age: 65
End: 2020-02-24
Payer: MEDICARE

## 2020-02-24 VITALS
RESPIRATION RATE: 14 BRPM | HEIGHT: 62 IN | BODY MASS INDEX: 50.42 KG/M2 | DIASTOLIC BLOOD PRESSURE: 70 MMHG | WEIGHT: 274 LBS | SYSTOLIC BLOOD PRESSURE: 122 MMHG | HEART RATE: 78 BPM | OXYGEN SATURATION: 96 %

## 2020-02-24 VITALS
RESPIRATION RATE: 17 BRPM | WEIGHT: 274 LBS | DIASTOLIC BLOOD PRESSURE: 74 MMHG | HEART RATE: 97 BPM | SYSTOLIC BLOOD PRESSURE: 120 MMHG | HEIGHT: 62 IN | OXYGEN SATURATION: 97 % | BODY MASS INDEX: 50.42 KG/M2 | TEMPERATURE: 98.6 F

## 2020-02-24 PROCEDURE — 99215 OFFICE O/P EST HI 40 MIN: CPT | Mod: 25

## 2020-02-24 PROCEDURE — 99358 PROLONG SERVICE W/O CONTACT: CPT

## 2020-02-24 PROCEDURE — 99215 OFFICE O/P EST HI 40 MIN: CPT

## 2020-02-24 PROCEDURE — 36415 COLL VENOUS BLD VENIPUNCTURE: CPT

## 2020-02-24 RX ORDER — DICLOFENAC SODIUM 75 MG/1
75 TABLET, DELAYED RELEASE ORAL
Qty: 180 | Refills: 0 | Status: DISCONTINUED | COMMUNITY
Start: 2019-08-07 | End: 2020-02-24

## 2020-02-24 RX ORDER — MELATONIN 3 MG
25 MCG TABLET ORAL
Qty: 120 | Refills: 0 | Status: ACTIVE | COMMUNITY

## 2020-02-24 RX ORDER — ASPIRIN ENTERIC COATED TABLETS 81 MG 81 MG/1
81 TABLET, DELAYED RELEASE ORAL
Qty: 100 | Refills: 0 | Status: DISCONTINUED | COMMUNITY
Start: 2019-03-08 | End: 2020-02-24

## 2020-02-24 RX ORDER — OMEPRAZOLE 20 MG/1
20 CAPSULE, DELAYED RELEASE ORAL
Qty: 30 | Refills: 0 | Status: DISCONTINUED | COMMUNITY
Start: 2019-03-04 | End: 2020-02-24

## 2020-02-24 RX ORDER — ADHESIVE TAPE 3"X 2.3 YD
50 MCG TAPE, NON-MEDICATED TOPICAL
Qty: 30 | Refills: 0 | Status: DISCONTINUED | COMMUNITY
End: 2020-02-24

## 2020-02-24 NOTE — HISTORY OF PRESENT ILLNESS
[No Pertinent Cardiac History] : no history of aortic stenosis, atrial fibrillation, coronary artery disease, recent myocardial infarction, or implantable device/pacemaker [No Pertinent Pulmonary History] : no history of asthma, COPD, sleep apnea, or smoking [Family Member] : family member with adverse anesthesia reaction/sudden death [No Adverse Anesthesia Reaction] : no adverse anesthesia reaction in self or family member [(Patient denies any chest pain, claudication, dyspnea on exertion, orthopnea, palpitations or syncope)] : Patient denies any chest pain, claudication, dyspnea on exertion, orthopnea, palpitations or syncope [Unable to assess] : unable to assess [Anti-Platelet Agents: _____] : Anti-Platelet Agents: [unfilled] [NSAIDs: _____] : NSAIDs: [unfilled] [Aortic Stenosis] : no aortic stenosis [Atrial Fibrillation] : no atrial fibrillation [Recent Myocardial Infarction] : no recent myocardial infarction [Coronary Artery Disease] : no coronary artery disease [Asthma] : no asthma [COPD] : no COPD [Implantable Device/Pacemaker] : no implantable device/pacemaker [Smoker] : not a smoker [Sleep Apnea] : no sleep apnea [Chronic Kidney Disease] : no chronic kidney disease [Self] : no previous adverse anesthesia reaction [Chronic Anticoagulation] : no chronic anticoagulation [FreeTextEntry1] : URBANO Lt Hip [Diabetes] : no diabetes [FreeTextEntry7] : Stress test,Echo---WNL [FreeTextEntry3] : Dr Henderson [FreeTextEntry4] : 64 yr F here for preop clearance for left hip total replacement scheduled for March 3.\par Past medical history significant for hypertension,breast cancer and chronic bilateral hip and knee pain.\par past surgical history significant for bilateral mastectomy and tonsillectomy.\par Patient is a nonsmoker.\par She denies any cardiac or pulmonary symptoms. Denies sleep apnea.\par She is on medication as listed.\par Patient has had a cardiology clearance done and if she has stopped aspirin, tamoxifen and NSAIDS as of 2 days ago. [FreeTextEntry2] : 03/03/20

## 2020-02-24 NOTE — ASSESSMENT
[Patient Optimized for Surgery] : Patient optimized for surgery [Continue medications as is] : Continue current medications [Modify anti-platelet treatment prior to procedure] : Modify anti-platelet treatment prior to procedure [Cardiology consultation] : Cardiology consultation [As per surgery] : as per surgery [FreeTextEntry6] : Pt has stopped ASA [FreeTextEntry4] : 64 yr F here for preop clearance for left hip total replacement scheduled for March 3.\par Past medical history significant for hypertension,breast cancer and chronic bilateral hip and knee pain.\par past surgical history significant for bilateral mastectomy and tonsillectomy.\par Patient is a nonsmoker.\par She denies any cardiac or pulmonary symptoms. Denies sleep apnea.\par She is on medication as listed.\par Patient has had a cardiology clearance done and if she has stopped aspirin, tamoxifen and NSAIDS as of 2 days ago.\par patient is medically optimized for proposed surgery. [FreeTextEntry7] : Pt has stopped Diclofenac and Tamoxifen

## 2020-02-24 NOTE — RESULTS/DATA
[] : results reviewed [de-identified] : WNL [de-identified] : WNL [de-identified] : Glu 107,Rest WNL [de-identified] : WNL [de-identified] : SDC1N---1.8

## 2020-02-24 NOTE — PHYSICAL EXAM
[No Acute Distress] : no acute distress [Well Developed] : well developed [Well Nourished] : well nourished [Well-Appearing] : well-appearing [Normal Sclera/Conjunctiva] : normal sclera/conjunctiva [PERRL] : pupils equal round and reactive to light [EOMI] : extraocular movements intact [Normal Outer Ear/Nose] : the outer ears and nose were normal in appearance [Normal Oropharynx] : the oropharynx was normal [Normal TMs] : both tympanic membranes were normal [No Lymphadenopathy] : no lymphadenopathy [No JVD] : no jugular venous distention [Supple] : supple [Thyroid Normal, No Nodules] : the thyroid was normal and there were no nodules present [No Respiratory Distress] : no respiratory distress  [Clear to Auscultation] : lungs were clear to auscultation bilaterally [Normal Rate] : normal rate  [No Accessory Muscle Use] : no accessory muscle use [Regular Rhythm] : with a regular rhythm [Normal S1, S2] : normal S1 and S2 [No Carotid Bruits] : no carotid bruits [No Murmur] : no murmur heard [No Abdominal Bruit] : a ~M bruit was not heard ~T in the abdomen [No Varicosities] : no varicosities [No Palpable Aorta] : no palpable aorta [Pedal Pulses Present] : the pedal pulses are present [No Edema] : there was no peripheral edema [Soft] : abdomen soft [No Extremity Clubbing/Cyanosis] : no extremity clubbing/cyanosis [Non Tender] : non-tender [Non-distended] : non-distended [No Masses] : no abdominal mass palpated [No HSM] : no HSM [Normal Bowel Sounds] : normal bowel sounds [Normal Posterior Cervical Nodes] : no posterior cervical lymphadenopathy [Normal Anterior Cervical Nodes] : no anterior cervical lymphadenopathy [No CVA Tenderness] : no CVA  tenderness [No Spinal Tenderness] : no spinal tenderness [No Joint Swelling] : no joint swelling [Grossly Normal Strength/Tone] : grossly normal strength/tone [No Rash] : no rash [No Focal Deficits] : no focal deficits [Normal Gait] : normal gait [Coordination Grossly Intact] : coordination grossly intact [Normal Affect] : the affect was normal [Deep Tendon Reflexes (DTR)] : deep tendon reflexes were 2+ and symmetric [Normal Insight/Judgement] : insight and judgment were intact

## 2020-02-25 LAB
ALBUMIN SERPL ELPH-MCNC: 4.5 G/DL
ALP BLD-CCNC: 77 U/L
ALT SERPL-CCNC: 31 U/L
ANION GAP SERPL CALC-SCNC: 15 MMOL/L
AST SERPL-CCNC: 22 U/L
BASOPHILS # BLD AUTO: 0.08 K/UL
BASOPHILS NFR BLD AUTO: 1.1 %
BILIRUB SERPL-MCNC: 0.6 MG/DL
BUN SERPL-MCNC: 14 MG/DL
CALCIUM SERPL-MCNC: 10.1 MG/DL
CHLORIDE SERPL-SCNC: 100 MMOL/L
CK SERPL-CCNC: 58 U/L
CO2 SERPL-SCNC: 24 MMOL/L
CREAT SERPL-MCNC: 0.77 MG/DL
CRP SERPL-MCNC: 0.23 MG/DL
DEPRECATED KAPPA LC FREE/LAMBDA SER: 0.93 RATIO
EOSINOPHIL # BLD AUTO: 0.18 K/UL
EOSINOPHIL NFR BLD AUTO: 2.4 %
ERYTHROCYTE [SEDIMENTATION RATE] IN BLOOD BY WESTERGREN METHOD: 13 MM/HR
GLUCOSE SERPL-MCNC: 116 MG/DL
HCT VFR BLD CALC: 40.8 %
HGB BLD-MCNC: 13.5 G/DL
IGA SER QL IEP: 416 MG/DL
IGG SER QL IEP: 1107 MG/DL
IGM SER QL IEP: 119 MG/DL
IMM GRANULOCYTES NFR BLD AUTO: 0.4 %
KAPPA LC CSF-MCNC: 2.31 MG/DL
KAPPA LC SERPL-MCNC: 2.14 MG/DL
LDH SERPL-CCNC: 175 U/L
LYMPHOCYTES # BLD AUTO: 2.37 K/UL
LYMPHOCYTES NFR BLD AUTO: 31.8 %
MAN DIFF?: NORMAL
MCHC RBC-ENTMCNC: 30.3 PG
MCHC RBC-ENTMCNC: 33.1 GM/DL
MCV RBC AUTO: 91.7 FL
MONOCYTES # BLD AUTO: 0.86 K/UL
MONOCYTES NFR BLD AUTO: 11.5 %
NEUTROPHILS # BLD AUTO: 3.94 K/UL
NEUTROPHILS NFR BLD AUTO: 52.8 %
PHOSPHATE SERPL-MCNC: 3.2 MG/DL
PLATELET # BLD AUTO: 349 K/UL
POTASSIUM SERPL-SCNC: 4.1 MMOL/L
PROT SERPL-MCNC: 7.1 G/DL
RBC # BLD: 4.45 M/UL
RBC # FLD: 14.7 %
SODIUM SERPL-SCNC: 139 MMOL/L
TSH SERPL-ACNC: 1.97 UIU/ML
WBC # FLD AUTO: 7.46 K/UL

## 2020-03-01 ENCOUNTER — OUTPATIENT (OUTPATIENT)
Dept: OUTPATIENT SERVICES | Facility: HOSPITAL | Age: 65
LOS: 1 days | End: 2020-03-01
Payer: MEDICARE

## 2020-03-01 DIAGNOSIS — Z90.13 ACQUIRED ABSENCE OF BILATERAL BREASTS AND NIPPLES: Chronic | ICD-10-CM

## 2020-03-01 DIAGNOSIS — Z98.890 OTHER SPECIFIED POSTPROCEDURAL STATES: Chronic | ICD-10-CM

## 2020-03-01 PROCEDURE — G9001: CPT

## 2020-03-02 ENCOUNTER — TRANSCRIPTION ENCOUNTER (OUTPATIENT)
Age: 65
End: 2020-03-02

## 2020-03-02 LAB — M PROTEIN SPEC IFE-MCNC: NORMAL

## 2020-03-02 RX ORDER — TRANEXAMIC ACID 100 MG/ML
1000 INJECTION, SOLUTION INTRAVENOUS ONCE
Refills: 0 | Status: DISCONTINUED | OUTPATIENT
Start: 2020-03-03 | End: 2020-03-03

## 2020-03-02 RX ORDER — CEFAZOLIN SODIUM 1 G
3000 VIAL (EA) INJECTION ONCE
Refills: 0 | Status: DISCONTINUED | OUTPATIENT
Start: 2020-03-03 | End: 2020-03-03

## 2020-03-03 ENCOUNTER — APPOINTMENT (OUTPATIENT)
Dept: ORTHOPEDIC SURGERY | Facility: HOSPITAL | Age: 65
End: 2020-03-03

## 2020-03-03 ENCOUNTER — TRANSCRIPTION ENCOUNTER (OUTPATIENT)
Age: 65
End: 2020-03-03

## 2020-03-03 ENCOUNTER — INPATIENT (INPATIENT)
Facility: HOSPITAL | Age: 65
LOS: 2 days | Discharge: HOME | DRG: 470 | End: 2020-03-06
Attending: ORTHOPAEDIC SURGERY | Admitting: ORTHOPAEDIC SURGERY
Payer: COMMERCIAL

## 2020-03-03 VITALS
SYSTOLIC BLOOD PRESSURE: 123 MMHG | OXYGEN SATURATION: 96 % | DIASTOLIC BLOOD PRESSURE: 75 MMHG | HEART RATE: 87 BPM | WEIGHT: 274.48 LBS | HEIGHT: 62 IN | RESPIRATION RATE: 16 BRPM | TEMPERATURE: 98 F

## 2020-03-03 DIAGNOSIS — Z98.890 OTHER SPECIFIED POSTPROCEDURAL STATES: Chronic | ICD-10-CM

## 2020-03-03 DIAGNOSIS — M16.12 UNILATERAL PRIMARY OSTEOARTHRITIS, LEFT HIP: ICD-10-CM

## 2020-03-03 DIAGNOSIS — Z90.13 ACQUIRED ABSENCE OF BILATERAL BREASTS AND NIPPLES: Chronic | ICD-10-CM

## 2020-03-03 LAB
BLD GP AB SCN SERPL QL: SIGNIFICANT CHANGE UP
GLUCOSE BLDC GLUCOMTR-MCNC: 111 MG/DL — HIGH (ref 70–99)
GLUCOSE BLDC GLUCOMTR-MCNC: 140 MG/DL — HIGH (ref 70–99)
GLUCOSE BLDC GLUCOMTR-MCNC: 150 MG/DL — HIGH (ref 70–99)

## 2020-03-03 PROCEDURE — 73501 X-RAY EXAM HIP UNI 1 VIEW: CPT | Mod: 26,LT

## 2020-03-03 PROCEDURE — 27130 TOTAL HIP ARTHROPLASTY: CPT | Mod: 22,LT

## 2020-03-03 PROCEDURE — 93010 ELECTROCARDIOGRAM REPORT: CPT

## 2020-03-03 PROCEDURE — 27130 TOTAL HIP ARTHROPLASTY: CPT | Mod: AS,22,LT

## 2020-03-03 RX ORDER — HYDROMORPHONE HYDROCHLORIDE 2 MG/ML
0.5 INJECTION INTRAMUSCULAR; INTRAVENOUS; SUBCUTANEOUS EVERY 4 HOURS
Refills: 0 | Status: DISCONTINUED | OUTPATIENT
Start: 2020-03-03 | End: 2020-03-06

## 2020-03-03 RX ORDER — FERROUS SULFATE 325(65) MG
325 TABLET ORAL DAILY
Refills: 0 | Status: DISCONTINUED | OUTPATIENT
Start: 2020-03-03 | End: 2020-03-06

## 2020-03-03 RX ORDER — PANTOPRAZOLE SODIUM 20 MG/1
40 TABLET, DELAYED RELEASE ORAL
Refills: 0 | Status: DISCONTINUED | OUTPATIENT
Start: 2020-03-03 | End: 2020-03-06

## 2020-03-03 RX ORDER — FENTANYL CITRATE 50 UG/ML
50 INJECTION INTRAVENOUS
Refills: 0 | Status: DISCONTINUED | OUTPATIENT
Start: 2020-03-03 | End: 2020-03-03

## 2020-03-03 RX ORDER — LOSARTAN POTASSIUM 100 MG/1
100 TABLET, FILM COATED ORAL DAILY
Refills: 0 | Status: DISCONTINUED | OUTPATIENT
Start: 2020-03-05 | End: 2020-03-06

## 2020-03-03 RX ORDER — SCOPALAMINE 1 MG/3D
1 PATCH, EXTENDED RELEASE TRANSDERMAL ONCE
Refills: 0 | Status: COMPLETED | OUTPATIENT
Start: 2020-03-03 | End: 2020-03-03

## 2020-03-03 RX ORDER — ACETAMINOPHEN 500 MG
975 TABLET ORAL ONCE
Refills: 0 | Status: COMPLETED | OUTPATIENT
Start: 2020-03-03 | End: 2020-03-03

## 2020-03-03 RX ORDER — ONDANSETRON 8 MG/1
4 TABLET, FILM COATED ORAL ONCE
Refills: 0 | Status: DISCONTINUED | OUTPATIENT
Start: 2020-03-03 | End: 2020-03-03

## 2020-03-03 RX ORDER — ENOXAPARIN SODIUM 100 MG/ML
40 INJECTION SUBCUTANEOUS DAILY
Refills: 0 | Status: DISCONTINUED | OUTPATIENT
Start: 2020-03-04 | End: 2020-03-06

## 2020-03-03 RX ORDER — SODIUM CHLORIDE 9 MG/ML
1000 INJECTION, SOLUTION INTRAVENOUS
Refills: 0 | Status: DISCONTINUED | OUTPATIENT
Start: 2020-03-03 | End: 2020-03-06

## 2020-03-03 RX ORDER — CEFAZOLIN SODIUM 1 G
3000 VIAL (EA) INJECTION
Refills: 0 | Status: COMPLETED | OUTPATIENT
Start: 2020-03-03 | End: 2020-03-04

## 2020-03-03 RX ORDER — HYDROMORPHONE HYDROCHLORIDE 2 MG/ML
0.5 INJECTION INTRAMUSCULAR; INTRAVENOUS; SUBCUTANEOUS
Refills: 0 | Status: DISCONTINUED | OUTPATIENT
Start: 2020-03-03 | End: 2020-03-03

## 2020-03-03 RX ORDER — CEPHALEXIN 500 MG
500 CAPSULE ORAL
Refills: 0 | Status: DISCONTINUED | OUTPATIENT
Start: 2020-03-04 | End: 2020-03-06

## 2020-03-03 RX ORDER — TRAMADOL HYDROCHLORIDE 50 MG/1
50 TABLET ORAL EVERY 4 HOURS
Refills: 0 | Status: DISCONTINUED | OUTPATIENT
Start: 2020-03-03 | End: 2020-03-06

## 2020-03-03 RX ORDER — GABAPENTIN 400 MG/1
600 CAPSULE ORAL THREE TIMES A DAY
Refills: 0 | Status: DISCONTINUED | OUTPATIENT
Start: 2020-03-03 | End: 2020-03-06

## 2020-03-03 RX ORDER — ACETAMINOPHEN 500 MG
975 TABLET ORAL EVERY 8 HOURS
Refills: 0 | Status: DISCONTINUED | OUTPATIENT
Start: 2020-03-03 | End: 2020-03-06

## 2020-03-03 RX ORDER — SODIUM CHLORIDE 9 MG/ML
1000 INJECTION, SOLUTION INTRAVENOUS
Refills: 0 | Status: DISCONTINUED | OUTPATIENT
Start: 2020-03-03 | End: 2020-03-03

## 2020-03-03 RX ORDER — SENNA PLUS 8.6 MG/1
2 TABLET ORAL AT BEDTIME
Refills: 0 | Status: DISCONTINUED | OUTPATIENT
Start: 2020-03-03 | End: 2020-03-06

## 2020-03-03 RX ORDER — KETOROLAC TROMETHAMINE 30 MG/ML
15 SYRINGE (ML) INJECTION EVERY 6 HOURS
Refills: 0 | Status: DISCONTINUED | OUTPATIENT
Start: 2020-03-03 | End: 2020-03-04

## 2020-03-03 RX ORDER — MAGNESIUM HYDROXIDE 400 MG/1
30 TABLET, CHEWABLE ORAL DAILY
Refills: 0 | Status: DISCONTINUED | OUTPATIENT
Start: 2020-03-03 | End: 2020-03-06

## 2020-03-03 RX ORDER — ACETAMINOPHEN 500 MG
650 TABLET ORAL EVERY 6 HOURS
Refills: 0 | Status: DISCONTINUED | OUTPATIENT
Start: 2020-03-03 | End: 2020-03-06

## 2020-03-03 RX ORDER — ONDANSETRON 8 MG/1
4 TABLET, FILM COATED ORAL EVERY 6 HOURS
Refills: 0 | Status: DISCONTINUED | OUTPATIENT
Start: 2020-03-03 | End: 2020-03-06

## 2020-03-03 RX ORDER — TRAMADOL HYDROCHLORIDE 50 MG/1
100 TABLET ORAL EVERY 6 HOURS
Refills: 0 | Status: DISCONTINUED | OUTPATIENT
Start: 2020-03-03 | End: 2020-03-06

## 2020-03-03 RX ADMIN — Medication 15 MILLIGRAM(S): at 23:01

## 2020-03-03 RX ADMIN — Medication 15 MILLIGRAM(S): at 20:19

## 2020-03-03 RX ADMIN — Medication 200 MILLIGRAM(S): at 23:55

## 2020-03-03 RX ADMIN — SODIUM CHLORIDE 3 MILLILITER(S): 9 INJECTION INTRAMUSCULAR; INTRAVENOUS; SUBCUTANEOUS at 23:01

## 2020-03-03 RX ADMIN — Medication 975 MILLIGRAM(S): at 23:00

## 2020-03-03 RX ADMIN — Medication 15 MILLIGRAM(S): at 23:30

## 2020-03-03 RX ADMIN — SODIUM CHLORIDE 50 MILLILITER(S): 9 INJECTION, SOLUTION INTRAVENOUS at 19:53

## 2020-03-03 RX ADMIN — Medication 15 MILLIGRAM(S): at 21:02

## 2020-03-03 RX ADMIN — SCOPALAMINE 1 PATCH: 1 PATCH, EXTENDED RELEASE TRANSDERMAL at 20:08

## 2020-03-03 RX ADMIN — Medication 975 MILLIGRAM(S): at 23:30

## 2020-03-03 RX ADMIN — SCOPALAMINE 1 PATCH: 1 PATCH, EXTENDED RELEASE TRANSDERMAL at 13:07

## 2020-03-03 RX ADMIN — TRAMADOL HYDROCHLORIDE 100 MILLIGRAM(S): 50 TABLET ORAL at 21:00

## 2020-03-03 RX ADMIN — GABAPENTIN 600 MILLIGRAM(S): 400 CAPSULE ORAL at 23:00

## 2020-03-03 RX ADMIN — Medication 975 MILLIGRAM(S): at 13:07

## 2020-03-03 RX ADMIN — TRAMADOL HYDROCHLORIDE 100 MILLIGRAM(S): 50 TABLET ORAL at 22:00

## 2020-03-03 NOTE — DISCHARGE NOTE PROVIDER - NSDCFUADDINST_GEN_ALL_CORE_FT
The patient will be seen in the office between 2-3 weeks for wound check. PLEASE CONTACT OFFICE TO ARRANGE FOLLOW-UP APPOINTMENT DATE. Sutures/Staples/Tape will be removed at that time. Patient may shower after post-op day #5 (3/8/20). The dressing is to be removed on post op day #9 (3/12/20). IF THE DRESSING BECOMES SOILED BEFORE THE REMOVAL DATE, CHANGE WITH A SIMILAR DRESSING. IF THE DRESSING BECOMES STAINED WITH DISCHARGE, CONTACT THE OFFICE FOR FURTHER DIRECTIONS.  The patient will contact the office if the wound becomes red, has increasing pain, develops bleeding or discharge, an injury occurs, or has other concerns. The patient will continue PT consistent with posterior total hip replacement protocol. The patient will continue to practice posterior total hip precautions for a minimum of 6 week. The patient will continue LOVENOX for 2 weeks and then begin ASPIRIN for blood clot prevention. The patient will take OXYCODONE AND TYLENOL for pain control and titrate according to prescription and patient needs. The patient will take Senna-S while taking oxycodone to prevent narcotic associated constipation.  Additionally, increase water intake (drink at least 8 glasses of water daily) and try adding fiber to the diet by eating fruits, vegetables and foods that are rich in grains. If constipation is experienced, contact the medical/primary care provider to discuss further treatment options. The patient is FULL weight bearing. In addition, the patient will take Duricef (cefadroxil) 500mg twice daily x 7 days post op. The patient will be seen in the office between 2-3 weeks for wound check. PLEASE CONTACT OFFICE TO ARRANGE FOLLOW-UP APPOINTMENT DATE. Sutures/Staples/Tape will be removed at that time. Patient may shower after post-op day #5 (3/8/20). The dressing is to be removed on post op day #9 (3/12/20). IF THE DRESSING BECOMES SOILED BEFORE THE REMOVAL DATE, CHANGE WITH A SIMILAR DRESSING. IF THE DRESSING BECOMES STAINED WITH DISCHARGE, CONTACT THE OFFICE FOR FURTHER DIRECTIONS.  The patient will contact the office if the wound becomes red, has increasing pain, develops bleeding or discharge, an injury occurs, or has other concerns. The patient will continue PT consistent with posterior total hip replacement protocol. The patient will continue to practice posterior total hip precautions for a minimum of 6 week. The patient will continue LOVENOX for 2 weeks and then begin ASPIRIN 325mg twice daily x 4 weeks for blood clot prevention, then resume home dose aspirin. The patient will take OXYCODONE AND TYLENOL for pain control and titrate according to prescription and patient needs. The patient will take Senna-S while taking oxycodone to prevent narcotic associated constipation.  Additionally, increase water intake (drink at least 8 glasses of water daily) and try adding fiber to the diet by eating fruits, vegetables and foods that are rich in grains. If constipation is experienced, contact the medical/primary care provider to discuss further treatment options. The patient is FULL weight bearing. In addition, the patient will take Duricef (cefadroxil) 500mg twice daily x 7 days post op. The patient will be seen in the office between 2-3 weeks for wound check. PLEASE CONTACT OFFICE TO ARRANGE FOLLOW-UP APPOINTMENT DATE. Tape will be removed at that time. Patient may shower after post-op day #5 (3/8/20). The dressing is to be removed on post op day #9 (3/12/20). IF THE DRESSING BECOMES SOILED BEFORE THE REMOVAL DATE, CHANGE WITH A SIMILAR DRESSING. IF THE DRESSING BECOMES STAINED WITH DISCHARGE, CONTACT THE OFFICE FOR FURTHER DIRECTIONS.  The patient will contact the office if the wound becomes red, has increasing pain, develops bleeding or discharge, an injury occurs, or has other concerns. The patient will continue PT consistent with posterior total hip replacement protocol. The patient will continue to practice posterior total hip precautions for a minimum of 6 week. The patient will continue LOVENOX for 2 weeks and then begin ASPIRIN 325mg twice daily x 4 weeks for blood clot prevention, then resume home dose aspirin. The patient will take OXYCODONE AND TYLENOL for pain control and titrate according to prescription and patient needs. The patient will take Senna-S while taking oxycodone to prevent narcotic associated constipation.  Additionally, increase water intake (drink at least 8 glasses of water daily) and try adding fiber to the diet by eating fruits, vegetables and foods that are rich in grains. If constipation is experienced, contact the medical/primary care provider to discuss further treatment options. The patient is FULL weight bearing. In addition, the patient will take Duricef (cefadroxil) 500mg twice daily x 7 days post op.

## 2020-03-03 NOTE — DISCHARGE NOTE PROVIDER - HOSPITAL COURSE
The patient underwent a LEFT POSTERIOR TOTAL HIP REPLACEMENT on 3/3/20. The patient received antibiotics consistent with SCIP guidelines. The patient underwent the procedure and had no intra-operative complications. Post-operatively, the patient was seen by medicine and PT. The patient received LOVENOX for DVTP. The patient received pain medications per orthopedic pain management pathway and the pain was appropriately controlled. Patient was instructed on posterior total hip precautions by PT. The patient did not have any post-operative medical complications. The patient was discharged in stable condition.

## 2020-03-03 NOTE — PROGRESS NOTE ADULT - SUBJECTIVE AND OBJECTIVE BOX
Orthopedic PA Postop Note  Patient S/P LEFT URBANO  Patient in bed comfortable   LEFT Leg  Dressing C/D/I  DP Pulse intact  Calf Soft NT  Dorsi/Plantar Flexion/EHL/FHL Intact  Sensation intact to light touch  Abduction Pillow in place     Vital Signs Last 24 Hrs  T(C): 36.5 (03 Mar 2020 18:43), Max: 36.5 (03 Mar 2020 12:22)  T(F): 97.7 (03 Mar 2020 18:43), Max: 97.7 (03 Mar 2020 12:22)  HR: 64 (03 Mar 2020 20:30) (64 - 87)  BP: 112/64 (03 Mar 2020 20:30) (60/40 - 125/80)  BP(mean): 75 (03 Mar 2020 20:15) (51 - 75)  RR: 17 (03 Mar 2020 20:30) (13 - 20)  SpO2: 97% (03 Mar 2020 20:30) (95% - 100%)    A/P:  S/P LEFT URBANO  1. DVTP - LOVENOX  2. Physical Therapy - Posterior Precautions  3. Pain Control as clinically indicated

## 2020-03-03 NOTE — DISCHARGE NOTE PROVIDER - NSDCMRMEDTOKEN_GEN_ALL_CORE_FT
aspirin 81 mg oral delayed release tablet: 1 tab(s) orally once a day  diclofenac sodium 75 mg oral delayed release tablet: 1 tab(s) orally 2 times a day  Fish Oil oral capsule:   gabapentin 600 mg oral tablet: 1 tab(s) orally 3 times a day  Garlic oral capsule:   ibuprofen 600 mg oral tablet: 1 tab(s) orally every 6 hours, As Needed -for mild pain   losartan 100 mg oral tablet: 1 tab(s) orally once a day  omeprazole 20 mg oral delayed release capsule: 1 cap(s) orally once a day  Vitamin C 100 mg oral tablet:   vitamin E oral capsule: Aspirin Enteric Coated 325 mg oral delayed release tablet: 1 tab(s) orally 2 times a day AFTER the completion of Lovenox  cefadroxil 500 mg oral capsule: 1 cap(s) orally every 12 hours   gabapentin 600 mg oral tablet: 1 tab(s) orally 3 times a day  losartan 100 mg oral tablet: 1 tab(s) orally once a day  Lovenox 40 mg/0.4 mL injectable solution: 40 milligram(s) subcutaneously once a day   pantoprazole 40 mg oral delayed release tablet: 1 tab(s) orally once a day (before a meal)  Senna S 50 mg-8.6 mg oral tablet: 2 tab(s) orally once a day (at bedtime)   traMADol 50 mg oral tablet: 1-2  tab(s) orally every 4 to 6 hours, As Needed -pain MDD:6  Vitamin C 100 mg oral tablet:   vitamin E oral capsule: aspirin 81 mg oral delayed release tablet: 1 tab(s) orally once a day while on Lovenox and then resume home dose again after completion of the 325mg dose  Aspirin Enteric Coated 325 mg oral delayed release tablet: 1 tab(s) orally 2 times a day AFTER the completion of Lovenox  cefadroxil 500 mg oral capsule: 1 cap(s) orally every 12 hours   gabapentin 600 mg oral tablet: 1 tab(s) orally 3 times a day  losartan 100 mg oral tablet: 1 tab(s) orally once a day  Lovenox 40 mg/0.4 mL injectable solution: 40 milligram(s) subcutaneously once a day   pantoprazole 40 mg oral delayed release tablet: 1 tab(s) orally once a day (before a meal)  Senna S 50 mg-8.6 mg oral tablet: 2 tab(s) orally once a day (at bedtime)   traMADol 50 mg oral tablet: 1-2  tab(s) orally every 4 to 6 hours, As Needed -pain MDD:6  Vitamin C 100 mg oral tablet:   vitamin E oral capsule:

## 2020-03-03 NOTE — DISCHARGE NOTE PROVIDER - CARE PROVIDER_API CALL
Germán Henderson)  Orthopaedic Surgery  [address]  [place] [address]  Phone: [phone]  Fax: [phone]  Follow Up Time:

## 2020-03-03 NOTE — PROVIDER CONTACT NOTE (OTHER) - SITUATION
pre op pt c/o chest pain 4/10, sharp in nature.  Pt states she has never experienced this pain before. Pain completely resolved after 2-3 minutes

## 2020-03-03 NOTE — DISCHARGE NOTE PROVIDER - NSDCFUSCHEDAPPT_GEN_ALL_CORE_FT
[facility] ; 03/23/2020 ; NPP OrthoSurg [address] [facility] ; 04/06/2020 ; NPP Chen [facility] ; 04/21/2020 ; NPP Ortho Lyric [address] [facility] ; 04/27/2020 ; NPP Gen Surg [address] [facility] ; 05/04/2020 ; NPP Urogyn [address] [facility] ; 05/27/2020 ; NPP Ortho Lyric [address] [facility] ; 06/01/2020 ; NPP Rheum [address] [facility] ; 03/23/2020 ; NPP OrthoSurg [address] [facility] ; 04/06/2020 ; NPP [facility] ; 04/21/2020 ; NPP Ortho Lyric [address] [facility] ; 04/27/2020 ; NPP Gen Surg [address] [facility] ; 05/04/2020 ; NPP Urogyn [address] [facility] ; 05/27/2020 ; NPP Ortho Lyric [address] [facility] ; 06/01/2020 ; NPP Rheum 180 [facility] ; 03/23/2020 ; NPP OrthoSurg [address] [facility] ; 04/06/2020 ; NPP [facility] ; 04/21/2020 ; NPP Ortho Lyric [address] [facility] ; 04/27/2020 ; NPP Gen Surg [address] [facility] ; 05/04/2020 ; NPP Urogyn [address] [facility] ; 05/27/2020 ; NPP Ortho Lyric [address] [facility] ; 06/01/2020 ; NPP Rheum 180 [facility] ; 03/23/2020 ; NPP OrthoSurg [address] [facility] ; 04/06/2020 ; NPP Chen [facility] ; 04/21/2020 ; NPP Ortho Lyric [address] [facility] ; 04/27/2020 ; NPP Gen Surg [address] [facility] ; 05/04/2020 ; NPP Urogyn [address] [facility] ; 05/27/2020 ; NPP Ortho Lyric [address] [facility] ; 06/01/2020 ; NPP Rheum 180 [facility] ; 03/23/2020 ; NPP OrthoSurg [address] [facility] ; 04/06/2020 ; NPP Chen [facility] ; 04/21/2020 ; NPP Ortho Lyric [address] [facility] ; 04/27/2020 ; NPP Gen Surg [address] [facility] ; 05/04/2020 ; NPP Urogyn [address] [facility] ; 05/27/2020 ; NPP Ortho Lyric [address] [facility] ; 06/01/2020 ; NPP Rheum [address] [facility]

## 2020-03-04 ENCOUNTER — TRANSCRIPTION ENCOUNTER (OUTPATIENT)
Age: 65
End: 2020-03-04

## 2020-03-04 DIAGNOSIS — E87.6 HYPOKALEMIA: ICD-10-CM

## 2020-03-04 DIAGNOSIS — C50.919 MALIGNANT NEOPLASM OF UNSPECIFIED SITE OF UNSPECIFIED FEMALE BREAST: ICD-10-CM

## 2020-03-04 DIAGNOSIS — M19.90 UNSPECIFIED OSTEOARTHRITIS, UNSPECIFIED SITE: ICD-10-CM

## 2020-03-04 DIAGNOSIS — I10 ESSENTIAL (PRIMARY) HYPERTENSION: ICD-10-CM

## 2020-03-04 DIAGNOSIS — Z29.9 ENCOUNTER FOR PROPHYLACTIC MEASURES, UNSPECIFIED: ICD-10-CM

## 2020-03-04 DIAGNOSIS — D64.9 ANEMIA, UNSPECIFIED: ICD-10-CM

## 2020-03-04 DIAGNOSIS — K80.20 CALCULUS OF GALLBLADDER WITHOUT CHOLECYSTITIS WITHOUT OBSTRUCTION: ICD-10-CM

## 2020-03-04 LAB
ANION GAP SERPL CALC-SCNC: 15 MMOL/L — SIGNIFICANT CHANGE UP (ref 5–17)
BUN SERPL-MCNC: 13 MG/DL — SIGNIFICANT CHANGE UP (ref 8–20)
CALCIUM SERPL-MCNC: 8.7 MG/DL — SIGNIFICANT CHANGE UP (ref 8.6–10.2)
CHLORIDE SERPL-SCNC: 101 MMOL/L — SIGNIFICANT CHANGE UP (ref 98–107)
CO2 SERPL-SCNC: 22 MMOL/L — SIGNIFICANT CHANGE UP (ref 22–29)
CREAT SERPL-MCNC: 0.66 MG/DL — SIGNIFICANT CHANGE UP (ref 0.5–1.3)
GLUCOSE SERPL-MCNC: 112 MG/DL — HIGH (ref 70–99)
HCT VFR BLD CALC: 32.8 % — LOW (ref 34.5–45)
HGB BLD-MCNC: 10.8 G/DL — LOW (ref 11.5–15.5)
MAGNESIUM SERPL-MCNC: 1.6 MG/DL — SIGNIFICANT CHANGE UP (ref 1.6–2.6)
MCHC RBC-ENTMCNC: 30.4 PG — SIGNIFICANT CHANGE UP (ref 27–34)
MCHC RBC-ENTMCNC: 32.9 GM/DL — SIGNIFICANT CHANGE UP (ref 32–36)
MCV RBC AUTO: 92.4 FL — SIGNIFICANT CHANGE UP (ref 80–100)
PLATELET # BLD AUTO: 272 K/UL — SIGNIFICANT CHANGE UP (ref 150–400)
POTASSIUM SERPL-MCNC: 3.1 MMOL/L — LOW (ref 3.5–5.3)
POTASSIUM SERPL-SCNC: 3.1 MMOL/L — LOW (ref 3.5–5.3)
RBC # BLD: 3.55 M/UL — LOW (ref 3.8–5.2)
RBC # FLD: 14.5 % — SIGNIFICANT CHANGE UP (ref 10.3–14.5)
SODIUM SERPL-SCNC: 138 MMOL/L — SIGNIFICANT CHANGE UP (ref 135–145)
WBC # BLD: 11.9 K/UL — HIGH (ref 3.8–10.5)
WBC # FLD AUTO: 11.9 K/UL — HIGH (ref 3.8–10.5)

## 2020-03-04 PROCEDURE — 99222 1ST HOSP IP/OBS MODERATE 55: CPT

## 2020-03-04 RX ORDER — MAGNESIUM OXIDE 400 MG ORAL TABLET 241.3 MG
400 TABLET ORAL
Refills: 0 | Status: DISCONTINUED | OUTPATIENT
Start: 2020-03-04 | End: 2020-03-06

## 2020-03-04 RX ORDER — POTASSIUM CHLORIDE 20 MEQ
40 PACKET (EA) ORAL EVERY 4 HOURS
Refills: 0 | Status: COMPLETED | OUTPATIENT
Start: 2020-03-04 | End: 2020-03-04

## 2020-03-04 RX ORDER — MAGNESIUM SULFATE 500 MG/ML
1 VIAL (ML) INJECTION ONCE
Refills: 0 | Status: COMPLETED | OUTPATIENT
Start: 2020-03-04 | End: 2020-03-04

## 2020-03-04 RX ADMIN — Medication 40 MILLIEQUIVALENT(S): at 17:08

## 2020-03-04 RX ADMIN — Medication 500 MILLIGRAM(S): at 17:08

## 2020-03-04 RX ADMIN — Medication 975 MILLIGRAM(S): at 06:02

## 2020-03-04 RX ADMIN — Medication 325 MILLIGRAM(S): at 11:12

## 2020-03-04 RX ADMIN — MAGNESIUM OXIDE 400 MG ORAL TABLET 400 MILLIGRAM(S): 241.3 TABLET ORAL at 17:08

## 2020-03-04 RX ADMIN — Medication 975 MILLIGRAM(S): at 06:32

## 2020-03-04 RX ADMIN — Medication 200 MILLIGRAM(S): at 06:02

## 2020-03-04 RX ADMIN — Medication 15 MILLIGRAM(S): at 11:21

## 2020-03-04 RX ADMIN — TRAMADOL HYDROCHLORIDE 100 MILLIGRAM(S): 50 TABLET ORAL at 23:53

## 2020-03-04 RX ADMIN — Medication 975 MILLIGRAM(S): at 13:12

## 2020-03-04 RX ADMIN — PANTOPRAZOLE SODIUM 40 MILLIGRAM(S): 20 TABLET, DELAYED RELEASE ORAL at 06:03

## 2020-03-04 RX ADMIN — GABAPENTIN 600 MILLIGRAM(S): 400 CAPSULE ORAL at 21:01

## 2020-03-04 RX ADMIN — TRAMADOL HYDROCHLORIDE 100 MILLIGRAM(S): 50 TABLET ORAL at 06:03

## 2020-03-04 RX ADMIN — Medication 500 MILLIGRAM(S): at 23:54

## 2020-03-04 RX ADMIN — Medication 40 MILLIEQUIVALENT(S): at 11:12

## 2020-03-04 RX ADMIN — SODIUM CHLORIDE 3 MILLILITER(S): 9 INJECTION INTRAMUSCULAR; INTRAVENOUS; SUBCUTANEOUS at 20:00

## 2020-03-04 RX ADMIN — Medication 975 MILLIGRAM(S): at 22:00

## 2020-03-04 RX ADMIN — Medication 100 GRAM(S): at 11:13

## 2020-03-04 RX ADMIN — Medication 15 MILLIGRAM(S): at 06:32

## 2020-03-04 RX ADMIN — ENOXAPARIN SODIUM 40 MILLIGRAM(S): 100 INJECTION SUBCUTANEOUS at 11:14

## 2020-03-04 RX ADMIN — SODIUM CHLORIDE 3 MILLILITER(S): 9 INJECTION INTRAMUSCULAR; INTRAVENOUS; SUBCUTANEOUS at 12:01

## 2020-03-04 RX ADMIN — SODIUM CHLORIDE 3 MILLILITER(S): 9 INJECTION INTRAMUSCULAR; INTRAVENOUS; SUBCUTANEOUS at 06:04

## 2020-03-04 RX ADMIN — Medication 15 MILLIGRAM(S): at 06:03

## 2020-03-04 RX ADMIN — PANTOPRAZOLE SODIUM 40 MILLIGRAM(S): 20 TABLET, DELAYED RELEASE ORAL at 00:03

## 2020-03-04 RX ADMIN — Medication 1 TABLET(S): at 11:12

## 2020-03-04 RX ADMIN — Medication 500 MILLIGRAM(S): at 11:12

## 2020-03-04 RX ADMIN — SCOPALAMINE 1 PATCH: 1 PATCH, EXTENDED RELEASE TRANSDERMAL at 23:49

## 2020-03-04 RX ADMIN — Medication 975 MILLIGRAM(S): at 21:02

## 2020-03-04 RX ADMIN — TRAMADOL HYDROCHLORIDE 100 MILLIGRAM(S): 50 TABLET ORAL at 17:38

## 2020-03-04 RX ADMIN — TRAMADOL HYDROCHLORIDE 100 MILLIGRAM(S): 50 TABLET ORAL at 17:08

## 2020-03-04 RX ADMIN — GABAPENTIN 600 MILLIGRAM(S): 400 CAPSULE ORAL at 13:12

## 2020-03-04 RX ADMIN — GABAPENTIN 600 MILLIGRAM(S): 400 CAPSULE ORAL at 06:02

## 2020-03-04 RX ADMIN — TRAMADOL HYDROCHLORIDE 100 MILLIGRAM(S): 50 TABLET ORAL at 06:32

## 2020-03-04 RX ADMIN — Medication 15 MILLIGRAM(S): at 11:12

## 2020-03-04 NOTE — DISCHARGE NOTE NURSING/CASE MANAGEMENT/SOCIAL WORK - PATIENT PORTAL LINK FT
You can access the FollowMyHealth Patient Portal offered by [facility] by registering at the following website: [email or web address]. By joining [employer]’s FollowMyHealth portal, you will also be able to view your health information using other applications (apps) compatible with our system.

## 2020-03-04 NOTE — CONSULT NOTE ADULT - PROBLEM SELECTOR RECOMMENDATION 9
S/P left total hip arthroplasty  Post op ABX as per Ortho  DVT ppx/PT as per Ortho  IS  No events post op on CM, may dc cardiac monitor

## 2020-03-04 NOTE — CONSULT NOTE ADULT - SUBJECTIVE AND OBJECTIVE BOX
HPI:  Patient is a 63 y/o female with H/O breast cancer (S/P B/L mastectomy), HTN with c/o left hip pain for approximately for 4 years.  Patient followed up with her PCP who sent patient for an X-ray of her left hip as per patient , the findings of " bone on bone " . Patient was referred to Dr Henderson . Patient is S/P left Total hip arthroplasty POD#1, patient seen and examined.       PAST MEDICAL & SURGICAL HISTORY:  Anemia  Osteoarthritis: left knee  Gallstones  Breast cancer in female  Hypertension  S/P mastectomy, bilateral  History of tonsillectomy      MEDICATIONS  (STANDING):  acetaminophen   Tablet .. 975 milliGRAM(s) Oral every 8 hours  cephalexin 500 milliGRAM(s) Oral four times a day  enoxaparin Injectable 40 milliGRAM(s) SubCutaneous daily  ferrous    sulfate 325 milliGRAM(s) Oral daily  gabapentin 600 milliGRAM(s) Oral three times a day  ketorolac   Injectable 15 milliGRAM(s) IV Push every 6 hours  lactated ringers. 1000 milliLiter(s) (100 mL/Hr) IV Continuous <Continuous>  multivitamin 1 Tablet(s) Oral daily  pantoprazole    Tablet 40 milliGRAM(s) Oral before breakfast  potassium chloride    Tablet ER 40 milliEquivalent(s) Oral every 4 hours  sodium chloride 0.9% lock flush 3 milliLiter(s) IV Push every 8 hours    MEDICATIONS  (PRN):  acetaminophen   Tablet .. 650 milliGRAM(s) Oral every 6 hours PRN Temp greater or equal to 38C (100.4F)  aluminum hydroxide/magnesium hydroxide/simethicone Suspension 30 milliLiter(s) Oral four times a day PRN Indigestion  HYDROmorphone  Injectable 0.5 milliGRAM(s) IV Push every 4 hours PRN Severe Pain (7 - 10)/breakthrough pain  magnesium hydroxide Suspension 30 milliLiter(s) Oral daily PRN Constipation  ondansetron Injectable 4 milliGRAM(s) IV Push every 6 hours PRN Nausea and/or Vomiting  senna 2 Tablet(s) Oral at bedtime PRN Constipation  traMADol 100 milliGRAM(s) Oral every 6 hours PRN Moderate Pain (4 - 6)  traMADol 50 milliGRAM(s) Oral every 4 hours PRN Mild Pain (1 - 3)      Allergies    latex (Rash)  sulfa drugs (Hives)    Intolerances    codeine (Vomiting)      SOCIAL HISTORY:  Never a smoker  Social ETOH  Retired    FAMILY HISTORY:  Family history of breast cancer (Sibling)  Family history of hypertension in father (Father)  Maternal family history of emphysema (Mother)      Vital Signs Last 24 Hrs  T(C): 36.4 (04 Mar 2020 08:00), Max: 36.7 (04 Mar 2020 04:57)  T(F): 97.5 (04 Mar 2020 08:00), Max: 98.1 (04 Mar 2020 04:57)  HR: 62 (04 Mar 2020 08:00) (60 - 87)  BP: 112/71 (04 Mar 2020 08:00) (60/40 - 133/67)  BP(mean): 85 (03 Mar 2020 21:45) (51 - 85)  RR: 18 (04 Mar 2020 08:00) (13 - 20)  SpO2: 94% (04 Mar 2020 08:00) (94% - 100%)    LABS:                        10.8   11.90 )-----------( 272      ( 04 Mar 2020 06:11 )             32.8     03-04    138  |  101  |  13.0  ----------------------------<  112<H>  3.1<L>   |  22.0  |  0.66    Ca    8.7      04 Mar 2020 06:11  Mg     1.6     03-04              RADIOLOGY & ADDITIONAL STUDIES: HPI:  Patient is a 63 y/o female with H/O breast cancer (S/P B/L mastectomy, no chemo or radiation), Gallstones, HTN, OA, with c/o left hip pain for approximately for 4 years.  Patient followed up with her PCP who sent patient for an X-ray of her left hip as per patient , the findings of " bone on bone " . Patient was referred to Dr Henderson . Patient is S/P left Total hip arthroplasty POD#1, patient seen and examined.       PAST MEDICAL & SURGICAL HISTORY:  Anemia  Osteoarthritis: left knee  Gallstones  Breast cancer in female  Hypertension  S/P mastectomy, bilateral  History of tonsillectomy      MEDICATIONS  (STANDING):  acetaminophen   Tablet .. 975 milliGRAM(s) Oral every 8 hours  cephalexin 500 milliGRAM(s) Oral four times a day  enoxaparin Injectable 40 milliGRAM(s) SubCutaneous daily  ferrous    sulfate 325 milliGRAM(s) Oral daily  gabapentin 600 milliGRAM(s) Oral three times a day  ketorolac   Injectable 15 milliGRAM(s) IV Push every 6 hours  lactated ringers. 1000 milliLiter(s) (100 mL/Hr) IV Continuous <Continuous>  multivitamin 1 Tablet(s) Oral daily  pantoprazole    Tablet 40 milliGRAM(s) Oral before breakfast  potassium chloride    Tablet ER 40 milliEquivalent(s) Oral every 4 hours  sodium chloride 0.9% lock flush 3 milliLiter(s) IV Push every 8 hours    MEDICATIONS  (PRN):  acetaminophen   Tablet .. 650 milliGRAM(s) Oral every 6 hours PRN Temp greater or equal to 38C (100.4F)  aluminum hydroxide/magnesium hydroxide/simethicone Suspension 30 milliLiter(s) Oral four times a day PRN Indigestion  HYDROmorphone  Injectable 0.5 milliGRAM(s) IV Push every 4 hours PRN Severe Pain (7 - 10)/breakthrough pain  magnesium hydroxide Suspension 30 milliLiter(s) Oral daily PRN Constipation  ondansetron Injectable 4 milliGRAM(s) IV Push every 6 hours PRN Nausea and/or Vomiting  senna 2 Tablet(s) Oral at bedtime PRN Constipation  traMADol 100 milliGRAM(s) Oral every 6 hours PRN Moderate Pain (4 - 6)  traMADol 50 milliGRAM(s) Oral every 4 hours PRN Mild Pain (1 - 3)      Allergies    latex (Rash)  sulfa drugs (Hives)    Intolerances    codeine (Vomiting)      SOCIAL HISTORY:  Never a smoker  Social ETOH  Retired    FAMILY HISTORY:  Family history of breast cancer (Sibling)  Family history of hypertension in father (Father)  Maternal family history of emphysema (Mother)      Vital Signs Last 24 Hrs  T(C): 36.4 (04 Mar 2020 08:00), Max: 36.7 (04 Mar 2020 04:57)  T(F): 97.5 (04 Mar 2020 08:00), Max: 98.1 (04 Mar 2020 04:57)  HR: 62 (04 Mar 2020 08:00) (60 - 87)  BP: 112/71 (04 Mar 2020 08:00) (60/40 - 133/67)  BP(mean): 85 (03 Mar 2020 21:45) (51 - 85)  RR: 18 (04 Mar 2020 08:00) (13 - 20)  SpO2: 94% (04 Mar 2020 08:00) (94% - 100%)    LABS:                        10.8   11.90 )-----------( 272      ( 04 Mar 2020 06:11 )             32.8     03-04    138  |  101  |  13.0  ----------------------------<  112<H>  3.1<L>   |  22.0  |  0.66    Ca    8.7      04 Mar 2020 06:11  Mg     1.6     03-04              RADIOLOGY & ADDITIONAL STUDIES:    ROS:  +left hip pain  Constitutional, Eyes, ENT, Respiratory, Cardiovascular, Gastrointestinal, Genitourinary, Musculoskeletal, Integumentary, Neurological, Psychiatric, Endocrine, Heme/Lymph are otherwise negative.      PHYSICAL EXAM:    General: Well developed; well nourished; in no acute distress  Eyes: PERRLA, EOMI; conjunctiva and sclera clear  Neck: Supple; non tender; no JVD  Respiratory: No wheezes, rales or rhonchi  Cardiovascular: Regular rate and rhythm. S1 and S2 Normal; No murmurs, gallops or rubs  Gastrointestinal: Soft non-tender non-distended; Normal bowel sounds  Extremities: Normal range of motion, No clubbing, cyanosis or edema. Left hip dressing C/D/I  Vascular: Peripheral pulses palpable 2+ bilaterally  Neurological: Alert and oriented x4  Skin: Warm and dry. No acute rash  Psychiatric: Cooperative and appropriate CC: Lt hip pain     HPI:  Patient is a 63 y/o female with H/O breast cancer (S/P B/L mastectomy, no chemo or radiation), Gallstones, HTN, OA, with c/o left hip pain for approximately for 4 years.  Patient followed up with her PCP who sent patient for an X-ray of her left hip as per patient , the findings of " bone on bone " . Patient was referred to Dr Henderson . Patient is S/P left Total hip arthroplasty POD#1, patient seen and examined.       PAST MEDICAL & SURGICAL HISTORY:  Anemia  Osteoarthritis: left knee  Gallstones  Breast cancer in female  Hypertension  S/P mastectomy, bilateral  History of tonsillectomy      MEDICATIONS  (STANDING):  acetaminophen   Tablet .. 975 milliGRAM(s) Oral every 8 hours  cephalexin 500 milliGRAM(s) Oral four times a day  enoxaparin Injectable 40 milliGRAM(s) SubCutaneous daily  ferrous    sulfate 325 milliGRAM(s) Oral daily  gabapentin 600 milliGRAM(s) Oral three times a day  ketorolac   Injectable 15 milliGRAM(s) IV Push every 6 hours  lactated ringers. 1000 milliLiter(s) (100 mL/Hr) IV Continuous <Continuous>  multivitamin 1 Tablet(s) Oral daily  pantoprazole    Tablet 40 milliGRAM(s) Oral before breakfast  potassium chloride    Tablet ER 40 milliEquivalent(s) Oral every 4 hours  sodium chloride 0.9% lock flush 3 milliLiter(s) IV Push every 8 hours    MEDICATIONS  (PRN):  acetaminophen   Tablet .. 650 milliGRAM(s) Oral every 6 hours PRN Temp greater or equal to 38C (100.4F)  aluminum hydroxide/magnesium hydroxide/simethicone Suspension 30 milliLiter(s) Oral four times a day PRN Indigestion  HYDROmorphone  Injectable 0.5 milliGRAM(s) IV Push every 4 hours PRN Severe Pain (7 - 10)/breakthrough pain  magnesium hydroxide Suspension 30 milliLiter(s) Oral daily PRN Constipation  ondansetron Injectable 4 milliGRAM(s) IV Push every 6 hours PRN Nausea and/or Vomiting  senna 2 Tablet(s) Oral at bedtime PRN Constipation  traMADol 100 milliGRAM(s) Oral every 6 hours PRN Moderate Pain (4 - 6)  traMADol 50 milliGRAM(s) Oral every 4 hours PRN Mild Pain (1 - 3)      Allergies    latex (Rash)  sulfa drugs (Hives)    Intolerances    codeine (Vomiting)      SOCIAL HISTORY:  Never a smoker  Social ETOH  Retired    FAMILY HISTORY:  Family history of breast cancer (Sibling)  Family history of hypertension in father (Father)  Maternal family history of emphysema (Mother)      Vital Signs Last 24 Hrs  T(C): 36.4 (04 Mar 2020 08:00), Max: 36.7 (04 Mar 2020 04:57)  T(F): 97.5 (04 Mar 2020 08:00), Max: 98.1 (04 Mar 2020 04:57)  HR: 62 (04 Mar 2020 08:00) (60 - 87)  BP: 112/71 (04 Mar 2020 08:00) (60/40 - 133/67)  BP(mean): 85 (03 Mar 2020 21:45) (51 - 85)  RR: 18 (04 Mar 2020 08:00) (13 - 20)  SpO2: 94% (04 Mar 2020 08:00) (94% - 100%)    LABS:                        10.8   11.90 )-----------( 272      ( 04 Mar 2020 06:11 )             32.8     03-04    138  |  101  |  13.0  ----------------------------<  112<H>  3.1<L>   |  22.0  |  0.66    Ca    8.7      04 Mar 2020 06:11  Mg     1.6     03-04              RADIOLOGY & ADDITIONAL STUDIES:    ROS:  +left hip pain  Constitutional, Eyes, ENT, Respiratory, Cardiovascular, Gastrointestinal, Genitourinary, Musculoskeletal, Integumentary, Neurological, Psychiatric, Endocrine, Heme/Lymph are otherwise negative.      PHYSICAL EXAM:    General: Well developed; well nourished; in no acute distress  Eyes: PERRLA, EOMI; conjunctiva and sclera clear  Neck: Supple; non tender; no JVD  Respiratory: No wheezes, rales or rhonchi  Cardiovascular: Regular rate and rhythm. S1 and S2 Normal; No murmurs, gallops or rubs  Gastrointestinal: Soft non-tender non-distended; Normal bowel sounds  Extremities: Normal range of motion, No clubbing, cyanosis or edema. Left hip dressing C/D/I  Vascular: Peripheral pulses palpable 2+ bilaterally  Neurological: Alert and oriented x4  Skin: Warm and dry. No acute rash  Psychiatric: Cooperative and appropriate

## 2020-03-04 NOTE — PHYSICAL THERAPY INITIAL EVALUATION ADULT - GAIT TRAINING, PT EVAL
Time Frame:  1-2   days   Goal:  Modified Independent   with RW x  250  feet / Stairs: pt will navigate  7+7  stairs with 1  rail independently

## 2020-03-04 NOTE — PROGRESS NOTE ADULT - SUBJECTIVE AND OBJECTIVE BOX
Ortho Post Op Check    Name: NANCI MATIAS    MR #: [id number]    Procedure: Left total hip arthroplasty   Surgeon: Dr Henderson      PAST MEDICAL & SURGICAL HISTORY:  Anemia  Osteoarthritis: left knee  Gallstones  Breast cancer in female  Hypertension  S/P mastectomy, bilateral  History of tonsillectomy      Pt comfortable without complaints, pain controlled  Denies CP, SOB, N/V, numbness/tingling     General Exam:  Vital Signs Last 24 Hrs  T(C): 36.4 (03-04-20 @ 08:00), Max: 36.7 (03-04-20 @ 04:57)  T(F): 97.5 (03-04-20 @ 08:00), Max: 98.1 (03-04-20 @ 04:57)  HR: 62 (03-04-20 @ 08:00) (62 - 70)  BP: 112/71 (03-04-20 @ 08:00) (105/66 - 112/71)  BP(mean): --  RR: 18 (03-04-20 @ 08:00) (18 - 18)  SpO2: 94% (03-04-20 @ 08:00) (94% - 95%)    General: Pt Alert and oriented, NAD, controlled pain.  Left hip Dressings C/D/I. No bleeding.  Pulses: 2+ dorsalis pedis pulse. Cap refill < 2 sec.  Sensation: Grossly intact to light touch without deficit.  Motor: + EHL/FHL  Calf soft, supple and nontender                                10.8   11.90 )-----------( 272      ( 04 Mar 2020 06:11 )             32.8   04 Mar 2020 06:11    138    |  101    |  13.0   ----------------------------<  112    3.1     |  22.0   |  0.66     Ca    8.7        04 Mar 2020 06:11  Mg     1.6       04 Mar 2020 06:11        Post-op X-Ray:  EXAM:  XR HIP 1V LT                          PROCEDURE DATE:  03/03/2020          INTERPRETATION:  Left hip    HISTORY: Postop      Two views of the left hip show total hip replacement.    IMPRESSION: Postoperative changes.    Thank you for this referral.        MEGAN HONEYCUTT M.D., ATTENDING RADIOLOGIST  This document has been electronically signed. Mar  4 2020  9:00AM        MEDICATIONS  (STANDING):  acetaminophen   Tablet .. 975 milliGRAM(s) Oral every 8 hours  cephalexin 500 milliGRAM(s) Oral four times a day  enoxaparin Injectable 40 milliGRAM(s) SubCutaneous daily  ferrous    sulfate 325 milliGRAM(s) Oral daily  gabapentin 600 milliGRAM(s) Oral three times a day  ketorolac   Injectable 15 milliGRAM(s) IV Push every 6 hours  lactated ringers. 1000 milliLiter(s) (100 mL/Hr) IV Continuous <Continuous>  magnesium sulfate  IVPB 1 Gram(s) IV Intermittent once  multivitamin 1 Tablet(s) Oral daily  pantoprazole    Tablet 40 milliGRAM(s) Oral before breakfast  potassium chloride    Tablet ER 40 milliEquivalent(s) Oral every 4 hours  sodium chloride 0.9% lock flush 3 milliLiter(s) IV Push every 8 hours    MEDICATIONS  (PRN):  acetaminophen   Tablet .. 650 milliGRAM(s) Oral every 6 hours PRN Temp greater or equal to 38C (100.4F)  aluminum hydroxide/magnesium hydroxide/simethicone Suspension 30 milliLiter(s) Oral four times a day PRN Indigestion  HYDROmorphone  Injectable 0.5 milliGRAM(s) IV Push every 4 hours PRN Severe Pain (7 - 10)/breakthrough pain  magnesium hydroxide Suspension 30 milliLiter(s) Oral daily PRN Constipation  ondansetron Injectable 4 milliGRAM(s) IV Push every 6 hours PRN Nausea and/or Vomiting  senna 2 Tablet(s) Oral at bedtime PRN Constipation  traMADol 100 milliGRAM(s) Oral every 6 hours PRN Moderate Pain (4 - 6)  traMADol 50 milliGRAM(s) Oral every 4 hours PRN Mild Pain (1 - 3)    A/P: 64yFemale POD#1 s/p Left total hip arthroplasty    - Stable  - Pain Control  - DVT ppx: Lovenox  - Post op abx: keflex   - PT eval pending  - Weight bearing status: WBAT with posterior precautions   - DC planning to home tomorrow

## 2020-03-04 NOTE — PHYSICAL THERAPY INITIAL EVALUATION ADULT - ACTIVE RANGE OF MOTION EXAMINATION, REHAB EVAL
bilateral  lower extremity Active ROM was WFL (within functional limits)/Left hip 2/5 due to pain/bilateral upper extremity Active ROM was WFL (within functional limits)

## 2020-03-04 NOTE — DISCHARGE NOTE NURSING/CASE MANAGEMENT/SOCIAL WORK - NSPROEXTENSIONSOFSELF_GEN_A_NUR
Removed G/J tube and replaced with Alex-key 3cm 18FR low profile G/J tube.  Pt tearful and slightly anxious, but tolerated procedure well with consious sedation and on 2L nc oxygen.    
walker

## 2020-03-04 NOTE — OCCUPATIONAL THERAPY INITIAL EVALUATION ADULT - PLANNED THERAPY INTERVENTIONS, OT EVAL
balance training/ADL retraining/bed mobility training/endurance training/strengthening/transfer training

## 2020-03-04 NOTE — CONSULT NOTE ADULT - ASSESSMENT
Patient is a 65 y/o female with H/O breast cancer (S/P B/L mastectomy, no chemo or radiation), Gallstones, HTN, OA, with c/o left hip pain for approximately for 4 years.  Patient followed up with her PCP who sent patient for an X-ray of her left hip as per patient , the findings of " bone on bone " . Patient was referred to Dr Henderson . Patient is S/P left Total hip arthroplasty POD#1, patient seen and examined. Patient is a 65 y/o female with H/O breast cancer (S/P B/L mastectomy, no chemo or radiation), Gallstones, HTN, OA, with c/o left hip pain for approximately for 4 years.  Patient followed up with her PCP who sent patient for an X-ray of her left hip as per patient , the findings of " bone on bone " . Patient was referred to Dr Henderson . Patient is S/P left Total hip arthroplasty POD#1

## 2020-03-04 NOTE — OCCUPATIONAL THERAPY INITIAL EVALUATION ADULT - GENERAL OBSERVATIONS, REHAB EVAL
Pt received ambulating with PT, +bilateral VCDs, +IV, +cardiac monitor, agreeable to OT eval.
Normal rate, regular rhythm.  Heart sounds S1, S2.

## 2020-03-04 NOTE — PHYSICAL THERAPY INITIAL EVALUATION ADULT - ADDITIONAL COMMENTS
Pt lives in a house with 7 steps to enter with rails and 7 stairs inside with  rails.  Pt owns medical equipment: RW, SAC, Shower Chair   Pt lives with: spouse: Someone is always available to provide assist.

## 2020-03-04 NOTE — OCCUPATIONAL THERAPY INITIAL EVALUATION ADULT - ADDITIONAL COMMENTS
Pt lives in private home with 7 steps to enter, 1 HR.  Once inside there are 7 more stairs with 1 HR to one floor living.  Pt has RW, SAC, shower chair.

## 2020-03-04 NOTE — CONSULT NOTE ADULT - PROBLEM SELECTOR RECOMMENDATION 2
H/O B/L MAST 2 years ago, no chemo or radiation  follows with [facility] oncology  outpatient follow up

## 2020-03-04 NOTE — CONSULT NOTE ADULT - ATTENDING COMMENTS
64yr old PMH of HTN, Breast cancer s/p b/l mastectomy s/p elective lt hip replacement.   Doing well   Agree with above   MOnitor reviewed - no acute events - May DC   Will follow

## 2020-03-04 NOTE — OCCUPATIONAL THERAPY INITIAL EVALUATION ADULT - LOWER BODY DRESSING, ASSISTIVE DEVICE, OT EVAL
Pt briefly instructed on use of dressing devices.  Pt returned demo to don/doff socks with supervision for verbal cues.

## 2020-03-05 DIAGNOSIS — Z71.89 OTHER SPECIFIED COUNSELING: ICD-10-CM

## 2020-03-05 LAB
ANION GAP SERPL CALC-SCNC: 16 MMOL/L — SIGNIFICANT CHANGE UP (ref 5–17)
BUN SERPL-MCNC: 18 MG/DL — SIGNIFICANT CHANGE UP (ref 8–20)
CALCIUM SERPL-MCNC: 9.1 MG/DL — SIGNIFICANT CHANGE UP (ref 8.6–10.2)
CHLORIDE SERPL-SCNC: 99 MMOL/L — SIGNIFICANT CHANGE UP (ref 98–107)
CO2 SERPL-SCNC: 22 MMOL/L — SIGNIFICANT CHANGE UP (ref 22–29)
CREAT SERPL-MCNC: 0.7 MG/DL — SIGNIFICANT CHANGE UP (ref 0.5–1.3)
GLUCOSE SERPL-MCNC: 107 MG/DL — HIGH (ref 70–99)
HCT VFR BLD CALC: 35.2 % — SIGNIFICANT CHANGE UP (ref 34.5–45)
HGB BLD-MCNC: 11.4 G/DL — LOW (ref 11.5–15.5)
MCHC RBC-ENTMCNC: 30.7 PG — SIGNIFICANT CHANGE UP (ref 27–34)
MCHC RBC-ENTMCNC: 32.4 GM/DL — SIGNIFICANT CHANGE UP (ref 32–36)
MCV RBC AUTO: 94.9 FL — SIGNIFICANT CHANGE UP (ref 80–100)
PLATELET # BLD AUTO: 317 K/UL — SIGNIFICANT CHANGE UP (ref 150–400)
POTASSIUM SERPL-MCNC: 4.2 MMOL/L — SIGNIFICANT CHANGE UP (ref 3.5–5.3)
POTASSIUM SERPL-SCNC: 4.2 MMOL/L — SIGNIFICANT CHANGE UP (ref 3.5–5.3)
RBC # BLD: 3.71 M/UL — LOW (ref 3.8–5.2)
RBC # FLD: 14.8 % — HIGH (ref 10.3–14.5)
SODIUM SERPL-SCNC: 137 MMOL/L — SIGNIFICANT CHANGE UP (ref 135–145)
WBC # BLD: 14.31 K/UL — HIGH (ref 3.8–10.5)
WBC # FLD AUTO: 14.31 K/UL — HIGH (ref 3.8–10.5)

## 2020-03-05 PROCEDURE — 99232 SBSQ HOSP IP/OBS MODERATE 35: CPT

## 2020-03-05 RX ORDER — OMEGA-3 ACID ETHYL ESTERS 1 G
0 CAPSULE ORAL
Qty: 0 | Refills: 0 | DISCHARGE

## 2020-03-05 RX ORDER — GARLIC 1000 MG
0 CAPSULE ORAL
Qty: 0 | Refills: 0 | DISCHARGE

## 2020-03-05 RX ORDER — ASPIRIN/CALCIUM CARB/MAGNESIUM 324 MG
1 TABLET ORAL
Qty: 0 | Refills: 0 | DISCHARGE

## 2020-03-05 RX ORDER — ASPIRIN/CALCIUM CARB/MAGNESIUM 324 MG
1 TABLET ORAL
Qty: 56 | Refills: 0
Start: 2020-03-05 | End: 2020-04-01

## 2020-03-05 RX ORDER — OMEPRAZOLE 10 MG/1
1 CAPSULE, DELAYED RELEASE ORAL
Qty: 0 | Refills: 0 | DISCHARGE

## 2020-03-05 RX ORDER — SENNOSIDES/DOCUSATE SODIUM 8.6MG-50MG
2 TABLET ORAL
Qty: 30 | Refills: 0
Start: 2020-03-05 | End: 2020-03-19

## 2020-03-05 RX ORDER — TRAMADOL HYDROCHLORIDE 50 MG/1
1 TABLET ORAL
Qty: 50 | Refills: 0
Start: 2020-03-05

## 2020-03-05 RX ORDER — ENOXAPARIN SODIUM 100 MG/ML
40 INJECTION SUBCUTANEOUS
Qty: 14 | Refills: 0
Start: 2020-03-05 | End: 2020-03-18

## 2020-03-05 RX ORDER — PANTOPRAZOLE SODIUM 20 MG/1
1 TABLET, DELAYED RELEASE ORAL
Qty: 0 | Refills: 0 | DISCHARGE
Start: 2020-03-05

## 2020-03-05 RX ORDER — ASPIRIN/CALCIUM CARB/MAGNESIUM 324 MG
1 TABLET ORAL
Qty: 0 | Refills: 0 | DISCHARGE
Start: 2020-03-05

## 2020-03-05 RX ORDER — DICLOFENAC SODIUM 75 MG/1
1 TABLET, DELAYED RELEASE ORAL
Qty: 0 | Refills: 0 | DISCHARGE

## 2020-03-05 RX ADMIN — ENOXAPARIN SODIUM 40 MILLIGRAM(S): 100 INJECTION SUBCUTANEOUS at 12:02

## 2020-03-05 RX ADMIN — SODIUM CHLORIDE 3 MILLILITER(S): 9 INJECTION INTRAMUSCULAR; INTRAVENOUS; SUBCUTANEOUS at 11:24

## 2020-03-05 RX ADMIN — Medication 975 MILLIGRAM(S): at 21:45

## 2020-03-05 RX ADMIN — HYDROMORPHONE HYDROCHLORIDE 0.5 MILLIGRAM(S): 2 INJECTION INTRAMUSCULAR; INTRAVENOUS; SUBCUTANEOUS at 00:45

## 2020-03-05 RX ADMIN — PANTOPRAZOLE SODIUM 40 MILLIGRAM(S): 20 TABLET, DELAYED RELEASE ORAL at 05:11

## 2020-03-05 RX ADMIN — TRAMADOL HYDROCHLORIDE 50 MILLIGRAM(S): 50 TABLET ORAL at 12:01

## 2020-03-05 RX ADMIN — TRAMADOL HYDROCHLORIDE 50 MILLIGRAM(S): 50 TABLET ORAL at 07:55

## 2020-03-05 RX ADMIN — Medication 500 MILLIGRAM(S): at 22:46

## 2020-03-05 RX ADMIN — SODIUM CHLORIDE 3 MILLILITER(S): 9 INJECTION INTRAMUSCULAR; INTRAVENOUS; SUBCUTANEOUS at 05:12

## 2020-03-05 RX ADMIN — Medication 500 MILLIGRAM(S): at 12:02

## 2020-03-05 RX ADMIN — MAGNESIUM OXIDE 400 MG ORAL TABLET 400 MILLIGRAM(S): 241.3 TABLET ORAL at 07:55

## 2020-03-05 RX ADMIN — Medication 975 MILLIGRAM(S): at 21:29

## 2020-03-05 RX ADMIN — MAGNESIUM OXIDE 400 MG ORAL TABLET 400 MILLIGRAM(S): 241.3 TABLET ORAL at 12:03

## 2020-03-05 RX ADMIN — GABAPENTIN 600 MILLIGRAM(S): 400 CAPSULE ORAL at 15:51

## 2020-03-05 RX ADMIN — TRAMADOL HYDROCHLORIDE 50 MILLIGRAM(S): 50 TABLET ORAL at 12:00

## 2020-03-05 RX ADMIN — GABAPENTIN 600 MILLIGRAM(S): 400 CAPSULE ORAL at 05:11

## 2020-03-05 RX ADMIN — TRAMADOL HYDROCHLORIDE 50 MILLIGRAM(S): 50 TABLET ORAL at 08:15

## 2020-03-05 RX ADMIN — TRAMADOL HYDROCHLORIDE 100 MILLIGRAM(S): 50 TABLET ORAL at 01:00

## 2020-03-05 RX ADMIN — Medication 975 MILLIGRAM(S): at 17:24

## 2020-03-05 RX ADMIN — Medication 325 MILLIGRAM(S): at 12:02

## 2020-03-05 RX ADMIN — TRAMADOL HYDROCHLORIDE 100 MILLIGRAM(S): 50 TABLET ORAL at 22:45

## 2020-03-05 RX ADMIN — MAGNESIUM OXIDE 400 MG ORAL TABLET 400 MILLIGRAM(S): 241.3 TABLET ORAL at 17:24

## 2020-03-05 RX ADMIN — SCOPALAMINE 1 PATCH: 1 PATCH, EXTENDED RELEASE TRANSDERMAL at 19:35

## 2020-03-05 RX ADMIN — SODIUM CHLORIDE 3 MILLILITER(S): 9 INJECTION INTRAMUSCULAR; INTRAVENOUS; SUBCUTANEOUS at 21:26

## 2020-03-05 RX ADMIN — TRAMADOL HYDROCHLORIDE 100 MILLIGRAM(S): 50 TABLET ORAL at 15:51

## 2020-03-05 RX ADMIN — GABAPENTIN 600 MILLIGRAM(S): 400 CAPSULE ORAL at 21:28

## 2020-03-05 RX ADMIN — Medication 975 MILLIGRAM(S): at 05:10

## 2020-03-05 RX ADMIN — Medication 1 TABLET(S): at 12:03

## 2020-03-05 RX ADMIN — HYDROMORPHONE HYDROCHLORIDE 0.5 MILLIGRAM(S): 2 INJECTION INTRAMUSCULAR; INTRAVENOUS; SUBCUTANEOUS at 01:00

## 2020-03-05 RX ADMIN — TRAMADOL HYDROCHLORIDE 100 MILLIGRAM(S): 50 TABLET ORAL at 23:40

## 2020-03-05 RX ADMIN — Medication 500 MILLIGRAM(S): at 05:11

## 2020-03-05 RX ADMIN — Medication 500 MILLIGRAM(S): at 17:24

## 2020-03-05 RX ADMIN — SCOPALAMINE 1 PATCH: 1 PATCH, EXTENDED RELEASE TRANSDERMAL at 07:25

## 2020-03-05 RX ADMIN — Medication 975 MILLIGRAM(S): at 06:00

## 2020-03-05 NOTE — PROGRESS NOTE ADULT - SUBJECTIVE AND OBJECTIVE BOX
NANCI MATIAS    [id number]    History: Patient is status post left posterior total hip arthroplasty, POD#2. Patient is doing well. The patient's pain is controlled using the prescribed pain medications. The patient is participating in physical therapy. She ambulated down the madrigal and did some stairs yesterday. C/O soreness last night after doing leg exercises but feels better today. Denies nausea, vomiting, chest pain, shortness of breath, abdominal pain or dizziness. No new complaints.                              10.8   11.90 )-----------( 272      ( 04 Mar 2020 06:11 )             32.8     03-04    138  |  101  |  13.0  ----------------------------<  112<H>  3.1<L>   |  22.0  |  0.66    Ca    8.7      04 Mar 2020 06:11  Mg     1.6     03-04        MEDICATIONS  (STANDING):  acetaminophen   Tablet .. 975 milliGRAM(s) Oral every 8 hours  cephalexin 500 milliGRAM(s) Oral four times a day  enoxaparin Injectable 40 milliGRAM(s) SubCutaneous daily  ferrous    sulfate 325 milliGRAM(s) Oral daily  gabapentin 600 milliGRAM(s) Oral three times a day  lactated ringers. 1000 milliLiter(s) (100 mL/Hr) IV Continuous <Continuous>  losartan 100 milliGRAM(s) Oral daily  magnesium oxide 400 milliGRAM(s) Oral three times a day with meals  multivitamin 1 Tablet(s) Oral daily  pantoprazole    Tablet 40 milliGRAM(s) Oral before breakfast  sodium chloride 0.9% lock flush 3 milliLiter(s) IV Push every 8 hours    MEDICATIONS  (PRN):  acetaminophen   Tablet .. 650 milliGRAM(s) Oral every 6 hours PRN Temp greater or equal to 38C (100.4F)  aluminum hydroxide/magnesium hydroxide/simethicone Suspension 30 milliLiter(s) Oral four times a day PRN Indigestion  bisacodyl Suppository 10 milliGRAM(s) Rectal daily PRN If no bowel movement by POD#2  HYDROmorphone  Injectable 0.5 milliGRAM(s) IV Push every 4 hours PRN Severe Pain (7 - 10)/breakthrough pain  magnesium hydroxide Suspension 30 milliLiter(s) Oral daily PRN Constipation  ondansetron Injectable 4 milliGRAM(s) IV Push every 6 hours PRN Nausea and/or Vomiting  senna 2 Tablet(s) Oral at bedtime PRN Constipation  traMADol 100 milliGRAM(s) Oral every 6 hours PRN Moderate Pain (4 - 6)  traMADol 50 milliGRAM(s) Oral every 4 hours PRN Mild Pain (1 - 3)    Vital Signs Last 24 Hrs  T(C): 36.9 (05 Mar 2020 04:33), Max: 37.6 (04 Mar 2020 20:30)  T(F): 98.5 (05 Mar 2020 04:33), Max: 99.7 (04 Mar 2020 20:30)  HR: 74 (05 Mar 2020 04:33) (62 - 85)  BP: 112/71 (05 Mar 2020 04:33) (105/85 - 118/76)  BP(mean): --  RR: 18 (05 Mar 2020 04:33) (18 - 18)  SpO2: 95% (05 Mar 2020 04:33) (94% - 97%)  Lying in bed in NAD, awake and alert  Physical exam: Left lower extremity- The left hip dressing is clean, dry and intact. Prineo dry and intact. No drainage or discharge. No erythema is noted. No blistering. No ecchymosis. The calf is supple. No calf tenderness. Sensation to light touch is grossly intact distally. Motor function distally is 5/5. No foot drop. +EHL/FHL. +DF/PF. 2+ dorsalis pedis pulse. Capillary refill is less than 2 seconds. No cyanosis.    Primary Orthopedic Assessment:  • s/p LEFT POSTERIOR total hip replacement, POD#2      Plan:   - Dressing changed left hip  • DVT prophylaxis as prescribed- Lovenox, including use of compression devices and ankle pumps  • Continue physical therapy  • Weightbearing as tolerated of the left lower extremity with assistance of a walker  • Incentive spirometry encouraged  • Pain control as clinically indicated  • Posterior hip precautions reviewed with patient  - keflex prophylaxis  • Discharge planning – anticipated discharge is Home after cleared by PT and medicine, likely today

## 2020-03-05 NOTE — PROGRESS NOTE ADULT - SUBJECTIVE AND OBJECTIVE BOX
CC: Left hip surgery     INTERVAL HPI/OVERNIGHT EVENTS: Patient seen and examined. C/O left hip pain, not sure if she can manage at him at this time but willing to see PT again later today. Denies chest pain, SOB, dizziness, nausea, vomiting, fever, chills. Sitting in chair.     Vital Signs Last 24 Hrs  T(C): 36.4 (05 Mar 2020 08:50), Max: 37.6 (04 Mar 2020 20:30)  T(F): 97.5 (05 Mar 2020 08:50), Max: 99.7 (04 Mar 2020 20:30)  HR: 84 (05 Mar 2020 08:50) (74 - 85)  BP: 114/68 (05 Mar 2020 08:50) (105/85 - 118/76)  BP(mean): --  RR: 18 (05 Mar 2020 08:50) (18 - 18)  SpO2: 90% (05 Mar 2020 08:50) (90% - 97%)      PHYSICAL EXAM:    General: Well developed; well nourished; in no acute distress  Eyes: PERRLA, EOMI; conjunctiva and sclera clear  Neck: Supple; non tender; no JVD  Respiratory: No wheezes, rales or rhonchi  Cardiovascular: Regular rate and rhythm. S1 and S2 Normal; No murmurs, gallops or rubs  Gastrointestinal: Soft non-tender non-distended; Normal bowel sounds  Extremities: Normal range of motion, No clubbing, cyanosis or edema. Left hip dressing C/D/I  Vascular: Peripheral pulses palpable 2+ bilaterally  Neurological: Alert and oriented x4  Skin: Warm and dry. No acute rash  Psychiatric: Cooperative and appropriate    I&O's Detail    04 Mar 2020 07:01  -  05 Mar 2020 07:00  --------------------------------------------------------  IN:  Total IN: 0 mL    OUT:    Voided: 400 mL  Total OUT: 400 mL    Total NET: -400 mL                                    11.4   14.31 )-----------( 317      ( 05 Mar 2020 07:50 )             35.2     05 Mar 2020 07:50    137    |  99     |  18.0   ----------------------------<  107    4.2     |  22.0   |  0.70     Ca    9.1        05 Mar 2020 07:50  Mg     1.6       04 Mar 2020 06:11        CAPILLARY BLOOD GLUCOSE              MEDICATIONS  (STANDING):  acetaminophen   Tablet .. 975 milliGRAM(s) Oral every 8 hours  cephalexin 500 milliGRAM(s) Oral four times a day  enoxaparin Injectable 40 milliGRAM(s) SubCutaneous daily  ferrous    sulfate 325 milliGRAM(s) Oral daily  gabapentin 600 milliGRAM(s) Oral three times a day  lactated ringers. 1000 milliLiter(s) (100 mL/Hr) IV Continuous <Continuous>  losartan 100 milliGRAM(s) Oral daily  magnesium oxide 400 milliGRAM(s) Oral three times a day with meals  multivitamin 1 Tablet(s) Oral daily  pantoprazole    Tablet 40 milliGRAM(s) Oral before breakfast  sodium chloride 0.9% lock flush 3 milliLiter(s) IV Push every 8 hours    MEDICATIONS  (PRN):  acetaminophen   Tablet .. 650 milliGRAM(s) Oral every 6 hours PRN Temp greater or equal to 38C (100.4F)  aluminum hydroxide/magnesium hydroxide/simethicone Suspension 30 milliLiter(s) Oral four times a day PRN Indigestion  bisacodyl Suppository 10 milliGRAM(s) Rectal daily PRN If no bowel movement by POD#2  HYDROmorphone  Injectable 0.5 milliGRAM(s) IV Push every 4 hours PRN Severe Pain (7 - 10)/breakthrough pain  magnesium hydroxide Suspension 30 milliLiter(s) Oral daily PRN Constipation  ondansetron Injectable 4 milliGRAM(s) IV Push every 6 hours PRN Nausea and/or Vomiting  senna 2 Tablet(s) Oral at bedtime PRN Constipation  traMADol 100 milliGRAM(s) Oral every 6 hours PRN Moderate Pain (4 - 6)  traMADol 50 milliGRAM(s) Oral every 4 hours PRN Mild Pain (1 - 3)      RADIOLOGY & ADDITIONAL TESTS: CC: Left hip pain s/p Lt hip replacement POD#2     INTERVAL HPI/OVERNIGHT EVENTS: Patient seen and examined. C/O left hip pain, not sure if she can manage at him at this time but willing to see PT again later today. Denies chest pain, SOB, dizziness, nausea, vomiting, fever, chills. Sitting in chair.     Vital Signs Last 24 Hrs  T(C): 36.4 (05 Mar 2020 08:50), Max: 37.6 (04 Mar 2020 20:30)  T(F): 97.5 (05 Mar 2020 08:50), Max: 99.7 (04 Mar 2020 20:30)  HR: 84 (05 Mar 2020 08:50) (74 - 85)  BP: 114/68 (05 Mar 2020 08:50) (105/85 - 118/76)  BP(mean): --  RR: 18 (05 Mar 2020 08:50) (18 - 18)  SpO2: 90% (05 Mar 2020 08:50) (90% - 97%)      PHYSICAL EXAM:    General: Well developed; well nourished; in no acute distress  Eyes: PERRLA, EOMI; conjunctiva and sclera clear  Neck: Supple; non tender; no JVD  Respiratory: No wheezes, rales or rhonchi  Cardiovascular: Regular rate and rhythm. S1 and S2 Normal; No murmurs, gallops or rubs  Gastrointestinal: Soft non-tender non-distended; Normal bowel sounds  Extremities: Normal range of motion, No clubbing, cyanosis or edema. Left hip dressing C/D/I  Vascular: Peripheral pulses palpable 2+ bilaterally  Neurological: Alert and oriented x4  Skin: Warm and dry. No acute rash  Psychiatric: Cooperative and appropriate    I&O's Detail    04 Mar 2020 07:01  -  05 Mar 2020 07:00  --------------------------------------------------------  IN:  Total IN: 0 mL    OUT:    Voided: 400 mL  Total OUT: 400 mL    Total NET: -400 mL                                    11.4   14.31 )-----------( 317      ( 05 Mar 2020 07:50 )             35.2     05 Mar 2020 07:50    137    |  99     |  18.0   ----------------------------<  107    4.2     |  22.0   |  0.70     Ca    9.1        05 Mar 2020 07:50  Mg     1.6       04 Mar 2020 06:11        CAPILLARY BLOOD GLUCOSE              MEDICATIONS  (STANDING):  acetaminophen   Tablet .. 975 milliGRAM(s) Oral every 8 hours  cephalexin 500 milliGRAM(s) Oral four times a day  enoxaparin Injectable 40 milliGRAM(s) SubCutaneous daily  ferrous    sulfate 325 milliGRAM(s) Oral daily  gabapentin 600 milliGRAM(s) Oral three times a day  lactated ringers. 1000 milliLiter(s) (100 mL/Hr) IV Continuous <Continuous>  losartan 100 milliGRAM(s) Oral daily  magnesium oxide 400 milliGRAM(s) Oral three times a day with meals  multivitamin 1 Tablet(s) Oral daily  pantoprazole    Tablet 40 milliGRAM(s) Oral before breakfast  sodium chloride 0.9% lock flush 3 milliLiter(s) IV Push every 8 hours    MEDICATIONS  (PRN):  acetaminophen   Tablet .. 650 milliGRAM(s) Oral every 6 hours PRN Temp greater or equal to 38C (100.4F)  aluminum hydroxide/magnesium hydroxide/simethicone Suspension 30 milliLiter(s) Oral four times a day PRN Indigestion  bisacodyl Suppository 10 milliGRAM(s) Rectal daily PRN If no bowel movement by POD#2  HYDROmorphone  Injectable 0.5 milliGRAM(s) IV Push every 4 hours PRN Severe Pain (7 - 10)/breakthrough pain  magnesium hydroxide Suspension 30 milliLiter(s) Oral daily PRN Constipation  ondansetron Injectable 4 milliGRAM(s) IV Push every 6 hours PRN Nausea and/or Vomiting  senna 2 Tablet(s) Oral at bedtime PRN Constipation  traMADol 100 milliGRAM(s) Oral every 6 hours PRN Moderate Pain (4 - 6)  traMADol 50 milliGRAM(s) Oral every 4 hours PRN Mild Pain (1 - 3)      RADIOLOGY & ADDITIONAL TESTS:

## 2020-03-05 NOTE — PROGRESS NOTE ADULT - ASSESSMENT
Patient is a 65 y/o female with H/O breast cancer (S/P B/L mastectomy, no chemo or radiation), Gallstones, HTN, OA, with c/o left hip pain for approximately for 4 years.  Patient followed up with her PCP who sent patient for an X-ray of her left hip as per patient , the findings of " bone on bone " . Patient was referred to Dr Henderson . Patient is S/P left Total hip arthroplasty     Problem/Recommendation - 1:  Problem: Osteoarthritis. Recommendation: S/P left total hip arthroplasty  Post op ABX as per Ortho  DVT ppx/PT as per Ortho  IS  Optimize pain control to allow for participation with PT     Problem/Recommendation - 2:  ·  Problem: Breast cancer in female.  Recommendation: H/O B/L MAST 2 years ago, no chemo or radiation  follows with [facility] oncology  outpatient follow up.      Problem/Recommendation - 3:  ·  Problem: Hypertension.  Recommendation: Continue hypertensive medications with hold parameters   follows with Dr. Sparrow , Cardiology.      Problem/Recommendation - 4:  ·  Problem: Gallstones.  Recommendation: Asymptomatic  low fat diet.      Problem/Recommendation - 5:  ·  Problem: Anemia.  Recommendation: ABLA  asymptomatic  monitor H&H  continue oral iron.      Problem/Recommendation - 6:  Problem: Hypokalemia. Recommendation: Resolved     Problem/Recommendation - 7:  Problem: Prophylactic measure. Recommendation: DVT ppx : Lovenox  bowel RX for opioid induced constipation.    PRoblem/Recommendations-9:  Problem: Leukocytosis. Recommendations: No signs/symptoms infection. Probably reactive    Dispo: No medical contraindications to discharge if pain is controlled

## 2020-03-06 VITALS
OXYGEN SATURATION: 95 % | SYSTOLIC BLOOD PRESSURE: 139 MMHG | DIASTOLIC BLOOD PRESSURE: 66 MMHG | RESPIRATION RATE: 18 BRPM | TEMPERATURE: 98 F | HEART RATE: 76 BPM

## 2020-03-06 LAB
ANION GAP SERPL CALC-SCNC: 11 MMOL/L — SIGNIFICANT CHANGE UP (ref 5–17)
BUN SERPL-MCNC: 17 MG/DL — SIGNIFICANT CHANGE UP (ref 8–20)
CALCIUM SERPL-MCNC: 8.6 MG/DL — SIGNIFICANT CHANGE UP (ref 8.6–10.2)
CHLORIDE SERPL-SCNC: 103 MMOL/L — SIGNIFICANT CHANGE UP (ref 98–107)
CO2 SERPL-SCNC: 25 MMOL/L — SIGNIFICANT CHANGE UP (ref 22–29)
CREAT SERPL-MCNC: 0.6 MG/DL — SIGNIFICANT CHANGE UP (ref 0.5–1.3)
GLUCOSE SERPL-MCNC: 98 MG/DL — SIGNIFICANT CHANGE UP (ref 70–99)
HCT VFR BLD CALC: 29.5 % — LOW (ref 34.5–45)
HGB BLD-MCNC: 9.7 G/DL — LOW (ref 11.5–15.5)
MCHC RBC-ENTMCNC: 30.4 PG — SIGNIFICANT CHANGE UP (ref 27–34)
MCHC RBC-ENTMCNC: 32.9 GM/DL — SIGNIFICANT CHANGE UP (ref 32–36)
MCV RBC AUTO: 92.5 FL — SIGNIFICANT CHANGE UP (ref 80–100)
PLATELET # BLD AUTO: 280 K/UL — SIGNIFICANT CHANGE UP (ref 150–400)
POTASSIUM SERPL-MCNC: 4 MMOL/L — SIGNIFICANT CHANGE UP (ref 3.5–5.3)
POTASSIUM SERPL-SCNC: 4 MMOL/L — SIGNIFICANT CHANGE UP (ref 3.5–5.3)
RBC # BLD: 3.19 M/UL — LOW (ref 3.8–5.2)
RBC # FLD: 14.4 % — SIGNIFICANT CHANGE UP (ref 10.3–14.5)
SODIUM SERPL-SCNC: 139 MMOL/L — SIGNIFICANT CHANGE UP (ref 135–145)
WBC # BLD: 11.53 K/UL — HIGH (ref 3.8–10.5)
WBC # FLD AUTO: 11.53 K/UL — HIGH (ref 3.8–10.5)

## 2020-03-06 PROCEDURE — 97167 OT EVAL HIGH COMPLEX 60 MIN: CPT

## 2020-03-06 PROCEDURE — 97163 PT EVAL HIGH COMPLEX 45 MIN: CPT

## 2020-03-06 PROCEDURE — C1776: CPT

## 2020-03-06 PROCEDURE — 97535 SELF CARE MNGMENT TRAINING: CPT

## 2020-03-06 PROCEDURE — 36415 COLL VENOUS BLD VENIPUNCTURE: CPT

## 2020-03-06 PROCEDURE — 80048 BASIC METABOLIC PNL TOTAL CA: CPT

## 2020-03-06 PROCEDURE — 85027 COMPLETE CBC AUTOMATED: CPT

## 2020-03-06 PROCEDURE — C1713: CPT

## 2020-03-06 PROCEDURE — 97110 THERAPEUTIC EXERCISES: CPT

## 2020-03-06 PROCEDURE — 86900 BLOOD TYPING SEROLOGIC ABO: CPT

## 2020-03-06 PROCEDURE — 93005 ELECTROCARDIOGRAM TRACING: CPT

## 2020-03-06 PROCEDURE — 73501 X-RAY EXAM HIP UNI 1 VIEW: CPT

## 2020-03-06 PROCEDURE — 82962 GLUCOSE BLOOD TEST: CPT

## 2020-03-06 PROCEDURE — 97116 GAIT TRAINING THERAPY: CPT

## 2020-03-06 PROCEDURE — 99232 SBSQ HOSP IP/OBS MODERATE 35: CPT

## 2020-03-06 PROCEDURE — 86901 BLOOD TYPING SEROLOGIC RH(D): CPT

## 2020-03-06 PROCEDURE — 86850 RBC ANTIBODY SCREEN: CPT

## 2020-03-06 PROCEDURE — 83735 ASSAY OF MAGNESIUM: CPT

## 2020-03-06 PROCEDURE — 97530 THERAPEUTIC ACTIVITIES: CPT

## 2020-03-06 RX ADMIN — GABAPENTIN 600 MILLIGRAM(S): 400 CAPSULE ORAL at 05:15

## 2020-03-06 RX ADMIN — SODIUM CHLORIDE 3 MILLILITER(S): 9 INJECTION INTRAMUSCULAR; INTRAVENOUS; SUBCUTANEOUS at 13:29

## 2020-03-06 RX ADMIN — TRAMADOL HYDROCHLORIDE 100 MILLIGRAM(S): 50 TABLET ORAL at 06:10

## 2020-03-06 RX ADMIN — GABAPENTIN 600 MILLIGRAM(S): 400 CAPSULE ORAL at 13:31

## 2020-03-06 RX ADMIN — Medication 975 MILLIGRAM(S): at 13:32

## 2020-03-06 RX ADMIN — Medication 500 MILLIGRAM(S): at 05:15

## 2020-03-06 RX ADMIN — SCOPALAMINE 1 PATCH: 1 PATCH, EXTENDED RELEASE TRANSDERMAL at 07:32

## 2020-03-06 RX ADMIN — MAGNESIUM OXIDE 400 MG ORAL TABLET 400 MILLIGRAM(S): 241.3 TABLET ORAL at 10:20

## 2020-03-06 RX ADMIN — Medication 975 MILLIGRAM(S): at 13:31

## 2020-03-06 RX ADMIN — SODIUM CHLORIDE 3 MILLILITER(S): 9 INJECTION INTRAMUSCULAR; INTRAVENOUS; SUBCUTANEOUS at 05:09

## 2020-03-06 RX ADMIN — Medication 1 TABLET(S): at 12:20

## 2020-03-06 RX ADMIN — LOSARTAN POTASSIUM 100 MILLIGRAM(S): 100 TABLET, FILM COATED ORAL at 05:15

## 2020-03-06 RX ADMIN — TRAMADOL HYDROCHLORIDE 100 MILLIGRAM(S): 50 TABLET ORAL at 05:15

## 2020-03-06 RX ADMIN — ENOXAPARIN SODIUM 40 MILLIGRAM(S): 100 INJECTION SUBCUTANEOUS at 12:21

## 2020-03-06 RX ADMIN — Medication 500 MILLIGRAM(S): at 12:21

## 2020-03-06 RX ADMIN — Medication 975 MILLIGRAM(S): at 06:10

## 2020-03-06 RX ADMIN — MAGNESIUM OXIDE 400 MG ORAL TABLET 400 MILLIGRAM(S): 241.3 TABLET ORAL at 12:20

## 2020-03-06 RX ADMIN — SCOPALAMINE 1 PATCH: 1 PATCH, EXTENDED RELEASE TRANSDERMAL at 13:32

## 2020-03-06 RX ADMIN — Medication 975 MILLIGRAM(S): at 05:15

## 2020-03-06 RX ADMIN — Medication 325 MILLIGRAM(S): at 12:21

## 2020-03-06 RX ADMIN — PANTOPRAZOLE SODIUM 40 MILLIGRAM(S): 20 TABLET, DELAYED RELEASE ORAL at 05:15

## 2020-03-06 NOTE — PROGRESS NOTE ADULT - ATTENDING COMMENTS
64yr old PMH of HTN, morbidly obese, Breast cancer s/p b/l mastectomy s/p elective lt hip replacement.   Doing well however c/o pain is still bothering her.   Agree with above   Medically stable for discharge if pain well controlled. No new changes to home medications for chronic medical illnesses recommended.
pt seen and examined at bedside.  s/p Lt hip replacement   pain is well controlled , ambulated with PT, denies any light headedness/dizziness on ambulation.  Medically stable for discharge. No new changes to home medications for chronic medical illnesses recommended.

## 2020-03-06 NOTE — PROGRESS NOTE ADULT - SUBJECTIVE AND OBJECTIVE BOX
Date of Service: 04/04/2019    HISTORY OF PRESENT ILLNESS:  The patient is a 56-year-old male who comes into the office for followup of his essential hypertension, hyperlipidemia, depression and other medical problems.  The patient relates that he is under a little more stress at work.  He is on his feet all day long and is working in a different department, with patient feeling more depressed recently.  He also relates that he has no libido whatsoever.  He is wondering if there is a different antidepressant that he can take rather than the Fluoxetine.  He and his wife are getting along in general, but wife has a chronic problem with anxiety.  The patient otherwise relates that his knee is slowly healing where he had redo arthroplasty.  He just got his ankle brace last week to give him more support in the ankle region where he has deformity.  He otherwise is not having any chest pain.  Breathing is all right.  He does check his blood pressures on occasion, most recent reading is 110/72.    MEDICATIONS AND ALLERGIES:  Listed and noted.    PHYSICAL EXAMINATION:  GENERAL:  The patient, on examination, is alert and in no distress.  VITAL SIGNS:  Blood pressure of 140/84, I had 116/82.  Pulse of 74.  Respirations are normal, not labored, with O2 saturation running at 99% at rest on room air.  Weight is 102 kilos.  HEENT:  His conjunctivae, throat and mouth are clear.  NECK:  Reveals good pulses, no masses, no jugular venous distention, no thyromegaly and no cervical or supraclavicular adenopathy.  HEART:  Regular rhythm with an occasional ectopic and a 1-1/2 over 6 early to midsystolic murmur heard best at the left sternal border.  LUNGS:  Clear to auscultation.  ABDOMEN:  Somewhat obese, soft, nontender, no masses, no hepatosplenomegaly.  EXTREMITIES:  Without clubbing, cyanosis or edema.  MUSCULOSKELETAL:  Basically unchanged today.    IMPRESSION:  1.  Essential hypertension, I believe controlled.  2.  Situational  NANCI MATIAS    [id number]    History: Patient is status post left posterior total hip arthroplasty on 3/3/2020, pod #3. Patient is doing well. The patient's pain is controlled using the prescribed pain medications. The patient is participating in physical therapy. Denies nausea, vomiting, chest pain, shortness of breath, abdominal pain or fever. No new complaints.                                MEDICATIONS  (STANDING):  acetaminophen   Tablet .. 975 milliGRAM(s) Oral every 8 hours  cephalexin 500 milliGRAM(s) Oral four times a day  enoxaparin Injectable 40 milliGRAM(s) SubCutaneous daily  ferrous    sulfate 325 milliGRAM(s) Oral daily  gabapentin 600 milliGRAM(s) Oral three times a day  lactated ringers. 1000 milliLiter(s) (100 mL/Hr) IV Continuous <Continuous>  losartan 100 milliGRAM(s) Oral daily  magnesium oxide 400 milliGRAM(s) Oral three times a day with meals  multivitamin 1 Tablet(s) Oral daily  pantoprazole    Tablet 40 milliGRAM(s) Oral before breakfast  sodium chloride 0.9% lock flush 3 milliLiter(s) IV Push every 8 hours    MEDICATIONS  (PRN):  acetaminophen   Tablet .. 650 milliGRAM(s) Oral every 6 hours PRN Temp greater or equal to 38C (100.4F)  aluminum hydroxide/magnesium hydroxide/simethicone Suspension 30 milliLiter(s) Oral four times a day PRN Indigestion  bisacodyl Suppository 10 milliGRAM(s) Rectal daily PRN If no bowel movement by POD#2  HYDROmorphone  Injectable 0.5 milliGRAM(s) IV Push every 4 hours PRN Severe Pain (7 - 10)/breakthrough pain  magnesium hydroxide Suspension 30 milliLiter(s) Oral daily PRN Constipation  ondansetron Injectable 4 milliGRAM(s) IV Push every 6 hours PRN Nausea and/or Vomiting  senna 2 Tablet(s) Oral at bedtime PRN Constipation  traMADol 100 milliGRAM(s) Oral every 6 hours PRN Moderate Pain (4 - 6)  traMADol 50 milliGRAM(s) Oral every 4 hours PRN Mild Pain (1 - 3)      Physical exam: The left hip dressing is clean, dry and intact. No drainage or discharge. No erythema is noted. No blistering. No ecchymosis. The calf is supple nontender.  No calf tenderness. Sensation to light touch is grossly intact distally. Motor function distally is 5/5. No foot drop. 2+ dorsalis pedis pulse. Capillary refill is less than 2 seconds. No cyanosis.    Primary Orthopedic Assessment:  • s/p LEFT POSTERIOR total hip replacement pod #3    Plan:   • DVT prophylaxis lovenox, including use of compression devices and ankle pumps  • Continue physical therapy  • Weightbearing as tolerated of the left lower extremity with assistance of a walker  • Incentive spirometry encouraged  • Pain control as clinically indicated  • Posterior hip precautions reviewed with patient  • Discharge planning – anticipated discharge is Home today depression, which does persist.  3.  Loss of libido, which may be related to #2, rule out androgen deficiency.  4.  Status post redo of knee arthroplasty, with knee slowly healing.    TREATMENT:  The patient was counseled today.  The patient is going to get serum testosterone level.  The patient will be contacted and I told him we will consider switching him to a different antidepressant.  We will see how the hormone levels turn out.  The patient otherwise should return in the next several months.      Dictated By: Javier Wiggins MD  Signing Provider: Javier Wiggins MD    [id number] ([id number])  DD: 06/16/2019 12:12:26 TD: 06/17/2019 20:01:06    Copy Sent To:

## 2020-03-06 NOTE — PROGRESS NOTE ADULT - SUBJECTIVE AND OBJECTIVE BOX
CC: left hip replacement     INTERVAL HPI/OVERNIGHT EVENTS: Patient seen and examined. Feeling much better today. Pain controlled on current RX. Able to walk with PT without difficulty. Denies chest pain, SOB, dizziness, nasuea, vomiting, fever, chills.     Vital Signs Last 24 Hrs  T(C): 36.7 (06 Mar 2020 08:04), Max: 37.4 (06 Mar 2020 00:00)  T(F): 98 (06 Mar 2020 08:04), Max: 99.3 (06 Mar 2020 00:00)  HR: 67 (06 Mar 2020 08:04) (67 - 89)  BP: 114/70 (06 Mar 2020 08:04) (114/70 - 138/76)  BP(mean): --  RR: 18 (06 Mar 2020 08:04) (17 - 18)  SpO2: 94% (06 Mar 2020 08:04) (93% - 96%)    PHYSICAL EXAM:    General: Well developed; well nourished; in no acute distress  Eyes: PERRLA, EOMI; conjunctiva and sclera clear  Neck: Supple; non tender; no JVD  Respiratory: No wheezes, rales or rhonchi  Cardiovascular: Regular rate and rhythm. S1 and S2 Normal; No murmurs, gallops or rubs  Gastrointestinal: Soft non-tender non-distended; Normal bowel sounds  Extremities: Normal range of motion, No clubbing, cyanosis or edema. Left hip dressing C/D/I  Vascular: Peripheral pulses palpable 2+ bilaterally  Neurological: Alert and oriented x4  Skin: Warm and dry. No acute rash  Psychiatric: Cooperative and appropriate      I&O's Detail    05 Mar 2020 07:01  -  06 Mar 2020 07:00  --------------------------------------------------------  IN:    Oral Fluid: 600 mL  Total IN: 600 mL    OUT:  Total OUT: 0 mL    Total NET: 600 mL                                    9.7    11.53 )-----------( 280      ( 06 Mar 2020 07:16 )             29.5     06 Mar 2020 07:16    139    |  103    |  17.0   ----------------------------<  98     4.0     |  25.0   |  0.60     Ca    8.6        06 Mar 2020 07:16        CAPILLARY BLOOD GLUCOSE              MEDICATIONS  (STANDING):  acetaminophen   Tablet .. 975 milliGRAM(s) Oral every 8 hours  cephalexin 500 milliGRAM(s) Oral four times a day  enoxaparin Injectable 40 milliGRAM(s) SubCutaneous daily  ferrous    sulfate 325 milliGRAM(s) Oral daily  gabapentin 600 milliGRAM(s) Oral three times a day  lactated ringers. 1000 milliLiter(s) (100 mL/Hr) IV Continuous <Continuous>  losartan 100 milliGRAM(s) Oral daily  magnesium oxide 400 milliGRAM(s) Oral three times a day with meals  multivitamin 1 Tablet(s) Oral daily  pantoprazole    Tablet 40 milliGRAM(s) Oral before breakfast  sodium chloride 0.9% lock flush 3 milliLiter(s) IV Push every 8 hours    MEDICATIONS  (PRN):  acetaminophen   Tablet .. 650 milliGRAM(s) Oral every 6 hours PRN Temp greater or equal to 38C (100.4F)  aluminum hydroxide/magnesium hydroxide/simethicone Suspension 30 milliLiter(s) Oral four times a day PRN Indigestion  bisacodyl Suppository 10 milliGRAM(s) Rectal daily PRN If no bowel movement by POD#2  HYDROmorphone  Injectable 0.5 milliGRAM(s) IV Push every 4 hours PRN Severe Pain (7 - 10)/breakthrough pain  magnesium hydroxide Suspension 30 milliLiter(s) Oral daily PRN Constipation  ondansetron Injectable 4 milliGRAM(s) IV Push every 6 hours PRN Nausea and/or Vomiting  senna 2 Tablet(s) Oral at bedtime PRN Constipation  traMADol 100 milliGRAM(s) Oral every 6 hours PRN Moderate Pain (4 - 6)  traMADol 50 milliGRAM(s) Oral every 4 hours PRN Mild Pain (1 - 3)      RADIOLOGY & ADDITIONAL TESTS: CC: left hip replacement     INTERVAL HPI/OVERNIGHT EVENTS: Patient seen and examined. Feeling much better today. Pain controlled on current RX. Able to walk with PT without difficulty. Denies chest pain, SOB, dizziness, nasuea, vomiting, fever, chills.       Vital Signs Last 24 Hrs  T(C): 36.7 (06 Mar 2020 08:04), Max: 37.4 (06 Mar 2020 00:00)  T(F): 98 (06 Mar 2020 08:04), Max: 99.3 (06 Mar 2020 00:00)  HR: 67 (06 Mar 2020 08:04) (67 - 89)  BP: 114/70 (06 Mar 2020 08:04) (114/70 - 138/76)  BP(mean): --  RR: 18 (06 Mar 2020 08:04) (17 - 18)  SpO2: 94% (06 Mar 2020 08:04) (93% - 96%)      PHYSICAL EXAM:    General: Well developed; well nourished; in no acute distress  Eyes: PERRLA, EOMI; conjunctiva and sclera clear  Neck: Supple; non tender; no JVD  Respiratory: No wheezes, rales or rhonchi  Cardiovascular: Regular rate and rhythm. S1 and S2 Normal; No murmur  Gastrointestinal: Soft non-tender non-distended; Normal bowel sounds  Extremities: Normal range of motion, No clubbing, cyanosis or edema. Left hip dressing C/D/I  Vascular: Peripheral pulses palpable 2+ bilaterally  Neurological: Alert and oriented x4        I&O's Detail    05 Mar 2020 07:01  -  06 Mar 2020 07:00  --------------------------------------------------------  IN:    Oral Fluid: 600 mL  Total IN: 600 mL    OUT:  Total OUT: 0 mL    Total NET: 600 mL                                    9.7    11.53 )-----------( 280      ( 06 Mar 2020 07:16 )             29.5     06 Mar 2020 07:16    139    |  103    |  17.0   ----------------------------<  98     4.0     |  25.0   |  0.60     Ca    8.6        06 Mar 2020 07:16        CAPILLARY BLOOD GLUCOSE              MEDICATIONS  (STANDING):  acetaminophen   Tablet .. 975 milliGRAM(s) Oral every 8 hours  cephalexin 500 milliGRAM(s) Oral four times a day  enoxaparin Injectable 40 milliGRAM(s) SubCutaneous daily  ferrous    sulfate 325 milliGRAM(s) Oral daily  gabapentin 600 milliGRAM(s) Oral three times a day  lactated ringers. 1000 milliLiter(s) (100 mL/Hr) IV Continuous <Continuous>  losartan 100 milliGRAM(s) Oral daily  magnesium oxide 400 milliGRAM(s) Oral three times a day with meals  multivitamin 1 Tablet(s) Oral daily  pantoprazole    Tablet 40 milliGRAM(s) Oral before breakfast  sodium chloride 0.9% lock flush 3 milliLiter(s) IV Push every 8 hours    MEDICATIONS  (PRN):  acetaminophen   Tablet .. 650 milliGRAM(s) Oral every 6 hours PRN Temp greater or equal to 38C (100.4F)  aluminum hydroxide/magnesium hydroxide/simethicone Suspension 30 milliLiter(s) Oral four times a day PRN Indigestion  bisacodyl Suppository 10 milliGRAM(s) Rectal daily PRN If no bowel movement by POD#2  HYDROmorphone  Injectable 0.5 milliGRAM(s) IV Push every 4 hours PRN Severe Pain (7 - 10)/breakthrough pain  magnesium hydroxide Suspension 30 milliLiter(s) Oral daily PRN Constipation  ondansetron Injectable 4 milliGRAM(s) IV Push every 6 hours PRN Nausea and/or Vomiting  senna 2 Tablet(s) Oral at bedtime PRN Constipation  traMADol 100 milliGRAM(s) Oral every 6 hours PRN Moderate Pain (4 - 6)  traMADol 50 milliGRAM(s) Oral every 4 hours PRN Mild Pain (1 - 3)      RADIOLOGY & ADDITIONAL TESTS:

## 2020-03-06 NOTE — PROGRESS NOTE ADULT - ASSESSMENT
Patient is a 63 y/o female with H/O breast cancer (S/P B/L mastectomy, no chemo or radiation), Gallstones, HTN, OA, with c/o left hip pain for approximately for 4 years.  Patient followed up with her PCP who sent patient for an X-ray of her left hip as per patient , the findings of " bone on bone " . Patient was referred to Dr Henderson . Patient is S/P left Total hip arthroplasty     Problem/Recommendation - 1:  Problem: Osteoarthritis. Recommendation: S/P left total hip arthroplasty  Post op ABX as per Ortho  DVT ppx/PT as per Ortho  IS  Pain control      Problem/Recommendation - 2:  ·  Problem: Breast cancer in female.  Recommendation: H/O B/L MAST 2 years ago, no chemo or radiation  follows with [facility] oncology  outpatient follow up.      Problem/Recommendation - 3:  ·  Problem: Hypertension.  Recommendation: Continue hypertensive medications with hold parameters   follows with Dr. Sparrow , Cardiology.      Problem/Recommendation - 4:  ·  Problem: Gallstones.  Recommendation: Asymptomatic  low fat diet.      Problem/Recommendation - 5:  ·  Problem: Anemia.  Recommendation: ABLA  asymptomatic  continue oral iron.      Problem/Recommendation - 6:  Problem: Hypokalemia. Recommendation: Resolved     Problem/Recommendation - 7:  Problem: Prophylactic measure. Recommendation: DVT ppx : Lovenox  bowel RX for opioid induced constipation.    PRoblem/Recommendations-9:  Problem: Leukocytosis. Recommendations: No signs/symptoms infection. Probably reactive, improved    Dispo: No medical contraindications to discharge

## 2020-03-23 ENCOUNTER — APPOINTMENT (OUTPATIENT)
Dept: ORTHOPEDIC SURGERY | Facility: CLINIC | Age: 65
End: 2020-03-23
Payer: MEDICARE

## 2020-03-23 PROCEDURE — 99024 POSTOP FOLLOW-UP VISIT: CPT

## 2020-03-23 PROCEDURE — 73502 X-RAY EXAM HIP UNI 2-3 VIEWS: CPT | Mod: LT

## 2020-03-23 NOTE — HISTORY OF PRESENT ILLNESS
[Neuro Intact] : an unremarkable neurological exam [Vascular Intact] : ~T peripheral vascular exam normal [Negative Marcel's] : maneuvers demonstrated a negative Marcel's sign [de-identified] : s/p complex primary left total hip arthroplasty 3/3/2020 [de-identified] : The patient presents back to the office for follow-up of her left total hip arthroplasty.  Her pain is well controlled with meloxicam and Tylenol.  She is using her walker.  She is worked with home physical therapy but they just finished.  With the concerns about the virus she will probably defer on outpatient physical therapy. [de-identified] : Her incision is well-healed no signs of infection the pernio dressing was removed.  She is neurovascular intact.  She has mild distal edema. [de-identified] : 3 views were taken today of the left hip and pelvis she has a left total hip arthroplasty with implants in good position no sign of wear loosening or subsidence she has severe bone-on-bone right hip osteoarthritis [de-identified] : Patient is progressing well with her left total hip arthroplasty we can continue with our postoperative protocol.  She will continue on DVT prophylaxis.  She is aware of the importance of antibiotic prophylaxis for dental and invasive procedures. [de-identified] : Patient will maintain posterior hip precautions continue with a home physical therapy program and follow-up with us as scheduled

## 2020-03-27 ENCOUNTER — OUTPATIENT (OUTPATIENT)
Dept: OUTPATIENT SERVICES | Facility: HOSPITAL | Age: 65
LOS: 1 days | Discharge: HOME | End: 2020-03-27

## 2020-03-27 DIAGNOSIS — Z90.13 ACQUIRED ABSENCE OF BILATERAL BREASTS AND NIPPLES: Chronic | ICD-10-CM

## 2020-03-27 DIAGNOSIS — D05.90 UNSPECIFIED TYPE OF CARCINOMA IN SITU OF UNSPECIFIED BREAST: ICD-10-CM

## 2020-03-27 DIAGNOSIS — Z98.890 OTHER SPECIFIED POSTPROCEDURAL STATES: Chronic | ICD-10-CM

## 2020-04-06 ENCOUNTER — APPOINTMENT (OUTPATIENT)
Dept: HEMATOLOGY ONCOLOGY | Facility: CLINIC | Age: 65
End: 2020-04-06

## 2020-04-20 ENCOUNTER — APPOINTMENT (OUTPATIENT)
Dept: ORTHOPEDIC SURGERY | Facility: CLINIC | Age: 65
End: 2020-04-20

## 2020-04-27 ENCOUNTER — APPOINTMENT (OUTPATIENT)
Dept: SURGERY | Facility: CLINIC | Age: 65
End: 2020-04-27

## 2020-04-29 ENCOUNTER — OUTPATIENT (OUTPATIENT)
Dept: OUTPATIENT SERVICES | Facility: HOSPITAL | Age: 65
LOS: 1 days | Discharge: HOME | End: 2020-04-29

## 2020-04-29 DIAGNOSIS — Z98.890 OTHER SPECIFIED POSTPROCEDURAL STATES: Chronic | ICD-10-CM

## 2020-04-29 DIAGNOSIS — D05.90 UNSPECIFIED TYPE OF CARCINOMA IN SITU OF UNSPECIFIED BREAST: ICD-10-CM

## 2020-04-29 DIAGNOSIS — Z90.13 ACQUIRED ABSENCE OF BILATERAL BREASTS AND NIPPLES: Chronic | ICD-10-CM

## 2020-05-04 ENCOUNTER — APPOINTMENT (OUTPATIENT)
Dept: UROGYNECOLOGY | Facility: CLINIC | Age: 65
End: 2020-05-04

## 2020-05-05 ENCOUNTER — APPOINTMENT (OUTPATIENT)
Dept: HEMATOLOGY ONCOLOGY | Facility: CLINIC | Age: 65
End: 2020-05-05
Payer: MEDICARE

## 2020-05-05 PROCEDURE — 99441: CPT

## 2020-05-05 NOTE — CONSULT LETTER
[Dear  ___] : Dear  [unfilled], [Consult Closing:] : Thank you very much for allowing me to participate in the care of this patient.  If you have any questions, please do not hesitate to contact me. [Please see my note below.] : Please see my note below. [Consult Letter:] : I had the pleasure of evaluating your patient, [unfilled]. [Sincerely,] : Sincerely, [FreeTextEntry3] : Francine Jara MD\par Medical Oncology/Hematology\par [facility] [place]\par [facility] [place]

## 2020-05-05 NOTE — HISTORY OF PRESENT ILLNESS
[T: ___] : T[unfilled] [N: ___] : N[unfilled] [AJCC Stage: ____] : AJCC Stage: [unfilled] [de-identified] : Ms. Toussaint was diagnosed with right breast cancer in November 2017 at age 61. \par \par In Fall 2017, she presented to ED with inflamed and erythematous right breast and was given antibiotics with resolution of symptoms.  She was evaluated by Dr. Arlene Gregg of breast surgery.\par \par She subsequently had a diagnostic mammo + US on 10/18/17: Multiple irregular masses in the upper outer right breast. Recommend representative ultrasound-guided core biopsy of two masses at the 11:00 axis and 10:00 axis, as above. Numerous associated distended ducts containing intraductal masses and calcifications throughout the upper outer right breast, in region of palpable concern, 10:00 axis, suspicious for extensive DCIS.Rounded indeterminate mass in the right breast at the 8:00 position, 16 \par cm the nipple, possible abnormal lymph node. Ultrasound-guided core biopsy is advised.\par \par On 11/1/18 she had a core biopsies:\par Final Diagnosis\par 1     Breast biopsy core, right 10:00 4 cm FN, Site #1:- detached fragments of papillary and micropapillar ductal carcinoma with extensive necrosis, probably ductal carcinoma in situ with intermediate to high nuclear grade\par 2     Breast biopsy core, right 11:00 16 cm FN, Site #;- Invasive moderately differentiated ductal carcinoma, [place] 7/9, 1.1 cm. ER>90%, CA> 90%, HER2 negative.\par +DCIS, cribriform and solid with intermediate grade. \par 3     Breast biopsy core, right 8:00 16 cm FN;-   Ductal carcinoma in situ, micropapillary type with intermediate nuclear grade.\par \par She subsequently had non compliance with obtaining an MRI as well as follow ups with Dr. Gregg.  She eventually underwent bilateral mastectomy and bilateral SNL biopsy on 2/1/2018.  \par \par Pathology:\par Right breast simple mastectomy - Infiltrating moderately differentiated ductal carcinoma and DCIS, micropapillary type with tumor necrosis. 1 axillary LN negative.  Approximately 2 cm, grade 2.  Isolate tumor cells in 3 LNs.  ER>90%, CA 90%, HER2 negative.  pT1c pN0(i+)\par \par Left mastectomy and left SNL biopsy - negative\par \par 2/1/18 Oncotype Dx - 17 (low risk) - 5 year recurrence with Tamoxifen 11%, with gutierrez+ chemo 12%\par 11/17/2017 BRCA1/2 negative\par \par Family history is significant for a sister with breast cancer at age 43, and neice (brothers daugther) in her 40s.  Paternal GM had uterine cancer.  Paternal aunt - brain cancer.\par \par She started anastrazole on 3/9/18 and switched to Letrozole on 5/21/18 given hot flashes. She stopped Letrozole on 12/4/18 due to multiple MSK symptoms including muscle pain and generalized joint pains. Since stopping Letrozole, her pain did not get better in her hips and knees but was better in her hands. She was subsequently switched to Tamoxifen on 4/15/19. \par \par s/p colonoscopy on 10/15/18 -->negative per patient [de-identified] : Verbal consent given by patient to conduct this telephonic visit. Unable to do video conferencing. \par \par She is s/p left hip replacement on 3/3/2020 by Dr. Henderson. Tamoxifen held for 2 weeks due to surgery. \par She feels some soreness of left hip but no pain like before. \par No leg swelling. \par No vaginal bleeding.\par No SOB. \par \par \par

## 2020-05-05 NOTE — ASSESSMENT
[FreeTextEntry1] : 64 year old female with stage IA right breast infiltrating ductal carcinoma (T1c N0(i+)). ER>90%, ID 90%, HER2 negative, S/p bilateral mastectomy + SNL biopsy.  BRCA negative.  Oncotype Dx 17 (low risk).   Began anastrazole on 3/9/18 and switched for Letrozole on 5/21/18 given hot flashes impacting QOL.  Letrozole stopped on 12/4/18 due to pain in multiple joints.  She was switched to Tamoxifen on 4/15/19. \par  4/3/18 bone density - normal.\par \par Tolerating Tamoxifen better aromatase inhibitors.\par \par \par Plan:\par Continue Tamoxifen\par GYN follow up every 6 months\par Follow up in 4 months\par \par Total time on phone: 5 minutes

## 2020-05-20 ENCOUNTER — APPOINTMENT (OUTPATIENT)
Dept: ORTHOPEDIC SURGERY | Facility: CLINIC | Age: 65
End: 2020-05-20
Payer: MEDICARE

## 2020-05-20 PROCEDURE — 99024 POSTOP FOLLOW-UP VISIT: CPT

## 2020-05-20 NOTE — DISCUSSION/SUMMARY
[de-identified] : The patient is doing well best we can tell with a telephonic visit after her left hip replacement.\par She does want to pursue right total hip arthroplasty in the near future.\par When to schedule a face-to-face encounter with her at the end of June.\par Point will be able to further evaluate her hip replacement as well as prepare for her contralateral right hip replacement.\par \par The patient is a 64 year old individual with end stage arthritis of their right hip joint. Based upon the patient's continued symptoms and failure to respond to conservative treatment I have recommended a right total hip arthroplasty for this patient. A long discussion took place with the patient describing what a total joint replacement is and what the procedure would entail. A total hip arthroplasty model, similar to the implant that will be used during the operation, was utilized to demonstrate and to discuss the various bearing surfaces of the implants. The hospitalization and post-operative care and rehabilitation were also discussed. The use of perioperative antibiotics and DVT prophylaxis were discussed. The risk, benefits and alternatives to a surgical intervention were discussed at length with the patient.  The patient was also advised of risks related to the medical comorbidities, elevated body mass index (BMI),  and smoking where applicable.  We discussed how to reduce modifiable risk factors and encouraged smoking cessation were applicable.. A lengthy discussion took place to review the most common complications including but not limited to: deep vein thrombosis, pulmonary embolus, heart attack, stroke, infection, wound breakdown, numbness, damage to nerves, tendon, muscles, arteries or other blood vessels, death and other possible complications from anesthesia. The patient was told that we will take steps to minimize these risks by using sterile technique, antibiotics and DVT prophylaxis when appropriate and follow the patient postoperatively in the office setting to monitor progress. The possibility of recurrent pain, no improvement in pain and actual worsening of pain were also discussed with the patient.\par The discharge plan of care focused on the patient going home following surgery. The patient was encouraged to make the necessary arrangements to have someone stay with them when they are discharged home. Following discharge, a home care nurse will visit the patient. The home care nurse will open your home care case and request home physical therapy services. Home physical therapy will commence following discharge provided it is appropriate and covered by the health insurance benefit plan. \par The benefits of surgery were discussed with the patient including the potential for improving his/her current clinical condition through operative intervention. Alternatives to surgical intervention including continued conservative management were also discussed in detail. All questions were answered to the satisfaction of the patient. The treatment plan of care, as well as a model of a total hip arthroplasty equivalent to the one that will be used for their total joint replacement, was shared with the patient. The patient agreed to the plan of care as well as the use of implants in their total joint replacement. \par \par

## 2020-05-20 NOTE — PHYSICAL EXAM
[LE] : Sensory: Intact in bilateral lower extremities [ALL] : dorsalis pedis, posterior tibial, femoral, popliteal, and radial 2+ and symmetric bilaterally [de-identified] : \par \par

## 2020-05-20 NOTE — HISTORY OF PRESENT ILLNESS
[Pain Location] : pain [1] : a current pain level of 1/10 [Improving] : improving [Direct Pressure] : worsened by direct pressure [Walking] : walking [3] : a maximum pain level of 3/10 [Exercise Regimen] : relieved by exercise regimen [Hip Movement] : worsened by hip movement [Physical Therapy] : relieved by physical therapy [Rest] : relieved by rest [Home] : at home, [unfilled] , at the time of the visit. [Medical Office: ([facility])___] : at the medical office located in  [Verbal consent obtained from patient] : the patient, [unfilled] [de-identified] : This was a telephonic's visit only\par pt is 11 weeks from left URBANO. she feel some burning pain by the incision especially when she put pressure. \par she is doing home exercise.\par she report improvement of pain and the pain is much better compare to pain prior to surgery. \par she denies drainage, fever or chills, No redness. \par pt also has endstage right hip arthritis and would like to schedule for right URBANO\par

## 2020-05-20 NOTE — REASON FOR VISIT
[Home] : at home, [unfilled] , at the time of the visit. [Verbal consent obtained from patient] : the patient, [unfilled] [Medical Office: ([facility])___] : at the medical office located in  [FreeTextEntry2] : 2.5 months s/p complex primary left total hip arthroplasty 3/3/2020. \par

## 2020-05-28 ENCOUNTER — APPOINTMENT (OUTPATIENT)
Age: 65
End: 2020-05-28

## 2020-06-01 ENCOUNTER — APPOINTMENT (OUTPATIENT)
Dept: RHEUMATOLOGY | Facility: CLINIC | Age: 65
End: 2020-06-01
Payer: MEDICARE

## 2020-06-01 DIAGNOSIS — G89.29 PAIN IN LEFT KNEE: ICD-10-CM

## 2020-06-01 DIAGNOSIS — M25.562 PAIN IN LEFT KNEE: ICD-10-CM

## 2020-06-01 PROCEDURE — 99443: CPT

## 2020-06-04 PROBLEM — M25.562 CHRONIC PAIN OF LEFT KNEE: Status: RESOLVED | Noted: 2019-08-05 | Resolved: 2020-06-04

## 2020-06-04 RX ORDER — ASPIRIN ENTERIC COATED TABLETS 81 MG 81 MG/1
81 TABLET, DELAYED RELEASE ORAL
Qty: 100 | Refills: 0 | Status: ACTIVE | COMMUNITY
Start: 2020-06-04

## 2020-06-04 RX ORDER — DICLOFENAC SODIUM 75 MG/1
75 TABLET, DELAYED RELEASE ORAL
Qty: 60 | Refills: 2 | Status: DISCONTINUED | COMMUNITY
Start: 2020-06-04 | End: 2020-06-04

## 2020-06-22 ENCOUNTER — APPOINTMENT (OUTPATIENT)
Dept: ORTHOPEDIC SURGERY | Facility: CLINIC | Age: 65
End: 2020-06-22

## 2020-06-22 ENCOUNTER — APPOINTMENT (OUTPATIENT)
Age: 65
End: 2020-06-22

## 2020-06-22 NOTE — HISTORY OF PRESENT ILLNESS
[Home] : at home, [unfilled] , at the time of the visit. [Medical Office: ([facility])___] : at the medical office located in  [Spouse] : spouse [Verbal consent obtained from patient] : the patient, [unfilled]

## 2020-06-24 ENCOUNTER — RX RENEWAL (OUTPATIENT)
Age: 65
End: 2020-06-24

## 2020-07-20 ENCOUNTER — RX RENEWAL (OUTPATIENT)
Age: 65
End: 2020-07-20

## 2020-08-12 ENCOUNTER — OUTPATIENT (OUTPATIENT)
Dept: OUTPATIENT SERVICES | Facility: HOSPITAL | Age: 65
LOS: 1 days | Discharge: HOME | End: 2020-08-12

## 2020-08-12 DIAGNOSIS — Z98.890 OTHER SPECIFIED POSTPROCEDURAL STATES: Chronic | ICD-10-CM

## 2020-08-12 DIAGNOSIS — D05.90 UNSPECIFIED TYPE OF CARCINOMA IN SITU OF UNSPECIFIED BREAST: ICD-10-CM

## 2020-08-12 DIAGNOSIS — Z90.13 ACQUIRED ABSENCE OF BILATERAL BREASTS AND NIPPLES: Chronic | ICD-10-CM

## 2020-08-13 DIAGNOSIS — Z86.39 PERSONAL HISTORY OF OTHER ENDOCRINE, NUTRITIONAL AND METABOLIC DISEASE: ICD-10-CM

## 2020-08-13 DIAGNOSIS — K80.20 CALCULUS OF GALLBLADDER W/OUT CHOLECYSTITIS W/OUT OBSTRUCTION: ICD-10-CM

## 2020-08-13 DIAGNOSIS — Z86.2 PERSONAL HISTORY OF DISEASES OF THE BLOOD AND BLOOD-FORMING ORGANS AND CERTAIN DISORDERS INVOLVING THE IMMUNE MECHANISM: ICD-10-CM

## 2020-08-18 ENCOUNTER — APPOINTMENT (OUTPATIENT)
Dept: SURGERY | Facility: CLINIC | Age: 65
End: 2020-08-18

## 2020-08-18 ENCOUNTER — APPOINTMENT (OUTPATIENT)
Dept: CARDIOLOGY | Facility: CLINIC | Age: 65
End: 2020-08-18

## 2020-09-01 ENCOUNTER — APPOINTMENT (OUTPATIENT)
Dept: OBGYN | Facility: CLINIC | Age: 65
End: 2020-09-01

## 2020-09-01 ENCOUNTER — APPOINTMENT (OUTPATIENT)
Dept: UROGYNECOLOGY | Facility: CLINIC | Age: 65
End: 2020-09-01

## 2020-09-08 ENCOUNTER — RX RENEWAL (OUTPATIENT)
Age: 65
End: 2020-09-08

## 2020-09-19 ENCOUNTER — RX RENEWAL (OUTPATIENT)
Age: 65
End: 2020-09-19

## 2020-09-25 ENCOUNTER — OUTPATIENT (OUTPATIENT)
Dept: OUTPATIENT SERVICES | Facility: HOSPITAL | Age: 65
LOS: 1 days | Discharge: HOME | End: 2020-09-25

## 2020-09-25 DIAGNOSIS — Z90.13 ACQUIRED ABSENCE OF BILATERAL BREASTS AND NIPPLES: Chronic | ICD-10-CM

## 2020-09-25 DIAGNOSIS — D05.90 UNSPECIFIED TYPE OF CARCINOMA IN SITU OF UNSPECIFIED BREAST: ICD-10-CM

## 2020-09-25 DIAGNOSIS — Z98.890 OTHER SPECIFIED POSTPROCEDURAL STATES: Chronic | ICD-10-CM

## 2020-10-05 DIAGNOSIS — Z96.649 PRESENCE OF UNSPECIFIED ARTIFICIAL HIP JOINT: ICD-10-CM

## 2020-10-06 ENCOUNTER — APPOINTMENT (OUTPATIENT)
Dept: CARDIOLOGY | Facility: CLINIC | Age: 65
End: 2020-10-06

## 2020-10-06 RX ORDER — GABAPENTIN 600 MG/1
600 TABLET, COATED ORAL 4 TIMES DAILY
Qty: 120 | Refills: 3 | Status: DISCONTINUED | COMMUNITY
Start: 2020-08-14 | End: 2020-10-06

## 2020-10-06 RX ORDER — GABAPENTIN 600 MG/1
600 TABLET, COATED ORAL
Qty: 30 | Refills: 0 | Status: DISCONTINUED | COMMUNITY
Start: 2019-02-04 | End: 2020-10-06

## 2020-10-19 ENCOUNTER — APPOINTMENT (OUTPATIENT)
Dept: ORTHOPEDIC SURGERY | Facility: CLINIC | Age: 65
End: 2020-10-19
Payer: MEDICARE

## 2020-10-19 VITALS
WEIGHT: 270 LBS | HEIGHT: 62 IN | HEART RATE: 69 BPM | DIASTOLIC BLOOD PRESSURE: 90 MMHG | BODY MASS INDEX: 49.69 KG/M2 | SYSTOLIC BLOOD PRESSURE: 159 MMHG

## 2020-10-19 PROCEDURE — 99072 ADDL SUPL MATRL&STAF TM PHE: CPT

## 2020-10-19 PROCEDURE — 99214 OFFICE O/P EST MOD 30 MIN: CPT

## 2020-10-19 NOTE — END OF VISIT
[FreeTextEntry3] : I, Willard Falk, acted solely as a scribe for Dr. Germán Henderson on this date 10/19/2020.

## 2020-10-19 NOTE — HISTORY OF PRESENT ILLNESS
[Worsening] : worsening [Pain Location] : pain [6] : a minimum pain level of 6/10 [8] : a current pain level of 8/10 [Walking] : walking [10] : a maximum pain level of 10/10 [Rest] : relieved by rest [de-identified] : 65 y/o F presents with b/l knee pain and right hip pain. She has known arthritic changes in her b/l knees and right hip. Her right knee pain is about the same severity as the left knee pain. She states that her left knee in unable to bend when she has weight on it. The patient is interested in surgery; however, is unsure which surgery to pursue first. Pt uses a walker when walking in the house. She walks in the kitchen and walks into the bathroom. She is s/p left URBANO 3/3/2020. She notes some pain near the incision.  [Ataxia] : no ataxia [Incontinence] : no incontinence [Loss of Dexterity] : good dexterity [Urinary Ret.] : no urinary retention [de-identified] : stairs

## 2020-10-19 NOTE — DISCUSSION/SUMMARY
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 Conservative therapy and surgical options discussed in detail with the patient. The patient is a candidate for a staged b/l TKR and a right URBANO. The patient is interested in pursuing a left TKA first due to having some stability from the left URBANO. She understands the important of weight loss and retaining motion prior to surgery. The patient is also s/p left URBANO 3/3/2020. She is doing well regarding the left hip. Pt understands the importance of prophylaxis for invasive dental procedures.\par \par \par The patient has been counseled regarding the elevated risks associated with surgical complications in patients with a BMI> 35. The patient demonstrates an understanding of the increased risk. After a lengthy discussion, the patient agreed to make a coordinated effort at weight loss prior to surgical intervention. The patient understands our BMI policy and they will make a conscious effort to improve their BMI. \par \par The patient is a 64 year individual with end stage arthritis of their left knee joint. Based upon the patient's continued symptoms and failure to respond to conservative treatment I have recommended a left total knee arthroplasty for this patient. A long discussion took place with the patient describing what a total joint replacement is and what the procedure would entail. A total knee arthroplasty model, similar to the implant that was used during the operation, was utilized to demonstrate and to discuss the various bearing surfaces of the implants. The hospitalization and post-operative care and rehabilitation were also discussed. The use of perioperative antibiotics and DVT prophylaxis were discussed. The risk, benefits and alternatives to a surgical intervention were discussed at length with the patient. The patient was also advised of risks related to the medical comorbidities, elevated body mass index (BMI), and smoking where applicable. We discussed how to reduce modifiable risk factors and encouraged smoking cessation were applicable.. A lengthy discussion took place to review the most common complications including but not limited to: deep vein thrombosis, pulmonary embolus, heart attack, stroke, infection, wound breakdown, numbness, damage to nerves, tendon, muscles, arteries or other blood vessels, death and other possible complications from anesthesia. The patient was told that we will take steps to minimize these risks by using sterile technique, antibiotics and DVT prophylaxis when appropriate and follow the patient postoperatively in the office setting to monitor progress. The possibility of recurrent pain, no improvement in pain and actual worsening of pain were also discussed with the patient.\par The discharge plan of care focused on the patient going home following surgery. The patient was encouraged to make the necessary arrangements to have someone stay with them when they are discharged home. Following discharge, a home care nurse was to the patient. The home care nurse would open the patient’s home care case and request home physical therapy services. Home physical therapy was to commence following discharge provided it was appropriate and covered by the health insurance benefit plan. \par The benefits of surgery were discussed with the patient including the potential for improving her current clinical condition through operative intervention. Alternatives to surgical intervention including continued conservative management were also discussed in detail. All questions were answered to the satisfaction of the patient. The treatment plan of care, as well as a model of a total knee arthroplasty equivalent to the one that will be used for their total joint replacement, was shared with the patient. The patient agreed to the plan of care as well as the use of implants in their total joint replacement.  \par \par The patient is a 64 year individual with end stage arthritis of their left hip joint. Based upon the patient's continued symptoms and failure to respond to conservative treatment, pt successfully completed left total hip arthroplasty. A long discussion took place with the patient describing what a total joint replacement is and what the procedure would entail. A total hip arthroplasty model, similar to the implant that will be used during the operation, was utilized to demonstrate and to discuss the various bearing surfaces of the implants. The hospitalization and post-operative care and rehabilitation were also discussed. The use of perioperative antibiotics and DVT prophylaxis were discussed. The risk, benefits and alternatives to a surgical intervention were discussed at length with the patient. The patient was also advised of risks related to the medical comorbidities, elevated body mass index (BMI), and smoking where applicable. We discussed how to reduce modifiable risk factors and encouraged smoking cessation were applicable.. A lengthy discussion took place to review the most common complications including but not limited to: deep vein thrombosis, pulmonary embolus, heart attack, stroke, infection, wound breakdown, numbness, damage to nerves, tendon, muscles, arteries or other blood vessels, death and other possible complications from anesthesia. The patient was told that we will take steps to minimize these risks by using sterile technique, antibiotics and DVT prophylaxis when appropriate and follow the patient postoperatively in the office setting to monitor progress. The possibility of recurrent pain, no improvement in pain and actual worsening of pain were also discussed with the patient.\par The discharge plan of care focused on the patient going home following surgery. The patient was encouraged to make the necessary arrangements to have someone stay with them when they are discharged home. Following discharge, a home care nurse will visit the patient. The home care nurse will open your home care case and request home physical therapy services. Home physical therapy will commence following discharge provided it is appropriate and covered by the health insurance benefit plan. \par The benefits of surgery were discussed with the patient including the potential for improving her current clinical condition through operative intervention. Alternatives to surgical intervention including continued conservative management were also discussed in detail. All questions were answered to the satisfaction of the patient. The treatment plan of care, as well as a model of a total hip arthroplasty equivalent to the one that will be used for their total joint replacement, was shared with the patient. The patient agreed to the plan of care as well as the use of implants in their total joint replacement.

## 2020-10-19 NOTE — PHYSICAL EXAM
[LE] : Sensory: Intact in bilateral lower extremities [ALL] : dorsalis pedis, posterior tibial, femoral, popliteal, and radial 2+ and symmetric bilaterally [Wheelchair] : uses a wheelchair [Normal] : Alert and in no acute distress [Obese] : obese [de-identified] : GENERAL APPEARANCE: Well nourished and hydrated, pleasant, alert, and oriented x 3. Appears their stated age. \par HEENT: Normocephalic, extraocular eye motion intact. Nasal septum midline. Oral cavity clear. External auditory canal clear. \par RESPIRATORY: Breath sounds clear and audible in all lobes. No wheezing, No accessory muscle use.\par CARDIOVASCULAR: No apparent abnormalities. No lower leg edema. No varicosities. Pedal pulses are palpable.\par NEUROLOGIC: Sensation is normal, no muscle weakness in the upper or lower extremities.\par DERMATOLOGIC: No apparent skin lesions, moist, warm, no rash.\par SPINE: Cervical spine appears normal and moves freely; thoracic spine appears normal and moves freely; lumbosacral spine appears normal and moves freely, normal, nontender.\par MUSCULOSKELETAL: Hands, wrists, and elbows are normal and move freely, shoulders are normal and move freely. \par Musculoskeletal: Gait: normal.  [de-identified] : Left knee exam shows ROM 0-95 degrees\par Left hip exam shows healed incision with no sign of infection  [de-identified] : Previous xrays of her left knee demonstrates bone on bone medial compartmental osteoarthritis \par \par Previous xrays of her right hip demonstrates bone on bone osteoarthritis

## 2020-10-19 NOTE — REASON FOR VISIT
[Follow-Up Visit] : a follow-up visit for [Hip Pain] : hip pain [Knee Pain] : knee pain [Spouse] : spouse [FreeTextEntry2] : Bilateral knee and right hip pain

## 2020-10-22 ENCOUNTER — APPOINTMENT (OUTPATIENT)
Dept: HEMATOLOGY ONCOLOGY | Facility: CLINIC | Age: 65
End: 2020-10-22

## 2020-10-24 ENCOUNTER — RX RENEWAL (OUTPATIENT)
Age: 65
End: 2020-10-24

## 2020-11-18 ENCOUNTER — RX CHANGE (OUTPATIENT)
Age: 65
End: 2020-11-18

## 2020-12-08 ENCOUNTER — APPOINTMENT (OUTPATIENT)
Dept: RHEUMATOLOGY | Facility: CLINIC | Age: 65
End: 2020-12-08

## 2020-12-09 ENCOUNTER — NON-APPOINTMENT (OUTPATIENT)
Age: 65
End: 2020-12-09

## 2020-12-09 ENCOUNTER — RX RENEWAL (OUTPATIENT)
Age: 65
End: 2020-12-09

## 2020-12-17 ENCOUNTER — RX RENEWAL (OUTPATIENT)
Age: 65
End: 2020-12-17

## 2020-12-21 PROBLEM — Z87.440 HISTORY OF URINARY TRACT INFECTION: Status: RESOLVED | Noted: 2019-03-12 | Resolved: 2020-12-21

## 2020-12-21 PROBLEM — Z01.419 ENCOUNTER FOR ROUTINE GYNECOLOGICAL EXAMINATION WITH PAPANICOLAOU SMEAR OF CERVIX: Status: RESOLVED | Noted: 2018-07-09 | Resolved: 2020-12-21

## 2021-02-04 ENCOUNTER — APPOINTMENT (OUTPATIENT)
Dept: FAMILY MEDICINE | Facility: CLINIC | Age: 66
End: 2021-02-04
Payer: MEDICARE

## 2021-02-04 ENCOUNTER — OUTPATIENT (OUTPATIENT)
Dept: OUTPATIENT SERVICES | Facility: HOSPITAL | Age: 66
LOS: 1 days | End: 2021-02-04
Payer: COMMERCIAL

## 2021-02-04 VITALS
HEIGHT: 62 IN | RESPIRATION RATE: 16 BRPM | DIASTOLIC BLOOD PRESSURE: 84 MMHG | SYSTOLIC BLOOD PRESSURE: 136 MMHG | OXYGEN SATURATION: 96 % | HEART RATE: 80 BPM | TEMPERATURE: 97.5 F

## 2021-02-04 VITALS
HEART RATE: 87 BPM | RESPIRATION RATE: 18 BRPM | WEIGHT: 291.01 LBS | SYSTOLIC BLOOD PRESSURE: 124 MMHG | DIASTOLIC BLOOD PRESSURE: 82 MMHG | HEIGHT: 64 IN | TEMPERATURE: 98 F

## 2021-02-04 DIAGNOSIS — Z78.9 OTHER SPECIFIED HEALTH STATUS: ICD-10-CM

## 2021-02-04 DIAGNOSIS — M79.651 PAIN IN RIGHT THIGH: ICD-10-CM

## 2021-02-04 DIAGNOSIS — M16.0 BILATERAL PRIMARY OSTEOARTHRITIS OF HIP: ICD-10-CM

## 2021-02-04 DIAGNOSIS — N39.46 MIXED INCONTINENCE: ICD-10-CM

## 2021-02-04 DIAGNOSIS — M75.21 BICIPITAL TENDINITIS, RIGHT SHOULDER: ICD-10-CM

## 2021-02-04 DIAGNOSIS — Z83.3 FAMILY HISTORY OF DIABETES MELLITUS: ICD-10-CM

## 2021-02-04 DIAGNOSIS — Z87.898 PERSONAL HISTORY OF OTHER SPECIFIED CONDITIONS: ICD-10-CM

## 2021-02-04 DIAGNOSIS — Z01.818 ENCOUNTER FOR OTHER PREPROCEDURAL EXAMINATION: ICD-10-CM

## 2021-02-04 DIAGNOSIS — Z48.89 ENCOUNTER FOR OTHER SPECIFIED SURGICAL AFTERCARE: ICD-10-CM

## 2021-02-04 DIAGNOSIS — N39.41 URGE INCONTINENCE: ICD-10-CM

## 2021-02-04 DIAGNOSIS — Z29.9 ENCOUNTER FOR PROPHYLACTIC MEASURES, UNSPECIFIED: ICD-10-CM

## 2021-02-04 DIAGNOSIS — Z98.890 OTHER SPECIFIED POSTPROCEDURAL STATES: ICD-10-CM

## 2021-02-04 DIAGNOSIS — Z80.8 FAMILY HISTORY OF MALIGNANT NEOPLASM OF OTHER ORGANS OR SYSTEMS: ICD-10-CM

## 2021-02-04 DIAGNOSIS — Z90.13 ACQUIRED ABSENCE OF BILATERAL BREASTS AND NIPPLES: ICD-10-CM

## 2021-02-04 DIAGNOSIS — Z87.39 PERSONAL HISTORY OF OTHER DISEASES OF THE MUSCULOSKELETAL SYSTEM AND CONNECTIVE TISSUE: ICD-10-CM

## 2021-02-04 DIAGNOSIS — M25.562 PAIN IN LEFT KNEE: ICD-10-CM

## 2021-02-04 DIAGNOSIS — G89.29 PAIN IN LEFT KNEE: ICD-10-CM

## 2021-02-04 DIAGNOSIS — Z90.13 ACQUIRED ABSENCE OF BILATERAL BREASTS AND NIPPLES: Chronic | ICD-10-CM

## 2021-02-04 DIAGNOSIS — Z82.49 FAMILY HISTORY OF ISCHEMIC HEART DISEASE AND OTHER DISEASES OF THE CIRCULATORY SYSTEM: ICD-10-CM

## 2021-02-04 DIAGNOSIS — M25.551 PAIN IN RIGHT HIP: ICD-10-CM

## 2021-02-04 DIAGNOSIS — M79.652 PAIN IN RIGHT THIGH: ICD-10-CM

## 2021-02-04 DIAGNOSIS — Z82.61 FAMILY HISTORY OF ARTHRITIS: ICD-10-CM

## 2021-02-04 DIAGNOSIS — R20.2 ANESTHESIA OF SKIN: ICD-10-CM

## 2021-02-04 DIAGNOSIS — Z82.5 FAMILY HISTORY OF ASTHMA AND OTHER CHRONIC LOWER RESPIRATORY DISEASES: ICD-10-CM

## 2021-02-04 DIAGNOSIS — Z96.642 PRESENCE OF LEFT ARTIFICIAL HIP JOINT: ICD-10-CM

## 2021-02-04 DIAGNOSIS — M25.552 PAIN IN LEFT HIP: ICD-10-CM

## 2021-02-04 DIAGNOSIS — I10 ESSENTIAL (PRIMARY) HYPERTENSION: ICD-10-CM

## 2021-02-04 DIAGNOSIS — Z96.649 PRESENCE OF UNSPECIFIED ARTIFICIAL HIP JOINT: ICD-10-CM

## 2021-02-04 DIAGNOSIS — Z84.1 FAMILY HISTORY OF DISORDERS OF KIDNEY AND URETER: ICD-10-CM

## 2021-02-04 DIAGNOSIS — R20.0 ANESTHESIA OF SKIN: ICD-10-CM

## 2021-02-04 DIAGNOSIS — Z83.71 FAMILY HISTORY OF COLONIC POLYPS: ICD-10-CM

## 2021-02-04 DIAGNOSIS — R52 PAIN, UNSPECIFIED: ICD-10-CM

## 2021-02-04 DIAGNOSIS — M17.12 UNILATERAL PRIMARY OSTEOARTHRITIS, LEFT KNEE: ICD-10-CM

## 2021-02-04 DIAGNOSIS — G89.29 LOW BACK PAIN: ICD-10-CM

## 2021-02-04 DIAGNOSIS — Z71.89 OTHER SPECIFIED COUNSELING: ICD-10-CM

## 2021-02-04 DIAGNOSIS — Z72.821 INADEQUATE SLEEP HYGIENE: ICD-10-CM

## 2021-02-04 DIAGNOSIS — E66.01 MORBID (SEVERE) OBESITY DUE TO EXCESS CALORIES: ICD-10-CM

## 2021-02-04 DIAGNOSIS — G89.29 LUMBAGO WITH SCIATICA, LEFT SIDE: ICD-10-CM

## 2021-02-04 DIAGNOSIS — Z98.890 OTHER SPECIFIED POSTPROCEDURAL STATES: Chronic | ICD-10-CM

## 2021-02-04 DIAGNOSIS — M54.42 LUMBAGO WITH SCIATICA, LEFT SIDE: ICD-10-CM

## 2021-02-04 DIAGNOSIS — M75.22 BICIPITAL TENDINITIS, LEFT SHOULDER: ICD-10-CM

## 2021-02-04 DIAGNOSIS — M54.5 LOW BACK PAIN: ICD-10-CM

## 2021-02-04 DIAGNOSIS — Z47.1 AFTERCARE FOLLOWING JOINT REPLACEMENT SURGERY: ICD-10-CM

## 2021-02-04 DIAGNOSIS — R40.0 SOMNOLENCE: ICD-10-CM

## 2021-02-04 LAB
A1C WITH ESTIMATED AVERAGE GLUCOSE RESULT: 5.8 % — HIGH (ref 4–5.6)
ALBUMIN SERPL ELPH-MCNC: 3.9 G/DL — SIGNIFICANT CHANGE UP (ref 3.3–5.2)
ALP SERPL-CCNC: 69 U/L — SIGNIFICANT CHANGE UP (ref 40–120)
ALT FLD-CCNC: 22 U/L — SIGNIFICANT CHANGE UP
ANION GAP SERPL CALC-SCNC: 13 MMOL/L — SIGNIFICANT CHANGE UP (ref 5–17)
APTT BLD: 28.3 SEC — SIGNIFICANT CHANGE UP (ref 27.5–35.5)
AST SERPL-CCNC: 21 U/L — SIGNIFICANT CHANGE UP
BASOPHILS # BLD AUTO: 0.09 K/UL — SIGNIFICANT CHANGE UP (ref 0–0.2)
BASOPHILS NFR BLD AUTO: 0.9 % — SIGNIFICANT CHANGE UP (ref 0–2)
BILIRUB SERPL-MCNC: 0.3 MG/DL — LOW (ref 0.4–2)
BLD GP AB SCN SERPL QL: SIGNIFICANT CHANGE UP
BUN SERPL-MCNC: 18 MG/DL — SIGNIFICANT CHANGE UP (ref 8–20)
CALCIUM SERPL-MCNC: 9.4 MG/DL — SIGNIFICANT CHANGE UP (ref 8.6–10.2)
CHLORIDE SERPL-SCNC: 104 MMOL/L — SIGNIFICANT CHANGE UP (ref 98–107)
CO2 SERPL-SCNC: 26 MMOL/L — SIGNIFICANT CHANGE UP (ref 22–29)
CREAT SERPL-MCNC: 0.74 MG/DL — SIGNIFICANT CHANGE UP (ref 0.5–1.3)
EOSINOPHIL # BLD AUTO: 0.24 K/UL — SIGNIFICANT CHANGE UP (ref 0–0.5)
EOSINOPHIL NFR BLD AUTO: 2.3 % — SIGNIFICANT CHANGE UP (ref 0–6)
ESTIMATED AVERAGE GLUCOSE: 120 MG/DL — HIGH (ref 68–114)
GLUCOSE SERPL-MCNC: 116 MG/DL — HIGH (ref 70–99)
HCT VFR BLD CALC: 39.3 % — SIGNIFICANT CHANGE UP (ref 34.5–45)
HGB BLD-MCNC: 12.9 G/DL — SIGNIFICANT CHANGE UP (ref 11.5–15.5)
IMM GRANULOCYTES NFR BLD AUTO: 0.6 % — SIGNIFICANT CHANGE UP (ref 0–1.5)
INR BLD: 1.05 RATIO — SIGNIFICANT CHANGE UP (ref 0.88–1.16)
LYMPHOCYTES # BLD AUTO: 2.84 K/UL — SIGNIFICANT CHANGE UP (ref 1–3.3)
LYMPHOCYTES # BLD AUTO: 27.6 % — SIGNIFICANT CHANGE UP (ref 13–44)
MCHC RBC-ENTMCNC: 30.4 PG — SIGNIFICANT CHANGE UP (ref 27–34)
MCHC RBC-ENTMCNC: 32.8 GM/DL — SIGNIFICANT CHANGE UP (ref 32–36)
MCV RBC AUTO: 92.7 FL — SIGNIFICANT CHANGE UP (ref 80–100)
MONOCYTES # BLD AUTO: 0.98 K/UL — HIGH (ref 0–0.9)
MONOCYTES NFR BLD AUTO: 9.5 % — SIGNIFICANT CHANGE UP (ref 2–14)
NEUTROPHILS # BLD AUTO: 6.08 K/UL — SIGNIFICANT CHANGE UP (ref 1.8–7.4)
NEUTROPHILS NFR BLD AUTO: 59.1 % — SIGNIFICANT CHANGE UP (ref 43–77)
PLATELET # BLD AUTO: 302 K/UL — SIGNIFICANT CHANGE UP (ref 150–400)
POTASSIUM SERPL-MCNC: 4.4 MMOL/L — SIGNIFICANT CHANGE UP (ref 3.5–5.3)
POTASSIUM SERPL-SCNC: 4.4 MMOL/L — SIGNIFICANT CHANGE UP (ref 3.5–5.3)
PROT SERPL-MCNC: 7.3 G/DL — SIGNIFICANT CHANGE UP (ref 6.6–8.7)
PROTHROM AB SERPL-ACNC: 12.2 SEC — SIGNIFICANT CHANGE UP (ref 10.6–13.6)
RBC # BLD: 4.24 M/UL — SIGNIFICANT CHANGE UP (ref 3.8–5.2)
RBC # FLD: 14.8 % — HIGH (ref 10.3–14.5)
SODIUM SERPL-SCNC: 143 MMOL/L — SIGNIFICANT CHANGE UP (ref 135–145)
WBC # BLD: 10.29 K/UL — SIGNIFICANT CHANGE UP (ref 3.8–10.5)
WBC # FLD AUTO: 10.29 K/UL — SIGNIFICANT CHANGE UP (ref 3.8–10.5)

## 2021-02-04 PROCEDURE — G0463: CPT

## 2021-02-04 PROCEDURE — 99072 ADDL SUPL MATRL&STAF TM PHE: CPT

## 2021-02-04 PROCEDURE — 93010 ELECTROCARDIOGRAM REPORT: CPT

## 2021-02-04 PROCEDURE — 99215 OFFICE O/P EST HI 40 MIN: CPT

## 2021-02-04 PROCEDURE — 93005 ELECTROCARDIOGRAM TRACING: CPT

## 2021-02-04 RX ORDER — SODIUM CHLORIDE 9 MG/ML
3 INJECTION INTRAMUSCULAR; INTRAVENOUS; SUBCUTANEOUS EVERY 8 HOURS
Refills: 0 | Status: DISCONTINUED | OUTPATIENT
Start: 2021-02-11 | End: 2021-02-16

## 2021-02-04 RX ORDER — LOSARTAN POTASSIUM 100 MG/1
1 TABLET, FILM COATED ORAL
Qty: 0 | Refills: 0 | DISCHARGE

## 2021-02-04 NOTE — H&P PST ADULT - WEIGHT IN KG
December 6, 2017       [facility]  [address] [place] [id number]      Dear Ms. Davey Mention,    This letter confirms that the date and time of your procedure with Lorenz Oppenheim, MD on January 17, 2018, has been rescheduled to January 23, 2018 at 11:00 AM.    Please register at:    [address] [place] [address] [place]  [address] [address] [facility], [address] [place] [address]  [phone]  On: January 23, 2018, at 10:00 AM.    Please refer to preoperative instructions in the letter previously sent to you. If you have any questions or need to reschedule, please contact me at the telephone number and extension listed below. Thank you for your flexibility and cooperation.     Sincerely,  Open Access Scheduling Patient Line [phone]  Surgery Scheduler for Lorenz Oppenheim, MD   [address] [facility] [phone]

## 2021-02-04 NOTE — H&P PST ADULT - ASSESSMENT
OPIOID RISK TOOL    SIN EACH BOX THAT APPLIES AND ADD TOTALS AT THE END    FAMILY HISTORY OF SUBSTANCE ABUSE                 FEMALE         MALE                                                Alcohol                             [  ]1 pt          [  ]3pts                                               Illegal Drugs                     [  ]2 pts        [  ]3pts                                               Rx Drugs                           [  ]4 pts        [  ]4 pts    PERSONAL HISTORY OF SUBSTANCE ABUSE                                                                                          Alcohol                             [  ]3 pts       [  ]3 pts                                               Illegal Drugs                     [  ]4 pts        [  ]4 pts                                               Rx Drugs                           [  ]5 pts        [  ]5 pts    AGE BETWEEN 16-45 YEARS                                      [  ]1 pt         [  ]1 pt    HISTORY OF PREADOLESCENT   SEXUAL ABUSE                                                             [  ]3 pts        [  ]0pts    PSYCHOLOGICAL DISEASE                     ADD, OCD, Bipolar, Schizophrenia        [  ]2 pts         [  ]2 pts                      Depression                                               [  ]1 pt           [  ]1 pt           SCORING TOTAL   (add numbers and type here)              (**0*)                                     A score of 3 or lower indicated LOW risk for future opioid abuse  A score of 4 to 7 indicated moderate risk for future opioid abuse  A score of 8 or higher indicates a high risk for opioid abuse      CAPRINI SCORE [CLOT]    AGE RELATED RISK FACTORS                                                       MOBILITY RELATED FACTORS  [ ] Age 41-60 years                                            (1 Point)                  [ ] Bed rest                                                        (1 Point)  [ ] Age: 61-74 years                                           (2 Points)                 [ ] Plaster cast                                                   (2 Points)  [ ] Age= 75 years                                              (3 Points)                 [ ] Bed bound for more than 72 hours                 (2 Points)    DISEASE RELATED RISK FACTORS                                               GENDER SPECIFIC FACTORS  [ ] Edema in the lower extremities                       (1 Point)                  [ ] Pregnancy                                                     (1 Point)  [ ] Varicose veins                                               (1 Point)                  [ ] Post-partum < 6 weeks                                   (1 Point)             [ ] BMI > 25 Kg/m2                                            (1 Point)                  [ ] Hormonal therapy  or oral contraception          (1 Point)                 [ ] Sepsis (in the previous month)                        (1 Point)                  [ ] History of pregnancy complications                 (1 point)  [ ] Pneumonia or serious lung disease                                               [ ] Unexplained or recurrent                     (1 Point)           (in the previous month)                               (1 Point)  [ ] Abnormal pulmonary function test                     (1 Point)                 SURGERY RELATED RISK FACTORS  [ ] Acute myocardial infarction                              (1 Point)                 [ ]  Section                                             (1 Point)  [ ] Congestive heart failure (in the previous month)  (1 Point)               [ ] Minor surgery                                                  (1 Point)   [ ] Inflammatory bowel disease                             (1 Point)                 [ ] Arthroscopic surgery                                        (2 Points)  [ ] Central venous access                                      (2 Points)                [ ] General surgery lasting more than 45 minutes   (2 Points)       [ ] Stroke (in the previous month)                          (5 Points)               [ ] Elective arthroplasty                                         (5 Points)                                                                                                                                               HEMATOLOGY RELATED FACTORS                                                 TRAUMA RELATED RISK FACTORS  [ ] Prior episodes of VTE                                     (3 Points)                [ ] Fracture of the hip, pelvis, or leg                       (5 Points)  [ ] Positive family history for VTE                         (3 Points)                 [ ] Acute spinal cord injury (in the previous month)  (5 Points)  [ ] Prothrombin 05726 A                                     (3 Points)                 [ ] Paralysis  (less than 1 month)                             (5 Points)  [ ] Factor V Leiden                                             (3 Points)                  [ ] Multiple Trauma within 1 month                        (5 Points)  [ ] Lupus anticoagulants                                     (3 Points)                                                           [ ] Anticardiolipin antibodies                               (3 Points)                                                       [ ] High homocysteine in the blood                      (3 Points)                                             [ ] Other congenital or acquired thrombophilia      (3 Points)                                                [ ] Heparin induced thrombocytopenia                  (3 Points)                                          Total Score [          ]    Caprini Score 0 - 2:  Low Risk, No VTE Prophylaxis required for most patients, encourage ambulation  Caprini Score 3 - 6:  At Risk, pharmacologic VTE prophylaxis is indicated for most patients (in the absence of a contraindication)  Caprini Score Greater than or = 7:  High Risk, pharmacologic VTE prophylaxis is indicated for most patients (in the absence of a contraindication) OPIOID RISK TOOL    SIN EACH BOX THAT APPLIES AND ADD TOTALS AT THE END    FAMILY HISTORY OF SUBSTANCE ABUSE                 FEMALE         MALE                                                Alcohol                             [  ]1 pt          [  ]3pts                                               Illegal Drugs                     [  ]2 pts        [  ]3pts                                               Rx Drugs                           [  ]4 pts        [  ]4 pts    PERSONAL HISTORY OF SUBSTANCE ABUSE                                                                                          Alcohol                             [  ]3 pts       [  ]3 pts                                               Illegal Drugs                     [  ]4 pts        [  ]4 pts                                               Rx Drugs                           [  ]5 pts        [  ]5 pts    AGE BETWEEN 16-45 YEARS                                      [  ]1 pt         [  ]1 pt    HISTORY OF PREADOLESCENT   SEXUAL ABUSE                                                             [  ]3 pts        [  ]0pts    PSYCHOLOGICAL DISEASE                     ADD, OCD, Bipolar, Schizophrenia        [  ]2 pts         [  ]2 pts                      Depression                                               [  ]1 pt           [  ]1 pt           SCORING TOTAL   (add numbers and type here)              (**0*)                                     A score of 3 or lower indicated LOW risk for future opioid abuse  A score of 4 to 7 indicated moderate risk for future opioid abuse  A score of 8 or higher indicates a high risk for opioid abuse      CAPRINI SCORE [CLOT]    AGE RELATED RISK FACTORS                                                       MOBILITY RELATED FACTORS  [ ] Age 41-60 years                                            (1 Point)                  [ ] Bed rest                                                        (1 Point)  [ x] Age: 61-74 years                                           (2 Points)                 [ ] Plaster cast                                                   (2 Points)  [ ] Age= 75 years                                              (3 Points)                 [ ] Bed bound for more than 72 hours                 (2 Points)    DISEASE RELATED RISK FACTORS                                               GENDER SPECIFIC FACTORS  [ ] Edema in the lower extremities                       (1 Point)                  [ ] Pregnancy                                                     (1 Point)  [ ] Varicose veins                                               (1 Point)                  [ ] Post-partum < 6 weeks                                   (1 Point)             [x ] BMI > 25 Kg/m2                                            (1 Point)                  [ ] Hormonal therapy  or oral contraception          (1 Point)                 [ ] Sepsis (in the previous month)                        (1 Point)                  [ ] History of pregnancy complications                 (1 point)  [ ] Pneumonia or serious lung disease                                               [ ] Unexplained or recurrent                     (1 Point)           (in the previous month)                               (1 Point)  [ ] Abnormal pulmonary function test                     (1 Point)                 SURGERY RELATED RISK FACTORS  [ ] Acute myocardial infarction                              (1 Point)                 [ ]  Section                                             (1 Point)  [ ] Congestive heart failure (in the previous month)  (1 Point)               [ ] Minor surgery                                                  (1 Point)   [ ] Inflammatory bowel disease                             (1 Point)                 [ ] Arthroscopic surgery                                        (2 Points)  [ ] Central venous access                                      (2 Points)                [x ] General surgery lasting more than 45 minutes   (2 Points)       [ ] Stroke (in the previous month)                          (5 Points)               [ ] Elective arthroplasty                                         (5 Points)                                                                                                                                               HEMATOLOGY RELATED FACTORS                                                 TRAUMA RELATED RISK FACTORS  [ ] Prior episodes of VTE                                     (3 Points)                [ ] Fracture of the hip, pelvis, or leg                       (5 Points)  [ ] Positive family history for VTE                         (3 Points)                 [ ] Acute spinal cord injury (in the previous month)  (5 Points)  [ ] Prothrombin 91592 A                                     (3 Points)                 [ ] Paralysis  (less than 1 month)                             (5 Points)  [ ] Factor V Leiden                                             (3 Points)                  [ ] Multiple Trauma within 1 month                        (5 Points)  [ ] Lupus anticoagulants                                     (3 Points)                                                           [ ] Anticardiolipin antibodies                               (3 Points)                                                       [ ] High homocysteine in the blood                      (3 Points)                                             [ ] Other congenital or acquired thrombophilia      (3 Points)                                                [ ] Heparin induced thrombocytopenia                  (3 Points)                                          Total Score [   5       ]    Caprini Score 0 - 2:  Low Risk, No VTE Prophylaxis required for most patients, encourage ambulation  Caprini Score 3 - 6:  At Risk, pharmacologic VTE prophylaxis is indicated for most patients (in the absence of a contraindication)  Caprini Score Greater than or = 7:  High Risk, pharmacologic VTE prophylaxis is indicated for most patients (in the absence of a contraindication)        (pre op dx unilateral primary osteoarthritis )  Patient is a 66 y/o female aox3 . Patient c/o left knee pain for about 10 years  Patient states she twisted her knee getting out of bed and was unable to stand on it . Patient went to her PCP and was given several treatments 2 cortisone treatment with only slight relief heat and cold treatement with no success in reducing her pain or increasing her function, she states she  has trouble with bending the left knee .Her pain level ranges form 4/10 to 6/10 with slight relief from rest and OTC medications .Patient followed up with Dr Henderson for her left hip replacement and was seen for the left knee complaint. Patient was sent for an x-ray of her left knee as per patient her findings as" bone on bone ". Patient presents to [facility] for evaluation for a left knee total joint replacement with Dr Henderson on 21       Patient was educated on preoperative preparations with written and verbal instruction . Patient is going for medical clearance with DR Shore [phone] cardiac with Dr Bustamante [phone]  . Patient will review medications with PCP. Patient was educated on aspirin and aspirin products NSAIDs ,vitamins and herbals that increase the risk of bleeding and need to be stopped five days before procedure  . Patient was also educated on covid testing and covid prevention ,social distancing and wearing a mask.

## 2021-02-04 NOTE — H&P PST ADULT - NSICDXPROBLEM_GEN_ALL_CORE_FT
PROBLEM DIAGNOSES  Problem: Unilateral primary osteoarthritis, left knee  Assessment and Plan: pt is having left knee total joint replacement 2/11/21    Problem: Hypertension  Assessment and Plan: pt will continue medication and will monitor her B/P as well as s/s of htn     Problem: Educated about COVID-19 virus infection  Assessment and Plan: pt educated on testing and prevention     Problem: Need for prophylactic measure  Assessment and Plan: caprini score is 5 modate VTE risk SCD ordered surgical team to assess the need for pharm proph

## 2021-02-04 NOTE — PATIENT PROFILE ADULT - NSPROHMSYMPCOND_GEN_A_NUR
"9/18/2020    Chief Complaint: psychosis    Subjective:  Patient is a 68 y.o. female that is currently inpatient on the [facility] today she is seen in her room, she is pleasant, no aggressive behaviors. She is working with therapy.   Pt does complain about the amount of medications she is taking, but is compliant and otherwise no issues.   Pt is sleeping well at night, she does stay in her room due to her being blind and afraid of falling if out in unfamiliar areas.   Pt denies any hallucinations, no AE with medications.   Objective     Vital Signs    Temp:  [97.3 °F (36.3 °C)-99 °F (37.2 °C)] 99 °F (37.2 °C)  Heart Rate:  [65-88] 88  Resp:  [18] 18  BP: ()/(53-57) 98/53    Physical Exam:   General Appearance: alert, appears stated age and cooperative,  Hygiene:   fair  Gait & Station: Wheelchair  Musculoskeletal: No tremors or abnormal involuntary movements    Mental Status Exam:   Cooperation:  Cooperative  Eye Contact:  blind  Psychomotor Behavior:  Appropriate  Mood: \"Fine\"  Affect:  mood-congruent  Speech:  Normal  Thought Process:  Poverty of thought  Associations: Circumstantial and Disorganized  Thought Content:     Mood congruent   Suicidal:  None   Homicidal:  None   Hallucinations:  Not demonstrated today   Delusion:  Unable to demonstrate  Cognitive Functioning:   Consciousness: awake and alert  Reliability:  fair  Insight:  Poor  Judgement:  Impaired  Impulse Control:  Fair    Lab Results (last 24 hours)     Procedure Component Value Units Date/Time    POC Glucose Once [[id number]]  (Normal) Collected: 09/18/20 0627    Specimen: Blood Updated: 09/18/20 0640     Glucose 89 mg/dL     POC Glucose Once [[id number]]  (Abnormal) Collected: 09/17/20 1937    Specimen: Blood Updated: 09/17/20 1951     Glucose 245 mg/dL     POC Glucose Once [[id number]]  (Abnormal) Collected: 09/17/20 1645    Specimen: Blood Updated: 09/17/20 1707     Glucose 207 mg/dL     POC Glucose Once [[id number]]  (Abnormal) Collected: " 09/17/20 1110    Specimen: Blood Updated: 09/17/20 1128     Glucose 266 mg/dL         Imaging Results (Last 24 Hours)     ** No results found for the last 24 hours. **          Medicine:   Current Facility-Administered Medications:   •  aluminum-magnesium hydroxide-simethicone (MAALOX MAX) 400-400-40 MG/5ML suspension 15 mL, 15 mL, Oral, Q6H PRN, Tyrel Anderson II, MD  •  cloNIDine (CATAPRES) tablet 0.1 mg, 0.1 mg, Oral, Q6H PRN, Tyrel Anderson II, MD  •  dextrose (D50W) 25 g/ 50mL Intravenous Solution 25 g, 25 g, Intravenous, Q15 Min PRN, Cas Houston APRN  •  dextrose (GLUTOSE) oral gel 15 g, 15 g, Oral, Q15 Min PRN, Cas Houston APRN, 15 g at 09/15/20 0556  •  escitalopram (LEXAPRO) tablet 10 mg, 10 mg, Oral, Daily, Tyrel Anderson II, MD, 10 mg at 09/18/20 0830  •  ferrous sulfate EC tablet 324 mg, 324 mg, Oral, Daily With Breakfast, Tyrel Anderson II, MD, 324 mg at 09/18/20 0830  •  glucagon (human recombinant) (GLUCAGEN DIAGNOSTIC) injection 1 mg, 1 mg, Subcutaneous, Q15 Min PRN, Cas Houston APRN  •  insulin aspart (novoLOG) injection 0-7 Units, 0-7 Units, Subcutaneous, TID AC, Cas Houston APRN, Stopped at 09/10/20 1227  •  loperamide (IMODIUM) capsule 2 mg, 2 mg, Oral, Q2H PRN, Tyrel Anderson II, MD  •  magnesium hydroxide (MILK OF MAGNESIA) suspension 2400 mg/10mL 10 mL, 10 mL, Oral, Daily PRN, Tyrel Anderson II, MD  •  pantoprazole (PROTONIX) EC tablet 40 mg, 40 mg, Oral, Q AM, Tyrel Anderson II, MD, 40 mg at 09/18/20 0600  •  risperiDONE (risperDAL) tablet 2 mg, 2 mg, Oral, Daily With Dinner, Ellie Raza MD, 2 mg at 09/17/20 2272    Diagnoses/Assessment:     Acute exacerbation of chronic paranoid schizophrenia (CMS/HCC)    Schizophrenia, paranoid type (CMS/HCC)    Acute blood loss anemia    DMII (diabetes mellitus, type 2) (CMS/HCC)      Treatment Plan:    1) Will continue care for the patient on  the behavioral health unit at [facility] to ensure patient safety.  2) Will continue to provide treatment with the unit milieu, activities, therapies and psychopharmacological management.  3) Patient to be placed on or continued on  Q15 minute checks  and Fall precautions.  4) Pertinent medical issues:   --Type 2 diabetes: Consistent carb diet.  Hold glipizide and Metformin, will continue to offer sliding scale insulin as appropriate  -GERD/acute blood loss anemia: Continue Protonix and ferrous sulfate  5) Will order following labs: none  6) Will make the following medication changes:   --Cont risperdal 2mg qpm dinner.  --Cont lexapro 10 mg daily 7) Will continue discharge planning as appropriate for patient.  8) Psychotherapy provided for less than 16 minutes.    Treatment plan and medication risks and benefits discussed with: Patient    CLAIRE Yen  09/18/20  10:59 CDT     NP, instrsuct NP, instructed pt on pre-op instructions/teaching, tips for safer surgery, pain management scale, pre-surgical infection prevention instructions, covid swab appt info, MRSA/MSSA instructions, ERAS sheet, Online joint replacement class info/book, medical clearance pending, influenza vaccine info sheet: risks/benefits given, pt verbalized understanding of all instructions given.

## 2021-02-04 NOTE — PATIENT PROFILE ADULT - NSPROGENSOURCEINFO_GEN_A_NUR
patient [place] and/or internationally in the last 14-21 days./patient

## 2021-02-04 NOTE — H&P PST ADULT - NSICDXPASTMEDICALHX_GEN_ALL_CORE_FT
PAST MEDICAL HISTORY:  Anemia     Breast cancer in female     Gallstones     Hypertension     Obese     Osteoarthritis left knee

## 2021-02-04 NOTE — PATIENT PROFILE ADULT - NSASFALLNEEDSASSISTWITH_GEN_A_NUR
Patient is a 52y old  Female who presents with a chief complaint of palpitations (15 Apr 2018 03:49)      SUBJECTIVE / OVERNIGHT EVENTS:  Overnight: no events   Tele: sinus 80-120s  Patient reports no tremors, headaches, chest pain, SOB, abdominal pain. Had one brown bm. Occasional palpitations     MEDICATIONS  (STANDING):  folic acid 1 milliGRAM(s) Oral daily  sodium chloride 0.9%. 1000 milliLiter(s) (75 mL/Hr) IV Continuous <Continuous>  sodium phosphate IVPB 15 milliMole(s) IV Intermittent once  thiamine 100 milliGRAM(s) Oral daily    MEDICATIONS  (PRN):  acetaminophen   Tablet 650 milliGRAM(s) Oral every 6 hours PRN For Temp greater than 38 C (100.4 F)  LORazepam     Tablet 2 milliGRAM(s) Oral every 2 hours PRN CIWA-Ar score increase by 2 points and a total score of 7 or less  LORazepam     Tablet 2 milliGRAM(s) Oral every 2 hours PRN Symptom-triggered 2 point increase in CIWA-Ar  LORazepam   Injectable 2 milliGRAM(s) IV Push every 1 hour PRN CIWA-Ar score 8 or greater  ondansetron Injectable 4 milliGRAM(s) IV Push every 6 hours PRN Nausea        CAPILLARY BLOOD GLUCOSE        I&O's Summary    2018 07:  -  2018 07:00  --------------------------------------------------------  IN: 2627 mL / OUT: 2000 mL / NET: 627 mL    2018 07:  -  2018 13:29  --------------------------------------------------------  IN: 220 mL / OUT: 0 mL / NET: 220 mL        PHYSICAL EXAM:  GENERAL: NAD, well-developed  HEAD:  Atraumatic, Normocephalic  EYES: conjunctiva and sclera clear  CHEST/LUNG: Clear to auscultation bilaterally; No wheeze  HEART: Regular rate and rhythm; No murmurs, rubs, or gallops  ABDOMEN: Soft, Nontender, Nondistended; Bowel sounds present  EXTREMITIES:  2+ Peripheral Pulses, No clubbing, cyanosis, or edema  PSYCH: AAOx3  SKIN: No rashes or lesions    LABS:                        9.1    3.24  )-----------( 105      ( 2018 10:11 )             26.2     04-17    134<L>  |  100  |  <4<L>  ----------------------------<  96  3.6   |  24  |  0.57    Ca    8.4      2018 06:51  Phos  1.8     04-17  Mg     1.5     -17    TPro  5.1<L>  /  Alb  2.8<L>  /  TBili  1.8<H>  /  DBili  0.9<H>  /  AST  116<H>  /  ALT  48<H>  /  AlkPhos  123<H>  04-16          Urinalysis Basic - ( 2018 06:13 )    Color: Yellow / Appearance: Clear / S.006 / pH: x  Gluc: x / Ketone: Negative  / Bili: Negative / Urobili: 1.0 mg/dL   Blood: x / Protein: Negative mg/dL / Nitrite: Negative   Leuk Esterase: Negative / RBC: x / WBC x   Sq Epi: x / Non Sq Epi: x / Bacteria: x Walker, rolling; Cane, straight, wheelchair/standing/walking

## 2021-02-04 NOTE — H&P PST ADULT - HISTORY OF PRESENT ILLNESS
(pre op dx unilateral primary osteoarthritis )  Patient is a 66 y/o female aox3 . Patient uses a walker daily . Patient c/o left knee pain for about 10 years . Patient states she twisted her knee getting out of bed and was unable to stand on it . Patient went to her PCP and was given several treatments 2 cortisone treatment with only slight relief, she states she  has trouble with bending the left knee .Her pain level ranges form 4/10 to 6/10 with slight relief from rest and OTC medications .Patient followed up with Dr Henderson for her left hip replacement and was seen for the left knee complaint. Patient was sent for an x-ray of her left knee as per patient her findings as" bone on bone ". Patiewnt presents to PST for evaluation for a left knee total joint replacement with Dr Henderson on 2/11/21  (pre op dx unilateral primary osteoarthritis )  Patient is a 64 y/o female aox3 . Patient c/o left knee pain for about 10 years  Patient states she twisted her knee getting out of bed and was unable to stand on it . Patient went to her PCP and was given several treatments 2 cortisone treatment with only slight relief heat and cold treatement with no success in reducing her pain or increasing her function, she states she  has trouble with bending the left knee .Her pain level ranges form 4/10 to 6/10 with slight relief from rest and OTC medications .Patient followed up with Dr Henderson for her left hip replacement and was seen for the left knee complaint. Patient was sent for an x-ray of her left knee as per patient her findings as" bone on bone ". Patient presents to PST for evaluation for a left knee total joint replacement with Dr Henderson on 2/11/21

## 2021-02-05 LAB
MRSA PCR RESULT.: SIGNIFICANT CHANGE UP
S AUREUS DNA NOSE QL NAA+PROBE: SIGNIFICANT CHANGE UP

## 2021-02-08 ENCOUNTER — APPOINTMENT (OUTPATIENT)
Dept: DISASTER EMERGENCY | Facility: CLINIC | Age: 66
End: 2021-02-08

## 2021-02-09 ENCOUNTER — NON-APPOINTMENT (OUTPATIENT)
Age: 66
End: 2021-02-09

## 2021-02-09 ENCOUNTER — APPOINTMENT (OUTPATIENT)
Dept: DISASTER EMERGENCY | Facility: CLINIC | Age: 66
End: 2021-02-09

## 2021-02-09 ENCOUNTER — APPOINTMENT (OUTPATIENT)
Dept: CARDIOLOGY | Facility: CLINIC | Age: 66
End: 2021-02-09
Payer: MEDICARE

## 2021-02-09 VITALS — TEMPERATURE: 96.3 F

## 2021-02-09 VITALS
WEIGHT: 288 LBS | OXYGEN SATURATION: 97 % | SYSTOLIC BLOOD PRESSURE: 148 MMHG | DIASTOLIC BLOOD PRESSURE: 92 MMHG | HEIGHT: 62 IN | TEMPERATURE: 98.4 F | HEART RATE: 85 BPM | BODY MASS INDEX: 53 KG/M2

## 2021-02-09 PROCEDURE — 93000 ELECTROCARDIOGRAM COMPLETE: CPT

## 2021-02-09 PROCEDURE — 99215 OFFICE O/P EST HI 40 MIN: CPT

## 2021-02-09 PROCEDURE — 99072 ADDL SUPL MATRL&STAF TM PHE: CPT

## 2021-02-09 RX ORDER — APREPITANT 80 MG/1
40 CAPSULE ORAL DAILY
Refills: 0 | Status: DISCONTINUED | OUTPATIENT
Start: 2021-02-11 | End: 2021-02-11

## 2021-02-09 RX ORDER — TRANEXAMIC ACID 100 MG/ML
1000 INJECTION, SOLUTION INTRAVENOUS ONCE
Refills: 0 | Status: DISCONTINUED | OUTPATIENT
Start: 2021-02-11 | End: 2021-02-11

## 2021-02-09 RX ORDER — PANTOPRAZOLE SODIUM 40 MG/10ML
40 INJECTION, POWDER, FOR SOLUTION INTRAVENOUS
Refills: 0 | Status: DISCONTINUED | COMMUNITY
End: 2021-02-09

## 2021-02-09 RX ORDER — NABUMETONE 750 MG/1
750 TABLET, FILM COATED ORAL
Qty: 120 | Refills: 0 | Status: DISCONTINUED | COMMUNITY
Start: 2020-06-04 | End: 2021-02-09

## 2021-02-09 RX ORDER — CEFAZOLIN SODIUM 1 G
3000 VIAL (EA) INJECTION ONCE
Refills: 0 | Status: DISCONTINUED | OUTPATIENT
Start: 2021-02-11 | End: 2021-02-11

## 2021-02-09 RX ORDER — OMEPRAZOLE 20 MG/1
20 CAPSULE, DELAYED RELEASE ORAL
Qty: 30 | Refills: 1 | Status: DISCONTINUED | COMMUNITY
Start: 2020-03-22 | End: 2021-02-09

## 2021-02-09 RX ORDER — DOCUSATE SODIUM 50 MG SENNOSIDES 8.6 MG 8.6; 5 MG/1; MG/1
8.6-5 TABLET, FILM COATED ORAL
Refills: 0 | Status: DISCONTINUED | COMMUNITY
End: 2021-02-09

## 2021-02-09 RX ORDER — FESOTERODINE FUMARATE 8 MG/1
8 TABLET, FILM COATED, EXTENDED RELEASE ORAL
Qty: 30 | Refills: 2 | Status: DISCONTINUED | COMMUNITY
Start: 2020-02-21 | End: 2021-02-09

## 2021-02-09 RX ORDER — TRAMADOL HYDROCHLORIDE 50 MG/1
50 TABLET, COATED ORAL EVERY 4 HOURS
Refills: 0 | Status: DISCONTINUED | COMMUNITY
End: 2021-02-09

## 2021-02-09 RX ORDER — CEFADROXIL 500 MG/1
500 CAPSULE ORAL TWICE DAILY
Refills: 0 | Status: DISCONTINUED | COMMUNITY
End: 2021-02-09

## 2021-02-09 RX ORDER — ROPINIROLE 0.5 MG/1
0.5 TABLET, FILM COATED ORAL
Qty: 14 | Refills: 0 | Status: DISCONTINUED | COMMUNITY
Start: 2020-03-16 | End: 2021-02-09

## 2021-02-09 NOTE — HISTORY OF PRESENT ILLNESS
[Preoperative Visit] : for a medical evaluation prior to surgery [Scheduled Procedure ___] : a [unfilled] [Date of Surgery ___] : on [unfilled] [Surgeon Name ___] : surgeon: [unfilled] [Good] : Good [Chills] : chills [Fatigue] : fatigue [Urinary Frequency] : urinary frequency [Poor Exercise Tolerance] : poor exercise tolerance [Prior Anesthesia] : Prior anesthesia [Electrocardiogram] : ~T an ECG ~C was performed [Echocardiogram] : ~T an echocardiogram ~C was performed [Metabolic Capacity ___Mets%] : The patient has a metabolic capacity of [unfilled] Mets%  [Poor] : Poor [Fever] : no fever [Chest Pain] : no chest pain [Cough] : no cough [Dyspnea] : no dyspnea [Dysuria] : no dysuria [Nausea] : no nausea [Vomiting] : no vomiting [Diarrhea] : no diarrhea [Abdominal Pain] : no abdominal pain [Easy Bruising] : no easy bruising [Lower Extremity Swelling] : no lower extremity swelling [Diabetes] : no diabetes [Cardiovascular Disease] : no cardiovascular disease [Pulmonary Disease] : no pulmonary disease [Anti-Platelet Agents] : no anti-platelet agents [Nicotine Dependence] : no nicotine dependence [Alcohol Use] : no  alcohol use [Renal Disease] : no renal disease [GI Disease] : no gastrointestinal disease [Thromboembolic Problems] : no thromboembolic problems [Frequent use of NSAIDs] : no use of NSAIDs [Transfusion Reaction] : no transfusion reaction [Impaired Immunity] : no impaired immunity [Steroid Use in Last 6 Months] : no steroid use in the last six months [Frequent Aspirin Use] : no frequent aspirin use [Prev Anesthesia Reaction] : no previous anesthesia reaction [Anesthesia Reaction] : no anesthesia reaction [Sudden Death] : no sudden death [Clotting Disorder] : no clotting disorder [Bleeding Disorder] : no bleeding disorder [de-identified] : [facility]  [de-identified] : 10 steps  arthritix. uses a walker a 10-20 steps.   [de-identified] : Sister had trouble coming ou ot anesthesia after her surgery.  [FreeTextEntry1] : encounter for cardiotoxic therapeutic agents \par Bilateral mastectomy, no chemo. \par \par HPI for today: : feels good. here for Pre-operative cardiovascular risk evaluation and management for sleft hip surgery\par no chest pain. no plpatiatons. \par no dyspnea. no headaches. no dizziness.  \par \par old note: she has been taking tamoxifen. no headchaes. sometimes  dizziness when she moves her head\par No syncope.   no chest pain. no palpitations. no dyspnea. No LE edema. \par She has enlarged RV. she wakes up frequently at night. She snores.  She feels ffatgieu and tired during the4 day time. day time sleepiness.\par \par old note: Pre-operative cardiovascular risk evaluation and management and hypertension \par patient had bilateral mastectomy for cancer. Feb 4, 2018. tolerated surgery. NO palpitations. no chestp ain. +  dyspnea. no dizziness. no syncope. \par When she goes up the staris she feels short of breathe. onset: few mths.  \par Now schedule  for DNC next tuesday. \par W

## 2021-02-09 NOTE — DISCUSSION/SUMMARY
[Optimized for Surgery] : the patient is optimized for surgery [As per surgery] : as per surgery [Continue] : Continue medications as currently directed [Patient] : the patient [Risks] : risks [Benefits] : benefits [Alternatives] : alternatives [___ Month(s)] : [unfilled] month(s) [With Me] : with me [FreeTextEntry1] : This is a 63 year old woman with history of obesity recent diagnosis  of bilateral breast cancer for bilateral mastectomy\par 1) Pre-operative cardiovascular risk evaluation and management .   Pre-operative cardiovascular risk evaluation and management : No cardiac Symptoms. No diagnostic ischemic changes on ECG. METs > 4.  RCRI score < 1%. Steve perioperative risk score < 1%. NSQIP score < 1%. No further cardiac work up is needed. Proceed for surgery as indicated.\par Any further work up will not change the risk of this patient. Benefits of surgery outweigh the risk. \par 2) Hypertension:. diet and exercise counselling.  ct losartan.  ct HCTZ \par 3) enlarged RV: sleep apnea. need sleep study. weight loss.  will order sleep study.\par 4) Daytime sleepiness, and snoring and insomnia : Sleep study as outpaitent. did not get it done. \par

## 2021-02-09 NOTE — PHYSICAL EXAM
[General Appearance - Well Developed] : well developed [Normal Appearance] : normal appearance [Well Groomed] : well groomed [General Appearance - Well Nourished] : well nourished [No Deformities] : no deformities [General Appearance - In No Acute Distress] : no acute distress [Normal Conjunctiva] : the conjunctiva exhibited no abnormalities [Eyelids - No Xanthelasma] : the eyelids demonstrated no xanthelasmas [No Oral Pallor] : no oral pallor [Normal Oral Mucosa] : normal oral mucosa [No Oral Cyanosis] : no oral cyanosis [Normal Jugular Venous A Waves Present] : normal jugular venous A waves present [Normal Jugular Venous V Waves Present] : normal jugular venous V waves present [No Jugular Venous Larson A Waves] : no jugular venous larson A waves [Respiration, Rhythm And Depth] : normal respiratory rhythm and effort [Exaggerated Use Of Accessory Muscles For Inspiration] : no accessory muscle use [Auscultation Breath Sounds / Voice Sounds] : lungs were clear to auscultation bilaterally [Heart Rate And Rhythm] : heart rate and rhythm were normal [Heart Sounds] : normal S1 and S2 [Murmurs] : no murmurs present [Abdomen Soft] : soft [Abdomen Tenderness] : non-tender [Abdomen Mass (___ Cm)] : no abdominal mass palpated [Abnormal Walk] : normal gait [Gait - Sufficient For Exercise Testing] : the gait was sufficient for exercise testing [Nail Clubbing] : no clubbing of the fingernails [Cyanosis, Localized] : no localized cyanosis [Petechial Hemorrhages (___cm)] : no petechial hemorrhages [Skin Color & Pigmentation] : normal skin color and pigmentation [] : no rash [No Venous Stasis] : no venous stasis [Skin Lesions] : no skin lesions [No Skin Ulcers] : no skin ulcer [No Xanthoma] : no  xanthoma was observed [Oriented To Time, Place, And Person] : oriented to person, place, and time [Affect] : the affect was normal [Mood] : the mood was normal [No Anxiety] : not feeling anxious [FreeTextEntry1] : morbid obesity .

## 2021-02-10 ENCOUNTER — TRANSCRIPTION ENCOUNTER (OUTPATIENT)
Age: 66
End: 2021-02-10

## 2021-02-10 LAB — SARS-COV-2 N GENE NPH QL NAA+PROBE: NOT DETECTED

## 2021-02-11 ENCOUNTER — TRANSCRIPTION ENCOUNTER (OUTPATIENT)
Age: 66
End: 2021-02-11

## 2021-02-11 ENCOUNTER — APPOINTMENT (OUTPATIENT)
Dept: ORTHOPEDIC SURGERY | Facility: HOSPITAL | Age: 66
End: 2021-02-11

## 2021-02-11 ENCOUNTER — INPATIENT (INPATIENT)
Facility: HOSPITAL | Age: 66
LOS: 4 days | Discharge: HOME HEALTH | DRG: 470 | End: 2021-02-16
Attending: ORTHOPAEDIC SURGERY | Admitting: ORTHOPAEDIC SURGERY
Payer: COMMERCIAL

## 2021-02-11 VITALS
TEMPERATURE: 96 F | HEART RATE: 97 BPM | WEIGHT: 287.92 LBS | SYSTOLIC BLOOD PRESSURE: 148 MMHG | DIASTOLIC BLOOD PRESSURE: 74 MMHG | HEIGHT: 64 IN | OXYGEN SATURATION: 99 % | RESPIRATION RATE: 20 BRPM

## 2021-02-11 DIAGNOSIS — Z90.13 ACQUIRED ABSENCE OF BILATERAL BREASTS AND NIPPLES: Chronic | ICD-10-CM

## 2021-02-11 DIAGNOSIS — Z98.890 OTHER SPECIFIED POSTPROCEDURAL STATES: Chronic | ICD-10-CM

## 2021-02-11 DIAGNOSIS — M17.12 UNILATERAL PRIMARY OSTEOARTHRITIS, LEFT KNEE: ICD-10-CM

## 2021-02-11 LAB
GLUCOSE BLDC GLUCOMTR-MCNC: 105 MG/DL — HIGH (ref 70–99)
GLUCOSE BLDC GLUCOMTR-MCNC: 126 MG/DL — HIGH (ref 70–99)
GLUCOSE BLDC GLUCOMTR-MCNC: 127 MG/DL — HIGH (ref 70–99)

## 2021-02-11 PROCEDURE — 20985 CPTR-ASST DIR MS PX: CPT

## 2021-02-11 PROCEDURE — 27447 TOTAL KNEE ARTHROPLASTY: CPT | Mod: LT

## 2021-02-11 PROCEDURE — 27447 TOTAL KNEE ARTHROPLASTY: CPT | Mod: AS,LT

## 2021-02-11 PROCEDURE — 73560 X-RAY EXAM OF KNEE 1 OR 2: CPT | Mod: 26,LT

## 2021-02-11 RX ORDER — HYDROMORPHONE HYDROCHLORIDE 2 MG/ML
1 INJECTION INTRAMUSCULAR; INTRAVENOUS; SUBCUTANEOUS
Refills: 0 | Status: DISCONTINUED | OUTPATIENT
Start: 2021-02-11 | End: 2021-02-11

## 2021-02-11 RX ORDER — TRAMADOL HYDROCHLORIDE 50 MG/1
50 TABLET ORAL EVERY 6 HOURS
Refills: 0 | Status: DISCONTINUED | OUTPATIENT
Start: 2021-02-11 | End: 2021-02-16

## 2021-02-11 RX ORDER — HYDROMORPHONE HYDROCHLORIDE 2 MG/ML
0.5 INJECTION INTRAMUSCULAR; INTRAVENOUS; SUBCUTANEOUS
Refills: 0 | Status: DISCONTINUED | OUTPATIENT
Start: 2021-02-11 | End: 2021-02-11

## 2021-02-11 RX ORDER — ONDANSETRON 8 MG/1
4 TABLET, FILM COATED ORAL EVERY 6 HOURS
Refills: 0 | Status: DISCONTINUED | OUTPATIENT
Start: 2021-02-11 | End: 2021-02-16

## 2021-02-11 RX ORDER — MAGNESIUM HYDROXIDE 400 MG/1
30 TABLET, CHEWABLE ORAL DAILY
Refills: 0 | Status: DISCONTINUED | OUTPATIENT
Start: 2021-02-11 | End: 2021-02-16

## 2021-02-11 RX ORDER — ENOXAPARIN SODIUM 100 MG/ML
30 INJECTION SUBCUTANEOUS EVERY 12 HOURS
Refills: 0 | Status: DISCONTINUED | OUTPATIENT
Start: 2021-02-12 | End: 2021-02-16

## 2021-02-11 RX ORDER — HYDROMORPHONE HYDROCHLORIDE 2 MG/ML
0.5 INJECTION INTRAMUSCULAR; INTRAVENOUS; SUBCUTANEOUS EVERY 4 HOURS
Refills: 0 | Status: DISCONTINUED | OUTPATIENT
Start: 2021-02-11 | End: 2021-02-12

## 2021-02-11 RX ORDER — LOSARTAN POTASSIUM 100 MG/1
100 TABLET, FILM COATED ORAL DAILY
Refills: 0 | Status: DISCONTINUED | OUTPATIENT
Start: 2021-02-13 | End: 2021-02-16

## 2021-02-11 RX ORDER — KETOROLAC TROMETHAMINE 30 MG/ML
15 SYRINGE (ML) INJECTION EVERY 6 HOURS
Refills: 0 | Status: DISCONTINUED | OUTPATIENT
Start: 2021-02-11 | End: 2021-02-12

## 2021-02-11 RX ORDER — GABAPENTIN 400 MG/1
600 CAPSULE ORAL THREE TIMES A DAY
Refills: 0 | Status: DISCONTINUED | OUTPATIENT
Start: 2021-02-11 | End: 2021-02-16

## 2021-02-11 RX ORDER — ACETAMINOPHEN 500 MG
975 TABLET ORAL EVERY 8 HOURS
Refills: 0 | Status: DISCONTINUED | OUTPATIENT
Start: 2021-02-11 | End: 2021-02-16

## 2021-02-11 RX ORDER — SODIUM CHLORIDE 9 MG/ML
1000 INJECTION INTRAMUSCULAR; INTRAVENOUS; SUBCUTANEOUS
Refills: 0 | Status: DISCONTINUED | OUTPATIENT
Start: 2021-02-11 | End: 2021-02-16

## 2021-02-11 RX ORDER — FERROUS SULFATE 325(65) MG
325 TABLET ORAL DAILY
Refills: 0 | Status: DISCONTINUED | OUTPATIENT
Start: 2021-02-11 | End: 2021-02-16

## 2021-02-11 RX ORDER — SODIUM CHLORIDE 9 MG/ML
500 INJECTION INTRAMUSCULAR; INTRAVENOUS; SUBCUTANEOUS ONCE
Refills: 0 | Status: COMPLETED | OUTPATIENT
Start: 2021-02-11 | End: 2021-02-11

## 2021-02-11 RX ORDER — ACETAMINOPHEN 500 MG
975 TABLET ORAL ONCE
Refills: 0 | Status: COMPLETED | OUTPATIENT
Start: 2021-02-11 | End: 2021-02-11

## 2021-02-11 RX ORDER — SODIUM CHLORIDE 9 MG/ML
1000 INJECTION, SOLUTION INTRAVENOUS
Refills: 0 | Status: DISCONTINUED | OUTPATIENT
Start: 2021-02-11 | End: 2021-02-11

## 2021-02-11 RX ORDER — CEPHALEXIN 500 MG
500 CAPSULE ORAL
Refills: 0 | Status: DISCONTINUED | OUTPATIENT
Start: 2021-02-12 | End: 2021-02-16

## 2021-02-11 RX ORDER — SENNA PLUS 8.6 MG/1
2 TABLET ORAL AT BEDTIME
Refills: 0 | Status: DISCONTINUED | OUTPATIENT
Start: 2021-02-11 | End: 2021-02-16

## 2021-02-11 RX ORDER — ACETAMINOPHEN 500 MG
650 TABLET ORAL EVERY 6 HOURS
Refills: 0 | Status: DISCONTINUED | OUTPATIENT
Start: 2021-02-11 | End: 2021-02-16

## 2021-02-11 RX ORDER — CEFAZOLIN SODIUM 1 G
2000 VIAL (EA) INJECTION
Refills: 0 | Status: COMPLETED | OUTPATIENT
Start: 2021-02-11 | End: 2021-02-12

## 2021-02-11 RX ORDER — ONDANSETRON 8 MG/1
4 TABLET, FILM COATED ORAL ONCE
Refills: 0 | Status: DISCONTINUED | OUTPATIENT
Start: 2021-02-11 | End: 2021-02-11

## 2021-02-11 RX ADMIN — Medication 975 MILLIGRAM(S): at 12:54

## 2021-02-11 RX ADMIN — Medication 100 MILLIGRAM(S): at 21:37

## 2021-02-11 RX ADMIN — Medication 325 MILLIGRAM(S): at 21:33

## 2021-02-11 RX ADMIN — Medication 1 TABLET(S): at 21:37

## 2021-02-11 RX ADMIN — SODIUM CHLORIDE 500 MILLILITER(S): 9 INJECTION INTRAMUSCULAR; INTRAVENOUS; SUBCUTANEOUS at 18:26

## 2021-02-11 RX ADMIN — SENNA PLUS 2 TABLET(S): 8.6 TABLET ORAL at 21:37

## 2021-02-11 RX ADMIN — TRAMADOL HYDROCHLORIDE 50 MILLIGRAM(S): 50 TABLET ORAL at 21:36

## 2021-02-11 RX ADMIN — APREPITANT 40 MILLIGRAM(S): 80 CAPSULE ORAL at 12:54

## 2021-02-11 RX ADMIN — SODIUM CHLORIDE 150 MILLILITER(S): 9 INJECTION INTRAMUSCULAR; INTRAVENOUS; SUBCUTANEOUS at 20:51

## 2021-02-11 RX ADMIN — Medication 975 MILLIGRAM(S): at 21:37

## 2021-02-11 RX ADMIN — SODIUM CHLORIDE 75 MILLILITER(S): 9 INJECTION, SOLUTION INTRAVENOUS at 18:27

## 2021-02-11 RX ADMIN — GABAPENTIN 600 MILLIGRAM(S): 400 CAPSULE ORAL at 21:37

## 2021-02-11 NOTE — DISCHARGE NOTE PROVIDER - CARE PROVIDER_API CALL
Germán Henderson)  Orthopaedic Surgery  [address], [facility], [address]  [place] [address]  Phone: [phone]  Fax: [phone]  Follow Up Time:

## 2021-02-11 NOTE — PHYSICAL THERAPY INITIAL EVALUATION ADULT - PHYSICAL ASSIST/NONPHYSICAL ASSIST: SIT/SUPINE, REHAB EVAL
required increased assistance  to help get bilateral LE's into bed/assume proper semi-rhoades position + verbal cues for proper hand placement./1 person assist

## 2021-02-11 NOTE — PHYSICAL THERAPY INITIAL EVALUATION ADULT - PLANNED THERAPY INTERVENTIONS, PT EVAL
stair training/balance training/bed mobility training/gait training/ROM/strengthening/transfer training

## 2021-02-11 NOTE — PHYSICAL THERAPY INITIAL EVALUATION ADULT - DISCHARGE DISPOSITION, PT EVAL
home with assist, RW and home PT pending progress and pending stair assessment, as pt is an increased falls risk and has multiple stairs to negotiate at home, deeming pt unsafe to return home at this time.

## 2021-02-11 NOTE — DISCHARGE NOTE PROVIDER - NSDCFUADDINST_GEN_ALL_CORE_FT
The patient will be seen in the office between 2-3 weeks for wound check.   **Your first post-operative visit has been scheduled prior to your admission. PLEASE CONTACT OFFICE TO CONFIRM THE APPOINTMENT DATE. Tape will be removed at that time.  **  The silver based dressing is to be removed 7 days from the date of surgery(2/18).   ** CONTACT THE OFFICE IF THE FOLLOWING DEVELOP:  - the dressing becomes soiled or saturated  - you develop a fever greater that 101F  - the wound becomes red or you develop blistering around the wound  * Patient may shower after post-op day #3.   * The patient will continue home PT consistent with  total knee replacement protocol. Transition to outpatient PT will occur at the time of the first office visit.   * The patient will practice knee extension exercises regularly to minimize hamstring contraction.   * The patient is FULL weight bearing.  *** The patient will continue LOVENOX 30mg twice daily for 2 weeks and then begin ASPIRIN 325mg twice daily for 4 weeks and then resume home dose aspirin for blood clot prevention.  *** While on aspirin, the patient will take daily omeprazole or other similar medication to protect the stomach from irritation.   * The patient will take OXYCODONE AND TYLENOL for pain control and adjust according to prescription and patient needs. Contact the office if pain increases while taking prescribed pain medications or related concerns develop.  * The patient will take Senna S while taking oxycodone to prevent narcotic associated constipation.  Additionally, increase water intake (drink at least 8 glasses of water daily) and try adding fiber to the diet by eating fruits, vegetables and foods that are rich in grains. If constipation is experienced, contact the medical/primary care provider to discuss further treatment options. *Patient will take Duricef(cefadroxil) 500mg twice daily for 7 days post-operative.   * To avoid injury at home:  - continue use of rolling walker until cleared by physical therapist  - have family or friend remove all throw rug or objects in hallways that may present a trip hazard.  - if you experience any dizziness or medical concerns, call your medical doctor or  911.  * The implant may activate metal detection devices.  The patient will be seen in the office between 2-3 weeks for wound check.   **Your first post-operative visit has been scheduled prior to your admission. PLEASE CONTACT OFFICE TO CONFIRM THE APPOINTMENT DATE. Tape will be removed at that time.  **  The silver based dressing is to be removed 7 days from the date of surgery(2/18).   ** CONTACT THE OFFICE IF THE FOLLOWING DEVELOP:  - the dressing becomes soiled or saturated  - you develop a fever greater that 101F  - the wound becomes red or you develop blistering around the wound  * Patient may shower after post-op day #3.   * The patient will continue home PT consistent with  total knee replacement protocol. Transition to outpatient PT will occur at the time of the first office visit.   * The patient will practice knee extension exercises regularly to minimize hamstring contraction.   * The patient is FULL weight bearing.  *** The patient will continue LOVENOX 30mg twice daily for 2 weeks and then begin ASPIRIN 325mg twice daily for 4 weeks and then resume home dose aspirin for blood clot prevention.  *** The patient may return to aspirin 81mg home dose after completion. While on aspirin, the patient will take daily omeprazole or other similar medication to protect the stomach from irritation.   * The patient will take TRAMADOL AND TYLENOL for pain control and adjust according to prescription and patient needs. Contact the office if pain increases while taking prescribed pain medications or related concerns develop.  * The patient will take Senna S while taking oxycodone to prevent narcotic associated constipation.  Additionally, increase water intake (drink at least 8 glasses of water daily) and try adding fiber to the diet by eating fruits, vegetables and foods that are rich in grains. If constipation is experienced, contact the medical/primary care provider to discuss further treatment options. *Patient will take Duricef(cefadroxil) 500mg twice daily for 7 days post-operative.   * To avoid injury at home:  - continue use of rolling walker until cleared by physical therapist  - have family or friend remove all throw rug or objects in hallways that may present a trip hazard.  - if you experience any dizziness or medical concerns, call your medical doctor or  911.  * The implant may activate metal detection devices.  The patient will be seen in the office between 2-3 weeks for wound check.   **Your first post-operative visit has been scheduled prior to your admission. PLEASE CONTACT OFFICE TO CONFIRM THE APPOINTMENT DATE. Tape will be removed at that time.  **  The silver based dressing is to be removed 7 days from the date of surgery(2/18).   ** CONTACT THE OFFICE IF THE FOLLOWING DEVELOP:  - the dressing becomes soiled or saturated  - you develop a fever greater that 101F  - the wound becomes red or you develop blistering around the wound  * Patient may shower after post-op day #3.   * The patient will continue home PT consistent with  total knee replacement protocol. Transition to outpatient PT will occur at the time of the first office visit.   * The patient will practice knee extension exercises regularly to minimize hamstring contraction.   * The patient is FULL weight bearing.  *** The patient will continue LOVENOX 30mg twice daily for 2 weeks and then begin ASPIRIN 325mg twice daily for 4 weeks and then resume home dose aspirin for blood clot prevention.  *** The patient may return to aspirin 81mg home dose after completion. While on aspirin, the patient will take daily omeprazole or other similar medication to protect the stomach from irritation.   * The patient will take Dilaudid AND TYLENOL for pain control and adjust according to prescription and patient needs. Contact the office if pain increases while taking prescribed pain medications or related concerns develop.  * The patient will take Senna S while taking oxycodone to prevent narcotic associated constipation.  Additionally, increase water intake (drink at least 8 glasses of water daily) and try adding fiber to the diet by eating fruits, vegetables and foods that are rich in grains. If constipation is experienced, contact the medical/primary care provider to discuss further treatment options. *Patient will take Duricef(cefadroxil) 500mg twice daily for 7 days post-operative.   * To avoid injury at home:  - continue use of rolling walker until cleared by physical therapist  - have family or friend remove all throw rug or objects in hallways that may present a trip hazard.  - if you experience any dizziness or medical concerns, call your medical doctor or  911.  * The implant may activate metal detection devices.

## 2021-02-11 NOTE — DISCHARGE NOTE PROVIDER - HOSPITAL COURSE
The patient underwent a LOVENOX TOTAL KNEE REPLACEMENT on 2/11/21. The patient received antibiotics consistent with SCIP guidelines. The patient underwent the procedure and had no intra-operative complications. Post-operatively, the patient was seen by medicine and PT. The patient received LOVENOX for DVTP. The patient received pain medications per orthopedic pain management pathway and the pain was appropriately controlled. The patient did not have any post-operative medical complications. The patient was discharged in stable condition. The patient underwent a LEFT TOTAL KNEE REPLACEMENT on 2/11/21. The patient received antibiotics consistent with SCIP guidelines. The patient underwent the procedure and had no intra-operative complications. Post-operatively, the patient was seen by medicine and PT. The patient received LOVENOX for DVTP. The patient received pain medications per orthopedic pain management pathway and the pain was appropriately controlled. The patient did not have any post-operative medical complications. The patient was discharged in stable condition.

## 2021-02-11 NOTE — DISCHARGE NOTE PROVIDER - NSDCMRMEDTOKEN_GEN_ALL_CORE_FT
aspirin 81 mg oral delayed release tablet: 1 tab(s) orally once a day while on Lovenox and then resume home dose again after completion of the 325mg dose  gabapentin 600 mg oral tablet: 1 tab(s) orally 3 times a day  losartan 100 mg oral tablet: 1 tab(s) orally once a day  omeprazole 20 mg oral delayed release capsule: 1 cap(s) orally once a day  tamoxifen 20 mg oral tablet: 1 tab(s) orally once a day  traMADol 50 mg oral tablet: 1-2  tab(s) orally every 4 to 6 hours, As Needed -pain MDD:6  Vitamin C 100 mg oral tablet:   vitamin E oral capsule:    acetaminophen 325 mg oral tablet: 3 tab(s) orally every 8 hours  Aspirin Enteric Coated 325 mg oral delayed release tablet: 1 tab(s) orally 2 times a day x 4 weeks, begin after completion of lovenox  cefadroxil 500 mg oral capsule: 1 cap(s) orally 2 times a day   gabapentin 600 mg oral tablet: 1 tab(s) orally 3 times a day  losartan 100 mg oral tablet: 1 tab(s) orally once a day  Lovenox 30 mg/0.3 mL injectable solution: 30 milligram(s) subcutaneously 2 times a day x 2 weeks   omeprazole 20 mg oral delayed release capsule: 1 cap(s) orally once a day  Senna S 50 mg-8.6 mg oral tablet: 2 tab(s) orally once a day (at bedtime)   tamoxifen 20 mg oral tablet: 1 tab(s) orally once a day  traMADol 50 mg oral tablet: 1-2  tab(s) orally every 4 to 6 hours, As Needed -pain MDD:6  Vitamin C 100 mg oral tablet:   vitamin E oral capsule:    acetaminophen 325 mg oral tablet: 3 tab(s) orally every 8 hours  Aspirin Enteric Coated 325 mg oral delayed release tablet: 1 tab(s) orally 2 times a day x 4 weeks, begin after completion of lovenox  cefadroxil 500 mg oral capsule: 1 cap(s) orally 2 times a day   Dilaudid 2 mg oral tablet: 1 tab(s) orally every 4-6 hours PRN mod-severe pain MDD:6  gabapentin 600 mg oral tablet: 1 tab(s) orally 3 times a day  losartan 100 mg oral tablet: 1 tab(s) orally once a day  Lovenox 30 mg/0.3 mL injectable solution: 30 milligram(s) subcutaneously 2 times a day x 2 weeks   omeprazole 20 mg oral delayed release capsule: 1 cap(s) orally once a day  Senna S 50 mg-8.6 mg oral tablet: 2 tab(s) orally once a day (at bedtime)   tamoxifen 20 mg oral tablet: 1 tab(s) orally once a day  Vitamin C 100 mg oral tablet:   vitamin E oral capsule:    Ambulatory

## 2021-02-11 NOTE — PHYSICAL THERAPY INITIAL EVALUATION ADULT - GENERAL OBSERVATIONS, REHAB EVAL
Pt received in PACU, ADAMA bentley'ed pt for PT. pt observed semi-rhoades in stretcher with IV, telemonitor with , BP cuff, primafit, VCBs, pleasant, cooperative, A&O and c/o 10/10 right shoulder pain + 2/10 Left LE pain t/o eval and 2/10 LBP pre + 7/10 LBP during/post eval.

## 2021-02-11 NOTE — PHYSICAL THERAPY INITIAL EVALUATION ADULT - PHYSICAL ASSIST/NONPHYSICAL ASSIST: GAIT, REHAB EVAL
required increased assistance + verbal cues to help maintain proper upright walking posture + for proper use of AD. pt c/o severely increased LBP with audible crepitus noted, which limited pt's activity tolerance/1 person assist

## 2021-02-11 NOTE — DISCHARGE NOTE PROVIDER - NSDCFUSCHEDAPPT_GEN_ALL_CORE_FT
NANCI MATIAS ; 03/03/2021 ; [facility] [address] [place]  NANCI MATIAS ; 03/23/2021 ; [facility] [address]  NANCI MATIAS ; 04/06/2021 ; [facility] [address] ; 05/05/2021 ; [facility] [address]

## 2021-02-11 NOTE — PHYSICAL THERAPY INITIAL EVALUATION ADULT - ADDITIONAL COMMENTS
Pt lives with spouse in a private spilt level house with 2 OLIVER no handrails + 7 steps 2 handrails up to main level/bed&bath and 7 steps 2 handrails down to lower level, however pt doesn't need to go down to lower level. Pt's PLOF was independent in all ADLs/ambulation with RW. Pt has RW, SAC, shower chair, WC and commode DME at home.

## 2021-02-12 ENCOUNTER — TRANSCRIPTION ENCOUNTER (OUTPATIENT)
Age: 66
End: 2021-02-12

## 2021-02-12 DIAGNOSIS — E66.01 MORBID (SEVERE) OBESITY DUE TO EXCESS CALORIES: ICD-10-CM

## 2021-02-12 DIAGNOSIS — C50.919 MALIGNANT NEOPLASM OF UNSPECIFIED SITE OF UNSPECIFIED FEMALE BREAST: ICD-10-CM

## 2021-02-12 DIAGNOSIS — D62 ACUTE POSTHEMORRHAGIC ANEMIA: ICD-10-CM

## 2021-02-12 DIAGNOSIS — I10 ESSENTIAL (PRIMARY) HYPERTENSION: ICD-10-CM

## 2021-02-12 DIAGNOSIS — R73.03 PREDIABETES: ICD-10-CM

## 2021-02-12 DIAGNOSIS — D72.829 ELEVATED WHITE BLOOD CELL COUNT, UNSPECIFIED: ICD-10-CM

## 2021-02-12 LAB
ANION GAP SERPL CALC-SCNC: 15 MMOL/L — SIGNIFICANT CHANGE UP (ref 5–17)
BUN SERPL-MCNC: 20 MG/DL — SIGNIFICANT CHANGE UP (ref 8–20)
CALCIUM SERPL-MCNC: 8.8 MG/DL — SIGNIFICANT CHANGE UP (ref 8.6–10.2)
CHLORIDE SERPL-SCNC: 104 MMOL/L — SIGNIFICANT CHANGE UP (ref 98–107)
CO2 SERPL-SCNC: 21 MMOL/L — LOW (ref 22–29)
CREAT SERPL-MCNC: 0.84 MG/DL — SIGNIFICANT CHANGE UP (ref 0.5–1.3)
GLUCOSE SERPL-MCNC: 140 MG/DL — HIGH (ref 70–99)
HCT VFR BLD CALC: 32.3 % — LOW (ref 34.5–45)
HGB BLD-MCNC: 10.7 G/DL — LOW (ref 11.5–15.5)
MCHC RBC-ENTMCNC: 30.3 PG — SIGNIFICANT CHANGE UP (ref 27–34)
MCHC RBC-ENTMCNC: 33.1 GM/DL — SIGNIFICANT CHANGE UP (ref 32–36)
MCV RBC AUTO: 91.5 FL — SIGNIFICANT CHANGE UP (ref 80–100)
PLATELET # BLD AUTO: 273 K/UL — SIGNIFICANT CHANGE UP (ref 150–400)
POTASSIUM SERPL-MCNC: 4 MMOL/L — SIGNIFICANT CHANGE UP (ref 3.5–5.3)
POTASSIUM SERPL-SCNC: 4 MMOL/L — SIGNIFICANT CHANGE UP (ref 3.5–5.3)
RBC # BLD: 3.53 M/UL — LOW (ref 3.8–5.2)
RBC # FLD: 15 % — HIGH (ref 10.3–14.5)
SODIUM SERPL-SCNC: 140 MMOL/L — SIGNIFICANT CHANGE UP (ref 135–145)
WBC # BLD: 16.29 K/UL — HIGH (ref 3.8–10.5)
WBC # FLD AUTO: 16.29 K/UL — HIGH (ref 3.8–10.5)

## 2021-02-12 PROCEDURE — 99222 1ST HOSP IP/OBS MODERATE 55: CPT

## 2021-02-12 RX ORDER — HYDROMORPHONE HYDROCHLORIDE 2 MG/ML
2 INJECTION INTRAMUSCULAR; INTRAVENOUS; SUBCUTANEOUS EVERY 4 HOURS
Refills: 0 | Status: DISCONTINUED | OUTPATIENT
Start: 2021-02-12 | End: 2021-02-12

## 2021-02-12 RX ORDER — HYDROMORPHONE HYDROCHLORIDE 2 MG/ML
2 INJECTION INTRAMUSCULAR; INTRAVENOUS; SUBCUTANEOUS
Refills: 0 | Status: DISCONTINUED | OUTPATIENT
Start: 2021-02-12 | End: 2021-02-16

## 2021-02-12 RX ORDER — TRAMADOL HYDROCHLORIDE 50 MG/1
100 TABLET ORAL EVERY 6 HOURS
Refills: 0 | Status: DISCONTINUED | OUTPATIENT
Start: 2021-02-12 | End: 2021-02-16

## 2021-02-12 RX ORDER — TRAMADOL HYDROCHLORIDE 50 MG/1
1 TABLET ORAL
Qty: 42 | Refills: 0
Start: 2021-02-12

## 2021-02-12 RX ORDER — ASPIRIN/CALCIUM CARB/MAGNESIUM 324 MG
1 TABLET ORAL
Qty: 60 | Refills: 0
Start: 2021-02-12

## 2021-02-12 RX ORDER — SENNOSIDES/DOCUSATE SODIUM 8.6MG-50MG
2 TABLET ORAL
Qty: 20 | Refills: 0
Start: 2021-02-12

## 2021-02-12 RX ORDER — HYDROMORPHONE HYDROCHLORIDE 2 MG/ML
0.5 INJECTION INTRAMUSCULAR; INTRAVENOUS; SUBCUTANEOUS EVERY 4 HOURS
Refills: 0 | Status: DISCONTINUED | OUTPATIENT
Start: 2021-02-12 | End: 2021-02-12

## 2021-02-12 RX ORDER — ACETAMINOPHEN 500 MG
3 TABLET ORAL
Qty: 0 | Refills: 0 | DISCHARGE
Start: 2021-02-12

## 2021-02-12 RX ORDER — ENOXAPARIN SODIUM 100 MG/ML
30 INJECTION SUBCUTANEOUS
Qty: 28 | Refills: 0
Start: 2021-02-12

## 2021-02-12 RX ORDER — HYDROMORPHONE HYDROCHLORIDE 2 MG/ML
0.5 INJECTION INTRAMUSCULAR; INTRAVENOUS; SUBCUTANEOUS EVERY 4 HOURS
Refills: 0 | Status: DISCONTINUED | OUTPATIENT
Start: 2021-02-12 | End: 2021-02-16

## 2021-02-12 RX ADMIN — GABAPENTIN 600 MILLIGRAM(S): 400 CAPSULE ORAL at 05:24

## 2021-02-12 RX ADMIN — Medication 975 MILLIGRAM(S): at 21:31

## 2021-02-12 RX ADMIN — Medication 975 MILLIGRAM(S): at 05:25

## 2021-02-12 RX ADMIN — GABAPENTIN 600 MILLIGRAM(S): 400 CAPSULE ORAL at 21:31

## 2021-02-12 RX ADMIN — Medication 100 MILLIGRAM(S): at 05:23

## 2021-02-12 RX ADMIN — ENOXAPARIN SODIUM 30 MILLIGRAM(S): 100 INJECTION SUBCUTANEOUS at 05:24

## 2021-02-12 RX ADMIN — SODIUM CHLORIDE 150 MILLILITER(S): 9 INJECTION INTRAMUSCULAR; INTRAVENOUS; SUBCUTANEOUS at 05:27

## 2021-02-12 RX ADMIN — Medication 325 MILLIGRAM(S): at 13:46

## 2021-02-12 RX ADMIN — HYDROMORPHONE HYDROCHLORIDE 2 MILLIGRAM(S): 2 INJECTION INTRAMUSCULAR; INTRAVENOUS; SUBCUTANEOUS at 08:52

## 2021-02-12 RX ADMIN — Medication 15 MILLIGRAM(S): at 13:47

## 2021-02-12 RX ADMIN — Medication 500 MILLIGRAM(S): at 18:14

## 2021-02-12 RX ADMIN — GABAPENTIN 600 MILLIGRAM(S): 400 CAPSULE ORAL at 15:16

## 2021-02-12 RX ADMIN — HYDROMORPHONE HYDROCHLORIDE 2 MILLIGRAM(S): 2 INJECTION INTRAMUSCULAR; INTRAVENOUS; SUBCUTANEOUS at 13:46

## 2021-02-12 RX ADMIN — SODIUM CHLORIDE 3 MILLILITER(S): 9 INJECTION INTRAMUSCULAR; INTRAVENOUS; SUBCUTANEOUS at 13:46

## 2021-02-12 RX ADMIN — SODIUM CHLORIDE 3 MILLILITER(S): 9 INJECTION INTRAMUSCULAR; INTRAVENOUS; SUBCUTANEOUS at 21:26

## 2021-02-12 RX ADMIN — Medication 15 MILLIGRAM(S): at 05:25

## 2021-02-12 RX ADMIN — Medication 975 MILLIGRAM(S): at 13:46

## 2021-02-12 RX ADMIN — HYDROMORPHONE HYDROCHLORIDE 2 MILLIGRAM(S): 2 INJECTION INTRAMUSCULAR; INTRAVENOUS; SUBCUTANEOUS at 18:17

## 2021-02-12 RX ADMIN — HYDROMORPHONE HYDROCHLORIDE 0.5 MILLIGRAM(S): 2 INJECTION INTRAMUSCULAR; INTRAVENOUS; SUBCUTANEOUS at 02:26

## 2021-02-12 RX ADMIN — ENOXAPARIN SODIUM 30 MILLIGRAM(S): 100 INJECTION SUBCUTANEOUS at 18:15

## 2021-02-12 RX ADMIN — Medication 1 TABLET(S): at 13:46

## 2021-02-12 RX ADMIN — HYDROMORPHONE HYDROCHLORIDE 2 MILLIGRAM(S): 2 INJECTION INTRAMUSCULAR; INTRAVENOUS; SUBCUTANEOUS at 21:31

## 2021-02-12 RX ADMIN — Medication 500 MILLIGRAM(S): at 23:17

## 2021-02-12 RX ADMIN — Medication 15 MILLIGRAM(S): at 18:14

## 2021-02-12 RX ADMIN — Medication 15 MILLIGRAM(S): at 00:12

## 2021-02-12 RX ADMIN — TRAMADOL HYDROCHLORIDE 100 MILLIGRAM(S): 50 TABLET ORAL at 05:25

## 2021-02-12 RX ADMIN — Medication 500 MILLIGRAM(S): at 13:46

## 2021-02-12 NOTE — OCCUPATIONAL THERAPY INITIAL EVALUATION ADULT - PHYSICAL ASSIST/NONPHYSICAL ASSIST: SIT/STAND, REHAB EVAL
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     Final pathology from 6/27/17 showed:  -Ovary, left, oophorectomy:     -Involved by mucinous adenocarcinoma,   -Lymph node, left common iliac, biopsy:     -Negative for malignancy.      -Primary Tumor Site(s):   Left ovary     -Tumor Size - Left Ovary: 15.1 cm   -Mucinous carcinoma     -Histologic Grade:   G2: Moderately differentiated     -Peritoneal Ascitic Fluid:   Malignant (positive for malignancy)       - The final stage is therefore stage IC3 (based on the positive cytology), grade 2 mucinous adenocarcinoma.    - She had a right intravenous access port placed on 6/28/17 by Dr. Coppola.     She completed cycle 6 of Carboplatin and Paclitaxel on 10/23/17 with Dr.Shadi Rainey    - On 12/13/17 she had her right chest power port removed    CT abdomen and pelvis performed on 1/18/18:  - Previously noted large pelvic mass has been resected without recurrence.   - Ascites has resolved. No evidence of progressive metastatic disease or new lesion.  - Indeterminate lesions dome of the liver and left kidney.  -These are low-attenuation lesions but not clearly cystic.   - Could be metastasis versus benign noncystic lesions.   - Ultrasound could be obtained but I suspect the visualization of these areas will be  limited due to patient's size and body habitus.   - Recommend follow-up CT.  - Abnormal appearing liver suggesting parenchymal disease.   - Splenomegaly and prominent portal vein consistent with portal hypertension    CT abdomen and pelvis performed on 7/6/18 showed:  -No acute process or metastatic disease  identified.    Her most recent office visit with Dr. Waters on 6/11/2020 it was noted, \"Plan to rotate appointments with Dr. Pérez. She will follow up in December and then annually after that as she is doing well.\"    :1000.2 (6/9/17) 441 (6/28/17) 33 (10/12/17) 8 (1/12/18), 8 (3/1/18), 6 (6/4/18), 9 (9/4/18), 9 (12/4/18), 8 (3/4/19), 8 (6/5/19), 9 (12/11/19), 11 (6/11/19)  LDH: 210 (11/30/17), 242 (3/1/18), 189 (6/4/18), 195 (9/4/18), 211 (12/4/18)    She now presents for follow-up.  She feels well.  She has no complaints whatsoever.  She specifically denies any issues with leg or ankle swelling, any abdominal pain or bloating.  Denies bowel or bladder function issues.  Denies any vaginal bleeding or discharge.  No weight change.  No change in appetite or early satiety.    On her review of systems, she notes medicated diabetes.  Ongoing hot flashes.  Arthritis and painful joints.  Fatigue.  Pain in the legs, hip and back.  This started a few months ago.  It is rated a “four.”  She notes at times the pain is sharp, no radiation, certain movements will worsen the pain.  Nothing makes the pain better.  I did offer her a consultation with physical therapy.  She does not wish to pursue this, she will talk with her primary care provider.    Review of Systems:  A 14 item review of systems was performed and all responses were negative except as noted in the above \"History of Present Illness\".     Distress Thermometer:  Four.    Past Medical History:  Past Medical History:   Diagnosis Date   • Acid reflux    • Anxiety    • Diabetes mellitus (CMS/HCC)    • Essential hypertension    • Fatty liver    • Hidradenitis suppurativa     lower abdomin   • Hyperlipidemia    • Iron deficiency    • Major depressive disorder with single episode    • Obesity    • Ovarian cancer (CMS/HCC)    • Palpitations     sinus tach   • Panic disorder    • Sleep apnea     c pap   • Venous stasis dermatitis      Past Surgical History:  Past  Surgical History:   Procedure Laterality Date   • Appendectomy     •  section, classic     • Cholecystectomy     • Hb inject/aspirate ganglion cyst     • Hysterectomy     • Ir implantable port vein access     • Knee arthroscopy w/ meniscal repair Bilateral    • Liver biopsy     • Lymph node dissection Left 2017   • Pelvis laparoscopy,dx  2017   • Salpingoophorectomy Bilateral 2017   • Total laparoscopic hysterect  2017     Medications:  Current Outpatient Medications   Medication Sig   • Aspirin Buf,CaCarb-MgCarb-MgO, 81 MG Tab    • fluconazole (DIFLUCAN) 150 MG tablet TAKE 1 TABLET(150 MG) BY MOUTH EVERY OTHER DAY FOR 3 DOSES.   • biotin 10 MG tablet Take 10 mg by mouth daily.   • Cyanocobalamin (VITAMIN B 12 PO) Take 1,000 mg by mouth daily.   • empagliflozin (JARDIANCE) 25 MG tablet Take 25 mg by mouth daily.    • gemfibrozil (LOPID) 600 MG tablet 1 TABLET TWICE DAILY BEFORE MEALS   • ONE TOUCH ULTRA TEST test strip U TO TEST BID   • blood glucose test strip TEST TWO TIMES DAILY.   • Cholecalciferol (VITAMIN D PO) Take 1,000 Units by mouth daily.    • aspirin 81 MG tablet Take 81 mg by mouth daily.   • PARoxetine (PAXIL) 20 MG tablet Take 20 mg by mouth every morning.   • metoPROLOL (LOPRESSOR) 25 MG tablet Take 25 mg by mouth 2 times daily.    • metformin (GLUCOPHAGE) 1000 MG tablet Take 1,000 mg by mouth 2 times daily (with meals).   • glimepiride (AMARYL) 4 MG tablet Take 4 mg by mouth daily (before breakfast).    • omeprazole (PRILOSEC) 40 MG capsule Take 40 mg by mouth daily.   • rosuvastatin (CRESTOR) 10 MG tablet Take 10 mg by mouth daily.   • vitamin - therapeutic multivitamins w/minerals (CENTRUM SILVER,THERA-M) Tab Take 1 tablet by mouth daily.   • ibuprofen (MOTRIN) 200 MG tablet Take 200 mg by mouth every 6 hours as needed for Pain.   • naproxen sodium (ALEVE) 220 MG tablet Take 220 mg by mouth 2 times daily (with meals).   • JARDIANCE 25 MG tablet    • Omega-3 Fatty  Acids (FISH OIL) 1000 MG capsule 1 CAPSULE TWICE DAILY   • acetaminophen (TYLENOL) 325 MG tablet Take 650 mg by mouth every 4 hours as needed for Pain.     No current facility-administered medications for this visit.      Allergies:  ALLERGIES:  Sulfa antibiotics and Cat dander    Family History:  Family History   Problem Relation Age of Onset   • Osteoarthritis Mother    • Congestive Heart Failure Father    • Hypertension Father    • Diabetes Father    • Asthma Maternal Grandmother    • Rheumatoid Arthritis Maternal Grandmother    • Cancer Maternal Grandmother 78        unknown orgin   • Cancer, Colon Paternal Grandmother 72        d.78   • Cancer, Breast Paternal Grandmother         age of onset unknown   • Congestive Heart Failure Maternal Grandfather    • Stroke Paternal Grandfather    • Asthma Daughter    • Cancer, Breast Other 40        Paternal 1st Cousin   • Cancer Maternal Uncle 81        leukemia, d.82     Social History:   Social History     Socioeconomic History   • Marital status: /Civil Union     Spouse name: Not on file   • Number of children: Not on file   • Years of education: Not on file   • Highest education level: Not on file   Occupational History   • Not on file   Social Needs   • Financial resource strain: Not on file   • Food insecurity:     Worry: Not on file     Inability: Not on file   • Transportation needs:     Medical: Not on file     Non-medical: Not on file   Tobacco Use   • Smoking status: Never Smoker   • Smokeless tobacco: Never Used   Substance and Sexual Activity   • Alcohol use: Yes     Alcohol/week: 0.0 standard drinks     Frequency: Never     Drinks per session: 1 or 2     Binge frequency: Never     Comment: seldom   • Drug use: No   • Sexual activity: Not on file   Lifestyle   • Physical activity:     Days per week: Not on file     Minutes per session: Not on file   • Stress: Not on file   Relationships   • Social connections:     Talks on phone: Not on file     Gets  together: Not on file     Attends Yazidism service: Not on file     Active member of club or organization: Not on file     Attends meetings of clubs or organizations: Not on file     Relationship status: Not on file   • Intimate partner violence:     Fear of current or ex partner: Not on file     Emotionally abused: Not on file     Physically abused: Not on file     Forced sexual activity: Not on file   Other Topics Concern   • Not on file   Social History Narrative    Works as  ,  to Hammad, who is retired as a .  They have one grown daughter.     PHYSICAL EXAM  Constitutional:   General appearance:  Morbidly obese, well-groomed female in no obvious distress.   Vitals:  Blood pressure 132/76, pulse 74, temperature 97.8 °F (36.6 °C), resp. rate 14, height 5' 4\" (1.626 m), weight (!) 139 kg.  BMI 55.  Neurological/Psychiatric:  Orientation: Alert to time, place and person. Mood and affect: Appears appropriate.  Neck:  Appears normal, with no masses, asymmetry, with a midline trachea. The thyroid is not enlarged, tender or with any masses.  Skin: No rashes, lesions or ulcers.  Respiratory:  Good respiratory effort, with no use of accessory muscles.  Good diaphram movement.  Lungs are clear to auscultation with no abnormal breath sounds.  Cardiovascular: Auscultation of heart reveals no abnormal sounds or murmurs. The peripheral vascular system shows full pulses and no edema, or tenderness.  Chest (breasts):  Please see \"Genitourinary\" below  Gastrointestinal (Abdomen):  Robotic laparoscopic scars. Pfannenstiel scar. Laparoscopic cholecystectomy scars. No hernia, or hepatosplenomegaly. Stool sample for occult blood test not indicated.  Lymphatic: No palpable lymph nodes in neck, axillae, or groin.  Genitourinary:    Breasts: not examined.  Pelvic exam   External genitalia: Normal appearance with no lesions.   Urethra meatus is of normal size and location, with no  lesions or prolapse.   Urethra shows no masses, tenderness or scarring.   Bladder shows no tenderness, fullness or masses.   Vagina has normal appearance, with no discharge, lesions, cystocele, rectocele or prolapse.   Cervix: Surgically absent   Uterus: Surgically absent   Adnexa/Parametria:  Surgically absent   Anus and Perineum: No lesions noted.  Digital rectal exam: Good sphincter tone, no masses. No obvious hemorrhoids. No nodules.    Data Reviewed:  Previous office visit notes and  Labs     Tests Ordered:    every six month.    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  She will follow-up with her local oncologist in December, then follow-up with me in June 2021.  She will continue to follow-up every 6 months until June 2022 then annually. She was encouraged to call me should they have any questions or concerns.    , thank you so much for allowing me to help you care for Ms. Driver.  Please contact me should you have any questions or concerns whatsoever. I look forward to being of further service to you and will continue to keep you informed of any and all subsequent developments in her care from my standpoint.        supervision

## 2021-02-12 NOTE — DISCHARGE NOTE NURSING/CASE MANAGEMENT/SOCIAL WORK - NSDCVIVACCINE_GEN_ALL_CORE_FT
ICU End of Shift Summary. See flowsheets for vital signs and detailed assessment.    Changes this shift: Reports abdominal pain, tolerable. Ongoing nausea, IV Zofran given. Paracentesis, 3.1L removed. Estradiol patch reordered, old one on LL abdomen. Hgb recheck pending. Potassium 3.2, one time order replacement PO. Lasix, good UOP.     Plan:  Monitor nausea. Monitor paracentesis site. Pending transfer to medicine floor.    No Vaccines Administered.

## 2021-02-12 NOTE — CONSULT NOTE ADULT - ATTENDING COMMENTS
Patient seen and examined , s/p L TKA , POD # 1.   Pain well controlled , no n/v , voiding without difficulties .   L knee dressing + < clean and dry   BP on lower side , patient is asymptomatic ,   BP meds on hold till POD # 2 ,   continue ivf till after lunch ,   patient advised to increase PO fluid intake , use IS .   Above noted and reviewed with NP Sruthi ,   agree with above .  D/W patient / NP / ortho PA.   Dispo plan is Home- likely today .    Medically stable to d/c once cleared by physical therapy / ortho .

## 2021-02-12 NOTE — OCCUPATIONAL THERAPY INITIAL EVALUATION ADULT - PLANNED THERAPY INTERVENTIONS, OT EVAL
ADL retraining/balance training/bed mobility training/motor coordination training/neuromuscular re-education/ROM/strengthening/transfer training

## 2021-02-12 NOTE — CONSULT NOTE ADULT - PROBLEM SELECTOR RECOMMENDATION 9
S/p Left TKA POD #1  Pain control.  PT/OT.  Antibiotics, wound care and DVT prophylaxis as per Ortho.  Incentive spirometry.  Avoid opioid induced constipation.

## 2021-02-12 NOTE — OCCUPATIONAL THERAPY INITIAL EVALUATION ADULT - LIVES WITH, PROFILE
ruth, in a private house with 7 steps to enter and 7 steps inside with railing; pt's S.O. will assist upon discharge as needed/significant other

## 2021-02-12 NOTE — CONSULT NOTE ADULT - SUBJECTIVE AND OBJECTIVE BOX
PMD: Dr. Shore/Abeba  Cardiologist: Dr. Sparrow  Rheumatology: Dr. Kleiner    CC: Left knee pain    HPI:  64 y/o female with PMH HTN, Breast CA, s/p bilateral mastectomy, OA presents with worsening left knee pain [id number], s/p cortisone injections with limited relief, with associated difficulty ambulating, controlled with Ibuprofen and Tylenol, now s/p left TKA POD#1.     PAST MEDICAL & SURGICAL HISTORY:  Obese    Anemia    Osteoarthritis  left knee    Gallstones    Breast cancer in female    Hypertension    S/P mastectomy, bilateral    History of tonsillectomy    FAMILY HISTORY:  Family history of breast cancer (Sibling)    Family history of hypertension in father (Father)    Maternal family history of emphysema (Mother)    SOCIAL HISTORY:  Lives with   Tobacco - Denies  ETOH - Occasional  Drug use - Denies    ALLERGIES:   Codeine - GI upset  Latex - Rash  Sulfa - Hives    HOME MEDICATIONS:  acetaminophen prn  gabapentin 600 mg oral tablet: 1 tab(s) orally 4 times a day   losartan 100 mg oral tablet: 1 tab(s) orally once a day   omeprazole 20 mg oral delayed release capsule: 1 cap(s) orally once a day  tamoxifen 20 mg oral tablet: 1 tab(s) orally once a day  Vitamin C 100 mg oral tablet:   vitamin E oral capsule:   HCTZ 25mg PO daily  ASA 81mg PO daily    MEDICATIONS  (STANDING):  acetaminophen   Tablet .. 975 milliGRAM(s) Oral every 8 hours  cephalexin 500 milliGRAM(s) Oral four times a day  enoxaparin Injectable 30 milliGRAM(s) SubCutaneous every 12 hours  ferrous    sulfate 325 milliGRAM(s) Oral daily  gabapentin 600 milliGRAM(s) Oral three times a day  ketorolac   Injectable 15 milliGRAM(s) IV Push every 6 hours  multivitamin 1 Tablet(s) Oral daily  senna 2 Tablet(s) Oral at bedtime  sodium chloride 0.9% lock flush 3 milliLiter(s) IV Push every 8 hours  sodium chloride 0.9%. 1000 milliLiter(s) (150 mL/Hr) IV Continuous <Continuous>    MEDICATIONS  (PRN):  acetaminophen   Tablet .. 650 milliGRAM(s) Oral every 6 hours PRN Temp greater or equal to 38C (100.4F)  HYDROmorphone   Tablet 2 milliGRAM(s) Oral every 3 hours PRN Severe Pain (7 - 10)  HYDROmorphone  Injectable 0.5 milliGRAM(s) IV Push every 4 hours PRN severe/breakthrough pain  magnesium hydroxide Suspension 30 milliLiter(s) Oral daily PRN Constipation  ondansetron Injectable 4 milliGRAM(s) IV Push every 6 hours PRN Nausea and/or Vomiting  traMADol 50 milliGRAM(s) Oral every 6 hours PRN Mild Pain (1 - 3)  traMADol 100 milliGRAM(s) Oral every 6 hours PRN Moderate Pain (4 - 6)    REVIEW OF SYSTEMS:  CONSTITUTIONAL: No fever, weight loss, or fatigue  RESPIRATORY: No cough, wheezing, or chills; No shortness of breath  CARDIOVASCULAR: No chest pain, palpitations, dizziness, or leg swelling  GASTROINTESTINAL: No abdominal or epigastric pain. No nausea or vomiting; No diarrhea or constipation.   GENITOURINARY: No dysuria, frequency, hematuria, or incontinence  NEUROLOGICAL: No headaches, memory loss, loss of strength, numbness, or tremors  SKIN: No itching, burning, rashes, or lesions   MUSCULOSKELETAL: Left knee pain  PSYCHIATRIC: No depression, anxiety, mood swings, or difficulty sleeping        Vital Signs Last 24 Hrs  T(C): 36.3 (12 Feb 2021 05:04), Max: 36.9 (11 Feb 2021 20:00)  T(F): 97.4 (12 Feb 2021 05:04), Max: 98.4 (11 Feb 2021 20:00)  HR: 67 (12 Feb 2021 05:04) (66 - 97)  BP: 109/66 (12 Feb 2021 05:04) (109/66 - 149/70)  BP(mean): --  RR: 18 (12 Feb 2021 05:04) (13 - 20)  SpO2: 94% (12 Feb 2021 05:04) (94% - 100%)    PHYSICAL EXAM:  GENERAL: NAD  HEAD:  Atraumatic, Normocephalic  NECK: Supple, No JVD, Normal thyroid  NERVOUS SYSTEM:  Alert & Oriented X3, Good concentration; Motor Strength 5/5 B/L upper and lower extremities  CHEST/LUNG: Clear to auscultation bilaterally; No rales, rhonchi, wheezing, or rubs  HEART: Regular rate and rhythm; No murmurs, rubs, or gallops  ABDOMEN: Soft, Nontender, Nondistended; Bowel sounds present  EXTREMITIES:  2+ Peripheral Pulses, Left knee with clean dressing        LABS:                        10.7   16.29 )-----------( 273      ( 12 Feb 2021 07:10 )             32.3     02-12    140  |  104  |  20.0  ----------------------------<  140<H>  4.0   |  21.0<L>  |  0.84    Ca    8.8      12 Feb 2021 07:10            RADIOLOGY & ADDITIONAL STUDIES:  < from: Xray Knee 1 or 2 Views, Left (02.11.21 @ 17:28) >   EXAM:  KNEE-LEFT                          PROCEDURE DATE:  02/11/2021          INTERPRETATION:  CLINICAL INFORMATION: or. Osteoarthritis.    EXAM:  3 views of the left knee.    COMPARISON: Bilateral knee radiographs 3/11/2019      FINDINGS:    Status post left total knee arthroplasty with cemented components and patellar resurfacing. No periprosthetic fracture. There is postoperative soft tissue swelling and air.    IMPRESSION:  Status post left total knee arthroplasty.            JORGE A SAHNI MD; Attending Radiologist  This document has been electronically signed. Feb 11 2021  7:12PM    < end of copied text >

## 2021-02-12 NOTE — CONSULT NOTE ADULT - PROBLEM SELECTOR RECOMMENDATION 2
BP on the lower side - Asymptomatic.  Continue IVF.  Losartan and HCTZ on hold until POD#2.  Monitor BP.

## 2021-02-12 NOTE — CONSULT NOTE ADULT - ASSESSMENT
66 y/o female with PMH HTN, Breast CA, s/p bilateral mastectomy, OA presents with worsening left knee pain [id number], s/p cortisone injections with limited relief, with associated difficulty ambulating, controlled with Ibuprofen and Tylenol, now s/p left TKA POD#1.

## 2021-02-13 LAB
ANION GAP SERPL CALC-SCNC: 16 MMOL/L — SIGNIFICANT CHANGE UP (ref 5–17)
BUN SERPL-MCNC: 22 MG/DL — HIGH (ref 8–20)
CALCIUM SERPL-MCNC: 8.5 MG/DL — LOW (ref 8.6–10.2)
CHLORIDE SERPL-SCNC: 103 MMOL/L — SIGNIFICANT CHANGE UP (ref 98–107)
CO2 SERPL-SCNC: 19 MMOL/L — LOW (ref 22–29)
CREAT SERPL-MCNC: 0.72 MG/DL — SIGNIFICANT CHANGE UP (ref 0.5–1.3)
GLUCOSE SERPL-MCNC: 145 MG/DL — HIGH (ref 70–99)
HCT VFR BLD CALC: 33.3 % — LOW (ref 34.5–45)
HGB BLD-MCNC: 11 G/DL — LOW (ref 11.5–15.5)
MCHC RBC-ENTMCNC: 30.4 PG — SIGNIFICANT CHANGE UP (ref 27–34)
MCHC RBC-ENTMCNC: 33 GM/DL — SIGNIFICANT CHANGE UP (ref 32–36)
MCV RBC AUTO: 92 FL — SIGNIFICANT CHANGE UP (ref 80–100)
PLATELET # BLD AUTO: 283 K/UL — SIGNIFICANT CHANGE UP (ref 150–400)
POTASSIUM SERPL-MCNC: 3.8 MMOL/L — SIGNIFICANT CHANGE UP (ref 3.5–5.3)
POTASSIUM SERPL-SCNC: 3.8 MMOL/L — SIGNIFICANT CHANGE UP (ref 3.5–5.3)
RBC # BLD: 3.62 M/UL — LOW (ref 3.8–5.2)
RBC # FLD: 15.5 % — HIGH (ref 10.3–14.5)
SODIUM SERPL-SCNC: 138 MMOL/L — SIGNIFICANT CHANGE UP (ref 135–145)
WBC # BLD: 12.52 K/UL — HIGH (ref 3.8–10.5)
WBC # FLD AUTO: 12.52 K/UL — HIGH (ref 3.8–10.5)

## 2021-02-13 PROCEDURE — 99232 SBSQ HOSP IP/OBS MODERATE 35: CPT

## 2021-02-13 RX ORDER — HYDROMORPHONE HYDROCHLORIDE 2 MG/ML
1 INJECTION INTRAMUSCULAR; INTRAVENOUS; SUBCUTANEOUS
Qty: 30 | Refills: 0
Start: 2021-02-13

## 2021-02-13 RX ADMIN — Medication 975 MILLIGRAM(S): at 23:52

## 2021-02-13 RX ADMIN — Medication 975 MILLIGRAM(S): at 14:37

## 2021-02-13 RX ADMIN — Medication 500 MILLIGRAM(S): at 14:37

## 2021-02-13 RX ADMIN — SENNA PLUS 2 TABLET(S): 8.6 TABLET ORAL at 23:51

## 2021-02-13 RX ADMIN — SODIUM CHLORIDE 3 MILLILITER(S): 9 INJECTION INTRAMUSCULAR; INTRAVENOUS; SUBCUTANEOUS at 14:37

## 2021-02-13 RX ADMIN — SODIUM CHLORIDE 3 MILLILITER(S): 9 INJECTION INTRAMUSCULAR; INTRAVENOUS; SUBCUTANEOUS at 23:35

## 2021-02-13 RX ADMIN — Medication 975 MILLIGRAM(S): at 05:26

## 2021-02-13 RX ADMIN — Medication 1 TABLET(S): at 14:37

## 2021-02-13 RX ADMIN — Medication 500 MILLIGRAM(S): at 18:26

## 2021-02-13 RX ADMIN — ENOXAPARIN SODIUM 30 MILLIGRAM(S): 100 INJECTION SUBCUTANEOUS at 18:26

## 2021-02-13 RX ADMIN — GABAPENTIN 600 MILLIGRAM(S): 400 CAPSULE ORAL at 14:43

## 2021-02-13 RX ADMIN — HYDROMORPHONE HYDROCHLORIDE 2 MILLIGRAM(S): 2 INJECTION INTRAMUSCULAR; INTRAVENOUS; SUBCUTANEOUS at 14:37

## 2021-02-13 RX ADMIN — SODIUM CHLORIDE 3 MILLILITER(S): 9 INJECTION INTRAMUSCULAR; INTRAVENOUS; SUBCUTANEOUS at 05:23

## 2021-02-13 RX ADMIN — ENOXAPARIN SODIUM 30 MILLIGRAM(S): 100 INJECTION SUBCUTANEOUS at 05:26

## 2021-02-13 RX ADMIN — HYDROMORPHONE HYDROCHLORIDE 2 MILLIGRAM(S): 2 INJECTION INTRAMUSCULAR; INTRAVENOUS; SUBCUTANEOUS at 23:53

## 2021-02-13 RX ADMIN — Medication 500 MILLIGRAM(S): at 05:26

## 2021-02-13 RX ADMIN — Medication 500 MILLIGRAM(S): at 23:52

## 2021-02-13 RX ADMIN — HYDROMORPHONE HYDROCHLORIDE 2 MILLIGRAM(S): 2 INJECTION INTRAMUSCULAR; INTRAVENOUS; SUBCUTANEOUS at 08:55

## 2021-02-13 RX ADMIN — HYDROMORPHONE HYDROCHLORIDE 2 MILLIGRAM(S): 2 INJECTION INTRAMUSCULAR; INTRAVENOUS; SUBCUTANEOUS at 18:27

## 2021-02-13 RX ADMIN — GABAPENTIN 600 MILLIGRAM(S): 400 CAPSULE ORAL at 23:52

## 2021-02-13 RX ADMIN — HYDROMORPHONE HYDROCHLORIDE 2 MILLIGRAM(S): 2 INJECTION INTRAMUSCULAR; INTRAVENOUS; SUBCUTANEOUS at 04:00

## 2021-02-13 RX ADMIN — GABAPENTIN 600 MILLIGRAM(S): 400 CAPSULE ORAL at 05:26

## 2021-02-13 RX ADMIN — Medication 325 MILLIGRAM(S): at 14:37

## 2021-02-14 LAB
ANION GAP SERPL CALC-SCNC: 10 MMOL/L — SIGNIFICANT CHANGE UP (ref 5–17)
BUN SERPL-MCNC: 16 MG/DL — SIGNIFICANT CHANGE UP (ref 8–20)
CALCIUM SERPL-MCNC: 8.3 MG/DL — LOW (ref 8.6–10.2)
CHLORIDE SERPL-SCNC: 104 MMOL/L — SIGNIFICANT CHANGE UP (ref 98–107)
CO2 SERPL-SCNC: 25 MMOL/L — SIGNIFICANT CHANGE UP (ref 22–29)
CREAT SERPL-MCNC: 0.53 MG/DL — SIGNIFICANT CHANGE UP (ref 0.5–1.3)
GLUCOSE SERPL-MCNC: 121 MG/DL — HIGH (ref 70–99)
HCT VFR BLD CALC: 31.9 % — LOW (ref 34.5–45)
HGB BLD-MCNC: 10.3 G/DL — LOW (ref 11.5–15.5)
MCHC RBC-ENTMCNC: 29.8 PG — SIGNIFICANT CHANGE UP (ref 27–34)
MCHC RBC-ENTMCNC: 32.3 GM/DL — SIGNIFICANT CHANGE UP (ref 32–36)
MCV RBC AUTO: 92.2 FL — SIGNIFICANT CHANGE UP (ref 80–100)
PLATELET # BLD AUTO: 290 K/UL — SIGNIFICANT CHANGE UP (ref 150–400)
POTASSIUM SERPL-MCNC: 4 MMOL/L — SIGNIFICANT CHANGE UP (ref 3.5–5.3)
POTASSIUM SERPL-SCNC: 4 MMOL/L — SIGNIFICANT CHANGE UP (ref 3.5–5.3)
RBC # BLD: 3.46 M/UL — LOW (ref 3.8–5.2)
RBC # FLD: 15.5 % — HIGH (ref 10.3–14.5)
SODIUM SERPL-SCNC: 139 MMOL/L — SIGNIFICANT CHANGE UP (ref 135–145)
WBC # BLD: 12.96 K/UL — HIGH (ref 3.8–10.5)
WBC # FLD AUTO: 12.96 K/UL — HIGH (ref 3.8–10.5)

## 2021-02-14 PROCEDURE — 99232 SBSQ HOSP IP/OBS MODERATE 35: CPT

## 2021-02-14 RX ADMIN — Medication 325 MILLIGRAM(S): at 11:24

## 2021-02-14 RX ADMIN — Medication 500 MILLIGRAM(S): at 22:32

## 2021-02-14 RX ADMIN — HYDROMORPHONE HYDROCHLORIDE 2 MILLIGRAM(S): 2 INJECTION INTRAMUSCULAR; INTRAVENOUS; SUBCUTANEOUS at 05:04

## 2021-02-14 RX ADMIN — ENOXAPARIN SODIUM 30 MILLIGRAM(S): 100 INJECTION SUBCUTANEOUS at 16:59

## 2021-02-14 RX ADMIN — GABAPENTIN 600 MILLIGRAM(S): 400 CAPSULE ORAL at 14:10

## 2021-02-14 RX ADMIN — Medication 500 MILLIGRAM(S): at 16:59

## 2021-02-14 RX ADMIN — Medication 1 TABLET(S): at 11:24

## 2021-02-14 RX ADMIN — Medication 500 MILLIGRAM(S): at 05:05

## 2021-02-14 RX ADMIN — GABAPENTIN 600 MILLIGRAM(S): 400 CAPSULE ORAL at 05:06

## 2021-02-14 RX ADMIN — HYDROMORPHONE HYDROCHLORIDE 2 MILLIGRAM(S): 2 INJECTION INTRAMUSCULAR; INTRAVENOUS; SUBCUTANEOUS at 11:18

## 2021-02-14 RX ADMIN — GABAPENTIN 600 MILLIGRAM(S): 400 CAPSULE ORAL at 22:31

## 2021-02-14 RX ADMIN — HYDROMORPHONE HYDROCHLORIDE 2 MILLIGRAM(S): 2 INJECTION INTRAMUSCULAR; INTRAVENOUS; SUBCUTANEOUS at 19:21

## 2021-02-14 RX ADMIN — Medication 975 MILLIGRAM(S): at 14:10

## 2021-02-14 RX ADMIN — ENOXAPARIN SODIUM 30 MILLIGRAM(S): 100 INJECTION SUBCUTANEOUS at 05:05

## 2021-02-14 RX ADMIN — HYDROMORPHONE HYDROCHLORIDE 2 MILLIGRAM(S): 2 INJECTION INTRAMUSCULAR; INTRAVENOUS; SUBCUTANEOUS at 22:29

## 2021-02-14 RX ADMIN — SODIUM CHLORIDE 3 MILLILITER(S): 9 INJECTION INTRAMUSCULAR; INTRAVENOUS; SUBCUTANEOUS at 14:09

## 2021-02-14 RX ADMIN — SODIUM CHLORIDE 3 MILLILITER(S): 9 INJECTION INTRAMUSCULAR; INTRAVENOUS; SUBCUTANEOUS at 22:34

## 2021-02-14 RX ADMIN — Medication 975 MILLIGRAM(S): at 22:31

## 2021-02-14 RX ADMIN — Medication 975 MILLIGRAM(S): at 06:59

## 2021-02-14 RX ADMIN — Medication 500 MILLIGRAM(S): at 11:24

## 2021-02-14 RX ADMIN — SENNA PLUS 2 TABLET(S): 8.6 TABLET ORAL at 22:31

## 2021-02-14 RX ADMIN — SODIUM CHLORIDE 3 MILLILITER(S): 9 INJECTION INTRAMUSCULAR; INTRAVENOUS; SUBCUTANEOUS at 05:09

## 2021-02-14 RX ADMIN — LOSARTAN POTASSIUM 100 MILLIGRAM(S): 100 TABLET, FILM COATED ORAL at 05:05

## 2021-02-15 PROCEDURE — 99232 SBSQ HOSP IP/OBS MODERATE 35: CPT

## 2021-02-15 RX ADMIN — HYDROMORPHONE HYDROCHLORIDE 2 MILLIGRAM(S): 2 INJECTION INTRAMUSCULAR; INTRAVENOUS; SUBCUTANEOUS at 10:38

## 2021-02-15 RX ADMIN — Medication 975 MILLIGRAM(S): at 06:58

## 2021-02-15 RX ADMIN — Medication 500 MILLIGRAM(S): at 10:37

## 2021-02-15 RX ADMIN — ENOXAPARIN SODIUM 30 MILLIGRAM(S): 100 INJECTION SUBCUTANEOUS at 06:59

## 2021-02-15 RX ADMIN — GABAPENTIN 600 MILLIGRAM(S): 400 CAPSULE ORAL at 07:01

## 2021-02-15 RX ADMIN — Medication 975 MILLIGRAM(S): at 20:33

## 2021-02-15 RX ADMIN — LOSARTAN POTASSIUM 100 MILLIGRAM(S): 100 TABLET, FILM COATED ORAL at 06:59

## 2021-02-15 RX ADMIN — HYDROMORPHONE HYDROCHLORIDE 2 MILLIGRAM(S): 2 INJECTION INTRAMUSCULAR; INTRAVENOUS; SUBCUTANEOUS at 17:20

## 2021-02-15 RX ADMIN — SODIUM CHLORIDE 3 MILLILITER(S): 9 INJECTION INTRAMUSCULAR; INTRAVENOUS; SUBCUTANEOUS at 20:34

## 2021-02-15 RX ADMIN — GABAPENTIN 600 MILLIGRAM(S): 400 CAPSULE ORAL at 14:24

## 2021-02-15 RX ADMIN — Medication 500 MILLIGRAM(S): at 06:58

## 2021-02-15 RX ADMIN — Medication 325 MILLIGRAM(S): at 10:38

## 2021-02-15 RX ADMIN — SODIUM CHLORIDE 3 MILLILITER(S): 9 INJECTION INTRAMUSCULAR; INTRAVENOUS; SUBCUTANEOUS at 14:28

## 2021-02-15 RX ADMIN — GABAPENTIN 600 MILLIGRAM(S): 400 CAPSULE ORAL at 20:33

## 2021-02-15 RX ADMIN — Medication 500 MILLIGRAM(S): at 17:14

## 2021-02-15 RX ADMIN — HYDROMORPHONE HYDROCHLORIDE 2 MILLIGRAM(S): 2 INJECTION INTRAMUSCULAR; INTRAVENOUS; SUBCUTANEOUS at 20:33

## 2021-02-15 RX ADMIN — HYDROMORPHONE HYDROCHLORIDE 2 MILLIGRAM(S): 2 INJECTION INTRAMUSCULAR; INTRAVENOUS; SUBCUTANEOUS at 14:24

## 2021-02-15 RX ADMIN — Medication 500 MILLIGRAM(S): at 22:34

## 2021-02-15 RX ADMIN — Medication 975 MILLIGRAM(S): at 14:26

## 2021-02-15 RX ADMIN — Medication 1 TABLET(S): at 10:37

## 2021-02-15 RX ADMIN — SODIUM CHLORIDE 3 MILLILITER(S): 9 INJECTION INTRAMUSCULAR; INTRAVENOUS; SUBCUTANEOUS at 07:31

## 2021-02-15 RX ADMIN — ENOXAPARIN SODIUM 30 MILLIGRAM(S): 100 INJECTION SUBCUTANEOUS at 17:14

## 2021-02-15 RX ADMIN — HYDROMORPHONE HYDROCHLORIDE 2 MILLIGRAM(S): 2 INJECTION INTRAMUSCULAR; INTRAVENOUS; SUBCUTANEOUS at 07:01

## 2021-02-16 VITALS
RESPIRATION RATE: 18 BRPM | HEART RATE: 80 BPM | OXYGEN SATURATION: 94 % | TEMPERATURE: 98 F | SYSTOLIC BLOOD PRESSURE: 129 MMHG | DIASTOLIC BLOOD PRESSURE: 74 MMHG

## 2021-02-16 PROCEDURE — 97110 THERAPEUTIC EXERCISES: CPT

## 2021-02-16 PROCEDURE — 97116 GAIT TRAINING THERAPY: CPT

## 2021-02-16 PROCEDURE — 85027 COMPLETE CBC AUTOMATED: CPT

## 2021-02-16 PROCEDURE — 82962 GLUCOSE BLOOD TEST: CPT

## 2021-02-16 PROCEDURE — C1776: CPT

## 2021-02-16 PROCEDURE — 73560 X-RAY EXAM OF KNEE 1 OR 2: CPT

## 2021-02-16 PROCEDURE — 36415 COLL VENOUS BLD VENIPUNCTURE: CPT

## 2021-02-16 PROCEDURE — 80048 BASIC METABOLIC PNL TOTAL CA: CPT

## 2021-02-16 PROCEDURE — C1713: CPT

## 2021-02-16 PROCEDURE — 97167 OT EVAL HIGH COMPLEX 60 MIN: CPT

## 2021-02-16 PROCEDURE — 99232 SBSQ HOSP IP/OBS MODERATE 35: CPT

## 2021-02-16 RX ADMIN — Medication 325 MILLIGRAM(S): at 13:12

## 2021-02-16 RX ADMIN — HYDROMORPHONE HYDROCHLORIDE 2 MILLIGRAM(S): 2 INJECTION INTRAMUSCULAR; INTRAVENOUS; SUBCUTANEOUS at 02:30

## 2021-02-16 RX ADMIN — LOSARTAN POTASSIUM 100 MILLIGRAM(S): 100 TABLET, FILM COATED ORAL at 05:30

## 2021-02-16 RX ADMIN — GABAPENTIN 600 MILLIGRAM(S): 400 CAPSULE ORAL at 05:30

## 2021-02-16 RX ADMIN — Medication 975 MILLIGRAM(S): at 13:12

## 2021-02-16 RX ADMIN — TRAMADOL HYDROCHLORIDE 100 MILLIGRAM(S): 50 TABLET ORAL at 13:12

## 2021-02-16 RX ADMIN — Medication 1 TABLET(S): at 13:12

## 2021-02-16 RX ADMIN — Medication 500 MILLIGRAM(S): at 05:30

## 2021-02-16 RX ADMIN — Medication 975 MILLIGRAM(S): at 05:30

## 2021-02-16 RX ADMIN — ENOXAPARIN SODIUM 30 MILLIGRAM(S): 100 INJECTION SUBCUTANEOUS at 05:30

## 2021-02-16 RX ADMIN — Medication 500 MILLIGRAM(S): at 13:12

## 2021-02-16 RX ADMIN — GABAPENTIN 600 MILLIGRAM(S): 400 CAPSULE ORAL at 14:24

## 2021-02-16 RX ADMIN — SODIUM CHLORIDE 3 MILLILITER(S): 9 INJECTION INTRAMUSCULAR; INTRAVENOUS; SUBCUTANEOUS at 05:29

## 2021-02-16 NOTE — PROGRESS NOTE ADULT - PROVIDER SPECIALTY LIST ADULT
Hospitalist
Orthopedics
Hospitalist

## 2021-02-16 NOTE — PROGRESS NOTE ADULT - SUBJECTIVE AND OBJECTIVE BOX
NANCI MATIAS  [id number]    History: Patient seen and eval at bedside. Patient is doing well and is comfortable. The patient's pain is controlled using the prescribed pain medications. The patient is participating in physical therapy. Denies nausea, vomiting, chest pain, shortness of breath, abdominal pain or fever. No new complaints.    T(C): 36.8 (02-13-21 @ 05:08), Max: 36.8 (02-13-21 @ 05:08)  HR: 65 (02-13-21 @ 05:08) (65 - 92)  BP: 106/69 (02-13-21 @ 05:08) (97/60 - 128/64)  RR: 18 (02-13-21 @ 05:08) (18 - 19)  SpO2: 94% (02-13-21 @ 05:08) (93% - 95%)                          10.7   16.29 )-----------( 273      ( 12 Feb 2021 07:10 )             32.3     02-12    140  |  104  |  20.0  ----------------------------<  140<H>  4.0   |  21.0<L>  |  0.84    Ca    8.8      12 Feb 2021 07:10    PE: NAD, alert, awake  Left LE:   Knee dressing C/D/I, no drainage, no bleeding  EHL/TA/FHL/GS intact, DP pulse 2+  Gross sensation to LT intact distally s/s/DP/SP/tib distrib, Calf soft, NT B/L    Primary Orthopedic Assessment:  •s/p LEFT total knee replacement POD#2    Plan:   DVT prophylaxis as prescribed - lovenox, including use of compression devices and ankle pumps  Continue physical therapy: Weightbearing as tolerated of the left lower extremity with assistance of a walker  Incentive spirometry encouraged  keflex for infx propx  Pain control as clinically indicated  Discharge planning: home today pending PT and med clearance
Ortho Post Op Check    Name: NANCI MATIAS    MR #: [id number]    Procedure: left total knee arthroplasty   Surgeon: Nett    Pt comfortable without complaints, pain controlled  Denies CP, SOB, N/V, numbness/tingling     General Exam:  Vital Signs Last 24 Hrs  T(C): 36.6 (02-11-21 @ 20:37), Max: 36.9 (02-11-21 @ 20:00)  T(F): 97.8 (02-11-21 @ 20:37), Max: 98.4 (02-11-21 @ 20:00)  HR: 78 (02-11-21 @ 20:37) (66 - 78)  BP: 149/70 (02-11-21 @ 20:37) (129/67 - 149/70)  BP(mean): --  RR: 20 (02-11-21 @ 20:37) (13 - 20)  SpO2: 99% (02-11-21 @ 20:37) (98% - 100%)    General: Pt Alert and oriented, NAD, controlled pain.  Dressings C/D/I. No bleeding.  Pulses: 2+ dorsalis pedis pulse. Cap refill < 2 sec.  Sensation: Grossly intact to light touch without deficit.  Motor: + EHL/FHL/TA/GS        Post-op X-Ray:  < from: Xray Knee 1 or 2 Views, Left (02.11.21 @ 17:28) >   EXAM:  KNEE-LEFT                          PROCEDURE DATE:  02/11/2021      INTERPRETATION:  CLINICAL INFORMATION: or. Osteoarthritis.    EXAM:  3 views of the left knee.    COMPARISON: Bilateral knee radiographs 3/11/2019    FINDINGS:    Status post left total knee arthroplasty with cemented components and patellar resurfacing. No periprosthetic fracture. There is postoperative soft tissue swelling and air.    IMPRESSION:  Status post left total knee arthroplasty.    JORGE A SAHNI MD; Attending Radiologist  This document has been electronically signed. Feb 11 2021  7:12PM    < end of copied text >      A/P: 65yFemale POD#0 s/p left total knee arthroplasty     - Pain Control  - DVT ppx: Lovenox 30 BID  - Post op abx: as per SCIP, Keflex  - Weight bearing status: WBAT
NANCI MATIAS    [id number]    65y      Female    CC: Left knee pain s/p Lt TKR POD#4  pain is fairly well controlled, ambulated with PT, tolerating po, urinating without any issues. denies any light headedness/dizziness on ambulation  offers no complaints.   has 16stairs at home to navigate - is anxious about going home today. ambulated with PT,  was able to to do stairs.     INTERVAL HPI/OVERNIGHT EVENTS: no acute events     REVIEW OF SYSTEMS:    CONSTITUTIONAL: No fever or fatigue  RESPIRATORY: No cough, wheezing,  No shortness of breath  CARDIOVASCULAR: No chest pain, palpitations  GASTROINTESTINAL: No abdominal or epigastric pain. No nausea, vomiting    - reviewed - non-sustained episode of desaturation to high 80s .    Vital Signs Last 24 Hrs  T(C): 36.7 (15 Feb 2021 12:58), Max: 36.9 (15 Feb 2021 01:53)  T(F): 98.1 (15 Feb 2021 12:58), Max: 98.4 (15 Feb 2021 01:53)  HR: 99 (15 Feb 2021 12:58) (76 - 99)  BP: 135/80 (15 Feb 2021 12:58) (134/74 - 163/80)  BP(mean): --  RR: 18 (15 Feb 2021 12:58) (18 - 18)  SpO2: 93% (15 Feb 2021 12:58) (93% - 96%)      PHYSICAL EXAM:    GENERAL: NAD, well-groomed, morbidly obese  HEENT: PERRL, +EOMI  NECK: soft, Supple  CHEST/LUNG: Clear to percussion bilaterally; No wheezing  HEART: S1S2+, Regular rate and rhythm; No murmurs  ABDOMEN: Soft, Nontender, Nondistended; Bowel sounds present  EXTREMITIES: No clubbing, cyanosis, or edema  SKIN: No rashes or lesions  NEURO: AAOX3            LABS:                                         10.3   12.96 )-----------( 290      ( 14 Feb 2021 07:35 )             31.9   02-14    139  |  104  |  16.0  ----------------------------<  121<H>  4.0   |  25.0  |  0.53    Ca    8.3<L>      14 Feb 2021 07:35                  MEDICATIONS  (STANDING):  acetaminophen   Tablet .. 975 milliGRAM(s) Oral every 8 hours  cephalexin 500 milliGRAM(s) Oral four times a day  enoxaparin Injectable 30 milliGRAM(s) SubCutaneous every 12 hours  ferrous    sulfate 325 milliGRAM(s) Oral daily  gabapentin 600 milliGRAM(s) Oral three times a day  losartan 100 milliGRAM(s) Oral daily  multivitamin 1 Tablet(s) Oral daily  senna 2 Tablet(s) Oral at bedtime  sodium chloride 0.9% lock flush 3 milliLiter(s) IV Push every 8 hours  sodium chloride 0.9%. 1000 milliLiter(s) (150 mL/Hr) IV Continuous <Continuous>    MEDICATIONS  (PRN):  acetaminophen   Tablet .. 650 milliGRAM(s) Oral every 6 hours PRN Temp greater or equal to 38C (100.4F)  HYDROmorphone   Tablet 2 milliGRAM(s) Oral every 3 hours PRN Severe Pain (7 - 10)  HYDROmorphone  Injectable 0.5 milliGRAM(s) IV Push every 4 hours PRN severe/breakthrough pain  magnesium hydroxide Suspension 30 milliLiter(s) Oral daily PRN Constipation  ondansetron Injectable 4 milliGRAM(s) IV Push every 6 hours PRN Nausea and/or Vomiting  traMADol 50 milliGRAM(s) Oral every 6 hours PRN Mild Pain (1 - 3)  traMADol 100 milliGRAM(s) Oral every 6 hours PRN Moderate Pain (4 - 6)        RADIOLOGY & ADDITIONAL TESTS:  
NANCI MATIAS  [id number]    History: 65y Female is status post left total knee arthroplasty, POD # 3. Patient is doing well and is comfortable. The patient's pain is controlled using the prescribed pain medications. The patient is participating in physical therapy. Denies nausea, vomiting, chest pain, shortness of breath, abdominal pain or fever. No new complaints.    Vital Signs Last 24 Hrs  T(C): 36.9 (14 Feb 2021 04:57), Max: 36.9 (14 Feb 2021 04:57)  T(F): 98.5 (14 Feb 2021 04:57), Max: 98.5 (14 Feb 2021 04:57)  HR: 94 (14 Feb 2021 04:57) (84 - 98)  BP: 162/79 (14 Feb 2021 04:57) (115/77 - 162/79)  BP(mean): --  RR: 18 (14 Feb 2021 04:57) (18 - 18)  SpO2: 96% (14 Feb 2021 04:57) (93% - 96%)                        10.3   12.96 )-----------( 290      ( 14 Feb 2021 07:35 )             31.9     MEDICATIONS  (STANDING):  acetaminophen   Tablet .. 975 milliGRAM(s) Oral every 8 hours  cephalexin 500 milliGRAM(s) Oral four times a day  enoxaparin Injectable 30 milliGRAM(s) SubCutaneous every 12 hours  ferrous    sulfate 325 milliGRAM(s) Oral daily  gabapentin 600 milliGRAM(s) Oral three times a day  losartan 100 milliGRAM(s) Oral daily  multivitamin 1 Tablet(s) Oral daily  senna 2 Tablet(s) Oral at bedtime  sodium chloride 0.9% lock flush 3 milliLiter(s) IV Push every 8 hours  sodium chloride 0.9%. 1000 milliLiter(s) (150 mL/Hr) IV Continuous <Continuous>    MEDICATIONS  (PRN):  acetaminophen   Tablet .. 650 milliGRAM(s) Oral every 6 hours PRN Temp greater or equal to 38C (100.4F)  HYDROmorphone   Tablet 2 milliGRAM(s) Oral every 3 hours PRN Severe Pain (7 - 10)  HYDROmorphone  Injectable 0.5 milliGRAM(s) IV Push every 4 hours PRN severe/breakthrough pain  magnesium hydroxide Suspension 30 milliLiter(s) Oral daily PRN Constipation  ondansetron Injectable 4 milliGRAM(s) IV Push every 6 hours PRN Nausea and/or Vomiting  traMADol 50 milliGRAM(s) Oral every 6 hours PRN Mild Pain (1 - 3)  traMADol 100 milliGRAM(s) Oral every 6 hours PRN Moderate Pain (4 - 6)    Physical exam: The left knee dressing is clean, dry and intact. No drainage or discharge. No erythema is noted. No blistering. No ecchymosis. The calf is supple nontender. Passive range of motion is acceptable to due postoperative pain. Sensation to light touch is grossly intact distally. Motor function distally is 5/5. Extensor hallucis longus and flexor hallucis longus are intact. No foot drop. 2+ dorsalis pedis pulse. Capillary refill is less than 2 seconds. No cyanosis.    Primary Orthopedic Assessment:  •	s/p LEFT total knee replacement    Secondary  Medical Assessment(s):   •  HTN, MO    Plan:   •	DVT prophylaxis as prescribed, including use of compression devices and ankle pumps  •	Continue physical therapy  •	Weightbearing as tolerated of the left lower extremity with assistance of a walker  •	Incentive spirometry encouraged  •	Pain control as clinically indicated  •	Discharge planning – anticipated discharge is Home when cleared by Medicine and PT   
NANCI MATIAS  [id number]    History: 65y Female is status post left total knee arthroplasty, POD # 4. Patient is doing well and is comfortable. The patient's pain is controlled using the prescribed pain medications. The patient is participating in physical therapy. Denies nausea, vomiting, chest pain, shortness of breath, abdominal pain or fever. No new complaints.    Vital Signs Last 24 Hrs  T(C): 36.6 (15 Feb 2021 05:49), Max: 36.9 (15 Feb 2021 01:53)  T(F): 97.9 (15 Feb 2021 05:49), Max: 98.4 (15 Feb 2021 01:53)  HR: 76 (15 Feb 2021 05:49) (76 - 86)  BP: 163/80 (15 Feb 2021 05:49) (130/70 - 163/80)  BP(mean): --  RR: 18 (15 Feb 2021 05:49) (18 - 19)  SpO2: 95% (15 Feb 2021 05:49) (94% - 96%)    MEDICATIONS  (STANDING):  acetaminophen   Tablet .. 975 milliGRAM(s) Oral every 8 hours  cephalexin 500 milliGRAM(s) Oral four times a day  enoxaparin Injectable 30 milliGRAM(s) SubCutaneous every 12 hours  ferrous    sulfate 325 milliGRAM(s) Oral daily  gabapentin 600 milliGRAM(s) Oral three times a day  losartan 100 milliGRAM(s) Oral daily  multivitamin 1 Tablet(s) Oral daily  senna 2 Tablet(s) Oral at bedtime  sodium chloride 0.9% lock flush 3 milliLiter(s) IV Push every 8 hours  sodium chloride 0.9%. 1000 milliLiter(s) (150 mL/Hr) IV Continuous <Continuous>    MEDICATIONS  (PRN):  acetaminophen   Tablet .. 650 milliGRAM(s) Oral every 6 hours PRN Temp greater or equal to 38C (100.4F)  HYDROmorphone   Tablet 2 milliGRAM(s) Oral every 3 hours PRN Severe Pain (7 - 10)  HYDROmorphone  Injectable 0.5 milliGRAM(s) IV Push every 4 hours PRN severe/breakthrough pain  magnesium hydroxide Suspension 30 milliLiter(s) Oral daily PRN Constipation  ondansetron Injectable 4 milliGRAM(s) IV Push every 6 hours PRN Nausea and/or Vomiting  traMADol 50 milliGRAM(s) Oral every 6 hours PRN Mild Pain (1 - 3)  traMADol 100 milliGRAM(s) Oral every 6 hours PRN Moderate Pain (4 - 6)    Physical exam: The left knee Mepilex dressing is clean, dry and intact. No drainage or discharge. No erythema is noted. No blistering. No ecchymosis. The calf is supple nontender. Passive range of motion is acceptable to due postoperative pain. Sensation to light touch is grossly intact distally. Motor function distally is 5/5. Extensor hallucis longus and flexor hallucis longus are intact. No foot drop. 2+ dorsalis pedis pulse. Capillary refill is less than 2 seconds. No cyanosis.    Primary Orthopedic Assessment:  •	s/p LEFT total knee replacement    Secondary  Medical Assessment(s):   • HTN, MO	    Plan:   •	DVT prophylaxis as prescribed, including use of compression devices and ankle pumps  •	Continue physical therapy  •	Weightbearing as tolerated of the left lower extremity with assistance of a walker  •	Incentive spirometry encouraged  •	Pain control as clinically indicated  •	Discharge planning – anticipated discharge is Home today when cleared by PT and Medicine  
Patient seen and examined at bedside, comfortable in bed, pain controlled. Denies fever/chills, SOB/chest pain, abdominal pain, numbness/tingling. no complaints.    Vital Signs Last 24 Hrs  T(C): 36.6 (16 Feb 2021 05:31), Max: 36.7 (15 Feb 2021 12:58)  T(F): 97.9 (16 Feb 2021 05:31), Max: 98.1 (15 Feb 2021 12:58)  HR: 73 (16 Feb 2021 05:31) (73 - 99)  BP: 134/84 (16 Feb 2021 05:31) (134/84 - 155/84)  BP(mean): --  RR: 17 (16 Feb 2021 05:31) (17 - 18)  SpO2: 94% (16 Feb 2021 05:31) (93% - 94%)    LLE: Mepilex dressing C/D/I. SILT. + dorsi/plantarflexion. ext warm, cap refill brisk. calf soft NT B/L.    A/P: 65 y.o F s/p left TKA POD #5  - WBAT  - DVTP  - d/c home when cleared by PT and medicine
Patient seen and examined at bedside. comfortable in bed, pain controlled. Denies fever/chills, SOB/chest pain, abdominal pain, numbness/tingling. no complaints.    Vital Signs Last 24 Hrs  T(C): 36.3 (12 Feb 2021 05:04), Max: 36.9 (11 Feb 2021 20:00)  T(F): 97.4 (12 Feb 2021 05:04), Max: 98.4 (11 Feb 2021 20:00)  HR: 67 (12 Feb 2021 05:04) (66 - 97)  BP: 109/66 (12 Feb 2021 05:04) (109/66 - 149/70)  BP(mean): --  RR: 18 (12 Feb 2021 05:04) (13 - 20)  SpO2: 94% (12 Feb 2021 05:04) (94% - 100%)    LLE: Ace bandage dressing C/D/I. SILT. + dorsi/plantarflexion. DP 2+. calf soft NT B/L.                          10.7   16.29 )-----------( 273      ( 12 Feb 2021 07:10 )             32.3     02-12    140  |  104  |  20.0  ----------------------------<  140<H>  4.0   |  21.0<L>  |  0.84    Ca    8.8      12 Feb 2021 07:10    A/P: 65 y.o F s/p left TKA POD #1  - WBAT  - DVTP  - d/c planning - home today vs tomorrow when cleared by PT and medicine
NANCI MATIAS    [id number]    65y      Female    CC: Left knee pain s/p Lt TKR POD#3  pain is well controlled, ambulated with PT, tolerating po, urinating without any issues. denies any light headedness/dizziness on ambulation  offers no complaints.     INTERVAL HPI/OVERNIGHT EVENTS: no acute events     REVIEW OF SYSTEMS:    CONSTITUTIONAL: No fever or fatigue  RESPIRATORY: No cough, wheezing,  No shortness of breath  CARDIOVASCULAR: No chest pain, palpitations  GASTROINTESTINAL: No abdominal or epigastric pain. No nausea, vomiting        Vital Signs Last 24 Hrs  T(C): 36.6 (14 Feb 2021 11:15), Max: 36.9 (14 Feb 2021 04:57)  T(F): 97.9 (14 Feb 2021 11:15), Max: 98.5 (14 Feb 2021 04:57)  HR: 82 (14 Feb 2021 11:15) (82 - 94)  BP: 130/70 (14 Feb 2021 11:15) (130/70 - 162/79)  BP(mean): --  RR: 19 (14 Feb 2021 11:15) (18 - 19)  SpO2: 94% (14 Feb 2021 11:15) (94% - 96%)        PHYSICAL EXAM:    GENERAL: NAD, well-groomed, obese  HEENT: PERRL, +EOMI  NECK: soft, Supple  CHEST/LUNG: Clear to percussion bilaterally; No wheezing  HEART: S1S2+, Regular rate and rhythm; No murmurs  ABDOMEN: Soft, Nontender, Nondistended; Bowel sounds present  EXTREMITIES: No clubbing, cyanosis, or edema  SKIN: No rashes or lesions  NEURO: AAOX3            LABS:                          10.3   12.96 )-----------( 290      ( 14 Feb 2021 07:35 )             31.9   02-14    139  |  104  |  16.0  ----------------------------<  121<H>  4.0   |  25.0  |  0.53    Ca    8.3<L>      14 Feb 2021 07:35              MEDICATIONS  (STANDING):  acetaminophen   Tablet .. 975 milliGRAM(s) Oral every 8 hours  cephalexin 500 milliGRAM(s) Oral four times a day  enoxaparin Injectable 30 milliGRAM(s) SubCutaneous every 12 hours  ferrous    sulfate 325 milliGRAM(s) Oral daily  gabapentin 600 milliGRAM(s) Oral three times a day  losartan 100 milliGRAM(s) Oral daily  multivitamin 1 Tablet(s) Oral daily  senna 2 Tablet(s) Oral at bedtime  sodium chloride 0.9% lock flush 3 milliLiter(s) IV Push every 8 hours  sodium chloride 0.9%. 1000 milliLiter(s) (150 mL/Hr) IV Continuous <Continuous>    MEDICATIONS  (PRN):  acetaminophen   Tablet .. 650 milliGRAM(s) Oral every 6 hours PRN Temp greater or equal to 38C (100.4F)  HYDROmorphone   Tablet 2 milliGRAM(s) Oral every 3 hours PRN Severe Pain (7 - 10)  HYDROmorphone  Injectable 0.5 milliGRAM(s) IV Push every 4 hours PRN severe/breakthrough pain  magnesium hydroxide Suspension 30 milliLiter(s) Oral daily PRN Constipation  ondansetron Injectable 4 milliGRAM(s) IV Push every 6 hours PRN Nausea and/or Vomiting  traMADol 50 milliGRAM(s) Oral every 6 hours PRN Mild Pain (1 - 3)  traMADol 100 milliGRAM(s) Oral every 6 hours PRN Moderate Pain (4 - 6)      RADIOLOGY & ADDITIONAL TESTS:  
Patient seen and examined . S/p L TKA   , POD # 5. Pain well controlled , no n/v , voiding without difficulties ,   participating with physical therapy     CC : L knee chronic pain , now post op well controlled       MEDICATIONS  (STANDING):  acetaminophen   Tablet .. 975 milliGRAM(s) Oral every 8 hours  cephalexin 500 milliGRAM(s) Oral four times a day  enoxaparin Injectable 30 milliGRAM(s) SubCutaneous every 12 hours  ferrous    sulfate 325 milliGRAM(s) Oral daily  gabapentin 600 milliGRAM(s) Oral three times a day  losartan 100 milliGRAM(s) Oral daily  multivitamin 1 Tablet(s) Oral daily  senna 2 Tablet(s) Oral at bedtime  sodium chloride 0.9% lock flush 3 milliLiter(s) IV Push every 8 hours  sodium chloride 0.9%. 1000 milliLiter(s) (150 mL/Hr) IV Continuous <Continuous>    MEDICATIONS  (PRN):  acetaminophen   Tablet .. 650 milliGRAM(s) Oral every 6 hours PRN Temp greater or equal to 38C (100.4F)  HYDROmorphone   Tablet 2 milliGRAM(s) Oral every 3 hours PRN Severe Pain (7 - 10)  HYDROmorphone  Injectable 0.5 milliGRAM(s) IV Push every 4 hours PRN severe/breakthrough pain  magnesium hydroxide Suspension 30 milliLiter(s) Oral daily PRN Constipation  ondansetron Injectable 4 milliGRAM(s) IV Push every 6 hours PRN Nausea and/or Vomiting  traMADol 50 milliGRAM(s) Oral every 6 hours PRN Mild Pain (1 - 3)  traMADol 100 milliGRAM(s) Oral every 6 hours PRN Moderate Pain (4 - 6)          REVIEW OF SYSTEMS:    L knee pain post op well controlled , all other systems are reviewed and are negative .     Vital Signs Last 24 Hrs  T(C): 36.6 (16 Feb 2021 05:31), Max: 36.7 (15 Feb 2021 12:58)  T(F): 97.9 (16 Feb 2021 05:31), Max: 98.1 (15 Feb 2021 12:58)  HR: 73 (16 Feb 2021 05:31) (73 - 99)  BP: 134/84 (16 Feb 2021 05:31) (134/84 - 155/84)  BP(mean): --  RR: 17 (16 Feb 2021 05:31) (17 - 18)  SpO2: 94% (16 Feb 2021 05:31) (93% - 94%)    PHYSICAL EXAM:    GENERAL: NAD, well-groomed, well-developed  HEAD:  Atraumatic, Normocephalic  EYES: EOMI, PERRLA, conjunctiva and sclera clear  NECK: Supple, No JVD, Normal thyroid  NERVOUS SYSTEM:  Alert & Oriented X3, no focal deficit  CHEST/LUNG: CTA b/l ,  no  rales, rhonchi, wheezing, or rubs  HEART: Regular rate and rhythm; No murmurs, rubs, or gallops  ABDOMEN: Soft, Nontender, Nondistended; Bowel sounds present  EXTREMITIES:  2+ Peripheral Pulses, No clubbing, cyanosis, or edema , L knee dressing + , clean and dry   LYMPH: No lymphadenopathy noted  SKIN: No rashes or lesions  
NANCI MATIAS    [id number]    65y      Female    CC: Left knee pain s/p Lt TKR POD#2  Doing well, pain is well controlled , ambulated with PT, tolerating po, urinating without any issues. denies any light headedness/dizziness on ambulation  needs more PT before being discharged    INTERVAL HPI/OVERNIGHT EVENTS: no acute events     REVIEW OF SYSTEMS:    CONSTITUTIONAL: No fever or fatigue  RESPIRATORY: No cough, wheezing,  No shortness of breath  CARDIOVASCULAR: No chest pain, palpitations  GASTROINTESTINAL: No abdominal or epigastric pain. No nausea, vomiting        Vital Signs Last 24 Hrs  T(C): 36.6 (13 Feb 2021 12:37), Max: 36.8 (13 Feb 2021 05:08)  T(F): 97.8 (13 Feb 2021 12:37), Max: 98.2 (13 Feb 2021 05:08)  HR: 98 (13 Feb 2021 12:37) (65 - 98)  BP: 115/77 (13 Feb 2021 12:37) (97/60 - 128/64)  BP(mean): --  RR: 18 (13 Feb 2021 12:37) (18 - 19)  SpO2: 93% (13 Feb 2021 12:37) (93% - 95%)    PHYSICAL EXAM:    GENERAL: NAD, well-groomed, obese  HEENT: PERRL, +EOMI  NECK: soft, Supple  CHEST/LUNG: Clear to percussion bilaterally; No wheezing  HEART: S1S2+, Regular rate and rhythm; No murmurs  ABDOMEN: Soft, Nontender, Nondistended; Bowel sounds present  EXTREMITIES: No clubbing, cyanosis, or edema  SKIN: No rashes or lesions  NEURO: AAOX3      02-12 @ 07:01  -  02-13 @ 07:00  --------------------------------------------------------  IN: 600 mL / OUT: 450 mL / NET: 150 mL        LABS:                        11.0   12.52 )-----------( 283      ( 13 Feb 2021 09:59 )             33.3     02-13    138  |  103  |  22.0<H>  ----------------------------<  145<H>  3.8   |  19.0<L>  |  0.72    Ca    8.5<L>      13 Feb 2021 09:59              MEDICATIONS  (STANDING):  acetaminophen   Tablet .. 975 milliGRAM(s) Oral every 8 hours  cephalexin 500 milliGRAM(s) Oral four times a day  enoxaparin Injectable 30 milliGRAM(s) SubCutaneous every 12 hours  ferrous    sulfate 325 milliGRAM(s) Oral daily  gabapentin 600 milliGRAM(s) Oral three times a day  losartan 100 milliGRAM(s) Oral daily  multivitamin 1 Tablet(s) Oral daily  senna 2 Tablet(s) Oral at bedtime  sodium chloride 0.9% lock flush 3 milliLiter(s) IV Push every 8 hours  sodium chloride 0.9%. 1000 milliLiter(s) (150 mL/Hr) IV Continuous <Continuous>    MEDICATIONS  (PRN):  acetaminophen   Tablet .. 650 milliGRAM(s) Oral every 6 hours PRN Temp greater or equal to 38C (100.4F)  HYDROmorphone   Tablet 2 milliGRAM(s) Oral every 3 hours PRN Severe Pain (7 - 10)  HYDROmorphone  Injectable 0.5 milliGRAM(s) IV Push every 4 hours PRN severe/breakthrough pain  magnesium hydroxide Suspension 30 milliLiter(s) Oral daily PRN Constipation  ondansetron Injectable 4 milliGRAM(s) IV Push every 6 hours PRN Nausea and/or Vomiting  traMADol 50 milliGRAM(s) Oral every 6 hours PRN Mild Pain (1 - 3)  traMADol 100 milliGRAM(s) Oral every 6 hours PRN Moderate Pain (4 - 6)      RADIOLOGY & ADDITIONAL TESTS:

## 2021-02-16 NOTE — PROGRESS NOTE ADULT - ASSESSMENT
66 y/o female with PMH HTN, Breast CA, s/p bilateral mastectomy, OA presents with worsening left knee pain [id number] s/p left TKA    Problem/Recommendation - 1   Problem: Unilateral primary osteoarthritis, left knee. Recommendation: S/p Left TKA   Pain control.  PT/OT.  Antibiotics, wound care and DVT prophylaxis as per Ortho. agree with pharmacologic dvt px   Incentive spirometry.  Avoid opioid induced constipation.    Problem/Recommendation - 2   ·  Problem: Essential hypertension.  Recommendation: BP stable - on Losartan and HCTZ. Monitor BP.,     Problem/Recommendation - 3   ·  Problem: Breast cancer in female.  Recommendation: H/o breast cancer, s/p bilateral mastectomy. Continue Tamoxifen.     Problem/Recommendation - 4   ·  Problem: Prediabetes.  Recommendation: A1c 5.8.  Lifestyle modifications.  ADA diet.  Outpatient follow up with primary doctor.     Problem/Recommendation - 5   ·  Problem: Leukocytosis, unspecified type.  Recommendation: Likely reactive.    Problem/Recommendation - 6   Problem: Anemia due to acute blood loss. Recommendation: Asymptomatic.  Expected blood loss post-op. repeat CBC stable    Problem/Recommendation - 7   Problem: Morbid obesity. Recommendation: Lifestyle modifications.    Medically stable for discharge.   
66 y/o female with PMH HTN, Breast CA, s/p bilateral mastectomy, OA presents with worsening left knee pain [id number] s/p left TKA    Problem/Recommendation - 1   Problem: Unilateral primary osteoarthritis, left knee. Recommendation: S/p Left TKA   Pain control.  PT/OT.  Antibiotics, wound care and DVT prophylaxis as per Ortho. agree with pharmacologic dvt px   Incentive spirometry.  Avoid opioid induced constipation.    Problem/Recommendation - 2   ·  Problem: Essential hypertension.  Recommendation: BP stable - on Losartan and HCTZ. Monitor BP.,     Problem/Recommendation - 3   ·  Problem: Breast cancer in female.  Recommendation: H/o breast cancer, s/p bilateral mastectomy. Continue Tamoxifen.     Problem/Recommendation - 4   ·  Problem: Prediabetes.  Recommendation: A1c 5.8.  Lifestyle modifications.  ADA diet.  Outpatient follow up with primary doctor.     Problem/Recommendation - 5   ·  Problem: Leukocytosis, unspecified type.  Recommendation: Likely reactive.    Problem/Recommendation - 6   Problem: Anemia due to acute blood loss. Recommendation: Asymptomatic.  Expected blood loss post-op. repeat CBC stable    Problem/Recommendation - 7   Problem: Morbid obesity. Recommendation: Lifestyle modifications. advised f/u with PMD to discuss sleep apnea study. Pt has discussed this with cardio.     Medically stable for discharge.   
66 y/o female with PMH HTN, Breast CA, s/p bilateral mastectomy, OA presents with worsening left knee pain [id number] s/p left TKA , POD # 5 .     Problem/Recommendation - 1   Problem: Unilateral primary osteoarthritis, left knee. Recommendation: S/p Left TKA   Pain control.  PT/OT.  Antibiotics, wound care and DVT prophylaxis as per Ortho. agree with pharmacologic dvt px   Incentive spirometry.  Avoid opioid induced constipation.    Problem/Recommendation - 2   ·  Problem: Essential hypertension.  Recommendation: BP stable - on Losartan and HCTZ. Monitor BP.,     Problem/Recommendation - 3   ·  Problem: Breast cancer in female.  Recommendation: H/o breast cancer, s/p bilateral mastectomy. Continue Tamoxifen.     Problem/Recommendation - 4   ·  Problem: Prediabetes.  Recommendation: A1c 5.8.  Lifestyle modifications.  ADA diet.  Outpatient follow up with primary doctor.     Problem/Recommendation - 5   ·  Problem: Leukocytosis, unspecified type.  Recommendation: Likely reactive, RESOLVING     Problem/Recommendation - 6   Problem: Anemia due to acute blood loss. Recommendation: Asymptomatic.  Expected blood loss post-op.     Problem/Recommendation - 7   Problem: Morbid obesity. Recommendation: Lifestyle modifications. advised f/u with PMD to discuss sleep apnea study. Pt has discussed this with cardio.     DVT prophylaxis  - as per ortho protocol- on Lovenox   Opioid induced constipation  prophylaxis - bowel regimen   Postoperative infection  prophylaxis in high risk patient ( Morbid obesity) - on Keflex as per ortho protocol     Predicates - ADA diet, follow up with PMD as on outpatient for further recommendations .  D/W patient / nurse / ortho PA / cm      Medically stable for discharge.   
64 y/o female with PMH HTN, Breast CA, s/p bilateral mastectomy, OA presents with worsening left knee pain [id number], s/p cortisone injections with limited relief, with associated difficulty ambulating, controlled with Ibuprofen and Tylenol, now s/p left TKA    Problem/Recommendation - 1   Problem: Unilateral primary osteoarthritis, left knee. Recommendation: S/p Left TKA   Pain control.  PT/OT.  Antibiotics, wound care and DVT prophylaxis as per Ortho. agree with pharmacologic dvt px   Incentive spirometry.  Avoid opioid induced constipation.    Problem/Recommendation - 2   ·  Problem: Essential hypertension.  Recommendation: BP stable now  Losartan and HCTZ. Monitor BP.,     Problem/Recommendation - 3   ·  Problem: Breast cancer in female.  Recommendation: H/o breast cancer, s/p bilateral mastectomy.  Continue Tamoxifen.     Problem/Recommendation - 4   ·  Problem: Prediabetes.  Recommendation: A1c 5.8.  Lifestyle modifications.  ADA diet.  Outpatient follow up with primary doctor.     Problem/Recommendation - 5   ·  Problem: Leukocytosis, unspecified type.  Recommendation: Likely reactive.  Asymptomatic.  Monitor CBC.     Problem/Recommendation - 6   Problem: Anemia due to acute blood loss. Recommendation: Asymptomatic.  Expected blood loss post-op. repeat CBC stable    Problem/Recommendation - 7   Problem: Morbid obesity. Recommendation: Lifestyle modifications.    will follow

## 2021-02-17 ENCOUNTER — NON-APPOINTMENT (OUTPATIENT)
Age: 66
End: 2021-02-17

## 2021-02-23 ENCOUNTER — NON-APPOINTMENT (OUTPATIENT)
Age: 66
End: 2021-02-23

## 2021-03-03 ENCOUNTER — APPOINTMENT (OUTPATIENT)
Dept: ORTHOPEDIC SURGERY | Facility: CLINIC | Age: 66
End: 2021-03-03
Payer: MEDICARE

## 2021-03-03 VITALS
BODY MASS INDEX: 53 KG/M2 | HEART RATE: 97 BPM | SYSTOLIC BLOOD PRESSURE: 138 MMHG | HEIGHT: 62 IN | DIASTOLIC BLOOD PRESSURE: 87 MMHG | WEIGHT: 288 LBS

## 2021-03-03 PROCEDURE — 99072 ADDL SUPL MATRL&STAF TM PHE: CPT

## 2021-03-03 PROCEDURE — 99024 POSTOP FOLLOW-UP VISIT: CPT

## 2021-03-03 PROCEDURE — 73562 X-RAY EXAM OF KNEE 3: CPT | Mod: LT

## 2021-03-03 NOTE — END OF VISIT
[FreeTextEntry3] : I, Willard Falk, acted solely as a scribe for Dr. Germán Henderson on this date 03/03/2021.

## 2021-03-03 NOTE — ASU PREOP CHECKLIST - PATIENT PROBLEMS/NEEDS
Called again and no answer. Unable to leave a message. Need to reschedule 03/11/21 appt with Dr. Knapp to [facility] or [place]. Dr. Knapp is no longer available at [place].     Writer will mail unable to reach letter.    Patient expressed no known problems or needs

## 2021-03-04 ENCOUNTER — RX RENEWAL (OUTPATIENT)
Age: 66
End: 2021-03-04

## 2021-03-08 ENCOUNTER — RX RENEWAL (OUTPATIENT)
Age: 66
End: 2021-03-08

## 2021-03-20 ENCOUNTER — RX RENEWAL (OUTPATIENT)
Age: 66
End: 2021-03-20

## 2021-03-23 ENCOUNTER — APPOINTMENT (OUTPATIENT)
Dept: CARDIOLOGY | Facility: CLINIC | Age: 66
End: 2021-03-23

## 2021-03-27 ENCOUNTER — RX RENEWAL (OUTPATIENT)
Age: 66
End: 2021-03-27

## 2021-04-06 ENCOUNTER — APPOINTMENT (OUTPATIENT)
Dept: ORTHOPEDIC SURGERY | Facility: CLINIC | Age: 66
End: 2021-04-06
Payer: MEDICARE

## 2021-04-06 VITALS
HEART RATE: 90 BPM | WEIGHT: 288 LBS | DIASTOLIC BLOOD PRESSURE: 80 MMHG | BODY MASS INDEX: 53 KG/M2 | HEIGHT: 62 IN | SYSTOLIC BLOOD PRESSURE: 157 MMHG

## 2021-04-06 PROCEDURE — 73562 X-RAY EXAM OF KNEE 3: CPT | Mod: 26,LT

## 2021-04-06 PROCEDURE — 99072 ADDL SUPL MATRL&STAF TM PHE: CPT

## 2021-04-06 PROCEDURE — 99024 POSTOP FOLLOW-UP VISIT: CPT

## 2021-04-06 NOTE — HISTORY OF PRESENT ILLNESS
[Doing Well] : is doing well [No Sign of Infection] : is showing no signs of infection [Adequate Pain Control] : has adequate pain control [0] : no pain reported [Xray (Date:___)] : [unfilled] Xray -  [Chills] : no chills [Constipation] : no constipation [Diarrhea] : no diarrhea [Dysuria] : no dysuria [Fever] : no fever [Nausea] : no nausea [Vomiting] : no vomiting [de-identified] : S/P Left total knee arthroplasty.\par  Computer_assisted tibial resection, [facility] procedure. DOS 2/11/21. \par  [de-identified] : she denies pain in the left knee but she has severe pain in the right hip and right knee\par she is interested in URBANO when her left knee is full healed.\par she is in wheel chair due to right hip and knee pain.\par pt is walking minimum with walker with PT [de-identified] : Left knee exam shows healed incision with no sign of infection. Range of motion 0-95 degrees. The surgical incision site(s) swollen, but clean, dry and intact, healed, not erythematous, absent of discharge and not dehisced. Additional findings included an unremarkable neurological exam, peripheral vascular exam normal and maneuvers demonstrated a negative Marcel's sign. \par  [de-identified] : . 3V xray of the left knee done in the office today and demonstrates s/p implants in good positioning with no evidence of wear, loosening, or subsidence. \par \par  [de-identified] : doing well from left knee standpoint\par we will optain updated right hip and knee xray at next visit and start pre op process.

## 2021-04-27 ENCOUNTER — RX RENEWAL (OUTPATIENT)
Age: 66
End: 2021-04-27

## 2021-05-04 ENCOUNTER — RX RENEWAL (OUTPATIENT)
Age: 66
End: 2021-05-04

## 2021-05-05 ENCOUNTER — APPOINTMENT (OUTPATIENT)
Dept: ORTHOPEDIC SURGERY | Facility: CLINIC | Age: 66
End: 2021-05-05

## 2021-05-12 ENCOUNTER — FORM ENCOUNTER (OUTPATIENT)
Age: 66
End: 2021-05-12

## 2021-06-07 ENCOUNTER — APPOINTMENT (OUTPATIENT)
Dept: ORTHOPEDIC SURGERY | Facility: CLINIC | Age: 66
End: 2021-06-07
Payer: MEDICARE

## 2021-06-07 PROCEDURE — 73562 X-RAY EXAM OF KNEE 3: CPT | Mod: LT

## 2021-06-07 PROCEDURE — 99072 ADDL SUPL MATRL&STAF TM PHE: CPT

## 2021-06-07 PROCEDURE — 99214 OFFICE O/P EST MOD 30 MIN: CPT

## 2021-06-07 NOTE — PHYSICAL EXAM
[Antalgic] : antalgic [Wheelchair] : uses a wheelchair [de-identified] : GENERAL APPEARANCE: Well nourished and hydrated, pleasant, alert, and oriented x 3. Appears their stated age. \par HEENT: Normocephalic, extraocular eye motion intact. Nasal septum midline. Oral cavity clear. External auditory canal clear. \par RESPIRATORY: Breath sounds clear and audible in all lobes. No wheezing, No accessory muscle use.\par CARDIOVASCULAR: No apparent abnormalities. No lower leg edema. No varicosities. Pedal pulses are palpable.\par NEUROLOGIC: Sensation is normal, no muscle weakness in the upper or lower extremities.\par DERMATOLOGIC: No apparent skin lesions, moist, warm, no rash.\par SPINE: Cervical spine appears normal and moves freely; thoracic spine appears normal and moves freely; lumbosacral spine appears normal and moves freely, normal, nontender.\par MUSCULOSKELETAL: Hands, wrists, and elbows are normal and move freely, shoulders are normal and move freely. \par Musculoskeletal: antalgic and uses a wheelchair . left knee exam shows healed incision ROM is 0-100 degree without pain. \par right hip  hip exam shows severe stiffness with complete loss of internal and external rotation. \par Utilizing a wheelchair. \par 5/5 motor strength in bilateral lower extremities. Sensory: Intact in bilateral lower extremities. DTRs: Biceps, brachioradialis, triceps, patellar, ankle and plantar 2+ and symmetric bilaterally. Pulses: dorsalis pedis, posterior tibial, femoral, popliteal, and radial 2+ and symmetric bilaterally. \par Constitutional: Alert and in no acute distress and obese, but well-appearing and not in acute distress. \par  [de-identified] : last hip xray was done in 3/23/20 after left URBANO\par shows severe right hip O,A \par \par 3V xray of the left knee done in the office today and demonstrates s/p implants in good positioning with no evidence of wear, loosening, or subsidence.

## 2021-06-07 NOTE — PHYSICAL EXAM
[Antalgic] : antalgic [Wheelchair] : uses a wheelchair [de-identified] : GENERAL APPEARANCE: Well nourished and hydrated, pleasant, alert, and oriented x 3. Appears their stated age. \par HEENT: Normocephalic, extraocular eye motion intact. Nasal septum midline. Oral cavity clear. External auditory canal clear. \par RESPIRATORY: Breath sounds clear and audible in all lobes. No wheezing, No accessory muscle use.\par CARDIOVASCULAR: No apparent abnormalities. No lower leg edema. No varicosities. Pedal pulses are palpable.\par NEUROLOGIC: Sensation is normal, no muscle weakness in the upper or lower extremities.\par DERMATOLOGIC: No apparent skin lesions, moist, warm, no rash.\par SPINE: Cervical spine appears normal and moves freely; thoracic spine appears normal and moves freely; lumbosacral spine appears normal and moves freely, normal, nontender.\par MUSCULOSKELETAL: Hands, wrists, and elbows are normal and move freely, shoulders are normal and move freely. \par Musculoskeletal: antalgic and uses a wheelchair . left knee exam shows healed incision ROM is 0-100 degree without pain. \par right hip  hip exam shows severe stiffness with complete loss of internal and external rotation. \par Utilizing a wheelchair. \par 5/5 motor strength in bilateral lower extremities. Sensory: Intact in bilateral lower extremities. DTRs: Biceps, brachioradialis, triceps, patellar, ankle and plantar 2+ and symmetric bilaterally. Pulses: dorsalis pedis, posterior tibial, femoral, popliteal, and radial 2+ and symmetric bilaterally. \par Constitutional: Alert and in no acute distress and obese, but well-appearing and not in acute distress. \par  [de-identified] : last hip xray was done in 3/23/20 after left URBANO\par shows severe right hip O,A \par \par 3V xray of the left knee done in the office today and demonstrates s/p implants in good positioning with no evidence of wear, loosening, or subsidence.

## 2021-06-07 NOTE — PHYSICAL EXAM
[Antalgic] : antalgic [Wheelchair] : uses a wheelchair [de-identified] : GENERAL APPEARANCE: Well nourished and hydrated, pleasant, alert, and oriented x 3. Appears their stated age. \par HEENT: Normocephalic, extraocular eye motion intact. Nasal septum midline. Oral cavity clear. External auditory canal clear. \par RESPIRATORY: Breath sounds clear and audible in all lobes. No wheezing, No accessory muscle use.\par CARDIOVASCULAR: No apparent abnormalities. No lower leg edema. No varicosities. Pedal pulses are palpable.\par NEUROLOGIC: Sensation is normal, no muscle weakness in the upper or lower extremities.\par DERMATOLOGIC: No apparent skin lesions, moist, warm, no rash.\par SPINE: Cervical spine appears normal and moves freely; thoracic spine appears normal and moves freely; lumbosacral spine appears normal and moves freely, normal, nontender.\par MUSCULOSKELETAL: Hands, wrists, and elbows are normal and move freely, shoulders are normal and move freely. \par Musculoskeletal: antalgic and uses a wheelchair . left knee exam shows healed incision ROM is 0-100 degree without pain. \par right hip  hip exam shows severe stiffness with complete loss of internal and external rotation. \par Utilizing a wheelchair. \par 5/5 motor strength in bilateral lower extremities. Sensory: Intact in bilateral lower extremities. DTRs: Biceps, brachioradialis, triceps, patellar, ankle and plantar 2+ and symmetric bilaterally. Pulses: dorsalis pedis, posterior tibial, femoral, popliteal, and radial 2+ and symmetric bilaterally. \par Constitutional: Alert and in no acute distress and obese, but well-appearing and not in acute distress. \par  [de-identified] : last hip xray was done in 3/23/20 after left URBANO\par shows severe right hip O,A \par \par 3V xray of the left knee done in the office today and demonstrates s/p implants in good positioning with no evidence of wear, loosening, or subsidence.

## 2021-06-07 NOTE — HISTORY OF PRESENT ILLNESS
[Pain Location] : pain [Worsening] : worsening [8] : a current pain level of 8/10 [9] : a maximum pain level of 9/10 [Standing] : standing [Intermit.] : ~He/She~ states the symptoms seem to be intermittent [Walking] : worsened by walking [Rest] : relieved by rest [de-identified] : right hip and right knee pain s/p left TKA 2/11/21  left URBANO 3/3/2020\par \par this is 66 y/o female almost 4M from left TKA doing much better still hurts a bit but asper cream is enough for it.\par she is in wheel chair due to right hip and right knee pain.\par she has end stage hip and knee O,A. the pain has been present for 4-5 hours\par pt is happy with the left knee and hip surgical out come. \par \par

## 2021-06-07 NOTE — HISTORY OF PRESENT ILLNESS
[Pain Location] : pain [Worsening] : worsening [8] : a current pain level of 8/10 [9] : a maximum pain level of 9/10 [Standing] : standing [Intermit.] : ~He/She~ states the symptoms seem to be intermittent [Walking] : worsened by walking [Rest] : relieved by rest [de-identified] : right hip and right knee pain s/p left TKA 2/11/21  left URBANO 3/3/2020\par \par this is 66 y/o female almost 4M from left TKA doing much better still hurts a bit but asper cream is enough for it.\par she is in wheel chair due to right hip and right knee pain.\par she has end stage hip and knee O,A. the pain has been present for 4-5 hours\par pt is happy with the left knee and hip surgical out come. \par \par

## 2021-06-07 NOTE — HISTORY OF PRESENT ILLNESS
[Pain Location] : pain [Worsening] : worsening [8] : a current pain level of 8/10 [9] : a maximum pain level of 9/10 [Standing] : standing [Intermit.] : ~He/She~ states the symptoms seem to be intermittent [Walking] : worsened by walking [Rest] : relieved by rest [de-identified] : right hip and right knee pain s/p left TKA 2/11/21  left URBANO 3/3/2020\par \par this is 66 y/o female almost 4M from left TKA doing much better still hurts a bit but asper cream is enough for it.\par she is in wheel chair due to right hip and right knee pain.\par she has end stage hip and knee O,A. the pain has been present for 4-5 hours\par pt is happy with the left knee and hip surgical out come. \par \par

## 2021-06-07 NOTE — DISCUSSION/SUMMARY
[de-identified] : Surgical risks reviewed. 65 year old female with bone-on-bone osteoarthritis of the right hip and right knee.  s/p left TKA 2/11/21  left URBANO 3/3/2020 doing well.  Based on imaging she is a candidate for right URBANO and right TKA, she would like to do right URBANO first   We discussed the role of total hip replacement including the risks. She is obese with BMI greater than 45 today,  She will need cardiac clearance and clearance from her internist. She can schedule right URBANO. \par \par The patient is a 65  year old individual with end stage arthritis of their right hip joint. Based upon the patient's continued symptoms and failure to respond to conservative treatment I have recommended a right hip replacement for this patient. A long discussion took place with the patient describing what a total joint replacement is and what the procedure would entail. A total hip arthroplasty model, similar to the implant that will be used during the operation, was utilized to demonstrate and to discuss the various bearing surfaces of the implants. The hospitalization and post-operative care and rehabilitation were also discussed. The use of perioperative antibiotics and DVT prophylaxis were discussed. The risk, benefits and alternatives to a surgical intervention were discussed at length with the patient. The patient was also advised of risks related to the medical comorbidities and elevated body mass index (BMI).  A lengthy discussion took place to review the most common complications including but not limited to: deep vein thrombosis, pulmonary embolus, heart attack, stroke, infection, wound breakdown, numbness, damage to nerves, tendon, muscles, arteries or other blood vessels, death and other possible complications from anesthesia. The patient was told that we will take steps to minimize these risks by using sterile technique, antibiotics and DVT prophylaxis when appropriate and follow the patient postoperatively in the office setting to monitor progress. The possibility of recurrent pain, no improvement in pain and actual worsening of pain were also discussed with the patient.\par The discharge plan of care focused on the patient going home following surgery.  The patient was encouraged to make the necessary arrangements to have someone stay with them when they are discharged home.  Following discharge, a home care nurse will visit the patient.  The home care nurse will open your home care case and request home physical therapy services.  Home physical therapy will commence following discharge provided it is appropriate and covered by the health insurance benefit plan. \par The benefits of surgery were discussed with the patient including the potential for improving his/her current clinical condition through operative intervention. Alternatives to surgical intervention including continued conservative management were also discussed in detail. All questions were answered to the satisfaction of the patient. The treatment plan of care, as well as a model of a total hip implants equivalent to the one that will be used for their total joint replacement, was shared with the patient.  The patient agreed to the plan of care as well as the use of implants in their total joint replacement.\par \par \par

## 2021-06-07 NOTE — DISCUSSION/SUMMARY
[de-identified] : Surgical risks reviewed. 65 year old female with bone-on-bone osteoarthritis of the right hip and right knee.  s/p left TKA 2/11/21  left URBANO 3/3/2020 doing well.  Based on imaging she is a candidate for right URBANO and right TKA, she would like to do right URBANO first   We discussed the role of total hip replacement including the risks. She is obese with BMI greater than 45 today,  She will need cardiac clearance and clearance from her internist. She can schedule right URBANO. \par \par The patient is a 65  year old individual with end stage arthritis of their right hip joint. Based upon the patient's continued symptoms and failure to respond to conservative treatment I have recommended a right hip replacement for this patient. A long discussion took place with the patient describing what a total joint replacement is and what the procedure would entail. A total hip arthroplasty model, similar to the implant that will be used during the operation, was utilized to demonstrate and to discuss the various bearing surfaces of the implants. The hospitalization and post-operative care and rehabilitation were also discussed. The use of perioperative antibiotics and DVT prophylaxis were discussed. The risk, benefits and alternatives to a surgical intervention were discussed at length with the patient. The patient was also advised of risks related to the medical comorbidities and elevated body mass index (BMI).  A lengthy discussion took place to review the most common complications including but not limited to: deep vein thrombosis, pulmonary embolus, heart attack, stroke, infection, wound breakdown, numbness, damage to nerves, tendon, muscles, arteries or other blood vessels, death and other possible complications from anesthesia. The patient was told that we will take steps to minimize these risks by using sterile technique, antibiotics and DVT prophylaxis when appropriate and follow the patient postoperatively in the office setting to monitor progress. The possibility of recurrent pain, no improvement in pain and actual worsening of pain were also discussed with the patient.\par The discharge plan of care focused on the patient going home following surgery.  The patient was encouraged to make the necessary arrangements to have someone stay with them when they are discharged home.  Following discharge, a home care nurse will visit the patient.  The home care nurse will open your home care case and request home physical therapy services.  Home physical therapy will commence following discharge provided it is appropriate and covered by the health insurance benefit plan. \par The benefits of surgery were discussed with the patient including the potential for improving his/her current clinical condition through operative intervention. Alternatives to surgical intervention including continued conservative management were also discussed in detail. All questions were answered to the satisfaction of the patient. The treatment plan of care, as well as a model of a total hip implants equivalent to the one that will be used for their total joint replacement, was shared with the patient.  The patient agreed to the plan of care as well as the use of implants in their total joint replacement.\par \par \par

## 2021-06-07 NOTE — DISCUSSION/SUMMARY
[de-identified] : Surgical risks reviewed. 65 year old female with bone-on-bone osteoarthritis of the right hip and right knee.  s/p left TKA 2/11/21  left URBANO 3/3/2020 doing well.  Based on imaging she is a candidate for right URBANO and right TKA, she would like to do right URBANO first   We discussed the role of total hip replacement including the risks. She is obese with BMI greater than 45 today,  She will need cardiac clearance and clearance from her internist. She can schedule right URBANO. \par \par The patient is a 65  year old individual with end stage arthritis of their right hip joint. Based upon the patient's continued symptoms and failure to respond to conservative treatment I have recommended a right hip replacement for this patient. A long discussion took place with the patient describing what a total joint replacement is and what the procedure would entail. A total hip arthroplasty model, similar to the implant that will be used during the operation, was utilized to demonstrate and to discuss the various bearing surfaces of the implants. The hospitalization and post-operative care and rehabilitation were also discussed. The use of perioperative antibiotics and DVT prophylaxis were discussed. The risk, benefits and alternatives to a surgical intervention were discussed at length with the patient. The patient was also advised of risks related to the medical comorbidities and elevated body mass index (BMI).  A lengthy discussion took place to review the most common complications including but not limited to: deep vein thrombosis, pulmonary embolus, heart attack, stroke, infection, wound breakdown, numbness, damage to nerves, tendon, muscles, arteries or other blood vessels, death and other possible complications from anesthesia. The patient was told that we will take steps to minimize these risks by using sterile technique, antibiotics and DVT prophylaxis when appropriate and follow the patient postoperatively in the office setting to monitor progress. The possibility of recurrent pain, no improvement in pain and actual worsening of pain were also discussed with the patient.\par The discharge plan of care focused on the patient going home following surgery.  The patient was encouraged to make the necessary arrangements to have someone stay with them when they are discharged home.  Following discharge, a home care nurse will visit the patient.  The home care nurse will open your home care case and request home physical therapy services.  Home physical therapy will commence following discharge provided it is appropriate and covered by the health insurance benefit plan. \par The benefits of surgery were discussed with the patient including the potential for improving his/her current clinical condition through operative intervention. Alternatives to surgical intervention including continued conservative management were also discussed in detail. All questions were answered to the satisfaction of the patient. The treatment plan of care, as well as a model of a total hip implants equivalent to the one that will be used for their total joint replacement, was shared with the patient.  The patient agreed to the plan of care as well as the use of implants in their total joint replacement.\par \par \par

## 2021-07-16 ENCOUNTER — APPOINTMENT (OUTPATIENT)
Dept: ORTHOPEDIC SURGERY | Facility: CLINIC | Age: 66
End: 2021-07-16
Payer: MEDICARE

## 2021-07-16 VITALS
SYSTOLIC BLOOD PRESSURE: 115 MMHG | HEART RATE: 91 BPM | WEIGHT: 288 LBS | BODY MASS INDEX: 53 KG/M2 | HEIGHT: 62 IN | DIASTOLIC BLOOD PRESSURE: 66 MMHG

## 2021-07-16 DIAGNOSIS — M16.11 UNILATERAL PRIMARY OSTEOARTHRITIS, RIGHT HIP: ICD-10-CM

## 2021-07-16 PROCEDURE — 73501 X-RAY EXAM HIP UNI 1 VIEW: CPT | Mod: 26,RT

## 2021-07-16 PROCEDURE — 99213 OFFICE O/P EST LOW 20 MIN: CPT

## 2021-07-20 NOTE — BRIEF OPERATIVE NOTE - PRE-OP DX
none
Endometrial polyp  11/08/2018    Active  Jodi Dahl  Thickened endometrium  11/08/2018    Active  Jodi Dahl

## 2021-07-21 ENCOUNTER — OUTPATIENT (OUTPATIENT)
Dept: OUTPATIENT SERVICES | Facility: HOSPITAL | Age: 66
LOS: 1 days | End: 2021-07-21
Payer: COMMERCIAL

## 2021-07-21 VITALS
TEMPERATURE: 97 F | RESPIRATION RATE: 20 BRPM | WEIGHT: 293 LBS | SYSTOLIC BLOOD PRESSURE: 140 MMHG | HEIGHT: 64 IN | HEART RATE: 95 BPM | DIASTOLIC BLOOD PRESSURE: 87 MMHG

## 2021-07-21 DIAGNOSIS — Z96.642 PRESENCE OF LEFT ARTIFICIAL HIP JOINT: Chronic | ICD-10-CM

## 2021-07-21 DIAGNOSIS — Z98.890 OTHER SPECIFIED POSTPROCEDURAL STATES: Chronic | ICD-10-CM

## 2021-07-21 DIAGNOSIS — Z29.9 ENCOUNTER FOR PROPHYLACTIC MEASURES, UNSPECIFIED: ICD-10-CM

## 2021-07-21 DIAGNOSIS — Z01.818 ENCOUNTER FOR OTHER PREPROCEDURAL EXAMINATION: ICD-10-CM

## 2021-07-21 DIAGNOSIS — Z96.652 PRESENCE OF LEFT ARTIFICIAL KNEE JOINT: Chronic | ICD-10-CM

## 2021-07-21 DIAGNOSIS — Z90.13 ACQUIRED ABSENCE OF BILATERAL BREASTS AND NIPPLES: Chronic | ICD-10-CM

## 2021-07-21 DIAGNOSIS — M16.11 UNILATERAL PRIMARY OSTEOARTHRITIS, RIGHT HIP: ICD-10-CM

## 2021-07-21 LAB
A1C WITH ESTIMATED AVERAGE GLUCOSE RESULT: 6.1 % — HIGH (ref 4–5.6)
ALBUMIN SERPL ELPH-MCNC: 4 G/DL — SIGNIFICANT CHANGE UP (ref 3.3–5.2)
ALP SERPL-CCNC: 80 U/L — SIGNIFICANT CHANGE UP (ref 40–120)
ALT FLD-CCNC: 24 U/L — SIGNIFICANT CHANGE UP
ANION GAP SERPL CALC-SCNC: 13 MMOL/L — SIGNIFICANT CHANGE UP (ref 5–17)
APTT BLD: 28.2 SEC — SIGNIFICANT CHANGE UP (ref 27.5–35.5)
AST SERPL-CCNC: 31 U/L — SIGNIFICANT CHANGE UP
BASOPHILS # BLD AUTO: 0.07 K/UL — SIGNIFICANT CHANGE UP (ref 0–0.2)
BASOPHILS NFR BLD AUTO: 0.8 % — SIGNIFICANT CHANGE UP (ref 0–2)
BILIRUB SERPL-MCNC: 0.5 MG/DL — SIGNIFICANT CHANGE UP (ref 0.4–2)
BLD GP AB SCN SERPL QL: SIGNIFICANT CHANGE UP
BUN SERPL-MCNC: 17.7 MG/DL — SIGNIFICANT CHANGE UP (ref 8–20)
CALCIUM SERPL-MCNC: 9.6 MG/DL — SIGNIFICANT CHANGE UP (ref 8.6–10.2)
CHLORIDE SERPL-SCNC: 105 MMOL/L — SIGNIFICANT CHANGE UP (ref 98–107)
CO2 SERPL-SCNC: 25 MMOL/L — SIGNIFICANT CHANGE UP (ref 22–29)
CREAT SERPL-MCNC: 0.66 MG/DL — SIGNIFICANT CHANGE UP (ref 0.5–1.3)
EOSINOPHIL # BLD AUTO: 0.23 K/UL — SIGNIFICANT CHANGE UP (ref 0–0.5)
EOSINOPHIL NFR BLD AUTO: 2.7 % — SIGNIFICANT CHANGE UP (ref 0–6)
ESTIMATED AVERAGE GLUCOSE: 128 MG/DL — HIGH (ref 68–114)
GLUCOSE SERPL-MCNC: 100 MG/DL — HIGH (ref 70–99)
HCT VFR BLD CALC: 38.2 % — SIGNIFICANT CHANGE UP (ref 34.5–45)
HGB BLD-MCNC: 12.5 G/DL — SIGNIFICANT CHANGE UP (ref 11.5–15.5)
IMM GRANULOCYTES NFR BLD AUTO: 0.5 % — SIGNIFICANT CHANGE UP (ref 0–1.5)
INR BLD: 1.05 RATIO — SIGNIFICANT CHANGE UP (ref 0.88–1.16)
LYMPHOCYTES # BLD AUTO: 1.59 K/UL — SIGNIFICANT CHANGE UP (ref 1–3.3)
LYMPHOCYTES # BLD AUTO: 18.6 % — SIGNIFICANT CHANGE UP (ref 13–44)
MCHC RBC-ENTMCNC: 29.6 PG — SIGNIFICANT CHANGE UP (ref 27–34)
MCHC RBC-ENTMCNC: 32.7 GM/DL — SIGNIFICANT CHANGE UP (ref 32–36)
MCV RBC AUTO: 90.3 FL — SIGNIFICANT CHANGE UP (ref 80–100)
MONOCYTES # BLD AUTO: 0.91 K/UL — HIGH (ref 0–0.9)
MONOCYTES NFR BLD AUTO: 10.6 % — SIGNIFICANT CHANGE UP (ref 2–14)
MRSA PCR RESULT.: SIGNIFICANT CHANGE UP
NEUTROPHILS # BLD AUTO: 5.71 K/UL — SIGNIFICANT CHANGE UP (ref 1.8–7.4)
NEUTROPHILS NFR BLD AUTO: 66.8 % — SIGNIFICANT CHANGE UP (ref 43–77)
PLATELET # BLD AUTO: 302 K/UL — SIGNIFICANT CHANGE UP (ref 150–400)
POTASSIUM SERPL-MCNC: 4.3 MMOL/L — SIGNIFICANT CHANGE UP (ref 3.5–5.3)
POTASSIUM SERPL-SCNC: 4.3 MMOL/L — SIGNIFICANT CHANGE UP (ref 3.5–5.3)
PROT SERPL-MCNC: 7.4 G/DL — SIGNIFICANT CHANGE UP (ref 6.6–8.7)
PROTHROM AB SERPL-ACNC: 12.2 SEC — SIGNIFICANT CHANGE UP (ref 10.6–13.6)
RBC # BLD: 4.23 M/UL — SIGNIFICANT CHANGE UP (ref 3.8–5.2)
RBC # FLD: 14.4 % — SIGNIFICANT CHANGE UP (ref 10.3–14.5)
S AUREUS DNA NOSE QL NAA+PROBE: SIGNIFICANT CHANGE UP
SODIUM SERPL-SCNC: 142 MMOL/L — SIGNIFICANT CHANGE UP (ref 135–145)
WBC # BLD: 8.55 K/UL — SIGNIFICANT CHANGE UP (ref 3.8–10.5)
WBC # FLD AUTO: 8.55 K/UL — SIGNIFICANT CHANGE UP (ref 3.8–10.5)

## 2021-07-21 PROCEDURE — 93010 ELECTROCARDIOGRAM REPORT: CPT

## 2021-07-21 PROCEDURE — 93005 ELECTROCARDIOGRAM TRACING: CPT

## 2021-07-21 PROCEDURE — G0463: CPT

## 2021-07-21 RX ORDER — SODIUM CHLORIDE 9 MG/ML
3 INJECTION INTRAMUSCULAR; INTRAVENOUS; SUBCUTANEOUS EVERY 8 HOURS
Refills: 0 | Status: DISCONTINUED | OUTPATIENT
Start: 2021-08-10 | End: 2021-08-12

## 2021-07-21 RX ORDER — GABAPENTIN 400 MG/1
1 CAPSULE ORAL
Qty: 0 | Refills: 0 | DISCHARGE

## 2021-07-21 NOTE — H&P PST ADULT - HISTORY OF PRESENT ILLNESS
64 yo F PMH of HTN, HLD, breast cancer s/p bilateral mastectomy in 2018 (on tamoxifen), OA s/p left TKA 2/11/21 and left URBANO 3/3/2020, presents with c/o right hip pain and right knee pain. She ambulates in a wheelchair. She has known end stage right hip and right knee OA. Patient is happy with the left knee and left hip surgical outcomes. Pain levels include a current pain level of 8/10. Recent Xray of the right hip demonstrates bone on bone arthritis. She had several cortisone treatments with only slight relief. She is taking ibuprofen 800 mg BID. Preop assessment prior to total right knee replacement with Dr Henderson on 8/10/21     Completed COVID 19 vaccination: Berenice x1 (6/12/21 66 yo F PMH of HTN, HLD, breast cancer s/p bilateral mastectomy in 2018 (on tamoxifen), OA s/p left TKA 2/11/21 and left URBANO 3/3/2020, presents with c/o right hip pain and right knee pain. She ambulates in a wheelchair. She has known end stage right hip and right knee OA. Patient is happy with the left knee and left hip surgical outcomes. Pain levels include a current pain level of 8/10. Recent Xray of the right hip demonstrates bone on bone arthritis. She had several cortisone treatments with only slight relief. She is taking ibuprofen and tylenol w/partial relief. Preop assessment prior to total right knee replacement with Dr Henderson on 8/10/21     Completed COVID 19 vaccination: Berenice x1 (6/12/21 66 yo F PMH of HTN, HLD, breast cancer s/p bilateral mastectomy in 2018 (on tamoxifen), OA s/p left TKA 2/11/21 and left URBANO 3/3/2020, presents with c/o right hip pain and right knee pain. She ambulates in a wheelchair. She has known end stage right hip and right knee OA. Patient is happy with the left knee and left hip surgical outcomes. Pain levels include a current pain level of 8/10. Recent Xray of the right hip demonstrates bone on bone arthritis. She had several cortisone treatments with only slight relief. She is taking ibuprofen and tylenol w/partial relief. Preop assessment prior to total right Hip replacement with Dr Henderson on 8/10/21     Completed COVID 19 vaccination: Berenice x1 (6/12/21

## 2021-07-21 NOTE — H&P PST ADULT - CONSTITUTIONAL DETAILS
BMI 51/well-groomed/distress due to pain/obese BMI 49.4/well-groomed/distress due to pain/obese BMI 52.5/well-groomed/distress due to pain/obese

## 2021-07-21 NOTE — H&P PST ADULT - NSICDXPROBLEM_GEN_ALL_CORE_FT
PROBLEM DIAGNOSES  Problem: Unilateral primary osteoarthritis, right hip  Assessment and Plan: preop assessment, medical and cardiac clearance pending, right THR on 8/10/21    Problem: Need for prophylactic measure  Assessment and Plan: caprini score 10, high risk for dvt, SCD ordered, surgical team to assess for dvt prophylaxis

## 2021-07-21 NOTE — H&P PST ADULT - MUSCULOSKELETAL
right hip, right knee/decreased ROM/decreased ROM due to pain/joint swelling/diminished strength details… detailed exam

## 2021-07-21 NOTE — H&P PST ADULT - NSICDXPASTSURGICALHX_GEN_ALL_CORE_FT
PAST SURGICAL HISTORY:  H/O total knee replacement, left 2/11/2021    History of tonsillectomy     History of total hip replacement, left 3/3/2020    S/P mastectomy, bilateral

## 2021-07-21 NOTE — H&P PST ADULT - REASON FOR ADMISSION
" Im having the left knee replaced " "I am having my right knee replaced" "I am having my right Hip replaced"

## 2021-07-21 NOTE — H&P PST ADULT - ASSESSMENT
66 yo F PMH of HTN, HLD, breast cancer s/p bilateral mastectomy in 2018 (on tamoxifen), OA s/p left TKA 21 and left URBANO 3/3/2020, presents with c/o right hip pain and right knee pain. She ambulates in a wheelchair. She has known end stage right hip and right knee OA. Patient is happy with the left knee and left hip surgical outcomes. Pain levels include a current pain level of 8/10. Recent Xray of the right hip demonstrates bone on bone arthritis. She had several cortisone treatments with only slight relief. She is taking ibuprofen 800 mg BID. Preop assessment prior to total right knee replacement with Dr Henderson on 8/10/21     Pt was educated on preop preparation with written and verbal instructions. Pt was informed to obtain clearances >3 days before surgery. Pt will review medications with PCP. Pt was educated on NSAIDs, multivitamins and herbals that increase the risk of bleeding and need to be stopped 7 days before procedure. Pt was educated on covid testing and covid prevention, i.e. social distancing, handwashing, mask wearing. Pt verbalized understanding of the above.     OPIOID RISK TOOL    SIN EACH BOX THAT APPLIES AND ADD TOTALS AT THE END    FAMILY HISTORY OF SUBSTANCE ABUSE                 FEMALE         MALE                                                Alcohol                             [  ]1 pt          [  ]3pts                                               Illegal Drugs                     [  ]2 pts        [  ]3pts                                               Rx Drugs                           [  ]4 pts        [  ]4 pts    PERSONAL HISTORY OF SUBSTANCE ABUSE                                                                                          Alcohol                             [  ]3 pts       [  ]3 pts                                               Illegal Drugs                     [  ]4 pts        [  ]4 pts                                               Rx Drugs                           [  ]5 pts        [  ]5 pts    AGE BETWEEN 16-45 YEARS                                      [  ]1 pt         [  ]1 pt    HISTORY OF PREADOLESCENT   SEXUAL ABUSE                                                             [  ]3 pts        [  ]0pts    PSYCHOLOGICAL DISEASE                     ADD, OCD, Bipolar, Schizophrenia        [  ]2 pts         [  ]2 pts                      Depression                                               [  ]1 pt           [  ]1 pt           SCORING TOTAL   (add numbers and type here)              ( 0 )                                     A score of 3 or lower indicated LOW risk for future opioid abuse  A score of 4 to 7 indicated moderate risk for future opioid abuse  A score of 8 or higher indicates a high risk for opioid abuse    CAPRINI VTE 2.0 SCORE [CLOT updated 2019]    AGE RELATED RISK FACTORS                                                       MOBILITY RELATED FACTORS  [ ] Age 41-60 years                                            (1 Point)                    [ ] Bed rest                                                        (1 Point)  [x ] Age: 61-74 years                                           (2 Points)                  [ ] Plaster cast                                                   (2 Points)  [ ] Age= 75 years                                              (3 Points)                    [ ] Bed bound for more than 72 hours                 (2 Points)    DISEASE RELATED RISK FACTORS                                               GENDER SPECIFIC FACTORS  [ ] Edema in the lower extremities                       (1 Point)              [ ] Pregnancy                                                     (1 Point)  [ ] Varicose veins                                               (1 Point)                     [ ] Post-partum < 6 weeks                                   (1 Point)             [x ] BMI > 25 Kg/m2                                            (1 Point)                     [ ] Hormonal therapy  or oral contraception          (1 Point)                 [ ] Sepsis (in the previous month)                        (1 Point)               [ ] History of pregnancy complications                 (1 point)  [ ] Pneumonia or serious lung disease                                               [ ] Unexplained or recurrent                     (1 Point)           (in the previous month)                               (1 Point)  [ ] Abnormal pulmonary function test                     (1 Point)                 SURGERY RELATED RISK FACTORS  [ ] Acute myocardial infarction                              (1 Point)               [ ]  Section                                             (1 Point)  [ ] Congestive heart failure (in the previous month)  (1 Point)      [ ] Minor surgery                                                  (1 Point)   [ ] Inflammatory bowel disease                             (1 Point)               [ ] Arthroscopic surgery                                        (2 Points)  [ ] Central venous access                                      (2 Points)                [ ] General surgery lasting more than 45 minutes (2 points)  [x ] Malignancy- Present or previous                   (2 Points)                [x ] Elective arthroplasty                                         (5 points)    [ ] Stroke (in the previous month)                          (5 Points)                                                                                                                                                           HEMATOLOGY RELATED FACTORS                                                 TRAUMA RELATED RISK FACTORS  [ ] Prior episodes of VTE                                     (3 Points)                [ ] Fracture of the hip, pelvis, or leg                       (5 Points)  [ ] Positive family history for VTE                         (3 Points)             [ ] Acute spinal cord injury (in the previous month)  (5 Points)  [ ] Prothrombin 13530 A                                     (3 Points)               [ ] Paralysis  (less than 1 month)                             (5 Points)  [ ] Factor V Leiden                                             (3 Points)                  [ ] Multiple Trauma within 1 month                        (5 Points)  [ ] Lupus anticoagulants                                     (3 Points)                                                           [ ] Anticardiolipin antibodies                               (3 Points)                                                       [ ] High homocysteine in the blood                      (3 Points)                                             [ ] Other congenital or acquired thrombophilia      (3 Points)                                                [ ] Heparin induced thrombocytopenia                  (3 Points)                                     Total Score [     10     ]   66 yo F PMH of HTN, HLD, breast cancer s/p bilateral mastectomy in 2018 (on tamoxifen), OA s/p left TKA 21 and left URBANO 3/3/2020, presents with c/o right hip pain and right knee pain. She ambulates in a wheelchair. She has known end stage right hip and right knee OA. Patient is happy with the left knee and left hip surgical outcomes. Pain levels include a current pain level of 8/10. Recent Xray of the right hip demonstrates bone on bone arthritis. She had several cortisone treatments with only slight relief. She is taking ibuprofen and tylenol w/partial relief. Preop assessment prior to total right knee replacement with Dr Hednerson on 8/10/21     Pt was educated on preop preparation with written and verbal instructions. Pt was informed to obtain clearances >3 days before surgery. Pt will review medications with PCP. Pt was educated on NSAIDs, multivitamins and herbals that increase the risk of bleeding and need to be stopped 7 days before procedure. Pt was educated on covid testing and covid prevention, i.e. social distancing, handwashing, mask wearing. Pt verbalized understanding of the above.     OPIOID RISK TOOL    SIN EACH BOX THAT APPLIES AND ADD TOTALS AT THE END    FAMILY HISTORY OF SUBSTANCE ABUSE                 FEMALE         MALE                                                Alcohol                             [  ]1 pt          [  ]3pts                                               Illegal Drugs                     [  ]2 pts        [  ]3pts                                               Rx Drugs                           [  ]4 pts        [  ]4 pts    PERSONAL HISTORY OF SUBSTANCE ABUSE                                                                                          Alcohol                             [  ]3 pts       [  ]3 pts                                               Illegal Drugs                     [  ]4 pts        [  ]4 pts                                               Rx Drugs                           [  ]5 pts        [  ]5 pts    AGE BETWEEN 16-45 YEARS                                      [  ]1 pt         [  ]1 pt    HISTORY OF PREADOLESCENT   SEXUAL ABUSE                                                             [  ]3 pts        [  ]0pts    PSYCHOLOGICAL DISEASE                     ADD, OCD, Bipolar, Schizophrenia        [  ]2 pts         [  ]2 pts                      Depression                                               [  ]1 pt           [  ]1 pt           SCORING TOTAL   (add numbers and type here)              ( 0 )                                     A score of 3 or lower indicated LOW risk for future opioid abuse  A score of 4 to 7 indicated moderate risk for future opioid abuse  A score of 8 or higher indicates a high risk for opioid abuse    CAPRINI VTE 2.0 SCORE [CLOT updated 2019]    AGE RELATED RISK FACTORS                                                       MOBILITY RELATED FACTORS  [ ] Age 41-60 years                                            (1 Point)                    [ ] Bed rest                                                        (1 Point)  [x ] Age: 61-74 years                                           (2 Points)                  [ ] Plaster cast                                                   (2 Points)  [ ] Age= 75 years                                              (3 Points)                    [ ] Bed bound for more than 72 hours                 (2 Points)    DISEASE RELATED RISK FACTORS                                               GENDER SPECIFIC FACTORS  [ ] Edema in the lower extremities                       (1 Point)              [ ] Pregnancy                                                     (1 Point)  [ ] Varicose veins                                               (1 Point)                     [ ] Post-partum < 6 weeks                                   (1 Point)             [x ] BMI > 25 Kg/m2                                            (1 Point)                     [ ] Hormonal therapy  or oral contraception          (1 Point)                 [ ] Sepsis (in the previous month)                        (1 Point)               [ ] History of pregnancy complications                 (1 point)  [ ] Pneumonia or serious lung disease                                               [ ] Unexplained or recurrent                     (1 Point)           (in the previous month)                               (1 Point)  [ ] Abnormal pulmonary function test                     (1 Point)                 SURGERY RELATED RISK FACTORS  [ ] Acute myocardial infarction                              (1 Point)               [ ]  Section                                             (1 Point)  [ ] Congestive heart failure (in the previous month)  (1 Point)      [ ] Minor surgery                                                  (1 Point)   [ ] Inflammatory bowel disease                             (1 Point)               [ ] Arthroscopic surgery                                        (2 Points)  [ ] Central venous access                                      (2 Points)                [ ] General surgery lasting more than 45 minutes (2 points)  [x ] Malignancy- Present or previous                   (2 Points)                [x ] Elective arthroplasty                                         (5 points)    [ ] Stroke (in the previous month)                          (5 Points)                                                                                                                                                           HEMATOLOGY RELATED FACTORS                                                 TRAUMA RELATED RISK FACTORS  [ ] Prior episodes of VTE                                     (3 Points)                [ ] Fracture of the hip, pelvis, or leg                       (5 Points)  [ ] Positive family history for VTE                         (3 Points)             [ ] Acute spinal cord injury (in the previous month)  (5 Points)  [ ] Prothrombin 81263 A                                     (3 Points)               [ ] Paralysis  (less than 1 month)                             (5 Points)  [ ] Factor V Leiden                                             (3 Points)                  [ ] Multiple Trauma within 1 month                        (5 Points)  [ ] Lupus anticoagulants                                     (3 Points)                                                           [ ] Anticardiolipin antibodies                               (3 Points)                                                       [ ] High homocysteine in the blood                      (3 Points)                                             [ ] Other congenital or acquired thrombophilia      (3 Points)                                                [ ] Heparin induced thrombocytopenia                  (3 Points)                                     Total Score [     10     ]   66 yo F PMH of HTN, HLD, breast cancer s/p bilateral mastectomy in 2018 (on tamoxifen), OA s/p left TKA 21 and left URBANO 3/3/2020, presents with c/o right hip pain and right knee pain. She ambulates in a wheelchair. She has known end stage right hip and right knee OA. Patient is happy with the left knee and left hip surgical outcomes. Pain levels include a current pain level of 8/10. Recent Xray of the right hip demonstrates bone on bone arthritis. She had several cortisone treatments with only slight relief. She is taking ibuprofen and tylenol w/partial relief. Preop assessment prior to total right hip replacement with Dr Henderson on 8/10/21     Pt was educated on preop preparation with written and verbal instructions. Pt was informed to obtain clearances >3 days before surgery. Pt will review medications with PCP. Pt was educated on NSAIDs, multivitamins and herbals that increase the risk of bleeding and need to be stopped 7 days before procedure. Pt was educated on covid testing and covid prevention, i.e. social distancing, handwashing, mask wearing. Pt verbalized understanding of the above.     OPIOID RISK TOOL    SIN EACH BOX THAT APPLIES AND ADD TOTALS AT THE END    FAMILY HISTORY OF SUBSTANCE ABUSE                 FEMALE         MALE                                                Alcohol                             [  ]1 pt          [  ]3pts                                               Illegal Drugs                     [  ]2 pts        [  ]3pts                                               Rx Drugs                           [  ]4 pts        [  ]4 pts    PERSONAL HISTORY OF SUBSTANCE ABUSE                                                                                          Alcohol                             [  ]3 pts       [  ]3 pts                                               Illegal Drugs                     [  ]4 pts        [  ]4 pts                                               Rx Drugs                           [  ]5 pts        [  ]5 pts    AGE BETWEEN 16-45 YEARS                                      [  ]1 pt         [  ]1 pt    HISTORY OF PREADOLESCENT   SEXUAL ABUSE                                                             [  ]3 pts        [  ]0pts    PSYCHOLOGICAL DISEASE                     ADD, OCD, Bipolar, Schizophrenia        [  ]2 pts         [  ]2 pts                      Depression                                               [  ]1 pt           [  ]1 pt           SCORING TOTAL   (add numbers and type here)              ( 0 )                                     A score of 3 or lower indicated LOW risk for future opioid abuse  A score of 4 to 7 indicated moderate risk for future opioid abuse  A score of 8 or higher indicates a high risk for opioid abuse    CAPRINI VTE 2.0 SCORE [CLOT updated 2019]    AGE RELATED RISK FACTORS                                                       MOBILITY RELATED FACTORS  [ ] Age 41-60 years                                            (1 Point)                    [ ] Bed rest                                                        (1 Point)  [x ] Age: 61-74 years                                           (2 Points)                  [ ] Plaster cast                                                   (2 Points)  [ ] Age= 75 years                                              (3 Points)                    [ ] Bed bound for more than 72 hours                 (2 Points)    DISEASE RELATED RISK FACTORS                                               GENDER SPECIFIC FACTORS  [ ] Edema in the lower extremities                       (1 Point)              [ ] Pregnancy                                                     (1 Point)  [ ] Varicose veins                                               (1 Point)                     [ ] Post-partum < 6 weeks                                   (1 Point)             [x ] BMI > 25 Kg/m2                                            (1 Point)                     [ ] Hormonal therapy  or oral contraception          (1 Point)                 [ ] Sepsis (in the previous month)                        (1 Point)               [ ] History of pregnancy complications                 (1 point)  [ ] Pneumonia or serious lung disease                                               [ ] Unexplained or recurrent                     (1 Point)           (in the previous month)                               (1 Point)  [ ] Abnormal pulmonary function test                     (1 Point)                 SURGERY RELATED RISK FACTORS  [ ] Acute myocardial infarction                              (1 Point)               [ ]  Section                                             (1 Point)  [ ] Congestive heart failure (in the previous month)  (1 Point)      [ ] Minor surgery                                                  (1 Point)   [ ] Inflammatory bowel disease                             (1 Point)               [ ] Arthroscopic surgery                                        (2 Points)  [ ] Central venous access                                      (2 Points)                [ ] General surgery lasting more than 45 minutes (2 points)  [x ] Malignancy- Present or previous                   (2 Points)                [x ] Elective arthroplasty                                         (5 points)    [ ] Stroke (in the previous month)                          (5 Points)                                                                                                                                                           HEMATOLOGY RELATED FACTORS                                                 TRAUMA RELATED RISK FACTORS  [ ] Prior episodes of VTE                                     (3 Points)                [ ] Fracture of the hip, pelvis, or leg                       (5 Points)  [ ] Positive family history for VTE                         (3 Points)             [ ] Acute spinal cord injury (in the previous month)  (5 Points)  [ ] Prothrombin 73522 A                                     (3 Points)               [ ] Paralysis  (less than 1 month)                             (5 Points)  [ ] Factor V Leiden                                             (3 Points)                  [ ] Multiple Trauma within 1 month                        (5 Points)  [ ] Lupus anticoagulants                                     (3 Points)                                                           [ ] Anticardiolipin antibodies                               (3 Points)                                                       [ ] High homocysteine in the blood                      (3 Points)                                             [ ] Other congenital or acquired thrombophilia      (3 Points)                                                [ ] Heparin induced thrombocytopenia                  (3 Points)                                     Total Score [     10     ]

## 2021-07-21 NOTE — H&P PST ADULT - NSICDXPASTMEDICALHX_GEN_ALL_CORE_FT
PAST MEDICAL HISTORY:  Anemia     Breast cancer in female     Gallstones     Hypertension     Obese     Osteoarthritis     Unilateral primary osteoarthritis, right hip

## 2021-07-21 NOTE — H&P PST ADULT - NECK DETAILS
supple/no JVD/normal thyroid gland right cervical non-tender nodule, patient reports it was followed up and is lipomatous and benign/supple/no JVD

## 2021-07-21 NOTE — H&P PST ADULT - RS GEN PE MLT RESP DETAILS PC
breath sounds equal/respirations non-labored/clear to auscultation bilaterally/no chest wall tenderness/no rales/no rhonchi/no subcutaneous emphysema/no wheezes breath sounds equal/respirations non-labored/clear to auscultation bilaterally/no chest wall tenderness/no rales/no rhonchi/no wheezes

## 2021-07-21 NOTE — H&P PST ADULT - NS PRO LAST MENSTRUAL DATE
Patient took an hour and 15 min nap with O2 sat greater than 90%. This RN communicated above to Dr. Harman Trinh. LMP at age 45

## 2021-07-26 ENCOUNTER — FORM ENCOUNTER (OUTPATIENT)
Age: 66
End: 2021-07-26

## 2021-07-27 ENCOUNTER — NON-APPOINTMENT (OUTPATIENT)
Age: 66
End: 2021-07-27

## 2021-07-27 ENCOUNTER — APPOINTMENT (OUTPATIENT)
Dept: CARDIOLOGY | Facility: CLINIC | Age: 66
End: 2021-07-27
Payer: MEDICARE

## 2021-07-27 VITALS
SYSTOLIC BLOOD PRESSURE: 118 MMHG | DIASTOLIC BLOOD PRESSURE: 72 MMHG | HEART RATE: 90 BPM | WEIGHT: 288 LBS | HEIGHT: 62 IN | OXYGEN SATURATION: 95 % | TEMPERATURE: 98.5 F | BODY MASS INDEX: 53 KG/M2

## 2021-07-27 DIAGNOSIS — Z00.00 ENCOUNTER FOR GENERAL ADULT MEDICAL EXAMINATION W/OUT ABNORMAL FINDINGS: ICD-10-CM

## 2021-07-27 PROBLEM — M19.90 UNSPECIFIED OSTEOARTHRITIS, UNSPECIFIED SITE: Chronic | Status: ACTIVE | Noted: 2018-01-30

## 2021-07-27 PROCEDURE — 99215 OFFICE O/P EST HI 40 MIN: CPT

## 2021-07-27 PROCEDURE — 93000 ELECTROCARDIOGRAM COMPLETE: CPT

## 2021-07-27 RX ORDER — ASPIRIN 325 MG/1
325 TABLET, FILM COATED ORAL TWICE DAILY
Qty: 60 | Refills: 0 | Status: DISCONTINUED | COMMUNITY
Start: 2020-03-12 | End: 2021-07-27

## 2021-07-27 RX ORDER — HYDROMORPHONE HYDROCHLORIDE 2 MG/1
2 TABLET ORAL
Qty: 30 | Refills: 0 | Status: DISCONTINUED | COMMUNITY
Start: 2021-02-13 | End: 2021-07-27

## 2021-07-27 RX ORDER — ENOXAPARIN SODIUM 100 MG/ML
30 INJECTION SUBCUTANEOUS
Qty: 8 | Refills: 0 | Status: DISCONTINUED | COMMUNITY
Start: 2021-02-12 | End: 2021-07-27

## 2021-07-27 RX ORDER — ASPIRIN 325 MG/1
325 TABLET, COATED ORAL
Refills: 0 | Status: DISCONTINUED | COMMUNITY
Start: 2021-02-12 | End: 2021-07-27

## 2021-07-28 NOTE — DISCUSSION/SUMMARY
[As per surgery] : as per surgery [Continue] : Continue medications as currently directed [Patient] : the patient [Risks] : risks [Benefits] : benefits [Alternatives] : alternatives [___ Month(s)] : [unfilled] month(s) [With Me] : with me [Additional Diagnostics Recommended] : additional diagnostics recommended [FreeTextEntry1] : This is a 63 year old woman with history of obesity recent diagnosis  of bilateral breast cancer for bilateral mastectomy\par 1) Pre-operative cardiovascular risk evaluation and management .    right hip surgery.  chronic dyspnea on exertion    nuclear stress test. 2 Decho. \par 2) Hypertension:. diet and exercise counselling.  ct losartan.  ct HCTZ \par 3) enlarged RV: did nto get sleep study. \par 4) Daytime sleepiness, and snoring and insomnia : Sleep study as outpatient. did not get it done. \par az\par

## 2021-07-28 NOTE — PHYSICAL EXAM
[General Appearance - Well Developed] : well developed [Normal Appearance] : normal appearance [Well Groomed] : well groomed [General Appearance - Well Nourished] : well nourished [No Deformities] : no deformities [General Appearance - In No Acute Distress] : no acute distress [Normal Conjunctiva] : the conjunctiva exhibited no abnormalities [Eyelids - No Xanthelasma] : the eyelids demonstrated no xanthelasmas [Normal Oral Mucosa] : normal oral mucosa [No Oral Pallor] : no oral pallor [No Oral Cyanosis] : no oral cyanosis [Normal Jugular Venous A Waves Present] : normal jugular venous A waves present [Normal Jugular Venous V Waves Present] : normal jugular venous V waves present [No Jugular Venous Larson A Waves] : no jugular venous larson A waves [Respiration, Rhythm And Depth] : normal respiratory rhythm and effort [Exaggerated Use Of Accessory Muscles For Inspiration] : no accessory muscle use [Auscultation Breath Sounds / Voice Sounds] : lungs were clear to auscultation bilaterally [Heart Rate And Rhythm] : heart rate and rhythm were normal [Heart Sounds] : normal S1 and S2 [Murmurs] : no murmurs present [Abdomen Soft] : soft [Abdomen Tenderness] : non-tender [Abdomen Mass (___ Cm)] : no abdominal mass palpated [Abnormal Walk] : normal gait [Gait - Sufficient For Exercise Testing] : the gait was sufficient for exercise testing [Nail Clubbing] : no clubbing of the fingernails [Cyanosis, Localized] : no localized cyanosis [Petechial Hemorrhages (___cm)] : no petechial hemorrhages [Skin Color & Pigmentation] : normal skin color and pigmentation [] : no rash [No Venous Stasis] : no venous stasis [Skin Lesions] : no skin lesions [No Skin Ulcers] : no skin ulcer [No Xanthoma] : no  xanthoma was observed [Oriented To Time, Place, And Person] : oriented to person, place, and time [Affect] : the affect was normal [Mood] : the mood was normal [No Anxiety] : not feeling anxious [FreeTextEntry1] : morbid obesity .

## 2021-07-28 NOTE — HISTORY OF PRESENT ILLNESS
[Preoperative Visit] : for a medical evaluation prior to surgery [Scheduled Procedure ___] : a [unfilled] [Date of Surgery ___] : on [unfilled] [Surgeon Name ___] : surgeon: [unfilled] [Good] : Good [Chills] : chills [Fatigue] : fatigue [Dyspnea] : dyspnea [Poor Exercise Tolerance] : poor exercise tolerance [Prior Anesthesia] : Prior anesthesia [Electrocardiogram] : ~T an ECG ~C was performed [Metabolic Capacity ___Mets%] : The patient has a metabolic capacity of [unfilled] Mets%  [Poor] : Poor [Fever] : no fever [Chest Pain] : no chest pain [Cough] : no cough [Dysuria] : no dysuria [Urinary Frequency] : no urinary frequency [Nausea] : no nausea [Vomiting] : no vomiting [Diarrhea] : no diarrhea [Abdominal Pain] : no abdominal pain [Easy Bruising] : no easy bruising [Lower Extremity Swelling] : no lower extremity swelling [Diabetes] : no diabetes [Cardiovascular Disease] : no cardiovascular disease [Pulmonary Disease] : no pulmonary disease [Anti-Platelet Agents] : no anti-platelet agents [Nicotine Dependence] : no nicotine dependence [Alcohol Use] : no  alcohol use [Renal Disease] : no renal disease [GI Disease] : no gastrointestinal disease [Thromboembolic Problems] : no thromboembolic problems [Frequent use of NSAIDs] : no use of NSAIDs [Transfusion Reaction] : no transfusion reaction [Impaired Immunity] : no impaired immunity [Steroid Use in Last 6 Months] : no steroid use in the last six months [Frequent Aspirin Use] : no frequent aspirin use [Prev Anesthesia Reaction] : no previous anesthesia reaction [Anesthesia Reaction] : no anesthesia reaction [Sudden Death] : no sudden death [Clotting Disorder] : no clotting disorder [Bleeding Disorder] : no bleeding disorder [de-identified] : [facility]  [de-identified] : 10 steps  arthritis uses a walker a 10-20 steps.  tolerated knee replacemetn surgeyr well. no cardiac complciations.  [de-identified] : Sister had trouble coming ou ot anesthesia after her surgery.  [FreeTextEntry1] : encounter for cardiotoxic therapeutic agents \par Bilateral mastectomy, no chemo. \par \par HPI for today: : Pre-operative cardiovascular risk evaluation and management . right hip surgery./ \par With dr jimenez.  \par no chest pain,. + dyspnea on exertion : no LE edema. \par \par \par old note  : feels good. here for Pre-operative cardiovascular risk evaluation and management for sleft hip surgery\par no chest pain. no plpatiatons. \par no dyspnea. no headaches. no dizziness.  \par \par old note: she has been taking tamoxifen. no headchaes. sometimes  dizziness when she moves her head\par No syncope.   no chest pain. no palpitations. no dyspnea. No LE edema. \par She has enlarged RV. she wakes up frequently at night. She snores.  She feels ffatgieu and tired during the4 day time. day time sleepiness.\par \par old note: Pre-operative cardiovascular risk evaluation and management and hypertension \par patient had bilateral mastectomy for cancer. Feb 4, 2018. tolerated surgery. NO palpitations. no chestp ain. +  dyspnea. no dizziness. no syncope. \par When she goes up the staris she feels short of breathe. onset: few mths.  \par Now schedule  for DNC next tuesday. \par W

## 2021-07-29 ENCOUNTER — APPOINTMENT (OUTPATIENT)
Dept: CARDIOLOGY | Facility: CLINIC | Age: 66
End: 2021-07-29
Payer: MEDICARE

## 2021-07-29 PROCEDURE — A9500: CPT

## 2021-07-29 PROCEDURE — 99072 ADDL SUPL MATRL&STAF TM PHE: CPT

## 2021-07-29 PROCEDURE — 93015 CV STRESS TEST SUPVJ I&R: CPT

## 2021-07-29 PROCEDURE — 78452 HT MUSCLE IMAGE SPECT MULT: CPT

## 2021-08-02 ENCOUNTER — APPOINTMENT (OUTPATIENT)
Dept: FAMILY MEDICINE | Facility: CLINIC | Age: 66
End: 2021-08-02
Payer: MEDICARE

## 2021-08-02 ENCOUNTER — APPOINTMENT (OUTPATIENT)
Dept: CARDIOLOGY | Facility: CLINIC | Age: 66
End: 2021-08-02
Payer: MEDICARE

## 2021-08-02 VITALS
SYSTOLIC BLOOD PRESSURE: 126 MMHG | TEMPERATURE: 98.1 F | OXYGEN SATURATION: 96 % | BODY MASS INDEX: 53.92 KG/M2 | HEART RATE: 79 BPM | HEIGHT: 62 IN | DIASTOLIC BLOOD PRESSURE: 82 MMHG | RESPIRATION RATE: 16 BRPM | WEIGHT: 293 LBS

## 2021-08-02 DIAGNOSIS — Z23 ENCOUNTER FOR IMMUNIZATION: ICD-10-CM

## 2021-08-02 DIAGNOSIS — M25.562 PAIN IN RIGHT KNEE: ICD-10-CM

## 2021-08-02 DIAGNOSIS — Z79.1 LONG TERM (CURRENT) USE OF NON-STEROIDAL ANTI-INFLAMMATORIES (NSAID): ICD-10-CM

## 2021-08-02 DIAGNOSIS — E79.0 HYPERURICEMIA W/OUT SIGNS OF INFLAMMATORY ARTHRITIS AND TOPHACEOUS DISEASE: ICD-10-CM

## 2021-08-02 DIAGNOSIS — G89.29 PAIN IN RIGHT KNEE: ICD-10-CM

## 2021-08-02 DIAGNOSIS — Z12.11 ENCOUNTER FOR SCREENING FOR MALIGNANT NEOPLASM OF COLON: ICD-10-CM

## 2021-08-02 DIAGNOSIS — M25.561 PAIN IN RIGHT KNEE: ICD-10-CM

## 2021-08-02 PROCEDURE — 99215 OFFICE O/P EST HI 40 MIN: CPT

## 2021-08-02 PROCEDURE — 93306 TTE W/DOPPLER COMPLETE: CPT

## 2021-08-02 NOTE — ASSESSMENT
[Patient Optimized for Surgery] : Patient optimized for surgery [No Further Testing Recommended] : no further testing recommended [FreeTextEntry4] : Patient cleared for procedure

## 2021-08-02 NOTE — HISTORY OF PRESENT ILLNESS
[Coronary Artery Disease] : coronary artery disease [No Pertinent Pulmonary History] : no history of asthma, COPD, sleep apnea, or smoking [No Adverse Anesthesia Reaction] : no adverse anesthesia reaction in self or family member [Chronic Anticoagulation] : chronic anticoagulation [Chronic Kidney Disease] : no chronic kidney disease [Diabetes] : no diabetes [FreeTextEntry1] : Right Hip Replacement [FreeTextEntry2] : 08/10/21 [FreeTextEntry3] : Dr Henderson [FreeTextEntry4] : Patient in for   right hip pain seen by Ortho Dr Henderson needs replacement  had cardiac clearance Dr Villa

## 2021-08-04 ENCOUNTER — APPOINTMENT (OUTPATIENT)
Dept: CARDIOLOGY | Facility: CLINIC | Age: 66
End: 2021-08-04
Payer: MEDICARE

## 2021-08-04 PROCEDURE — 93320 DOPPLER ECHO COMPLETE: CPT

## 2021-08-04 PROCEDURE — 0439T MYOCRD CONTRAST PRFUJ ECHO: CPT | Mod: 59

## 2021-08-04 PROCEDURE — 93351 STRESS TTE COMPLETE: CPT

## 2021-08-04 PROCEDURE — 93352 ADMIN ECG CONTRAST AGENT: CPT

## 2021-08-09 ENCOUNTER — TRANSCRIPTION ENCOUNTER (OUTPATIENT)
Age: 66
End: 2021-08-09

## 2021-08-09 ENCOUNTER — NON-APPOINTMENT (OUTPATIENT)
Age: 66
End: 2021-08-09

## 2021-08-09 RX ORDER — CEFAZOLIN SODIUM 1 G
3000 VIAL (EA) INJECTION ONCE
Refills: 0 | Status: COMPLETED | OUTPATIENT
Start: 2021-08-10 | End: 2021-08-10

## 2021-08-09 RX ORDER — TRANEXAMIC ACID 100 MG/ML
1000 INJECTION, SOLUTION INTRAVENOUS ONCE
Refills: 0 | Status: DISCONTINUED | OUTPATIENT
Start: 2021-08-10 | End: 2021-08-12

## 2021-08-10 ENCOUNTER — APPOINTMENT (OUTPATIENT)
Dept: ORTHOPEDIC SURGERY | Facility: HOSPITAL | Age: 66
End: 2021-08-10

## 2021-08-10 ENCOUNTER — INPATIENT (INPATIENT)
Facility: HOSPITAL | Age: 66
LOS: 1 days | Discharge: HOME | DRG: 470 | End: 2021-08-12
Attending: ORTHOPAEDIC SURGERY | Admitting: ORTHOPAEDIC SURGERY
Payer: COMMERCIAL

## 2021-08-10 ENCOUNTER — TRANSCRIPTION ENCOUNTER (OUTPATIENT)
Age: 66
End: 2021-08-10

## 2021-08-10 VITALS
OXYGEN SATURATION: 92 % | TEMPERATURE: 98 F | RESPIRATION RATE: 15 BRPM | HEART RATE: 100 BPM | WEIGHT: 287.92 LBS | SYSTOLIC BLOOD PRESSURE: 132 MMHG | HEIGHT: 62 IN | DIASTOLIC BLOOD PRESSURE: 70 MMHG

## 2021-08-10 DIAGNOSIS — Z96.642 PRESENCE OF LEFT ARTIFICIAL HIP JOINT: Chronic | ICD-10-CM

## 2021-08-10 DIAGNOSIS — M16.11 UNILATERAL PRIMARY OSTEOARTHRITIS, RIGHT HIP: ICD-10-CM

## 2021-08-10 DIAGNOSIS — Z96.652 PRESENCE OF LEFT ARTIFICIAL KNEE JOINT: Chronic | ICD-10-CM

## 2021-08-10 DIAGNOSIS — Z98.890 OTHER SPECIFIED POSTPROCEDURAL STATES: Chronic | ICD-10-CM

## 2021-08-10 DIAGNOSIS — Z90.13 ACQUIRED ABSENCE OF BILATERAL BREASTS AND NIPPLES: Chronic | ICD-10-CM

## 2021-08-10 LAB
GLUCOSE BLDC GLUCOMTR-MCNC: 127 MG/DL — HIGH (ref 70–99)
SARS-COV-2 RNA SPEC QL NAA+PROBE: SIGNIFICANT CHANGE UP

## 2021-08-10 PROCEDURE — 73502 X-RAY EXAM HIP UNI 2-3 VIEWS: CPT | Mod: 26

## 2021-08-10 PROCEDURE — 27130 TOTAL HIP ARTHROPLASTY: CPT | Mod: AS,RT,22

## 2021-08-10 PROCEDURE — 27130 TOTAL HIP ARTHROPLASTY: CPT | Mod: RT,22

## 2021-08-10 RX ORDER — SODIUM CHLORIDE 9 MG/ML
1000 INJECTION INTRAMUSCULAR; INTRAVENOUS; SUBCUTANEOUS
Refills: 0 | Status: DISCONTINUED | OUTPATIENT
Start: 2021-08-10 | End: 2021-08-12

## 2021-08-10 RX ORDER — MAGNESIUM HYDROXIDE 400 MG/1
30 TABLET, CHEWABLE ORAL DAILY
Refills: 0 | Status: DISCONTINUED | OUTPATIENT
Start: 2021-08-10 | End: 2021-08-12

## 2021-08-10 RX ORDER — LOSARTAN POTASSIUM 100 MG/1
100 TABLET, FILM COATED ORAL DAILY
Refills: 0 | Status: DISCONTINUED | OUTPATIENT
Start: 2021-08-12 | End: 2021-08-12

## 2021-08-10 RX ORDER — POLYETHYLENE GLYCOL 3350 17 G/17G
17 POWDER, FOR SOLUTION ORAL AT BEDTIME
Refills: 0 | Status: DISCONTINUED | OUTPATIENT
Start: 2021-08-10 | End: 2021-08-12

## 2021-08-10 RX ORDER — ONDANSETRON 8 MG/1
4 TABLET, FILM COATED ORAL EVERY 6 HOURS
Refills: 0 | Status: DISCONTINUED | OUTPATIENT
Start: 2021-08-10 | End: 2021-08-12

## 2021-08-10 RX ORDER — ENOXAPARIN SODIUM 100 MG/ML
40 INJECTION SUBCUTANEOUS DAILY
Refills: 0 | Status: DISCONTINUED | OUTPATIENT
Start: 2021-08-11 | End: 2021-08-12

## 2021-08-10 RX ORDER — ACETAMINOPHEN 500 MG
975 TABLET ORAL EVERY 8 HOURS
Refills: 0 | Status: DISCONTINUED | OUTPATIENT
Start: 2021-08-10 | End: 2021-08-12

## 2021-08-10 RX ORDER — HYDROMORPHONE HYDROCHLORIDE 2 MG/ML
0.5 INJECTION INTRAMUSCULAR; INTRAVENOUS; SUBCUTANEOUS EVERY 4 HOURS
Refills: 0 | Status: DISCONTINUED | OUTPATIENT
Start: 2021-08-10 | End: 2021-08-12

## 2021-08-10 RX ORDER — ACETAMINOPHEN 500 MG
975 TABLET ORAL ONCE
Refills: 0 | Status: COMPLETED | OUTPATIENT
Start: 2021-08-10 | End: 2021-08-10

## 2021-08-10 RX ORDER — CEFAZOLIN SODIUM 1 G
3000 VIAL (EA) INJECTION
Refills: 0 | Status: COMPLETED | OUTPATIENT
Start: 2021-08-10 | End: 2021-08-11

## 2021-08-10 RX ORDER — HYDROMORPHONE HYDROCHLORIDE 2 MG/ML
4 INJECTION INTRAMUSCULAR; INTRAVENOUS; SUBCUTANEOUS EVERY 4 HOURS
Refills: 0 | Status: DISCONTINUED | OUTPATIENT
Start: 2021-08-10 | End: 2021-08-12

## 2021-08-10 RX ORDER — PANTOPRAZOLE SODIUM 20 MG/1
40 TABLET, DELAYED RELEASE ORAL
Refills: 0 | Status: DISCONTINUED | OUTPATIENT
Start: 2021-08-10 | End: 2021-08-12

## 2021-08-10 RX ORDER — FERROUS SULFATE 325(65) MG
325 TABLET ORAL DAILY
Refills: 0 | Status: DISCONTINUED | OUTPATIENT
Start: 2021-08-10 | End: 2021-08-12

## 2021-08-10 RX ORDER — SENNA PLUS 8.6 MG/1
2 TABLET ORAL AT BEDTIME
Refills: 0 | Status: DISCONTINUED | OUTPATIENT
Start: 2021-08-10 | End: 2021-08-12

## 2021-08-10 RX ORDER — ASPIRIN/CALCIUM CARB/MAGNESIUM 324 MG
81 TABLET ORAL DAILY
Refills: 0 | Status: DISCONTINUED | OUTPATIENT
Start: 2021-08-10 | End: 2021-08-12

## 2021-08-10 RX ORDER — ACETAMINOPHEN 500 MG
650 TABLET ORAL EVERY 6 HOURS
Refills: 0 | Status: DISCONTINUED | OUTPATIENT
Start: 2021-08-10 | End: 2021-08-12

## 2021-08-10 RX ORDER — OXYCODONE HYDROCHLORIDE 5 MG/1
5 TABLET ORAL
Refills: 0 | Status: DISCONTINUED | OUTPATIENT
Start: 2021-08-10 | End: 2021-08-12

## 2021-08-10 RX ORDER — OXYCODONE HYDROCHLORIDE 5 MG/1
10 TABLET ORAL
Refills: 0 | Status: DISCONTINUED | OUTPATIENT
Start: 2021-08-10 | End: 2021-08-12

## 2021-08-10 RX ORDER — SODIUM CHLORIDE 9 MG/ML
500 INJECTION INTRAMUSCULAR; INTRAVENOUS; SUBCUTANEOUS ONCE
Refills: 0 | Status: COMPLETED | OUTPATIENT
Start: 2021-08-10 | End: 2021-08-10

## 2021-08-10 RX ORDER — GABAPENTIN 400 MG/1
600 CAPSULE ORAL
Refills: 0 | Status: DISCONTINUED | OUTPATIENT
Start: 2021-08-10 | End: 2021-08-12

## 2021-08-10 RX ORDER — SODIUM CHLORIDE 9 MG/ML
1000 INJECTION, SOLUTION INTRAVENOUS
Refills: 0 | Status: DISCONTINUED | OUTPATIENT
Start: 2021-08-10 | End: 2021-08-10

## 2021-08-10 RX ORDER — KETOROLAC TROMETHAMINE 30 MG/ML
15 SYRINGE (ML) INJECTION EVERY 6 HOURS
Refills: 0 | Status: DISCONTINUED | OUTPATIENT
Start: 2021-08-10 | End: 2021-08-11

## 2021-08-10 RX ORDER — HYDROMORPHONE HYDROCHLORIDE 2 MG/ML
0.5 INJECTION INTRAMUSCULAR; INTRAVENOUS; SUBCUTANEOUS
Refills: 0 | Status: DISCONTINUED | OUTPATIENT
Start: 2021-08-10 | End: 2021-08-10

## 2021-08-10 RX ORDER — APREPITANT 80 MG/1
40 CAPSULE ORAL ONCE
Refills: 0 | Status: COMPLETED | OUTPATIENT
Start: 2021-08-10 | End: 2021-08-10

## 2021-08-10 RX ORDER — FENTANYL CITRATE 50 UG/ML
50 INJECTION INTRAVENOUS
Refills: 0 | Status: DISCONTINUED | OUTPATIENT
Start: 2021-08-10 | End: 2021-08-10

## 2021-08-10 RX ADMIN — Medication 200 MILLIGRAM(S): at 15:47

## 2021-08-10 RX ADMIN — SODIUM CHLORIDE 3 MILLILITER(S): 9 INJECTION INTRAMUSCULAR; INTRAVENOUS; SUBCUTANEOUS at 22:55

## 2021-08-10 RX ADMIN — SODIUM CHLORIDE 75 MILLILITER(S): 9 INJECTION, SOLUTION INTRAVENOUS at 20:01

## 2021-08-10 RX ADMIN — Medication 200 MILLIGRAM(S): at 23:51

## 2021-08-10 RX ADMIN — Medication 975 MILLIGRAM(S): at 12:53

## 2021-08-10 RX ADMIN — SODIUM CHLORIDE 3 MILLILITER(S): 9 INJECTION INTRAMUSCULAR; INTRAVENOUS; SUBCUTANEOUS at 19:47

## 2021-08-10 RX ADMIN — SENNA PLUS 2 TABLET(S): 8.6 TABLET ORAL at 22:10

## 2021-08-10 RX ADMIN — Medication 975 MILLIGRAM(S): at 22:09

## 2021-08-10 RX ADMIN — APREPITANT 40 MILLIGRAM(S): 80 CAPSULE ORAL at 12:53

## 2021-08-10 RX ADMIN — Medication 975 MILLIGRAM(S): at 23:28

## 2021-08-10 RX ADMIN — GABAPENTIN 600 MILLIGRAM(S): 400 CAPSULE ORAL at 22:10

## 2021-08-10 RX ADMIN — FENTANYL CITRATE 50 MICROGRAM(S): 50 INJECTION INTRAVENOUS at 20:30

## 2021-08-10 RX ADMIN — FENTANYL CITRATE 50 MICROGRAM(S): 50 INJECTION INTRAVENOUS at 20:40

## 2021-08-10 RX ADMIN — Medication 15 MILLIGRAM(S): at 23:28

## 2021-08-10 RX ADMIN — SODIUM CHLORIDE 500 MILLILITER(S): 9 INJECTION INTRAMUSCULAR; INTRAVENOUS; SUBCUTANEOUS at 19:00

## 2021-08-10 RX ADMIN — Medication 15 MILLIGRAM(S): at 22:10

## 2021-08-10 NOTE — DISCHARGE NOTE PROVIDER - NSDCFUADDINST_GEN_ALL_CORE_FT
The patient will be seen in the office between 2-3 weeks for wound check.   **Your first post-operative visit has been scheduled prior to your admission. PLEASE CONTACT OFFICE TO CONFIRM THE APPOINTMENT DATE. Sutures/Staples/Tape will be removed at that time.  **  The silver based dressing is to be removed 7 days from the date of surgery (8/17/21).   ** CONTACT THE OFFICE IF THE FOLLOWING DEVELOP:  - the dressing becomes soiled or saturated  - you develop a fever greater that 101F  - the wound becomes red or you develop blistering around the wound  * Patient may shower after post-op day #3.   * The patient will continue home PT consistent with posterior total hip replacement protocol and will continue to practice posterior total hip precautions for a minimum of 6 week. Transition to outpatient PT will occur at the time of the first office visit.   * The patient is FULL weight bearing.  *** The patient will continue LOVENOX for 2 weeks and then begin ASPIRIN for blood clot prevention. *** While on aspirin, the patient will take daily omeprazole or other similar medication to protect the stomach from irritation.   * The patient will take OXYCODONE AND TYLENOL for pain control and adjust according to prescription and patient needs. Contact the office if pain increases while taking prescribed pain medications or related concerns develop.  * Celebrex will be taken twice daily for 3 weeks for pain control and prevention of excessive bone growth. Additional prescription may be requested at your office follow-up visit.  * The patient will take Senna S while taking oxycodone to prevent narcotic associated constipation.  Additionally, increase water intake (drink at least 8 glasses of water daily) and try adding fiber to the diet by eating fruits, vegetables and foods that are rich in grains. If constipation is experienced, contact the medical/primary care provider to discuss further treatment options.  * To avoid injury at home:  - continue use of rolling walker until cleared by physical therapist  - have family or friend remove all throw rug or objects in hallways that may present a trip hazard.  - if you experience any dizziness or medical concerns, call your medical doctor or  911.  * The implant may activate metal detection devices.  The patient will be seen in the office between 2-3 weeks for wound check.   **Your first post-operative visit has been scheduled prior to your admission. PLEASE CONTACT OFFICE TO CONFIRM THE APPOINTMENT DATE. Sutures/Staples/Tape will be removed at that time.  **  The silver based dressing is to be removed 7 days from the date of surgery (8/17/21).   ** CONTACT THE OFFICE IF THE FOLLOWING DEVELOP:  - the dressing becomes soiled or saturated  - you develop a fever greater that 101F  - the wound becomes red or you develop blistering around the wound  * Patient may shower after post-op day #3.   * The patient will continue home PT consistent with posterior total hip replacement protocol and will continue to practice posterior total hip precautions for a minimum of 6 week. Transition to outpatient PT will occur at the time of the first office visit.   * The patient is FULL weight bearing.  *** The patient will continue LOVENOX for 2 weeks and then begin ASPIRIN for blood clot prevention. *** While on aspirin, the patient will take daily omeprazole or other similar medication to protect the stomach from irritation.   * The patient will take OXYCODONE AND TYLENOL for pain control and adjust according to prescription and patient needs. Contact the office if pain increases while taking prescribed pain medications or related concerns develop.  * The patient will take Senna S while taking oxycodone to prevent narcotic associated constipation.  Additionally, increase water intake (drink at least 8 glasses of water daily) and try adding fiber to the diet by eating fruits, vegetables and foods that are rich in grains. If constipation is experienced, contact the medical/primary care provider to discuss further treatment options.  * To avoid injury at home:  - continue use of rolling walker until cleared by physical therapist  - have family or friend remove all throw rug or objects in hallways that may present a trip hazard.  - if you experience any dizziness or medical concerns, call your medical doctor or  911.  * The implant may activate metal detection devices.  The patient will be seen in the office between 2-3 weeks for wound check.   **Your first post-operative visit has been scheduled prior to your admission. PLEASE CONTACT OFFICE TO CONFIRM THE APPOINTMENT DATE. Tape will be removed at that time.  **  The silver based dressing is to be removed 7 days from the date of surgery (8/17/21).   ** CONTACT THE OFFICE IF THE FOLLOWING DEVELOP:  - the dressing becomes soiled or saturated  - you develop a fever greater that 101F  - the wound becomes red or you develop blistering around the wound  * Patient may shower after post-op day #3.   * The patient will continue home PT consistent with posterior total hip replacement protocol and will continue to practice posterior total hip precautions for a minimum of 6 week. Transition to outpatient PT will occur at the time of the first office visit.   * The patient is FULL weight bearing.  *** The patient will continue LOVENOX for 2 weeks (last dose 8/26). Patient will also take home dose aspirin while on the Lovenox.   * Patient will then begin ASPIRIN 325mg twice a day for 4 weeks blood clot prevention. Patient will NOT be taking home dose aspirin during this period of time. Patient may begin home dose aspirin after completion of four weeks.   *** While on aspirin, the patient will take daily omeprazole or other similar medication to protect the stomach from irritation.   * The patient will take OXYCODONE AND TYLENOL for pain control and adjust according to prescription and patient needs. Contact the office if pain increases while taking prescribed pain medications or related concerns develop.  * The patient will take Senna S while taking oxycodone to prevent narcotic associated constipation.  Additionally, increase water intake (drink at least 8 glasses of water daily) and try adding fiber to the diet by eating fruits, vegetables and foods that are rich in grains. If constipation is experienced, contact the medical/primary care provider to discuss further treatment options.  * To avoid injury at home:  - continue use of rolling walker until cleared by physical therapist  - have family or friend remove all throw rug or objects in hallways that may present a trip hazard.  - if you experience any dizziness or medical concerns, call your medical doctor or  911.  * The implant may activate metal detection devices.

## 2021-08-10 NOTE — BRIEF OPERATIVE NOTE - OPERATION/FINDINGS
Patient is non diabetic and is here today for foot exam and nail care.      Medications and allergies verified.  Tobacco history reviewed.  Denies known Latex allergy or symptoms of Latex sensitivity.      Past Medical History:   Diagnosis Date   • Asthma    • Asymptomatic varicose veins    • Atherosclerosis of native arteries of the extremities with intermittent claudication    • Atherosclerosis of native arteries of the extremities with rest pain    • Bell palsy     as a child   • Benign neoplasm of colon 8/25/04, 11/11/09    tubular adenoma, hyperplastic / colonoscopy 11/11/09   • Benign neoplasm of colon    • Benign neoplasm of colon 2/4/15    Dr. Morgan   • Carotid arterial disease (CMS/HCC) 03/2013    needs repeat duplex 3/14   • Coronary atherosclerosis of native coronary artery 1996    MI x2   • Lung nodule 3/13    referred to pulmonary   • Mild COPD (chronic obstructive pulmonary disease) 4/30/2013   • Mycobacterium avium complex (CMS/HCC)     declines treatment   • Non-small cell lung cancer (CMS/HCC) 9/13    S/p RLL lobectomy-ductal ca in situ--no ct or rt needed   • Other acute and subacute form of ischemic heart disease 1996   • Other and unspecified hyperlipidemia    • Other specified disorder of breast     breast abscesses left   • PONV (postoperative nausea and vomiting)    • PVD (peripheral vascular disease) (CMS/HCC) 11/13    embolic--rx at [facility] --chronic coumadin   • PVD (peripheral vascular disease) (CMS/HCC) 5/14    R CFA endarterectomy, L SFA angio and stent   • s/p ISI wedge resection 12/21/2016    MAC on culture   • s/p RUL wedge resection 1/8/2018   • Special screening for malignant neoplasms, colon 2/4/15    Dr. Morgan   • Swelling, mass, or lump in chest 2003    cyst       
Nurses note reviewed and agreed with.  Patient RTC today with a CC of painful, inflammed toenails which the patient states hurt inside the shoes. The pain is related as being a pressure type pain occurring when the tops of the shoes rub on the toenails. The pain is related as being mild to moderate, and is made worse with dressier shoes.  No new foot issues.    Physical exam reveals nails that are thickened, lytic,brittle, yellow-brown discolored with marked subungual debris. There is noted pain to palpation of the nails. The nails are flaky and also exhibit an odor consistent with onchychomycosis. Nails affected are left 1, left 2, left 3, left 4, left 5, right 1, right 2, right 3, right 4, right 5.  She is unable to reach her feet due to prior hip surgery.  The feet show no areas or rubbing or pressure.  Wart on left fourth/fifth sulcus which is not bothering her.  Pedal pulses are present.    Diagnosis is dermatophytosis of the nails with report of pain.    All the affected nails were debrided and thinned in a dorsal-plantar direction using an electric . The diseased nail plate was removed to the bed using the . There were no cuts.   Since the wart is not bothering her, keep it filed.  Patient is instructed to RTC PRN.  
Dheeraj Continuum 54 elevated liner. 36 +0/ ml taper stem 9 ext red/ 40 screw X 1

## 2021-08-10 NOTE — DISCHARGE NOTE PROVIDER - NSDCMRMEDTOKEN_GEN_ALL_CORE_FT
acetaminophen 325 mg oral tablet: 3 tab(s) orally every 8 hours  aspirin 81 mg oral delayed release tablet: 1 tab(s) orally once a day  gabapentin 600 mg oral tablet: 1 tab(s) orally 4 times a day  Garlic oral tablet: orally once a day  ibuprofen 200 mg oral tablet: orally 3 times a day, As Needed  losartan 100 mg oral tablet: 1 tab(s) orally once a day  omeprazole 20 mg oral delayed release capsule: 1 cap(s) orally once a day  tamoxifen 20 mg oral tablet: 1 tab(s) orally once a day  Vitamin C 100 mg oral tablet:   vitamin E oral capsule:    acetaminophen 325 mg oral tablet: 3 tab(s) orally every 8 hours  aspirin 81 mg oral delayed release tablet: 1 tab(s) orally once a day. Take while on Lovenox.   Aspirin Enteric Coated 325 mg oral delayed release tablet: 1 tab(s) orally 2 times a day.   gabapentin 600 mg oral tablet: 1 tab(s) orally 4 times a day  Garlic oral tablet: orally once a day  losartan 100 mg oral tablet: 1 tab(s) orally once a day  Lovenox 40 mg/0.4 mL injectable solution: 1 dose(s) subcutaneously once a day (last dose on 8/25)  omeprazole 20 mg oral delayed release capsule: 1 cap(s) orally once a day  oxyCODONE 5 mg oral tablet: 1 tab(s) orally every 4 hours, As Needed for moderate to severe pain. MDD:6  Senna S 50 mg-8.6 mg oral tablet: 2 tab(s) orally once a day (at bedtime), As Needed   tamoxifen 20 mg oral tablet: 1 tab(s) orally once a day  Vitamin C 100 mg oral tablet:   vitamin E oral capsule:

## 2021-08-10 NOTE — DISCHARGE NOTE PROVIDER - CARE PROVIDER_API CALL
Germán Henderson)  Orthopaedic Surgery  [address], [address], [address]  [place] [address]  Phone: [phone]  Fax: [phone]  Follow Up Time:

## 2021-08-10 NOTE — DISCHARGE NOTE PROVIDER - HOSPITAL COURSE
The patient underwent a RIGHT POSTERIOR TOTAL HIP REPLACEMENT on 8/10/21. The patient received antibiotics consistent with SCIP guidelines. The patient underwent the procedure and had no intra-operative complications. Post-operatively, the patient was seen by medicine and PT. The patient received LOVENOX for DVTP. The patient received pain medications per orthopedic pain management pathway and the pain was appropriately controlled. Patient was instructed on posterior total hip precautions by PT. The patient did not have any post-operative medical complications. The patient was discharged in stable condition. The patient underwent a RIGHT POSTERIOR TOTAL HIP REPLACEMENT on 8/10/21. The patient received antibiotics consistent with SCIP guidelines. The patient underwent the procedure and had no intra-operative complications. Post-operatively, the patient was seen by medicine and PT. The patient received LOVENOX for DVTP. The patient received pain medications per orthopedic pain management pathway and the pain was appropriately controlled. Patient was instructed on posterior total hip precautions by PT. The patient did experience diarrhea postop however resolved. Patient did not  have any post-operative medical complications. The patient was discharged in stable condition.

## 2021-08-11 ENCOUNTER — TRANSCRIPTION ENCOUNTER (OUTPATIENT)
Age: 66
End: 2021-08-11

## 2021-08-11 DIAGNOSIS — I10 ESSENTIAL (PRIMARY) HYPERTENSION: ICD-10-CM

## 2021-08-11 DIAGNOSIS — Z96.641 PRESENCE OF RIGHT ARTIFICIAL HIP JOINT: ICD-10-CM

## 2021-08-11 DIAGNOSIS — D72.829 ELEVATED WHITE BLOOD CELL COUNT, UNSPECIFIED: ICD-10-CM

## 2021-08-11 DIAGNOSIS — D62 ACUTE POSTHEMORRHAGIC ANEMIA: ICD-10-CM

## 2021-08-11 DIAGNOSIS — E66.01 MORBID (SEVERE) OBESITY DUE TO EXCESS CALORIES: ICD-10-CM

## 2021-08-11 DIAGNOSIS — R73.03 PREDIABETES: ICD-10-CM

## 2021-08-11 DIAGNOSIS — E78.5 HYPERLIPIDEMIA, UNSPECIFIED: ICD-10-CM

## 2021-08-11 LAB
ANION GAP SERPL CALC-SCNC: 15 MMOL/L — SIGNIFICANT CHANGE UP (ref 5–17)
BUN SERPL-MCNC: 16.8 MG/DL — SIGNIFICANT CHANGE UP (ref 8–20)
CALCIUM SERPL-MCNC: 8.8 MG/DL — SIGNIFICANT CHANGE UP (ref 8.6–10.2)
CHLORIDE SERPL-SCNC: 100 MMOL/L — SIGNIFICANT CHANGE UP (ref 98–107)
CO2 SERPL-SCNC: 22 MMOL/L — SIGNIFICANT CHANGE UP (ref 22–29)
COVID-19 SPIKE DOMAIN AB INTERP: POSITIVE
COVID-19 SPIKE DOMAIN ANTIBODY RESULT: >250 U/ML — HIGH
CREAT SERPL-MCNC: 0.69 MG/DL — SIGNIFICANT CHANGE UP (ref 0.5–1.3)
GLUCOSE BLDC GLUCOMTR-MCNC: 125 MG/DL — HIGH (ref 70–99)
GLUCOSE SERPL-MCNC: 130 MG/DL — HIGH (ref 70–99)
HCT VFR BLD CALC: 34.4 % — LOW (ref 34.5–45)
HGB BLD-MCNC: 11.3 G/DL — LOW (ref 11.5–15.5)
MCHC RBC-ENTMCNC: 29.8 PG — SIGNIFICANT CHANGE UP (ref 27–34)
MCHC RBC-ENTMCNC: 32.8 GM/DL — SIGNIFICANT CHANGE UP (ref 32–36)
MCV RBC AUTO: 90.8 FL — SIGNIFICANT CHANGE UP (ref 80–100)
PLATELET # BLD AUTO: 267 K/UL — SIGNIFICANT CHANGE UP (ref 150–400)
POTASSIUM SERPL-MCNC: 3.8 MMOL/L — SIGNIFICANT CHANGE UP (ref 3.5–5.3)
POTASSIUM SERPL-SCNC: 3.8 MMOL/L — SIGNIFICANT CHANGE UP (ref 3.5–5.3)
RBC # BLD: 3.79 M/UL — LOW (ref 3.8–5.2)
RBC # FLD: 14.4 % — SIGNIFICANT CHANGE UP (ref 10.3–14.5)
SARS-COV-2 IGG+IGM SERPL QL IA: >250 U/ML — HIGH
SARS-COV-2 IGG+IGM SERPL QL IA: POSITIVE
SODIUM SERPL-SCNC: 137 MMOL/L — SIGNIFICANT CHANGE UP (ref 135–145)
WBC # BLD: 11.53 K/UL — HIGH (ref 3.8–10.5)
WBC # FLD AUTO: 11.53 K/UL — HIGH (ref 3.8–10.5)

## 2021-08-11 PROCEDURE — 99222 1ST HOSP IP/OBS MODERATE 55: CPT

## 2021-08-11 RX ORDER — ASPIRIN/CALCIUM CARB/MAGNESIUM 324 MG
1 TABLET ORAL
Qty: 0 | Refills: 0 | DISCHARGE

## 2021-08-11 RX ORDER — ENOXAPARIN SODIUM 100 MG/ML
1 INJECTION SUBCUTANEOUS
Qty: 14 | Refills: 0
Start: 2021-08-11 | End: 2021-08-24

## 2021-08-11 RX ORDER — OXYCODONE HYDROCHLORIDE 5 MG/1
1 TABLET ORAL
Qty: 42 | Refills: 0
Start: 2021-08-11 | End: 2021-08-17

## 2021-08-11 RX ORDER — IBUPROFEN 200 MG
0 TABLET ORAL
Qty: 0 | Refills: 0 | DISCHARGE

## 2021-08-11 RX ORDER — SENNOSIDES/DOCUSATE SODIUM 8.6MG-50MG
2 TABLET ORAL
Qty: 20 | Refills: 0
Start: 2021-08-11 | End: 2021-08-20

## 2021-08-11 RX ORDER — ASPIRIN/CALCIUM CARB/MAGNESIUM 324 MG
1 TABLET ORAL
Qty: 0 | Refills: 0 | DISCHARGE
Start: 2021-08-11 | End: 2021-08-25

## 2021-08-11 RX ADMIN — PANTOPRAZOLE SODIUM 40 MILLIGRAM(S): 20 TABLET, DELAYED RELEASE ORAL at 05:34

## 2021-08-11 RX ADMIN — OXYCODONE HYDROCHLORIDE 10 MILLIGRAM(S): 5 TABLET ORAL at 10:00

## 2021-08-11 RX ADMIN — Medication 975 MILLIGRAM(S): at 22:10

## 2021-08-11 RX ADMIN — SODIUM CHLORIDE 3 MILLILITER(S): 9 INJECTION INTRAMUSCULAR; INTRAVENOUS; SUBCUTANEOUS at 21:10

## 2021-08-11 RX ADMIN — OXYCODONE HYDROCHLORIDE 10 MILLIGRAM(S): 5 TABLET ORAL at 16:00

## 2021-08-11 RX ADMIN — Medication 15 MILLIGRAM(S): at 12:36

## 2021-08-11 RX ADMIN — Medication 975 MILLIGRAM(S): at 13:06

## 2021-08-11 RX ADMIN — OXYCODONE HYDROCHLORIDE 10 MILLIGRAM(S): 5 TABLET ORAL at 15:21

## 2021-08-11 RX ADMIN — Medication 81 MILLIGRAM(S): at 12:36

## 2021-08-11 RX ADMIN — OXYCODONE HYDROCHLORIDE 10 MILLIGRAM(S): 5 TABLET ORAL at 09:14

## 2021-08-11 RX ADMIN — ENOXAPARIN SODIUM 40 MILLIGRAM(S): 100 INJECTION SUBCUTANEOUS at 12:36

## 2021-08-11 RX ADMIN — GABAPENTIN 600 MILLIGRAM(S): 400 CAPSULE ORAL at 05:34

## 2021-08-11 RX ADMIN — Medication 975 MILLIGRAM(S): at 06:24

## 2021-08-11 RX ADMIN — SODIUM CHLORIDE 3 MILLILITER(S): 9 INJECTION INTRAMUSCULAR; INTRAVENOUS; SUBCUTANEOUS at 06:24

## 2021-08-11 RX ADMIN — SODIUM CHLORIDE 3 MILLILITER(S): 9 INJECTION INTRAMUSCULAR; INTRAVENOUS; SUBCUTANEOUS at 14:00

## 2021-08-11 RX ADMIN — Medication 325 MILLIGRAM(S): at 12:37

## 2021-08-11 RX ADMIN — Medication 975 MILLIGRAM(S): at 12:36

## 2021-08-11 RX ADMIN — GABAPENTIN 600 MILLIGRAM(S): 400 CAPSULE ORAL at 12:36

## 2021-08-11 RX ADMIN — Medication 200 MILLIGRAM(S): at 07:13

## 2021-08-11 RX ADMIN — Medication 975 MILLIGRAM(S): at 05:34

## 2021-08-11 RX ADMIN — Medication 15 MILLIGRAM(S): at 05:34

## 2021-08-11 RX ADMIN — Medication 15 MILLIGRAM(S): at 13:06

## 2021-08-11 RX ADMIN — Medication 975 MILLIGRAM(S): at 21:08

## 2021-08-11 RX ADMIN — GABAPENTIN 600 MILLIGRAM(S): 400 CAPSULE ORAL at 23:25

## 2021-08-11 RX ADMIN — Medication 1 TABLET(S): at 12:36

## 2021-08-11 RX ADMIN — GABAPENTIN 600 MILLIGRAM(S): 400 CAPSULE ORAL at 17:35

## 2021-08-11 RX ADMIN — Medication 15 MILLIGRAM(S): at 06:24

## 2021-08-11 NOTE — CONSULT NOTE ADULT - PROBLEM SELECTOR RECOMMENDATION 9
DVT prophylaxis  - as per ortho protocol  Opioid induced constipation  prophylaxis - bowel regimen     Thank you for the courtesy of this consult ,   patient is medically optimized for d/c once cleared by PP/ortho .   Will no longer follow up , please recall if needed . s/p R URBANO

## 2021-08-11 NOTE — CONSULT NOTE ADULT - ASSESSMENT
66 yo F PMH of HTN, HLD, obese - BMI - 52C ,  breast cancer s/p bilateral mastectomy in 2018 (on tamoxifen), OA s/p left TKA 2/11/21 and left URBANO 3/3/2020, presents with c/o right hip pain and right knee pain. She ambulates in a wheelchair. She has known end stage right hip and right knee OA. Recent Xray of the right hip demonstrates bone on bone arthritis. She had several cortisone treatments with only slight relief. She is taking ibuprofen and tylenol w/partial relief. Now s/p R URBANO .

## 2021-08-11 NOTE — CONSULT NOTE ADULT - PROBLEM SELECTOR RECOMMENDATION 5
- BMI -52- will benefit from weight reduction - may recommend to bariatric surgery ,   life style changes, nutritionist eval as on outpatient

## 2021-08-11 NOTE — DISCHARGE NOTE NURSING/CASE MANAGEMENT/SOCIAL WORK - PATIENT PORTAL LINK FT
You can access the FollowMyHealth Patient Portal offered by [facility] by registering at the following website: [email or web address]. By joining [email or web address] FollowMyHealth portal, you will also be able to view your health information using other applications (apps) compatible with our system.

## 2021-08-11 NOTE — CONSULT NOTE ADULT - PROBLEM SELECTOR RECOMMENDATION 3
After Visit Summary   8/4/2017    Guero Carter    MRN: [id number]           Patient Information     Date Of Birth          1991        Visit Information        Provider Department      8/4/2017 10:30 AM Meenakshi Mcdaniel APRN [facility]        Today's Diagnoses     Undifferentiated schizophrenia (H)    -  1       Follow-ups after your visit        Follow-up notes from your care team     Return in about 4 weeks (around 9/1/2017), or if symptoms worsen or fail to improve.      Your next 10 appointments already scheduled     Aug 14, 2017 11:30 AM CDT   Navigate See with Felix Forrester   Psychiatry Clinic ([facility])    [address]  [address] [place] [address]   [phone]            Aug 14, 2017  1:00 PM CDT   Return Visit with ADULT  DAY 3C [facility] ([facility])    [address] [place] [address]   [phone]            Aug 15, 2017  1:00 PM CDT   Return Visit with ADULT  DAY 3C   [facility] ([facility])    [address] [place] [address]   [phone]            Aug 17, 2017 11:30 AM CDT   Navigate Psychotherapy with KHRIS Terrell   Psychiatry Clinic ([facility])    [address]  [address] [place] [address]   [phone]            Aug 17, 2017  1:00 PM CDT   Return Visit with ADULT  DAY 3C   [facility] ([facility])    [address] [place] [address]   [phone]            Aug 21, 2017  1:00 PM CDT   Return Visit with ADULT  DAY 3C   [facility] ([facility])    [address] [place] [address]   [phone]            Aug 22, 2017  1:00  PM CDT   Return Visit with ADULT  DAY 3C   [facility] ([facility])    [address] [address]   [phone]            Aug 24, 2017  1:00 PM CDT   Return Visit with ADULT  DAY 3C   [facility] ([facility])    [address] [address]   [phone]            Aug 28, 2017  1:00 PM CDT   Return Visit with ADULT  DAY 3C   [facility] ([facility])    [address] [address]   [phone]            Aug 29, 2017  1:00 PM CDT   Return Visit with ADULT  DAY 3C   [facility] ([facility])    [address] [address]   [phone]              Who to contact     Please call your clinic at [phone] to:    Ask questions about your health    Make or cancel appointments    Discuss your medicines    Learn about your test results    Speak to your doctor   If you have compliments or concerns about an experience at your clinic, or if you wish to file a complaint, please contact [facility] Patient Relations at [phone] or email us at [email or web address]         Additional Information About Your Visit        MyChart Information     [email or web address] is an electronic gateway that provides easy, online access to your medical records. With FP Complete, you can request a clinic appointment, read your test results, renew a prescription or communicate with your care team.     To sign up for [email or web address] visit the website at [email or web address]   You will be asked to enter the access code listed below, as well as some personal information. Please follow the directions to create your username and password.     Your access code is:  [email or web address]  Expires: 2017  1:21 PM     Your access code will  in 90 days. If you need help or a new code, please contact your [facility] or call [phone] for assistance.        Care EveryWhere ID     This is your Care EveryWhere ID. This could be used by other organizations to access your [place] medical records  [id number]        Your Vitals Were     Pulse BMI (Body Mass Index)                75 30.87 kg/m2           Blood Pressure from Last 3 Encounters:   17 (!) 146/98   17 135/84   17 138/71    Weight from Last 3 Encounters:   17 109 kg (240 lb 6.4 oz)   17 102.1 kg (225 lb)   17 102.8 kg (226 lb 9.6 oz)              Today, you had the following     No orders found for display         Today's Medication Changes          These changes are accurate as of: 17 11:59 PM.  If you have any questions, ask your nurse or doctor.               These medicines have changed or have updated prescriptions.        Dose/Directions    OLANZapine 15 MG tablet   Commonly known as:  zyPREXA   This may have changed:    - medication strength  - how much to take  - when to take this   Used for:  Undifferentiated schizophrenia (H)   Changed by:  Meenakshi Mcdaniel APRN CNP        Dose:  15 mg   Take 1 tablet (15 mg) by mouth At Bedtime   Quantity:  30 tablet   Refills:  0            Where to get your medicines      These medications were sent to [facility] [address] [address]     Phone:  [phone]     OLANZapine 15 MG tablet                Primary Care Provider    Physician No Ref-Primary       No address on file        Equal Access to Services     MARI VELASCO : Libertad Amaya, mehnaz navarrete, moe terry. [facility] [phone].    ATENCIÓN: Si julia hastings bruce  disposición servicios gratuitos de asistencia lingüística. Elda chua [phone].    We comply with applicable federal civil rights laws and [place] laws. We do not discriminate on the basis of race, color, national origin, age, disability sex, sexual orientation or gender identity.            Thank you!     Thank you for choosing PSYCHIATRY CLINIC  for your care. Our goal is always to provide you with excellent care. Hearing back from our patients is one way we can continue to improve our services. Please take a few minutes to complete the written survey that you may receive in the mail after your visit with us. Thank you!             Your Updated Medication List - Protect others around you: Learn how to safely use, store and throw away your medicines at www.disposemymeds.org.          This list is accurate as of: 8/4/17 11:59 PM.  Always use your most recent med list.                   Brand Name Dispense Instructions for use Diagnosis    ADULT GUMMY PO      Take 2 tablets by mouth daily        IBUPROFEN PO      Take 200-400 mg by mouth every 4 hours as needed for other (headache)        OLANZapine 15 MG tablet    zyPREXA    30 tablet    Take 1 tablet (15 mg) by mouth At Bedtime    Undifferentiated schizophrenia (H)          - continue home meds with parameters

## 2021-08-11 NOTE — DISCHARGE NOTE NURSING/CASE MANAGEMENT/SOCIAL WORK - NSDCPELOVENOXCOMP_GEN_ALL_CORE
Enoxaparin/Lovenox is used to treat and prevent blood clots. Use a different body area each time you give yourself a shot. Keep track of where you give each shot to make sure you rotate body areas. Use a new needle and syringe each time you inject your medicine. Never skip a dose of Enoxaparin/Lovenox. You must use this medicine on a fixed schedule.  NEVER TAKE A DOUBLE DOSE. Call your doctor or pharmacist if you miss a dose. Take Enoxaparin/Lovenox at the same time each morning and/or evening.
3-5x/week

## 2021-08-11 NOTE — OCCUPATIONAL THERAPY INITIAL EVALUATION ADULT - PLANNED THERAPY INTERVENTIONS, OT EVAL
ADL retraining/balance training/bed mobility training/motor coordination training/strengthening/transfer training
girlfriend

## 2021-08-11 NOTE — CONSULT NOTE ADULT - SUBJECTIVE AND OBJECTIVE BOX
Patient is a 65y old  Female who is s/p R URBANO , POD#1. Pain well controlled , no v/v , voiding without  difficulty ,   participating with physical therapy .    CC: R hip chronic pain , post op pain well controlled       HPI:  64 yo F PMH of HTN, HLD, obese - BMI - 52C ,  breast cancer s/p bilateral mastectomy in 2018 (on tamoxifen), OA s/p left TKA 2/11/21 and left URBANO 3/3/2020, presents with c/o right hip pain and right knee pain. She ambulates in a wheelchair. She has known end stage right hip and right knee OA. Recent Xray of the right hip demonstrates bone on bone arthritis. She had several cortisone treatments with only slight relief. She is taking ibuprofen and tylenol w/partial relief. Now s/p R URBANO .     Completed COVID 19 vaccination: Berenice x1 (6/12/21 (21 Jul 2021 14:01)      PAST MEDICAL & SURGICAL HISTORY:  Hypertension    Breast cancer in female    Gallstones    Osteoarthritis    Anemia    Obese    Unilateral primary osteoarthritis, right hip    History of tonsillectomy    S/P mastectomy, bilateral    H/O total knee replacement, left  2/11/2021    History of total hip replacement, left  3/3/2020        Social History:  Tobacco - denies  ETOH - occasionally   Illicit drug abuse - denies    FAMILY HISTORY:  Maternal family history of emphysema (Mother)    Family history of hypertension in father (Father)    Family history of breast cancer (Sibling)        Allergies    latex (Rash)  sulfa drugs (Hives)  tramadol (Nausea)    Intolerances    codeine (Vomiting)      HOME MEDICATIONS :   · 	acetaminophen 325 mg oral tablet: Last Dose Taken:  , 3 tab(s) orally every 8 hours  · 	vitamin E oral capsule: Last Dose Taken:    · 	Vitamin C 100 mg oral tablet: Last Dose Taken:    · 	tamoxifen 20 mg oral tablet: Last Dose Taken:  , 1 tab(s) orally once a day  · 	omeprazole 20 mg oral delayed release capsule: Last Dose Taken:  , 1 cap(s) orally once a day  · 	losartan 100 mg oral tablet: Last Dose Taken:  , 1 tab(s) orally once a day  · 	aspirin 81 mg oral delayed release tablet: Last Dose Taken:  , 1 tab(s) orally once a day  · 	gabapentin 600 mg oral tablet: Last Dose Taken:  , 1 tab(s) orally 4 times a day  · 	Garlic oral tablet: Last Dose Taken:  , orally once a day          · 	ibuprofen 200 mg oral tablet: orally 3 times a day, As Needed    REVIEW OF SYSTEMS:    R hip chronic pain , post op pain well controlled , all other systems are reviewed and are negative .    MEDICATIONS  (STANDING):  acetaminophen   Tablet .. 975 milliGRAM(s) Oral every 8 hours  aspirin enteric coated 81 milliGRAM(s) Oral daily  enoxaparin Injectable 40 milliGRAM(s) SubCutaneous daily  ferrous    sulfate 325 milliGRAM(s) Oral daily  gabapentin 600 milliGRAM(s) Oral four times a day  multivitamin 1 Tablet(s) Oral daily  pantoprazole    Tablet 40 milliGRAM(s) Oral before breakfast  polyethylene glycol 3350 17 Gram(s) Oral at bedtime  senna 2 Tablet(s) Oral at bedtime  sodium chloride 0.9% lock flush 3 milliLiter(s) IV Push every 8 hours  sodium chloride 0.9%. 1000 milliLiter(s) (100 mL/Hr) IV Continuous <Continuous>  tranexamic acid IVPB 1000 milliGRAM(s) IV Intermittent once  tranexamic acid IVPB 1000 milliGRAM(s) IV Intermittent once    MEDICATIONS  (PRN):  acetaminophen   Tablet .. 650 milliGRAM(s) Oral every 6 hours PRN Temp greater or equal to 38C (100.4F)  HYDROmorphone   Tablet 4 milliGRAM(s) Oral every 4 hours PRN Severe Pain (7 - 10)  HYDROmorphone  Injectable 0.5 milliGRAM(s) IV Push every 4 hours PRN breakthrough pain  magnesium hydroxide Suspension 30 milliLiter(s) Oral daily PRN Constipation  ondansetron Injectable 4 milliGRAM(s) IV Push every 6 hours PRN Nausea and/or Vomiting  oxyCODONE    IR 5 milliGRAM(s) Oral every 3 hours PRN Mild Pain (1 - 3)  oxyCODONE    IR 10 milliGRAM(s) Oral every 3 hours PRN Moderate Pain (4 - 6)      Vital Signs Last 24 Hrs  T(C): 36.8 (11 Aug 2021 09:31), Max: 36.8 (10 Aug 2021 17:45)  T(F): 98.2 (11 Aug 2021 09:31), Max: 98.3 (10 Aug 2021 17:45)  HR: 86 (11 Aug 2021 09:31) (61 - 95)  BP: 133/75 (11 Aug 2021 09:31) (98/70 - 133/75)  BP(mean): 85 (10 Aug 2021 21:00) (84 - 92)  RR: 18 (11 Aug 2021 12:00) (12 - 19)  SpO2: 97% (11 Aug 2021 12:00) (95% - 100%)    PHYSICAL EXAM:    GENERAL: NAD, well-groomed, obese   HEAD:  Atraumatic, Normocephalic  EYES: EOMI, PERRLA, conjunctiva and sclera clear  NECK: Supple, No JVD, Normal thyroid  NERVOUS SYSTEM:  Alert & Oriented X3, no focal deficit   CHEST/LUNG: CTA  b/l,  no rales, rhonchi, wheezing, or rubs  HEART: Regular rate and rhythm; No murmurs, rubs, or gallops  ABDOMEN: Soft, Nontender, Nondistended; Bowel sounds present  EXTREMITIES:  2+ Peripheral Pulses, No clubbing, cyanosis, or edema , R hip posterior dressing + , clean and dry   LYMPH: No lymphadenopathy noted  SKIN: No rashes or lesions    LABS:                        11.3   11.53 )-----------( 267      ( 11 Aug 2021 06:01 )             34.4     08-11    137  |  100  |  16.8  ----------------------------<  130<H>  3.8   |  22.0  |  0.69    Ca    8.8      11 Aug 2021 06:01      RADIOLOGY & ADDITIONAL STUDIES:

## 2021-08-11 NOTE — PHYSICAL THERAPY INITIAL EVALUATION ADULT - ADDITIONAL COMMENTS
Pt lives in a house with  7 steps to enter with  rails and 7  stairs inside with  rails.  Pt owns medical equipment: RW, SAC, Commode  Pt lives with: Spouse   Someone is always available to provide assist.

## 2021-08-11 NOTE — PHYSICAL THERAPY INITIAL EVALUATION ADULT - ACTIVE RANGE OF MOTION EXAMINATION, REHAB EVAL
Right shd flexion to 90 / Right LE grossly 2/5 due to pain with movement/bilateral upper extremity Active ROM was WFL (within functional limits)/bilateral  lower extremity Active ROM was WFL (within functional limits)/deficits as listed below

## 2021-08-12 VITALS
HEART RATE: 92 BPM | DIASTOLIC BLOOD PRESSURE: 63 MMHG | TEMPERATURE: 98 F | OXYGEN SATURATION: 94 % | SYSTOLIC BLOOD PRESSURE: 97 MMHG | RESPIRATION RATE: 18 BRPM

## 2021-08-12 LAB
ANION GAP SERPL CALC-SCNC: 9 MMOL/L — SIGNIFICANT CHANGE UP (ref 5–17)
BUN SERPL-MCNC: 19.5 MG/DL — SIGNIFICANT CHANGE UP (ref 8–20)
CALCIUM SERPL-MCNC: 8.4 MG/DL — LOW (ref 8.6–10.2)
CHLORIDE SERPL-SCNC: 100 MMOL/L — SIGNIFICANT CHANGE UP (ref 98–107)
CO2 SERPL-SCNC: 25 MMOL/L — SIGNIFICANT CHANGE UP (ref 22–29)
CREAT SERPL-MCNC: 0.64 MG/DL — SIGNIFICANT CHANGE UP (ref 0.5–1.3)
GLUCOSE SERPL-MCNC: 144 MG/DL — HIGH (ref 70–99)
HCT VFR BLD CALC: 30.4 % — LOW (ref 34.5–45)
HGB BLD-MCNC: 9.9 G/DL — LOW (ref 11.5–15.5)
MCHC RBC-ENTMCNC: 29.6 PG — SIGNIFICANT CHANGE UP (ref 27–34)
MCHC RBC-ENTMCNC: 32.6 GM/DL — SIGNIFICANT CHANGE UP (ref 32–36)
MCV RBC AUTO: 91 FL — SIGNIFICANT CHANGE UP (ref 80–100)
PLATELET # BLD AUTO: 240 K/UL — SIGNIFICANT CHANGE UP (ref 150–400)
POTASSIUM SERPL-MCNC: 3.9 MMOL/L — SIGNIFICANT CHANGE UP (ref 3.5–5.3)
POTASSIUM SERPL-SCNC: 3.9 MMOL/L — SIGNIFICANT CHANGE UP (ref 3.5–5.3)
RBC # BLD: 3.34 M/UL — LOW (ref 3.8–5.2)
RBC # FLD: 14.6 % — HIGH (ref 10.3–14.5)
SODIUM SERPL-SCNC: 134 MMOL/L — LOW (ref 135–145)
WBC # BLD: 10.65 K/UL — HIGH (ref 3.8–10.5)
WBC # FLD AUTO: 10.65 K/UL — HIGH (ref 3.8–10.5)

## 2021-08-12 PROCEDURE — 97116 GAIT TRAINING THERAPY: CPT

## 2021-08-12 PROCEDURE — 99232 SBSQ HOSP IP/OBS MODERATE 35: CPT

## 2021-08-12 PROCEDURE — 97167 OT EVAL HIGH COMPLEX 60 MIN: CPT

## 2021-08-12 PROCEDURE — 85027 COMPLETE CBC AUTOMATED: CPT

## 2021-08-12 PROCEDURE — 36415 COLL VENOUS BLD VENIPUNCTURE: CPT

## 2021-08-12 PROCEDURE — C1776: CPT

## 2021-08-12 PROCEDURE — 87635 SARS-COV-2 COVID-19 AMP PRB: CPT

## 2021-08-12 PROCEDURE — 73502 X-RAY EXAM HIP UNI 2-3 VIEWS: CPT

## 2021-08-12 PROCEDURE — 86769 SARS-COV-2 COVID-19 ANTIBODY: CPT

## 2021-08-12 PROCEDURE — C1713: CPT

## 2021-08-12 PROCEDURE — 97163 PT EVAL HIGH COMPLEX 45 MIN: CPT

## 2021-08-12 PROCEDURE — 80048 BASIC METABOLIC PNL TOTAL CA: CPT

## 2021-08-12 PROCEDURE — 97110 THERAPEUTIC EXERCISES: CPT

## 2021-08-12 PROCEDURE — 82962 GLUCOSE BLOOD TEST: CPT

## 2021-08-12 RX ORDER — LOPERAMIDE HCL 2 MG
2 TABLET ORAL ONCE
Refills: 0 | Status: COMPLETED | OUTPATIENT
Start: 2021-08-12 | End: 2021-08-12

## 2021-08-12 RX ORDER — SENNA PLUS 8.6 MG/1
2 TABLET ORAL AT BEDTIME
Refills: 0 | Status: DISCONTINUED | OUTPATIENT
Start: 2021-08-12 | End: 2021-08-12

## 2021-08-12 RX ORDER — POLYETHYLENE GLYCOL 3350 17 G/17G
17 POWDER, FOR SOLUTION ORAL AT BEDTIME
Refills: 0 | Status: DISCONTINUED | OUTPATIENT
Start: 2021-08-12 | End: 2021-08-12

## 2021-08-12 RX ADMIN — Medication 325 MILLIGRAM(S): at 13:40

## 2021-08-12 RX ADMIN — ENOXAPARIN SODIUM 40 MILLIGRAM(S): 100 INJECTION SUBCUTANEOUS at 13:40

## 2021-08-12 RX ADMIN — SODIUM CHLORIDE 3 MILLILITER(S): 9 INJECTION INTRAMUSCULAR; INTRAVENOUS; SUBCUTANEOUS at 13:36

## 2021-08-12 RX ADMIN — GABAPENTIN 600 MILLIGRAM(S): 400 CAPSULE ORAL at 05:57

## 2021-08-12 RX ADMIN — Medication 1 TABLET(S): at 13:40

## 2021-08-12 RX ADMIN — Medication 2 MILLIGRAM(S): at 06:10

## 2021-08-12 RX ADMIN — Medication 975 MILLIGRAM(S): at 13:40

## 2021-08-12 RX ADMIN — Medication 975 MILLIGRAM(S): at 14:10

## 2021-08-12 RX ADMIN — GABAPENTIN 600 MILLIGRAM(S): 400 CAPSULE ORAL at 13:40

## 2021-08-12 RX ADMIN — Medication 975 MILLIGRAM(S): at 06:25

## 2021-08-12 RX ADMIN — PANTOPRAZOLE SODIUM 40 MILLIGRAM(S): 20 TABLET, DELAYED RELEASE ORAL at 05:57

## 2021-08-12 RX ADMIN — OXYCODONE HYDROCHLORIDE 10 MILLIGRAM(S): 5 TABLET ORAL at 02:07

## 2021-08-12 RX ADMIN — LOSARTAN POTASSIUM 100 MILLIGRAM(S): 100 TABLET, FILM COATED ORAL at 05:57

## 2021-08-12 RX ADMIN — Medication 975 MILLIGRAM(S): at 05:57

## 2021-08-12 RX ADMIN — SODIUM CHLORIDE 3 MILLILITER(S): 9 INJECTION INTRAMUSCULAR; INTRAVENOUS; SUBCUTANEOUS at 05:59

## 2021-08-12 RX ADMIN — OXYCODONE HYDROCHLORIDE 10 MILLIGRAM(S): 5 TABLET ORAL at 03:25

## 2021-08-12 RX ADMIN — Medication 81 MILLIGRAM(S): at 13:40

## 2021-08-12 NOTE — PROGRESS NOTE ADULT - ATTENDING COMMENTS
Recalled by ortho PA to evaluate patient due to lose stools episodes ,   had 3 BM since last night , no n/v , no abd pain , tolerating diet ,   participating with physical therapy .      Vital Signs Last 24 Hrs  T(C): 36.7 (12 Aug 2021 08:53), Max: 37.1 (12 Aug 2021 05:22)  T(F): 98.1 (12 Aug 2021 08:53), Max: 98.8 (12 Aug 2021 05:22)  HR: 71 (12 Aug 2021 08:53) (71 - 96)  BP: 102/65 (12 Aug 2021 08:53) (100/66 - 122/75)  BP(mean): --  ABP: --  ABP(mean): --  RR: 19 (12 Aug 2021 08:53) (18 - 19)  SpO2: 93% (12 Aug 2021 08:53) (93% - 97%)    NAD , obese  Lungs: CTA   Abd: soft , BS + , non tender , non distended   R hip dressing + , clean and dry   Above noted and reviewed with NP ,   Agree with above .   hypoxia noted while asleep , presumed DEE DEE -  patient aware , outpatient Sleep study recommended .   Saturating well while awake .   Lose stools x 3 since last night - likely due to stool softener / laxative -   will hold , avoid high fibers , started on Imodium .   Observe - if no further episodes of diarrhea till after lunch  may d/c home   today .   d/w ortho PA/ nurse / SHELLEY

## 2021-08-12 NOTE — PROGRESS NOTE ADULT - ASSESSMENT
64 yo F PMH of HTN, HLD, obese - BMI - 52C ,  breast cancer s/p bilateral mastectomy in 2018 (on tamoxifen), OA s/p left TKA 2/11/21 and left URBANO 3/3/2020, presents with c/o right hip pain and right knee pain. She ambulates in a wheelchair. She has known end stage right hip and right knee OA. Recent Xray of the right hip demonstrates bone on bone arthritis. She had several cortisone treatments with only slight relief. She is taking ibuprofen and tylenol w/partial relief. Now s/p R URBANO .      Problem/Recommendation - 1:  Problem: Unilateral primary osteoarthritis, right hip. Recommendation: s/p R URBANO.     Problem/Recommendation - 2:  ·  Problem: S/P hip replacement, right.  Recommendation: PT/OT/pain mgmt  DVT prophylaxis- as per ortho  Abx as per SCIP- given   Incentive spirometry  Prophylaxis of opioid  induced constipation to be held for now due to diarrhea, resume as needed      Problem/Recommendation - 3:  ·  Problem: HTN (hypertension).  Recommendation: - continue home meds with parameters.      Problem/Recommendation - 4:  ·  Problem: HLD (hyperlipidemia).  Recommendation: - not on any medications , continue low cholesterol diet.      Problem/Recommendation - 5:  ·  Problem: Morbid obesity.  Recommendation: - BMI -52- will benefit from weight reduction - may recommend to bariatric surgery ,   life style changes, nutritionist eval as on outpatient.      Problem/Recommendation - 6:  Problem: Prediabetes. Recommendation: - patient informed , ADA recommended , follow up with PMD for further recommendations.     Problem/Recommendation - 7:  Problem: Leucocytosis. Recommendation: - no s/s of infection , likely reactive. Downtrending.      Problem/Recommendation - 8:  Problem: Anemia due to acute blood loss. Recommendation: 2/2 to surgical blood lost - patient is asymptomatic.     Problem/Recommendation - 9:  Problem: Need for prophylactic measure. Recommendation: DVT prophylaxis  - as per ortho protocol      Problem/Recommendation-10:  Problem: Diarrhea.  Recommendation: Hold bowel RX, low fiber diet    Problem/Recommendation:  Problem: Probable DEE DEE.  Recommendation: Pulse ox in 80's overnight. Instructed patient to follow up with PMD for referral for sleep study

## 2021-08-13 ENCOUNTER — NON-APPOINTMENT (OUTPATIENT)
Age: 66
End: 2021-08-13

## 2021-08-21 ENCOUNTER — RX RENEWAL (OUTPATIENT)
Age: 66
End: 2021-08-21

## 2021-08-26 ENCOUNTER — NON-APPOINTMENT (OUTPATIENT)
Age: 66
End: 2021-08-26

## 2021-08-26 RX ORDER — ASPIRIN/CALCIUM CARB/MAGNESIUM 324 MG
1 TABLET ORAL
Qty: 56 | Refills: 0
Start: 2021-08-26 | End: 2021-09-22

## 2021-08-26 NOTE — PATIENT PROFILE ADULT - LIVING ENVIRONMENT
8/26/2021         RE: Dhruv Villalba  [address] [facility] [address]        Dear Colleague,    Thank you for referring your patient, Dhruv Villalba, to the [facility]. Please see a copy of my visit note below.    Podiatry / Foot and Ankle Surgery Progress Note    August 26, 2021    Subject: Patient was seen for follow up on 1 week status post right great toe amputation due to ischemia.  Patient is here with his wife.  Notes that he has minimal pain to the foot.  Denies fever, nausea, vomiting.  No concerns today.    Objective:  Vitals: /64     General:  Patient is alert and orientated.  NAD.    Vascular:  DP and PT pulses are palpable.  No edema or varicosities noted.  CFT's < 3secs.  Skin temp is normal.    Neuro:  Light and gross touch sensation intact to digits, dorsum, and plantar aspects of the feet.    Derm: Dressing is clean dry and intact on the right foot.  Sutures are intact.  No redness, dehiscence or signs of infection noted.    Musculoskeletal: Right great toe has now been amputated.      Assessment:    Post-operative state  MDS (myelodysplastic syndrome) (H)  Amputation of right great toe (H)    Medical Decision Making/Plan: At this time the dressing was changed.  We will have him follow-up weekly for dressing changes for the next 3 weeks until the stitches are hopefully ready to come out.  Discussed that the stitches may stay in for 4 to 6 weeks.  Given his other comorbidities healing may take a little while.  He will continue to keep the foot dressing dry.  All questions were answered to patient satisfaction he will call for the questions or concerns.      Patient Risk Factor:  Patient is a high risk factor for infection.     Christal Yoon DPM, Podiatry/Foot and Ankle Surgery        Again, thank you for allowing me to participate in the care of your patient.        Sincerely,        Christal Yoon DPM, Podiatry/Foot and Ankle Surgery    
no

## 2021-08-27 ENCOUNTER — NON-APPOINTMENT (OUTPATIENT)
Age: 66
End: 2021-08-27

## 2021-08-31 ENCOUNTER — NON-APPOINTMENT (OUTPATIENT)
Age: 66
End: 2021-08-31

## 2021-09-01 ENCOUNTER — APPOINTMENT (OUTPATIENT)
Dept: ORTHOPEDIC SURGERY | Facility: CLINIC | Age: 66
End: 2021-09-01
Payer: MEDICARE

## 2021-09-01 VITALS
BODY MASS INDEX: 53.92 KG/M2 | HEART RATE: 99 BPM | DIASTOLIC BLOOD PRESSURE: 83 MMHG | HEIGHT: 62 IN | WEIGHT: 293 LBS | SYSTOLIC BLOOD PRESSURE: 134 MMHG

## 2021-09-01 PROCEDURE — 99024 POSTOP FOLLOW-UP VISIT: CPT

## 2021-09-01 PROCEDURE — 73502 X-RAY EXAM HIP UNI 2-3 VIEWS: CPT

## 2021-09-01 NOTE — HISTORY OF PRESENT ILLNESS
[Procedure: ___] : status post [unfilled] [Clean/Dry/Intact] : clean, dry and intact [Healed] : healed [Swelling] : swollen [Neuro Intact] : an unremarkable neurological exam [Vascular Intact] : ~T peripheral vascular exam normal [Negative Marcel's] : maneuvers demonstrated a negative Marcel's sign [Xray (Date:___)] : [unfilled] Xray -  [Doing Well] : is doing well [No Sign of Infection] : is showing no signs of infection [Adequate Pain Control] : has adequate pain control [Erythema] : erythematous [3] : the patient reports pain that is 3/10 in severity [Chills] : no chills [Constipation] : no constipation [Diarrhea] : no diarrhea [Dysuria] : no dysuria [Fever] : no fever [Nausea] : no nausea [Vomiting] : no vomiting [Discharge] : absent of discharge [Dehiscence] : not dehisced [de-identified] : s/p right URBANO  [de-identified] : Pt is 3 weeks post-op. She is on Lovenox. She is currently in home PT and wants to do more home PT. She is walking in the house without much pain. She presents to the office today in a wheelchair. She is taking Tylenol for pain management. She has a lot of restless leg syndrome. [de-identified] : Right hip exam shows healed incision with no sign of infection. Redness around the inferior area of the incision. No active drainage.  [de-identified] : 3V xray of the right hip done in the office today and reviewed by Dr. Germán Henderson demonstrates s/p implants in good positioning with no evidence of wear, loosening, or subsidence.  [de-identified] : Patient will continue to adhere to the posterior hip precautions. She should continue to do low impact exercises. Another rx for home PT was provided today. Pt understands the importance of prophylaxis for invasive dental procedures. F/u with us in 3 weeks.

## 2021-09-01 NOTE — END OF VISIT
[FreeTextEntry3] : I, Willard Falk, acted solely as a scribe for Dr. Germán Henderson on this date 09/01/2021.

## 2021-09-13 ENCOUNTER — RX RENEWAL (OUTPATIENT)
Age: 66
End: 2021-09-13

## 2021-09-29 ENCOUNTER — APPOINTMENT (OUTPATIENT)
Dept: FAMILY MEDICINE | Facility: CLINIC | Age: 66
End: 2021-09-29
Payer: MEDICARE

## 2021-09-29 ENCOUNTER — APPOINTMENT (OUTPATIENT)
Dept: ORTHOPEDIC SURGERY | Facility: CLINIC | Age: 66
End: 2021-09-29
Payer: MEDICARE

## 2021-09-29 VITALS
WEIGHT: 288 LBS | DIASTOLIC BLOOD PRESSURE: 79 MMHG | HEART RATE: 87 BPM | HEIGHT: 60 IN | SYSTOLIC BLOOD PRESSURE: 117 MMHG | BODY MASS INDEX: 56.54 KG/M2

## 2021-09-29 VITALS
HEIGHT: 62 IN | BODY MASS INDEX: 53.92 KG/M2 | SYSTOLIC BLOOD PRESSURE: 138 MMHG | TEMPERATURE: 98 F | OXYGEN SATURATION: 97 % | RESPIRATION RATE: 16 BRPM | WEIGHT: 293 LBS | DIASTOLIC BLOOD PRESSURE: 84 MMHG | HEART RATE: 90 BPM

## 2021-09-29 DIAGNOSIS — G25.81 RESTLESS LEGS SYNDROME: ICD-10-CM

## 2021-09-29 PROCEDURE — 99024 POSTOP FOLLOW-UP VISIT: CPT

## 2021-09-29 PROCEDURE — 99214 OFFICE O/P EST MOD 30 MIN: CPT | Mod: 25

## 2021-09-29 PROCEDURE — 73502 X-RAY EXAM HIP UNI 2-3 VIEWS: CPT

## 2021-09-29 NOTE — END OF VISIT
[FreeTextEntry3] : I, Willard Falk, acted solely as a scribe for Dr. Germán Henderson on this date 09/29/2021.

## 2021-09-29 NOTE — HISTORY OF PRESENT ILLNESS
[Procedure: ___] : status post [unfilled] [Clean/Dry/Intact] : clean, dry and intact [Healed] : healed [Swelling] : swollen [Neuro Intact] : an unremarkable neurological exam [Vascular Intact] : ~T peripheral vascular exam normal [Negative Marcel's] : maneuvers demonstrated a negative Marcel's sign [Xray (Date:___)] : [unfilled] Xray -  [Doing Well] : is doing well [No Sign of Infection] : is showing no signs of infection [0] : no pain reported [Erythema] : erythematous [Excellent Pain Control] : has excellent pain control [Chills] : no chills [Constipation] : no constipation [Diarrhea] : no diarrhea [Dysuria] : no dysuria [Fever] : no fever [Nausea] : no nausea [Vomiting] : no vomiting [Discharge] : absent of discharge [Dehiscence] : not dehisced [de-identified] : Status post 8/10/2021. S/P right total hip arthroplasty, DOS: 8/10/2021. \par status post 8/10/21. s/p right URBANO.  [de-identified] : Pt is 7 weeks post-op. She is doing home exercises. She denies pain in the hip. She presents to the office today in a wheelchair.  [de-identified] : Right hip exam shows healed incision with no sign of infection. There is redness around the incision. No drainage [de-identified] : 3V xray of the right hip done in the office today and reviewed by Dr. Germán Henderson demonstrates s/p implants in good positioning with no evidence of wear, loosening, or subsidence.  [de-identified] : She should continue to do low impact exercises. Patient will continue to adhere to the posterior hip precautions. Pt understands the importance of prophylaxis for invasive dental procedures. F/u with us in 4 weeks.

## 2021-09-29 NOTE — ASSESSMENT
[FreeTextEntry1] : Pt in for f/u after surgery. R hip replacement 08/10/21. Pt is walking well. \par \par Care plan reviewed\par Labs drawn - Mg included for restless legs, Anemia post-hip replacement\par Counseled on trying tonic water to help with restless legs\par Meds reviewed - Antifungal cream given for rash under arms and skin folds\par Pt refused flu shot\par RTC in 3 mo

## 2021-09-30 LAB
ALBUMIN SERPL ELPH-MCNC: 3.9 G/DL
ALP BLD-CCNC: 73 U/L
ALT SERPL-CCNC: 23 U/L
ANION GAP SERPL CALC-SCNC: 17 MMOL/L
AST SERPL-CCNC: 30 U/L
BASOPHILS # BLD AUTO: 0.1 K/UL
BASOPHILS NFR BLD AUTO: 1.3 %
BILIRUB SERPL-MCNC: 0.3 MG/DL
BUN SERPL-MCNC: 19 MG/DL
CALCIUM SERPL-MCNC: 10.2 MG/DL
CHLORIDE SERPL-SCNC: 101 MMOL/L
CHOLEST SERPL-MCNC: 151 MG/DL
CO2 SERPL-SCNC: 22 MMOL/L
CREAT SERPL-MCNC: 0.68 MG/DL
EOSINOPHIL # BLD AUTO: 0.38 K/UL
EOSINOPHIL NFR BLD AUTO: 4.9 %
ERYTHROCYTE [SEDIMENTATION RATE] IN BLOOD BY WESTERGREN METHOD: 20 MM/HR
FERRITIN SERPL-MCNC: 135 NG/ML
FOLATE SERPL-MCNC: 9.2 NG/ML
GLUCOSE SERPL-MCNC: 119 MG/DL
HCT VFR BLD CALC: 37 %
HDLC SERPL-MCNC: 39 MG/DL
HGB BLD-MCNC: 11.3 G/DL
IMM GRANULOCYTES NFR BLD AUTO: 0.9 %
IRON SATN MFR SERPL: 18 %
IRON SERPL-MCNC: 59 UG/DL
LDLC SERPL CALC-MCNC: 84 MG/DL
LYMPHOCYTES # BLD AUTO: 1.59 K/UL
LYMPHOCYTES NFR BLD AUTO: 20.4 %
MAN DIFF?: NORMAL
MCHC RBC-ENTMCNC: 29 PG
MCHC RBC-ENTMCNC: 30.5 GM/DL
MCV RBC AUTO: 95.1 FL
MONOCYTES # BLD AUTO: 0.77 K/UL
MONOCYTES NFR BLD AUTO: 9.9 %
NEUTROPHILS # BLD AUTO: 4.9 K/UL
NEUTROPHILS NFR BLD AUTO: 62.6 %
NONHDLC SERPL-MCNC: 112 MG/DL
PLATELET # BLD AUTO: 372 K/UL
POTASSIUM SERPL-SCNC: 4.6 MMOL/L
PROT SERPL-MCNC: 7.1 G/DL
RBC # BLD: 3.89 M/UL
RBC # FLD: 15.1 %
SODIUM SERPL-SCNC: 140 MMOL/L
TIBC SERPL-MCNC: 332 UG/DL
TRIGL SERPL-MCNC: 140 MG/DL
TSH SERPL-ACNC: 2.73 UIU/ML
UIBC SERPL-MCNC: 273 UG/DL
VIT B12 SERPL-MCNC: 389 PG/ML
WBC # FLD AUTO: 7.81 K/UL

## 2021-10-05 LAB — MAGNESIUM SERPL-MCNC: 1.9 MG/DL

## 2021-10-15 ENCOUNTER — TRANSCRIPTION ENCOUNTER (OUTPATIENT)
Age: 66
End: 2021-10-15

## 2021-10-16 ENCOUNTER — TRANSCRIPTION ENCOUNTER (OUTPATIENT)
Age: 66
End: 2021-10-16

## 2021-10-18 ENCOUNTER — RX RENEWAL (OUTPATIENT)
Age: 66
End: 2021-10-18

## 2021-10-28 ENCOUNTER — APPOINTMENT (OUTPATIENT)
Dept: FAMILY MEDICINE | Facility: CLINIC | Age: 66
End: 2021-10-28
Payer: MEDICARE

## 2021-10-28 VITALS
WEIGHT: 288 LBS | TEMPERATURE: 97.3 F | RESPIRATION RATE: 16 BRPM | SYSTOLIC BLOOD PRESSURE: 128 MMHG | HEART RATE: 78 BPM | BODY MASS INDEX: 56.54 KG/M2 | DIASTOLIC BLOOD PRESSURE: 80 MMHG | HEIGHT: 60 IN | OXYGEN SATURATION: 98 %

## 2021-10-28 DIAGNOSIS — M25.552 PAIN IN RIGHT HIP: ICD-10-CM

## 2021-10-28 DIAGNOSIS — M25.551 PAIN IN RIGHT HIP: ICD-10-CM

## 2021-10-28 PROCEDURE — 99214 OFFICE O/P EST MOD 30 MIN: CPT

## 2021-10-28 NOTE — COUNSELING
[Fall prevention counseling provided] : Fall prevention counseling provided [Adequate lighting] : Adequate lighting [No throw rugs] : No throw rugs [Behavioral health counseling provided] : Behavioral health counseling provided [Sleep ___ hours/day] : Sleep [unfilled] hours/day [None] : None [Good understanding] : Patient has a good understanding of lifestyle changes and steps needed to achieve self management goal

## 2021-10-28 NOTE — HEALTH RISK ASSESSMENT
[No] : No [0] : 2) Feeling down, depressed, or hopeless: Not at all (0) [PHQ-2 Negative - No further assessment needed] : PHQ-2 Negative - No further assessment needed [Patient/Caregiver not ready to engage] : , patient/caregiver not ready to engage [I will adhere to the patient's wishes.] : I will adhere to the patient's wishes. [Time Spent: ___ minutes] : Time Spent: [unfilled] minutes [] : No [[id number]] : 0 [AdvancecareDate] : 10/21

## 2021-10-28 NOTE — ASSESSMENT
[FreeTextEntry1] : Pt in for f/u after surgery. R hip replacement 08/10/21. Pt is walking well. \par \par Care plan reviewed\par Labs drawn\par Meds reviewed \par Pt refused flu shot\par J&J vaccine already received

## 2021-10-30 LAB
BASOPHILS # BLD AUTO: 0.09 K/UL
BASOPHILS NFR BLD AUTO: 1.1 %
EOSINOPHIL # BLD AUTO: 0.35 K/UL
EOSINOPHIL NFR BLD AUTO: 4.3 %
HCT VFR BLD CALC: 40.1 %
HGB BLD-MCNC: 12.2 G/DL
IMM GRANULOCYTES NFR BLD AUTO: 0.4 %
LYMPHOCYTES # BLD AUTO: 1.62 K/UL
LYMPHOCYTES NFR BLD AUTO: 20.1 %
MAN DIFF?: NORMAL
MCHC RBC-ENTMCNC: 28.6 PG
MCHC RBC-ENTMCNC: 30.4 GM/DL
MCV RBC AUTO: 94.1 FL
MONOCYTES # BLD AUTO: 1 K/UL
MONOCYTES NFR BLD AUTO: 12.4 %
NEUTROPHILS # BLD AUTO: 4.97 K/UL
NEUTROPHILS NFR BLD AUTO: 61.7 %
PLATELET # BLD AUTO: 319 K/UL
RBC # BLD: 4.26 M/UL
RBC # FLD: 15.4 %
WBC # FLD AUTO: 8.06 K/UL

## 2021-11-01 ENCOUNTER — APPOINTMENT (OUTPATIENT)
Dept: ORTHOPEDIC SURGERY | Facility: CLINIC | Age: 66
End: 2021-11-01
Payer: MEDICARE

## 2021-11-01 ENCOUNTER — APPOINTMENT (OUTPATIENT)
Dept: OBGYN | Facility: CLINIC | Age: 66
End: 2021-11-01
Payer: MEDICARE

## 2021-11-01 VITALS
BODY MASS INDEX: 57.52 KG/M2 | HEIGHT: 60 IN | SYSTOLIC BLOOD PRESSURE: 134 MMHG | DIASTOLIC BLOOD PRESSURE: 88 MMHG | WEIGHT: 293 LBS

## 2021-11-01 VITALS
HEART RATE: 84 BPM | SYSTOLIC BLOOD PRESSURE: 155 MMHG | DIASTOLIC BLOOD PRESSURE: 92 MMHG | HEIGHT: 60 IN | BODY MASS INDEX: 56.54 KG/M2 | WEIGHT: 288 LBS

## 2021-11-01 PROCEDURE — 99214 OFFICE O/P EST MOD 30 MIN: CPT | Mod: 24

## 2021-11-01 PROCEDURE — 73562 X-RAY EXAM OF KNEE 3: CPT | Mod: RT

## 2021-11-01 PROCEDURE — ZZZZZ: CPT

## 2021-11-01 PROCEDURE — 73502 X-RAY EXAM HIP UNI 2-3 VIEWS: CPT | Mod: RT

## 2021-11-01 PROCEDURE — 99213 OFFICE O/P EST LOW 20 MIN: CPT

## 2021-11-01 NOTE — PHYSICAL EXAM
[LE] : Sensory: Intact in bilateral lower extremities [ALL] : dorsalis pedis, posterior tibial, femoral, popliteal, and radial 2+ and symmetric bilaterally [Normal] : Alert and in no acute distress [Obese] : obese [Poor Appearance] : well-appearing [de-identified] : GENERAL APPEARANCE: Well nourished and hydrated, pleasant, alert, and oriented x 3. Appears their stated age. \par HEENT: Normocephalic, extraocular eye motion intact. Nasal septum midline. Oral cavity clear. External auditory canal clear. \par RESPIRATORY: Breath sounds clear and audible in all lobes. No wheezing, No accessory muscle use.\par CARDIOVASCULAR: No apparent abnormalities. No lower leg edema. No varicosities. Pedal pulses are palpable.\par NEUROLOGIC: Sensation is normal, no muscle weakness in the upper or lower extremities.\par DERMATOLOGIC: No apparent skin lesions, moist, warm, no rash.\par SPINE: Cervical spine appears normal and moves freely; thoracic spine appears normal and moves freely; lumbosacral spine appears normal and moves freely, normal, nontender.\par MUSCULOSKELETAL: Hands, wrists, and elbows are normal and move freely, shoulders are normal and move freely.  [de-identified] : Right hip exam shows healing incision with no sign of infection \par Right knee exam shows medial joint line tenderness ROM is 0-100 degree [de-identified] : 3V xray of the right hip done in the office today and reviewed by Dr. Germán Henderson demonstrates s/p implants in good positioning with no evidence of wear, loosening, or subsidence. \par \par 3V xray of the right knee done in the office today and reviewed by Dr. Germán Henderson demonstrates bone on bone medial compartmental osteoarthritis

## 2021-11-01 NOTE — CHIEF COMPLAINT
[Urgent Visit] : Urgent Visit [FreeTextEntry1] : The patient presents complaining of vulvar itching and burning.

## 2021-11-01 NOTE — PHYSICAL EXAM
[Awake] : awake [Alert] : alert [Acute Distress] : no acute distress [Soft] : soft [Tender] : non tender [Oriented x3] : oriented to person, place, and time [Normal] : cervix [Multiple] : multiple [No Bleeding] : there was no active vaginal bleeding [FreeTextEntry7] : not palpable

## 2021-11-01 NOTE — END OF VISIT
[FreeTextEntry3] : I, Willard Falk, acted solely as a scribe for Dr. Germán Henderson on this date 11/01/2021.

## 2021-11-01 NOTE — DISCUSSION/SUMMARY
[Medication Risks Reviewed] : Medication risks reviewed [Surgical risks reviewed] : Surgical risks reviewed [de-identified] : 66 y/o F with bone on bone medial compartmental osteoarthritis of the right knee. Based on imaging, she is a candidate for a right TKA. She has failed conservative therapies and is interested in pursuing surgery in February 2022. She scheduled the right TKA today and all of her questions were answered. She is also s/p right URBANO on 8/10/21. She should continue to do low impact exercises. Pt understands the importance of prophylaxis for invasive dental procedures. F/u with us a year from surgery for radiographic surveillance. \par \par The patient has been counseled regarding the elevated risks associated with surgical complications in patients with a BMI> 35. The patient demonstrates an understanding of the increased risk. After a lengthy discussion, the patient agreed to make a coordinated effort at weight loss prior to surgical intervention. The patient understands our BMI policy and they will make a conscious effort to improve their BMI. \par \par The patient is a 65 year individual with end stage arthritis of their right knee joint. Based upon the patient's continued symptoms and failure to respond to conservative treatment I have recommended a right total knee arthroplasty for this patient. A long discussion took place with the patient describing what a total joint replacement is and what the procedure would entail. A total knee arthroplasty model, similar to the implant that was used during the operation, was utilized to demonstrate and to discuss the various bearing surfaces of the implants. The hospitalization and post-operative care and rehabilitation were also discussed. The use of perioperative antibiotics and DVT prophylaxis were discussed. The risk, benefits and alternatives to a surgical intervention were discussed at length with the patient. The patient was also advised of risks related to the medical comorbidities, elevated body mass index (BMI), and smoking where applicable. We discussed how to reduce modifiable risk factors and encouraged smoking cessation were applicable.. A lengthy discussion took place to review the most common complications including but not limited to: deep vein thrombosis, pulmonary embolus, heart attack, stroke, infection, wound breakdown, numbness, damage to nerves, tendon, muscles, arteries or other blood vessels, death and other possible complications from anesthesia. The patient was told that we will take steps to minimize these risks by using sterile technique, antibiotics and DVT prophylaxis when appropriate and follow the patient postoperatively in the office setting to monitor progress. The possibility of recurrent pain, no improvement in pain and actual worsening of pain were also discussed with the patient.\par The discharge plan of care focused on the patient going home following surgery. The patient was encouraged to make the necessary arrangements to have someone stay with them when they are discharged home. Following discharge, a home care nurse was to the patient. The home care nurse would open the patient’s home care case and request home physical therapy services. Home physical therapy was to commence following discharge provided it was appropriate and covered by the health insurance benefit plan. \par The benefits of surgery were discussed with the patient including the potential for improving her current clinical condition through operative intervention. Alternatives to surgical intervention including continued conservative management were also discussed in detail. All questions were answered to the satisfaction of the patient. The treatment plan of care, as well as a model of a total knee arthroplasty equivalent to the one that will be used for their total joint replacement, was shared with the patient. The patient agreed to the plan of care as well as the use of implants in their total joint replacement.

## 2021-11-01 NOTE — HISTORY OF PRESENT ILLNESS
[Worsening] : worsening [Pain Location] : pain [7] : a current pain level of 7/10 [3] : a minimum pain level of 3/10 [Walking] : worsened by walking [Rest] : relieved by rest [de-identified] : 64 y/o F presents with right knee pain. She has a known hx of bone on bone medial compartmental osteoarthritis of the right knee. Her pain is in the medial aspect of the knee. She is also s/p right URBANO on 8/10/21. She is happy with surgical outcome. She presents to the office in a wheelchair. She is interested in a right TKA.

## 2021-11-03 LAB
HSV 1+2 IGG SER IA-IMP: POSITIVE
HSV 1+2 IGG SER IA-IMP: POSITIVE
HSV1 IGG SER QL: >62.2 INDEX
HSV2 IGG SER QL: 1.59 INDEX

## 2021-11-09 LAB
HHV SPEC CULT: NORMAL
HSV TYPE 1: NORMAL
HSV TYPE 2: NORMAL
HSV1 IGM SER QL: NEGATIVE
HSV2 AB FLD-ACNC: NEGATIVE

## 2021-11-16 ENCOUNTER — APPOINTMENT (OUTPATIENT)
Dept: OBGYN | Facility: CLINIC | Age: 66
End: 2021-11-16
Payer: MEDICARE

## 2021-11-16 VITALS — SYSTOLIC BLOOD PRESSURE: 156 MMHG | DIASTOLIC BLOOD PRESSURE: 80 MMHG

## 2021-11-16 PROCEDURE — 99213 OFFICE O/P EST LOW 20 MIN: CPT

## 2021-11-17 ENCOUNTER — APPOINTMENT (OUTPATIENT)
Dept: CARDIOLOGY | Facility: CLINIC | Age: 66
End: 2021-11-17

## 2021-12-03 ENCOUNTER — APPOINTMENT (OUTPATIENT)
Dept: OBGYN | Facility: CLINIC | Age: 66
End: 2021-12-03
Payer: MEDICARE

## 2021-12-03 VITALS
BODY MASS INDEX: 57.52 KG/M2 | SYSTOLIC BLOOD PRESSURE: 154 MMHG | DIASTOLIC BLOOD PRESSURE: 83 MMHG | HEIGHT: 60 IN | WEIGHT: 293 LBS

## 2021-12-03 PROCEDURE — 99213 OFFICE O/P EST LOW 20 MIN: CPT

## 2021-12-03 NOTE — PHYSICAL EXAM
[Chaperone Present] : A chaperone was present in the examining room during all aspects of the physical examination [Appropriately responsive] : appropriately responsive [Alert] : alert [No Acute Distress] : no acute distress [Soft] : soft [Non-tender] : non-tender [Non-distended] : non-distended [No HSM] : No HSM [No Lesions] : no lesions [No Mass] : no mass [Oriented x3] : oriented x3 [Multiple] : multiple

## 2021-12-03 NOTE — REASON FOR VISIT
[Follow-Up] : a follow-up evaluation of [FreeTextEntry2] : a vulvar lesion. The patient continues to complain of pain.

## 2021-12-06 ENCOUNTER — NON-APPOINTMENT (OUTPATIENT)
Age: 66
End: 2021-12-06

## 2021-12-27 ENCOUNTER — OUTPATIENT (OUTPATIENT)
Dept: OUTPATIENT SERVICES | Facility: HOSPITAL | Age: 66
LOS: 1 days | Discharge: HOME | End: 2021-12-27

## 2021-12-27 DIAGNOSIS — Z96.642 PRESENCE OF LEFT ARTIFICIAL HIP JOINT: Chronic | ICD-10-CM

## 2021-12-27 DIAGNOSIS — Z90.13 ACQUIRED ABSENCE OF BILATERAL BREASTS AND NIPPLES: Chronic | ICD-10-CM

## 2021-12-27 DIAGNOSIS — Z98.890 OTHER SPECIFIED POSTPROCEDURAL STATES: Chronic | ICD-10-CM

## 2021-12-27 DIAGNOSIS — Z96.652 PRESENCE OF LEFT ARTIFICIAL KNEE JOINT: Chronic | ICD-10-CM

## 2021-12-27 DIAGNOSIS — D05.90 UNSPECIFIED TYPE OF CARCINOMA IN SITU OF UNSPECIFIED BREAST: ICD-10-CM

## 2021-12-29 DIAGNOSIS — D05.11 INTRADUCTAL CARCINOMA IN SITU OF RIGHT BREAST: ICD-10-CM

## 2021-12-30 ENCOUNTER — APPOINTMENT (OUTPATIENT)
Dept: HEMATOLOGY ONCOLOGY | Facility: CLINIC | Age: 66
End: 2021-12-30

## 2021-12-30 ENCOUNTER — APPOINTMENT (OUTPATIENT)
Dept: OBGYN | Facility: CLINIC | Age: 66
End: 2021-12-30
Payer: MEDICARE

## 2021-12-30 VITALS
SYSTOLIC BLOOD PRESSURE: 141 MMHG | BODY MASS INDEX: 57.52 KG/M2 | WEIGHT: 293 LBS | DIASTOLIC BLOOD PRESSURE: 89 MMHG | HEIGHT: 60 IN

## 2021-12-30 PROCEDURE — 56605 BIOPSY OF VULVA/PERINEUM: CPT

## 2021-12-30 NOTE — PROCEDURE
[Vulvar Biopsy] : Vulvar Biopsy [Time out performed] : Pre-procedure time out performed.  Patient's name, date of birth and procedure confirmed. [Consent Obtained] : Consent obtained [] : on the left labia majora [Local Anesthesia] : local anesthesia [Betadine] : Betadine [Sent to Pathology] : placed in buffered formalin and sent for pathology [Punch] : punch biopsy [Sutures #___] : [unfilled] sutures [Tolerated Well] : the patient tolerated the procedure well [No Complications] : there were no complications

## 2022-01-06 LAB — CORE LAB BIOPSY: NORMAL

## 2022-01-14 ENCOUNTER — APPOINTMENT (OUTPATIENT)
Dept: OBGYN | Facility: CLINIC | Age: 67
End: 2022-01-14
Payer: MEDICARE

## 2022-01-14 VITALS
BODY MASS INDEX: 51.91 KG/M2 | DIASTOLIC BLOOD PRESSURE: 84 MMHG | HEIGHT: 63 IN | WEIGHT: 293 LBS | SYSTOLIC BLOOD PRESSURE: 156 MMHG

## 2022-01-14 DIAGNOSIS — K57.30 DIVERTICULOSIS OF LARGE INTESTINE W/OUT PERFORATION OR ABSCESS W/OUT BLEEDING: ICD-10-CM

## 2022-01-14 DIAGNOSIS — Z87.19 PERSONAL HISTORY OF OTHER DISEASES OF THE DIGESTIVE SYSTEM: ICD-10-CM

## 2022-01-14 DIAGNOSIS — Z86.39 PERSONAL HISTORY OF OTHER ENDOCRINE, NUTRITIONAL AND METABOLIC DISEASE: ICD-10-CM

## 2022-01-14 DIAGNOSIS — Z86.19 PERSONAL HISTORY OF OTHER INFECTIOUS AND PARASITIC DISEASES: ICD-10-CM

## 2022-01-14 DIAGNOSIS — Z01.419 ENCOUNTER FOR GYNECOLOGICAL EXAMINATION (GENERAL) (ROUTINE) W/OUT ABNORMAL FINDINGS: ICD-10-CM

## 2022-01-14 DIAGNOSIS — M15.9 POLYOSTEOARTHRITIS, UNSPECIFIED: ICD-10-CM

## 2022-01-14 PROCEDURE — 99397 PER PM REEVAL EST PAT 65+ YR: CPT

## 2022-01-14 RX ORDER — VALACYCLOVIR 500 MG/1
500 TABLET, FILM COATED ORAL
Qty: 6 | Refills: 1 | Status: DISCONTINUED | COMMUNITY
Start: 2021-11-16 | End: 2022-01-14

## 2022-01-14 NOTE — PHYSICAL EXAM
[Chaperone Present] : A chaperone was present in the examining room during all aspects of the physical examination [Appropriately responsive] : appropriately responsive [Alert] : alert [No Acute Distress] : no acute distress [Soft] : soft [Non-tender] : non-tender [Non-distended] : non-distended [No HSM] : No HSM [No Lesions] : no lesions [No Mass] : no mass [Oriented x3] : oriented x3 [Right Breast Absent] : a total mastectomy [Left Breast Absent] : a total mastectomy [Single ___ cm] : a single [unfilled] ~Ucm [Labia Majora] : normal [Labia Minora] : normal [Normal] : normal [Uterine Adnexae] : non-palpable [FreeTextEntry6] : Non-palpable

## 2022-01-14 NOTE — HISTORY OF PRESENT ILLNESS
[Y] : Patient is sexually active [Menarche Age: ____] : age at menarche was [unfilled] [Menopause Age: ____] : age at menopause was [unfilled] [PGHxTotal] : 0 [PGHxFullTerm] : 0 [PGHxPremature] : 0 [PGHxAbortions] : 0 [[facility]] : 0 [PGHxABInduced] : 0 [PGHxABSpont] : 0 [PGHxEctopic] : 0 [PGHxMultBirths] : 0

## 2022-01-21 ENCOUNTER — APPOINTMENT (OUTPATIENT)
Dept: OBGYN | Facility: CLINIC | Age: 67
End: 2022-01-21

## 2022-01-26 ENCOUNTER — OUTPATIENT (OUTPATIENT)
Dept: OUTPATIENT SERVICES | Facility: HOSPITAL | Age: 67
LOS: 1 days | Discharge: HOME | End: 2022-01-26

## 2022-01-26 DIAGNOSIS — Z96.642 PRESENCE OF LEFT ARTIFICIAL HIP JOINT: Chronic | ICD-10-CM

## 2022-01-26 DIAGNOSIS — Z90.13 ACQUIRED ABSENCE OF BILATERAL BREASTS AND NIPPLES: Chronic | ICD-10-CM

## 2022-01-26 DIAGNOSIS — Z98.890 OTHER SPECIFIED POSTPROCEDURAL STATES: Chronic | ICD-10-CM

## 2022-01-26 DIAGNOSIS — D05.90 UNSPECIFIED TYPE OF CARCINOMA IN SITU OF UNSPECIFIED BREAST: ICD-10-CM

## 2022-01-26 DIAGNOSIS — Z96.652 PRESENCE OF LEFT ARTIFICIAL KNEE JOINT: Chronic | ICD-10-CM

## 2022-01-27 ENCOUNTER — RESULT REVIEW (OUTPATIENT)
Age: 67
End: 2022-01-27

## 2022-01-27 ENCOUNTER — APPOINTMENT (OUTPATIENT)
Dept: GYNECOLOGIC ONCOLOGY | Facility: CLINIC | Age: 67
End: 2022-01-27
Payer: MEDICARE

## 2022-01-27 ENCOUNTER — APPOINTMENT (OUTPATIENT)
Dept: HEMATOLOGY ONCOLOGY | Facility: CLINIC | Age: 67
End: 2022-01-27
Payer: MEDICARE

## 2022-01-27 VITALS — HEART RATE: 83 BPM | OXYGEN SATURATION: 97 % | SYSTOLIC BLOOD PRESSURE: 152 MMHG | DIASTOLIC BLOOD PRESSURE: 90 MMHG

## 2022-01-27 VITALS
HEIGHT: 63 IN | OXYGEN SATURATION: 96 % | HEART RATE: 82 BPM | SYSTOLIC BLOOD PRESSURE: 147 MMHG | DIASTOLIC BLOOD PRESSURE: 82 MMHG | WEIGHT: 293 LBS | BODY MASS INDEX: 51.91 KG/M2

## 2022-01-27 DIAGNOSIS — Z80.3 FAMILY HISTORY OF MALIGNANT NEOPLASM OF BREAST: ICD-10-CM

## 2022-01-27 DIAGNOSIS — Z80.49 FAMILY HISTORY OF MALIGNANT NEOPLASM OF OTHER GENITAL ORGANS: ICD-10-CM

## 2022-01-27 LAB
BASOPHILS # BLD AUTO: 0.1 K/UL — SIGNIFICANT CHANGE UP (ref 0–0.2)
BASOPHILS NFR BLD AUTO: 1.3 % — SIGNIFICANT CHANGE UP (ref 0–2)
EOSINOPHIL # BLD AUTO: 0.2 K/UL — SIGNIFICANT CHANGE UP (ref 0–0.5)
EOSINOPHIL NFR BLD AUTO: 2.7 % — SIGNIFICANT CHANGE UP (ref 0–6)
HCT VFR BLD CALC: 41.7 % — SIGNIFICANT CHANGE UP (ref 34.5–45)
HGB BLD-MCNC: 12.9 G/DL — SIGNIFICANT CHANGE UP (ref 11.5–15.5)
LYMPHOCYTES # BLD AUTO: 1.3 K/UL — SIGNIFICANT CHANGE UP (ref 1–3.3)
LYMPHOCYTES # BLD AUTO: 14.9 % — SIGNIFICANT CHANGE UP (ref 13–44)
MCHC RBC-ENTMCNC: 28.5 PG — SIGNIFICANT CHANGE UP (ref 27–34)
MCHC RBC-ENTMCNC: 31 G/DL — LOW (ref 32–36)
MCV RBC AUTO: 92 FL — SIGNIFICANT CHANGE UP (ref 80–100)
MONOCYTES # BLD AUTO: 0.9 K/UL — SIGNIFICANT CHANGE UP (ref 0–0.9)
MONOCYTES NFR BLD AUTO: 10.5 % — SIGNIFICANT CHANGE UP (ref 2–14)
NEUTROPHILS # BLD AUTO: 6 K/UL — SIGNIFICANT CHANGE UP (ref 1.8–7.4)
NEUTROPHILS NFR BLD AUTO: 70.6 % — SIGNIFICANT CHANGE UP (ref 43–77)
PLATELET # BLD AUTO: 333 K/UL — SIGNIFICANT CHANGE UP (ref 150–400)
RBC # BLD: 4.54 M/UL — SIGNIFICANT CHANGE UP (ref 3.8–5.2)
RBC # FLD: 13.9 % — SIGNIFICANT CHANGE UP (ref 10.3–14.5)
WBC # BLD: 8.5 K/UL — SIGNIFICANT CHANGE UP (ref 3.8–10.5)
WBC # FLD AUTO: 8.5 K/UL — SIGNIFICANT CHANGE UP (ref 3.8–10.5)

## 2022-01-27 PROCEDURE — 99204 OFFICE O/P NEW MOD 45 MIN: CPT | Mod: 25

## 2022-01-27 PROCEDURE — 99214 OFFICE O/P EST MOD 30 MIN: CPT

## 2022-01-27 PROCEDURE — 56606 BIOPSY OF VULVA/PERINEUM: CPT | Mod: 59

## 2022-01-27 PROCEDURE — 56605 BIOPSY OF VULVA/PERINEUM: CPT | Mod: 59

## 2022-01-27 RX ORDER — CLOTRIMAZOLE AND BETAMETHASONE DIPROPIONATE 10; .5 MG/G; MG/G
1-0.05 CREAM TOPICAL 3 TIMES DAILY
Qty: 1 | Refills: 2 | Status: DISCONTINUED | COMMUNITY
Start: 2021-09-29 | End: 2022-01-27

## 2022-01-27 NOTE — ASSESSMENT
[FreeTextEntry1] : 66 year old female with stage IA right breast infiltrating ductal carcinoma (T1c N0(i+)). ER>90%, LA 90%, HER2 negative, S/p bilateral mastectomy + SNL biopsy.  BRCA negative.  Oncotype Dx 17 (low risk).   Began anastrazole on 3/9/18 and switched for Letrozole on 5/21/18 given hot flashes impacting QOL.  Letrozole stopped on 12/4/18 due to pain in multiple joints.  She was switched to Tamoxifen on 4/15/19. \par 4/3/18 bone density - normal.\par \par Patient has not been seen since 05/05/2020. \par She is currently on Tamoxifen, overall tolerating well. No vaginal bleeding/spotting. \par CBC today shows WBC 8.5, Hgb 12.9, plts 333\par \par Plan:\par Continue Tamoxifen\par Has not had a pap smear in quite some time, advised GYN follow up\par Follow up in 6 months\par \par

## 2022-01-27 NOTE — HISTORY OF PRESENT ILLNESS
[T: ___] : T[unfilled] [N: ___] : N[unfilled] [AJCC Stage: ____] : AJCC Stage: [unfilled] [de-identified] : Ms. Toussaint was diagnosed with right breast cancer in November 2017 at age 61. \par \par In Fall 2017, she presented to ED with inflamed and erythematous right breast and was given antibiotics with resolution of symptoms.  She was evaluated by Dr. Arlene Gregg of breast surgery.\par \par She subsequently had a diagnostic mammo + US on 10/18/17: Multiple irregular masses in the upper outer right breast. Recommend representative ultrasound-guided core biopsy of two masses at the 11:00 axis and 10:00 axis, as above. Numerous associated distended ducts containing intraductal masses and calcifications throughout the upper outer right breast, in region of palpable concern, 10:00 axis, suspicious for extensive DCIS.Rounded indeterminate mass in the right breast at the 8:00 position, 16 \par cm the nipple, possible abnormal lymph node. Ultrasound-guided core biopsy is advised.\par \par On 11/1/18 she had a core biopsies:\par Final Diagnosis\par 1     Breast biopsy core, right 10:00 4 cm FN, Site #1:- detached fragments of papillary and micropapillar ductal carcinoma with extensive necrosis, probably ductal carcinoma in situ with intermediate to high nuclear grade\par 2     Breast biopsy core, right 11:00 16 cm FN, Site #;- Invasive moderately differentiated ductal carcinoma, [place] 7/9, 1.1 cm. ER>90%, SC> 90%, HER2 negative.\par +DCIS, cribriform and solid with intermediate grade. \par 3     Breast biopsy core, right 8:00 16 cm FN;-   Ductal carcinoma in situ, micropapillary type with intermediate nuclear grade.\par \par She subsequently had non compliance with obtaining an MRI as well as follow ups with Dr. Gregg.  She eventually underwent bilateral mastectomy and bilateral SNL biopsy on 2/1/2018.  \par \par Pathology:\par Right breast simple mastectomy - Infiltrating moderately differentiated ductal carcinoma and DCIS, micropapillary type with tumor necrosis. 1 axillary LN negative.  Approximately 2 cm, grade 2.  Isolate tumor cells in 3 LNs.  ER>90%, SC 90%, HER2 negative.  pT1c pN0(i+)\par \par Left mastectomy and left SNL biopsy - negative\par \par 2/1/18 Oncotype Dx - 17 (low risk) - 5 year recurrence with Tamoxifen 11%, with gutierrez+ chemo 12%\par 11/17/2017 BRCA1/2 negative\par \par Family history is significant for a sister with breast cancer at age 43, and neice (brothers daugther) in her 40s.  Paternal GM had uterine cancer.  Paternal aunt - brain cancer.\par \par She started anastrazole on 3/9/18 and switched to Letrozole on 5/21/18 given hot flashes. She stopped Letrozole on 12/4/18 due to multiple MSK symptoms including muscle pain and generalized joint pains. Since stopping Letrozole, her pain did not get better in her hips and knees but was better in her hands. She was subsequently switched to Tamoxifen on 4/15/19. \par \par s/p colonoscopy on 10/15/18 -->negative per patient [de-identified] : Patient returns for a delayed follow-up. Last seen in 05/05/2020. \par Continues on Tamoxifen. \par S/p B/l hip and Left knee replacements \par R knee replacement scheduled on 02/18/2022\par No vaginal bleeding or spotting. \par Reports itching and pain in vaginal area for the last 6 months. She has vulvar ulcer s/p biopsy and is seeing gyn-onc today.\par Denies any other pain. \par \par \par \par \par

## 2022-01-27 NOTE — ADDENDUM
[FreeTextEntry1] : Documented by Kadeem Odell acting as scribe for Dr. Jara on 01/27/2022. \par \par All Medical record entries made by the Scribe were at my, Dr. Jara's, direction and personally dictated by me on 01/27/2022. I have reviewed the chart and agree that the record accurately reflects my personal performance of the history, physical exam, assessment and plan. I have also personally directed, reviewed, and agreed with the discharge instructions.

## 2022-01-27 NOTE — CONSULT LETTER
[Dear  ___] : Dear  [unfilled], [Consult Letter:] : I had the pleasure of evaluating your patient, [unfilled]. [Please see my note below.] : Please see my note below. [Consult Closing:] : Thank you very much for allowing me to participate in the care of this patient.  If you have any questions, please do not hesitate to contact me. [Sincerely,] : Sincerely, [FreeTextEntry3] : Francine Jara MD\par Medical Oncology/Hematology\par [facility] [place]\par [facility] [place]

## 2022-01-27 NOTE — PHYSICAL EXAM
[Normal] : well developed, well nourished, in no acute distress [Obese] : obese [de-identified] : bilateral mastectomy scars, no palpable mass or axillary adenopathy [de-identified] : morbidly obese abdomen

## 2022-01-28 LAB
ALBUMIN SERPL ELPH-MCNC: 4.3 G/DL
ALP BLD-CCNC: 83 U/L
ALT SERPL-CCNC: 30 U/L
ANION GAP SERPL CALC-SCNC: 15 MMOL/L
AST SERPL-CCNC: 32 U/L
BILIRUB SERPL-MCNC: 0.3 MG/DL
BUN SERPL-MCNC: 19 MG/DL
CALCIUM SERPL-MCNC: 9.7 MG/DL
CHLORIDE SERPL-SCNC: 104 MMOL/L
CO2 SERPL-SCNC: 24 MMOL/L
CREAT SERPL-MCNC: 0.71 MG/DL
GLUCOSE SERPL-MCNC: 146 MG/DL
POTASSIUM SERPL-SCNC: 4.1 MMOL/L
PROT SERPL-MCNC: 7.2 G/DL
SODIUM SERPL-SCNC: 144 MMOL/L

## 2022-01-28 RX ORDER — ACETAMINOPHEN 500 MG/1
500 TABLET, COATED ORAL
Qty: 100 | Refills: 0 | Status: DISCONTINUED | COMMUNITY
Start: 2020-02-25 | End: 2022-01-28

## 2022-02-08 ENCOUNTER — APPOINTMENT (OUTPATIENT)
Dept: GYNECOLOGIC ONCOLOGY | Facility: CLINIC | Age: 67
End: 2022-02-08

## 2022-02-08 ENCOUNTER — APPOINTMENT (OUTPATIENT)
Dept: CARDIOLOGY | Facility: CLINIC | Age: 67
End: 2022-02-08
Payer: MEDICARE

## 2022-02-08 VITALS
TEMPERATURE: 98.1 F | HEART RATE: 87 BPM | RESPIRATION RATE: 17 BRPM | SYSTOLIC BLOOD PRESSURE: 161 MMHG | BODY MASS INDEX: 57.52 KG/M2 | HEIGHT: 60 IN | WEIGHT: 293 LBS | DIASTOLIC BLOOD PRESSURE: 80 MMHG | OXYGEN SATURATION: 96 %

## 2022-02-08 PROCEDURE — 93000 ELECTROCARDIOGRAM COMPLETE: CPT | Mod: NC

## 2022-02-08 PROCEDURE — 99215 OFFICE O/P EST HI 40 MIN: CPT

## 2022-02-08 RX ORDER — LIDOCAINE 5% 5 G/100G
5 CREAM TOPICAL
Qty: 1 | Refills: 3 | Status: DISCONTINUED | COMMUNITY
Start: 2021-12-04 | End: 2022-02-08

## 2022-02-08 RX ORDER — TRIAMCINOLONE ACETONIDE 0.25 MG/G
0.03 OINTMENT TOPICAL TWICE DAILY
Qty: 1 | Refills: 1 | Status: DISCONTINUED | COMMUNITY
Start: 2021-12-30 | End: 2022-02-08

## 2022-02-08 RX ORDER — OMEPRAZOLE 20 MG/1
20 TABLET, DELAYED RELEASE ORAL
Qty: 90 | Refills: 1 | Status: DISCONTINUED | COMMUNITY
Start: 2021-02-18 | End: 2022-02-08

## 2022-02-08 RX ORDER — LIDOCAINE HYDROCHLORIDE 20 MG/ML
2 JELLY TOPICAL
Qty: 1 | Refills: 0 | Status: DISCONTINUED | COMMUNITY
Start: 2021-12-03 | End: 2022-02-08

## 2022-02-08 RX ORDER — VALACYCLOVIR 500 MG/1
500 TABLET, FILM COATED ORAL TWICE DAILY
Qty: 6 | Refills: 0 | Status: DISCONTINUED | COMMUNITY
Start: 2021-12-03 | End: 2022-02-08

## 2022-02-08 RX ORDER — GABAPENTIN 600 MG/1
600 TABLET, COATED ORAL 4 TIMES DAILY
Qty: 120 | Refills: 3 | Status: DISCONTINUED | COMMUNITY
Start: 2021-01-15 | End: 2022-02-08

## 2022-02-09 ENCOUNTER — OUTPATIENT (OUTPATIENT)
Dept: OUTPATIENT SERVICES | Facility: HOSPITAL | Age: 67
LOS: 1 days | End: 2022-02-09
Payer: COMMERCIAL

## 2022-02-09 ENCOUNTER — RESULT REVIEW (OUTPATIENT)
Age: 67
End: 2022-02-09

## 2022-02-09 VITALS
RESPIRATION RATE: 18 BRPM | SYSTOLIC BLOOD PRESSURE: 146 MMHG | WEIGHT: 293 LBS | HEIGHT: 60 IN | TEMPERATURE: 98 F | HEART RATE: 82 BPM | DIASTOLIC BLOOD PRESSURE: 82 MMHG

## 2022-02-09 DIAGNOSIS — Z29.9 ENCOUNTER FOR PROPHYLACTIC MEASURES, UNSPECIFIED: ICD-10-CM

## 2022-02-09 DIAGNOSIS — Z98.890 OTHER SPECIFIED POSTPROCEDURAL STATES: Chronic | ICD-10-CM

## 2022-02-09 DIAGNOSIS — Z96.652 PRESENCE OF LEFT ARTIFICIAL KNEE JOINT: Chronic | ICD-10-CM

## 2022-02-09 DIAGNOSIS — Z96.642 PRESENCE OF LEFT ARTIFICIAL HIP JOINT: Chronic | ICD-10-CM

## 2022-02-09 DIAGNOSIS — I10 ESSENTIAL (PRIMARY) HYPERTENSION: ICD-10-CM

## 2022-02-09 DIAGNOSIS — Z90.13 ACQUIRED ABSENCE OF BILATERAL BREASTS AND NIPPLES: Chronic | ICD-10-CM

## 2022-02-09 DIAGNOSIS — Z96.643 PRESENCE OF ARTIFICIAL HIP JOINT, BILATERAL: Chronic | ICD-10-CM

## 2022-02-09 DIAGNOSIS — M17.11 UNILATERAL PRIMARY OSTEOARTHRITIS, RIGHT KNEE: ICD-10-CM

## 2022-02-09 DIAGNOSIS — Z01.818 ENCOUNTER FOR OTHER PREPROCEDURAL EXAMINATION: ICD-10-CM

## 2022-02-09 LAB
A1C WITH ESTIMATED AVERAGE GLUCOSE RESULT: 6.5 % — HIGH (ref 4–5.6)
ANION GAP SERPL CALC-SCNC: 14 MMOL/L — SIGNIFICANT CHANGE UP (ref 5–17)
APTT BLD: 27.3 SEC — LOW (ref 27.5–35.5)
BASOPHILS # BLD AUTO: 0.08 K/UL — SIGNIFICANT CHANGE UP (ref 0–0.2)
BASOPHILS NFR BLD AUTO: 0.9 % — SIGNIFICANT CHANGE UP (ref 0–2)
BLD GP AB SCN SERPL QL: SIGNIFICANT CHANGE UP
BUN SERPL-MCNC: 17.1 MG/DL — SIGNIFICANT CHANGE UP (ref 8–20)
CALCIUM SERPL-MCNC: 9.2 MG/DL — SIGNIFICANT CHANGE UP (ref 8.6–10.2)
CHLORIDE SERPL-SCNC: 104 MMOL/L — SIGNIFICANT CHANGE UP (ref 98–107)
CO2 SERPL-SCNC: 26 MMOL/L — SIGNIFICANT CHANGE UP (ref 22–29)
CREAT SERPL-MCNC: 0.69 MG/DL — SIGNIFICANT CHANGE UP (ref 0.5–1.3)
EOSINOPHIL # BLD AUTO: 0.35 K/UL — SIGNIFICANT CHANGE UP (ref 0–0.5)
EOSINOPHIL NFR BLD AUTO: 4.1 % — SIGNIFICANT CHANGE UP (ref 0–6)
ESTIMATED AVERAGE GLUCOSE: 140 MG/DL — HIGH (ref 68–114)
GLUCOSE SERPL-MCNC: 125 MG/DL — HIGH (ref 70–99)
HCT VFR BLD CALC: 39.2 % — SIGNIFICANT CHANGE UP (ref 34.5–45)
HGB BLD-MCNC: 12.3 G/DL — SIGNIFICANT CHANGE UP (ref 11.5–15.5)
IMM GRANULOCYTES NFR BLD AUTO: 0.5 % — SIGNIFICANT CHANGE UP (ref 0–1.5)
INR BLD: 1.05 RATIO — SIGNIFICANT CHANGE UP (ref 0.88–1.16)
LYMPHOCYTES # BLD AUTO: 1.64 K/UL — SIGNIFICANT CHANGE UP (ref 1–3.3)
LYMPHOCYTES # BLD AUTO: 19 % — SIGNIFICANT CHANGE UP (ref 13–44)
MCHC RBC-ENTMCNC: 28.2 PG — SIGNIFICANT CHANGE UP (ref 27–34)
MCHC RBC-ENTMCNC: 31.4 GM/DL — LOW (ref 32–36)
MCV RBC AUTO: 89.9 FL — SIGNIFICANT CHANGE UP (ref 80–100)
MONOCYTES # BLD AUTO: 1.09 K/UL — HIGH (ref 0–0.9)
MONOCYTES NFR BLD AUTO: 12.6 % — SIGNIFICANT CHANGE UP (ref 2–14)
MRSA PCR RESULT.: SIGNIFICANT CHANGE UP
NEUTROPHILS # BLD AUTO: 5.43 K/UL — SIGNIFICANT CHANGE UP (ref 1.8–7.4)
NEUTROPHILS NFR BLD AUTO: 62.9 % — SIGNIFICANT CHANGE UP (ref 43–77)
PLATELET # BLD AUTO: 332 K/UL — SIGNIFICANT CHANGE UP (ref 150–400)
POTASSIUM SERPL-MCNC: 3.9 MMOL/L — SIGNIFICANT CHANGE UP (ref 3.5–5.3)
POTASSIUM SERPL-SCNC: 3.9 MMOL/L — SIGNIFICANT CHANGE UP (ref 3.5–5.3)
PROTHROM AB SERPL-ACNC: 12.1 SEC — SIGNIFICANT CHANGE UP (ref 10.6–13.6)
RBC # BLD: 4.36 M/UL — SIGNIFICANT CHANGE UP (ref 3.8–5.2)
RBC # FLD: 15.5 % — HIGH (ref 10.3–14.5)
S AUREUS DNA NOSE QL NAA+PROBE: SIGNIFICANT CHANGE UP
SODIUM SERPL-SCNC: 143 MMOL/L — SIGNIFICANT CHANGE UP (ref 135–145)
WBC # BLD: 8.63 K/UL — SIGNIFICANT CHANGE UP (ref 3.8–10.5)
WBC # FLD AUTO: 8.63 K/UL — SIGNIFICANT CHANGE UP (ref 3.8–10.5)

## 2022-02-09 PROCEDURE — 71046 X-RAY EXAM CHEST 2 VIEWS: CPT

## 2022-02-09 PROCEDURE — 93010 ELECTROCARDIOGRAM REPORT: CPT

## 2022-02-09 PROCEDURE — G0463: CPT

## 2022-02-09 PROCEDURE — 93005 ELECTROCARDIOGRAM TRACING: CPT

## 2022-02-09 PROCEDURE — 71046 X-RAY EXAM CHEST 2 VIEWS: CPT | Mod: 26

## 2022-02-09 RX ORDER — GABAPENTIN 400 MG/1
1 CAPSULE ORAL
Qty: 0 | Refills: 0 | DISCHARGE

## 2022-02-09 RX ORDER — AMLODIPINE BESYLATE 2.5 MG/1
0 TABLET ORAL
Qty: 0 | Refills: 0 | DISCHARGE

## 2022-02-09 RX ORDER — LOSARTAN POTASSIUM 100 MG/1
0 TABLET, FILM COATED ORAL
Qty: 0 | Refills: 0 | DISCHARGE

## 2022-02-09 RX ORDER — GABAPENTIN 400 MG/1
0 CAPSULE ORAL
Qty: 0 | Refills: 2 | DISCHARGE

## 2022-02-09 RX ORDER — TAMOXIFEN CITRATE 20 MG/1
1 TABLET, FILM COATED ORAL
Qty: 0 | Refills: 0 | DISCHARGE

## 2022-02-09 RX ORDER — SODIUM CHLORIDE 9 MG/ML
3 INJECTION INTRAMUSCULAR; INTRAVENOUS; SUBCUTANEOUS EVERY 8 HOURS
Refills: 0 | Status: DISCONTINUED | OUTPATIENT
Start: 2022-02-18 | End: 2022-02-18

## 2022-02-09 RX ORDER — OMEPRAZOLE 10 MG/1
0 CAPSULE, DELAYED RELEASE ORAL
Qty: 0 | Refills: 0 | DISCHARGE

## 2022-02-09 NOTE — H&P PST ADULT - NSCAFFEINETYPE_GEN_ALL_CORE_SD
Detail Level: Simple Render Risk Assessment In Note?: no Comment: Patient instructed to return to clinic for biopsy if lesion does not resolve with LN2 treatment. coffee

## 2022-02-09 NOTE — H&P PST ADULT - NSICDXPASTSURGICALHX_GEN_ALL_CORE_FT
PAST SURGICAL HISTORY:  H/O total knee replacement, left 2/11/2021    History of tonsillectomy     History of total hip replacement, left 3/3/2020    S/P mastectomy, bilateral      PAST SURGICAL HISTORY:  H/O bilateral hip replacements     H/O total knee replacement, left 2/11/2021    History of tonsillectomy     History of total hip replacement, left 3/3/2020    S/P mastectomy, bilateral 4 years ago

## 2022-02-09 NOTE — PATIENT PROFILE ADULT - FALL HARM RISK - HARM RISK INTERVENTIONS
Assistance OOB with selected safe patient handling equipment/Communicate Risk of Fall with Harm to all staff/Discuss with provider need for PT consult/Monitor gait and stability/Provide patient with walking aids - walker, cane, crutches/Reinforce activity limits and safety measures with patient and family/Tailored Fall Risk Interventions/Visual Cue: Yellow wristband and red socks/Bed in lowest position, wheels locked, appropriate side rails in place/Call bell, personal items and telephone in reach/Instruct patient to call for assistance before getting out of bed or chair/Non-slip footwear when patient is out of bed/[place] to call system/Physically safe environment - no spills, clutter or unnecessary equipment/Purposeful Proactive Rounding/Room/bathroom lighting operational, light cord in reach Assistance with ambulation/Assistance OOB with selected safe patient handling equipment/Communicate Risk of Fall with Harm to all staff/Discuss with provider need for PT consult/Monitor gait and stability/Provide patient with walking aids - walker, cane, crutches/Reinforce activity limits and safety measures with patient and family/Sit up slowly, dangle for a short time, stand at bedside before walking/Tailored Fall Risk Interventions/Use of alarms - bed, chair and/or voice tab/Visual Cue: Yellow wristband and red socks/Bed in lowest position, wheels locked, appropriate side rails in place/Call bell, personal items and telephone in reach/Instruct patient to call for assistance before getting out of bed or chair/Non-slip footwear when patient is out of bed/[place] to call system/Physically safe environment - no spills, clutter or unnecessary equipment/Purposeful Proactive Rounding/Room/bathroom lighting operational, light cord in reach

## 2022-02-09 NOTE — H&P PST ADULT - MUSCULOSKELETAL
right knee and right shoulder/decreased ROM/decreased ROM due to pain/joint swelling/diminished strength details… detailed exam

## 2022-02-09 NOTE — H&P PST ADULT - MS GEN HX ROS MEA POS PC
right knee pain/arthralgia/arthritis/joint swelling/muscle cramps/muscle weakness/stiffness/back pain

## 2022-02-09 NOTE — H&P PST ADULT - HISTORY OF PRESENT ILLNESS
Pt 67 y/o Female seen today pre-op for Right knee total joint replacement. Pt  has a known hx of bone on bone medial compartmental osteoarthritis of the right knee. Her pain is in the medial aspect of the knee. She is also s/p right URBANO on 8/10/21.  Pt 67 y/o Female, pt of size, BMI  seen today pre-op for Right knee total joint replacement. Pt  has a known hx of bone on bone medial compartmental osteoarthritis of the right knee, report pain has progressively gotten worse within the past few months. Pt report  pain is localized in the medial aspect of the knee. Pt  is also s/p right URBANO on 8/10/21, left URBANO 2020 and left knee replacement 2021. PMH of HTN, OA, Breast cancer, OA,   Pt 65 y/o Female, pt of size, BMI 62.4,  seen today pre-op for Right knee total joint replacement. Pt  has a known hx of bone on bone medial compartmental osteoarthritis of the right knee, report pain has progressively gotten worse within the past few months. Pt pain is localized in the medial aspect of the knee. Pt  is also s/p right URBANO on 8/10/21, left URBANO 2020 and left knee replacement 2021. PMH of HTN, OA, Breast cancer s/p bilateral mastectomy on Tamoxifen.  Pt requires the use of a cane, walker and wheelchair to assist with mobility. Pt seen today for a scheduled procedure on 2/18/22 with Dr. Henderson

## 2022-02-09 NOTE — H&P PST ADULT - ASSESSMENT
CAPRINI VTE 2.0 SCORE [CLOT updated 2019]    AGE RELATED RISK FACTORS                                                       MOBILITY RELATED FACTORS  [ ] Age 41-60 years                                            (1 Point)                    [ ] Bed rest                                                        (1 Point)  [x ] Age: 61-74 years                                           (2 Points)                  [ ] Plaster cast                                                   (2 Points)  [ ] Age= 75 years                                              (3 Points)                    [ ] Bed bound for more than 72 hours                 (2 Points)    DISEASE RELATED RISK FACTORS                                               GENDER SPECIFIC FACTORS  [ ] Edema in the lower extremities                       (1 Point)              [ ] Pregnancy                                                     (1 Point)  [ ] Varicose veins                                               (1 Point)                     [ ] Post-partum < 6 weeks                                   (1 Point)             [x ] BMI > 25 Kg/m2                                            (1 Point)                     [ ] Hormonal therapy  or oral contraception          (1 Point)                 [ ] Sepsis (in the previous month)                        (1 Point)               [ ] History of pregnancy complications                 (1 point)  [ ] Pneumonia or serious lung disease                                               [ ] Unexplained or recurrent                     (1 Point)           (in the previous month)                               (1 Point)  [ ] Abnormal pulmonary function test                     (1 Point)                 SURGERY RELATED RISK FACTORS  [ ] Acute myocardial infarction                              (1 Point)               [ ]  Section                                             (1 Point)  [ ] Congestive heart failure (in the previous month)  (1 Point)      [ ] Minor surgery                                                  (1 Point)   [ ] Inflammatory bowel disease                             (1 Point)               [ ] Arthroscopic surgery                                        (2 Points)  [ ] Central venous access                                      (2 Points)                [ ] General surgery lasting more than 45 minutes (2 points)  [ x] Malignancy- Present or previous                   (2 Points)                [x ] Elective arthroplasty                                         (5 points)    [ ] Stroke (in the previous month)                          (5 Points)                                                                                                                                                           HEMATOLOGY RELATED FACTORS                                                 TRAUMA RELATED RISK FACTORS  [ ] Prior episodes of VTE                                     (3 Points)                [ ] Fracture of the hip, pelvis, or leg                       (5 Points)  [ ] Positive family history for VTE                         (3 Points)             [ ] Acute spinal cord injury (in the previous month)  (5 Points)  [ ] Prothrombin 77925 A                                     (3 Points)               [ ] Paralysis  (less than 1 month)                             (5 Points)  [ ] Factor V Leiden                                             (3 Points)                  [ ] Multiple Trauma within 1 month                        (5 Points)  [ ] Lupus anticoagulants                                     (3 Points)                                                           [ ] Anticardiolipin antibodies                               (3 Points)                                                       [ ] High homocysteine in the blood                      (3 Points)                                             [ ] Other congenital or acquired thrombophilia      (3 Points)                                                [ ] Heparin induced thrombocytopenia                  (3 Points)                                     Total Score [    10      ]  OPIOID RISK TOOL    SIN EACH BOX THAT APPLIES AND ADD TOTALS AT THE END    FAMILY HISTORY OF SUBSTANCE ABUSE                 FEMALE         MALE                                                Alcohol                             [  ]1 pt          [  ]3pts                                               Illegal Durgs                     [  ]2 pts        [  ]3pts                                               Rx Drugs                           [  ]4 pts        [  ]4 pts    PERSONAL HISTORY OF SUBSTANCE ABUSE                                                                                          Alcohol                             [  ]3 pts       [  ]3 pts                                               Illegal Drugs                     [  ]4 pts        [  ]4 pts                                               Rx Drugs                           [  ]5 pts        [  ]5 pts    AGE BETWEEN 16-45 YEARS                                      [  ]1 pt         [  ]1 pt    HISTORY OF PREADOLESCENT   SEXUAL ABUSE                                                             [  ]3 pts        [  ]0pts    PSYCHOLOGICAL DISEASE                     ADD, OCD, Bipolar, Schizophrenia        [  ]2 pts         [  ]2 pts                      Depression                                               [  ]1 pt           [  ]1 pt           SCORING TOTAL   (add numbers and type here)              (*0**)                                     A score of 3 or lower indicated LOW risk for future opioid abuse  A score of 4 to 7 indicated moderate risk for future opioid abuse  A score of 8 or higher indicates a high risk for opioid abuse Pt 65 y/o Female, pt of size, BMI 62.4,  seen today pre-op for Right knee total joint replacement. Pt  has a known hx of bone on bone medial compartmental osteoarthritis of the right knee, report pain has progressively gotten worse within the past few months. Pt pain is localized in the medial aspect of the knee. Pt  is also s/p right URBANO on 8/10/21, left URBANO  and left knee replacement . PMH of HTN, OA, Breast cancer s/p bilateral mastectomy on Tamoxifen.  Pt requires the use of a cane, walker and wheelchair to assist with mobility. Pt seen today for a scheduled procedure on 22 with Dr. Henderson. Surgery protocol reviewed with pt today. Pt to follow-up with PCP for clearance. Cardiologist clearance received today.       CAPRINI VTE 2.0 SCORE [CLOT updated 2019]    AGE RELATED RISK FACTORS                                                       MOBILITY RELATED FACTORS  [ ] Age 41-60 years                                            (1 Point)                    [ ] Bed rest                                                        (1 Point)  [x ] Age: 61-74 years                                           (2 Points)                  [ ] Plaster cast                                                   (2 Points)  [ ] Age= 75 years                                              (3 Points)                    [ ] Bed bound for more than 72 hours                 (2 Points)    DISEASE RELATED RISK FACTORS                                               GENDER SPECIFIC FACTORS  [ ] Edema in the lower extremities                       (1 Point)              [ ] Pregnancy                                                     (1 Point)  [ ] Varicose veins                                               (1 Point)                     [ ] Post-partum < 6 weeks                                   (1 Point)             [x ] BMI > 25 Kg/m2                                            (1 Point)                     [ ] Hormonal therapy  or oral contraception          (1 Point)                 [ ] Sepsis (in the previous month)                        (1 Point)               [ ] History of pregnancy complications                 (1 point)  [ ] Pneumonia or serious lung disease                                               [ ] Unexplained or recurrent                     (1 Point)           (in the previous month)                               (1 Point)  [ ] Abnormal pulmonary function test                     (1 Point)                 SURGERY RELATED RISK FACTORS  [ ] Acute myocardial infarction                              (1 Point)               [ ]  Section                                             (1 Point)  [ ] Congestive heart failure (in the previous month)  (1 Point)      [ ] Minor surgery                                                  (1 Point)   [ ] Inflammatory bowel disease                             (1 Point)               [ ] Arthroscopic surgery                                        (2 Points)  [ ] Central venous access                                      (2 Points)                [ ] General surgery lasting more than 45 minutes (2 points)  [ x] Malignancy- Present or previous                   (2 Points)                [x ] Elective arthroplasty                                         (5 points)    [ ] Stroke (in the previous month)                          (5 Points)                                                                                                                                                           HEMATOLOGY RELATED FACTORS                                                 TRAUMA RELATED RISK FACTORS  [ ] Prior episodes of VTE                                     (3 Points)                [ ] Fracture of the hip, pelvis, or leg                       (5 Points)  [ ] Positive family history for VTE                         (3 Points)             [ ] Acute spinal cord injury (in the previous month)  (5 Points)  [ ] Prothrombin 46939 A                                     (3 Points)               [ ] Paralysis  (less than 1 month)                             (5 Points)  [ ] Factor V Leiden                                             (3 Points)                  [ ] Multiple Trauma within 1 month                        (5 Points)  [ ] Lupus anticoagulants                                     (3 Points)                                                           [ ] Anticardiolipin antibodies                               (3 Points)                                                       [ ] High homocysteine in the blood                      (3 Points)                                             [ ] Other congenital or acquired thrombophilia      (3 Points)                                                [ ] Heparin induced thrombocytopenia                  (3 Points)                                     Total Score [    10      ]  OPIOID RISK TOOL    SIN EACH BOX THAT APPLIES AND ADD TOTALS AT THE END    FAMILY HISTORY OF SUBSTANCE ABUSE                 FEMALE         MALE                                                Alcohol                             [  ]1 pt          [  ]3pts                                               Illegal Durgs                     [  ]2 pts        [  ]3pts                                               Rx Drugs                           [  ]4 pts        [  ]4 pts    PERSONAL HISTORY OF SUBSTANCE ABUSE                                                                                          Alcohol                             [  ]3 pts       [  ]3 pts                                               Illegal Drugs                     [  ]4 pts        [  ]4 pts                                               Rx Drugs                           [  ]5 pts        [  ]5 pts    AGE BETWEEN 16-45 YEARS                                      [  ]1 pt         [  ]1 pt    HISTORY OF PREADOLESCENT   SEXUAL ABUSE                                                             [  ]3 pts        [  ]0pts    PSYCHOLOGICAL DISEASE                     ADD, OCD, Bipolar, Schizophrenia        [  ]2 pts         [  ]2 pts                      Depression                                               [  ]1 pt           [  ]1 pt           SCORING TOTAL   (add numbers and type here)              (*0**)                                     A score of 3 or lower indicated LOW risk for future opioid abuse  A score of 4 to 7 indicated moderate risk for future opioid abuse  A score of 8 or higher indicates a high risk for opioid abuse

## 2022-02-09 NOTE — H&P PST ADULT - NSICDXPASTMEDICALHX_GEN_ALL_CORE_FT
PAST MEDICAL HISTORY:  Anemia     Breast cancer in female     Cancer of breast right    Gallstones     Hypertension     Obese     Osteoarthritis     Unilateral primary osteoarthritis, right hip      PAST MEDICAL HISTORY:  Anemia     At risk for sleep apnea     Breast cancer in female     Cancer of breast right    Gallstones     Hypertension     Obese     Osteoarthritis     Super obese BMI 62.4    Unilateral primary osteoarthritis, right hip

## 2022-02-11 ENCOUNTER — APPOINTMENT (OUTPATIENT)
Dept: GYNECOLOGIC ONCOLOGY | Facility: CLINIC | Age: 67
End: 2022-02-11
Payer: MEDICARE

## 2022-02-11 LAB — CORE LAB BIOPSY: NORMAL

## 2022-02-11 PROCEDURE — 99214 OFFICE O/P EST MOD 30 MIN: CPT | Mod: 95

## 2022-02-15 ENCOUNTER — APPOINTMENT (OUTPATIENT)
Dept: FAMILY MEDICINE | Facility: CLINIC | Age: 67
End: 2022-02-15
Payer: MEDICARE

## 2022-02-15 VITALS
TEMPERATURE: 98 F | HEART RATE: 93 BPM | WEIGHT: 293 LBS | RESPIRATION RATE: 16 BRPM | BODY MASS INDEX: 57.52 KG/M2 | OXYGEN SATURATION: 96 % | DIASTOLIC BLOOD PRESSURE: 70 MMHG | SYSTOLIC BLOOD PRESSURE: 136 MMHG | HEIGHT: 60 IN

## 2022-02-15 DIAGNOSIS — Z87.19 PERSONAL HISTORY OF OTHER DISEASES OF THE DIGESTIVE SYSTEM: ICD-10-CM

## 2022-02-15 DIAGNOSIS — Z86.19 PERSONAL HISTORY OF OTHER INFECTIOUS AND PARASITIC DISEASES: ICD-10-CM

## 2022-02-15 DIAGNOSIS — Z01.810 ENCOUNTER FOR PREPROCEDURAL CARDIOVASCULAR EXAMINATION: ICD-10-CM

## 2022-02-15 DIAGNOSIS — M54.42 LUMBAGO WITH SCIATICA, LEFT SIDE: ICD-10-CM

## 2022-02-15 DIAGNOSIS — I51.7 CARDIOMEGALY: ICD-10-CM

## 2022-02-15 DIAGNOSIS — Z01.818 ENCOUNTER FOR OTHER PREPROCEDURAL EXAMINATION: ICD-10-CM

## 2022-02-15 DIAGNOSIS — Z87.898 PERSONAL HISTORY OF OTHER SPECIFIED CONDITIONS: ICD-10-CM

## 2022-02-15 DIAGNOSIS — Z79.810 LONG TERM (CURRENT) USE OF SELECTIVE ESTROGEN RECEPTOR MODULATORS (SERMS): ICD-10-CM

## 2022-02-15 DIAGNOSIS — Z96.641 AFTERCARE FOLLOWING JOINT REPLACEMENT SURGERY: ICD-10-CM

## 2022-02-15 DIAGNOSIS — Z47.1 AFTERCARE FOLLOWING JOINT REPLACEMENT SURGERY: ICD-10-CM

## 2022-02-15 DIAGNOSIS — M17.11 UNILATERAL PRIMARY OSTEOARTHRITIS, RIGHT KNEE: ICD-10-CM

## 2022-02-15 DIAGNOSIS — Z96.641 PRESENCE OF RIGHT ARTIFICIAL HIP JOINT: ICD-10-CM

## 2022-02-15 DIAGNOSIS — Z87.39 PERSONAL HISTORY OF OTHER DISEASES OF THE MUSCULOSKELETAL SYSTEM AND CONNECTIVE TISSUE: ICD-10-CM

## 2022-02-15 DIAGNOSIS — Z12.11 ENCOUNTER FOR SCREENING FOR MALIGNANT NEOPLASM OF COLON: ICD-10-CM

## 2022-02-15 DIAGNOSIS — Z96.652 AFTERCARE FOLLOWING JOINT REPLACEMENT SURGERY: ICD-10-CM

## 2022-02-15 DIAGNOSIS — Z86.39 PERSONAL HISTORY OF OTHER ENDOCRINE, NUTRITIONAL AND METABOLIC DISEASE: ICD-10-CM

## 2022-02-15 DIAGNOSIS — Z78.0 ASYMPTOMATIC MENOPAUSAL STATE: ICD-10-CM

## 2022-02-15 DIAGNOSIS — Z96.652 PRESENCE OF LEFT ARTIFICIAL KNEE JOINT: ICD-10-CM

## 2022-02-15 DIAGNOSIS — Z87.42 PERSONAL HISTORY OF OTHER DISEASES OF THE FEMALE GENITAL TRACT: ICD-10-CM

## 2022-02-15 DIAGNOSIS — G89.29 LUMBAGO WITH SCIATICA, LEFT SIDE: ICD-10-CM

## 2022-02-15 DIAGNOSIS — M54.41 LUMBAGO WITH SCIATICA, LEFT SIDE: ICD-10-CM

## 2022-02-15 DIAGNOSIS — R70.0 ELEVATED ERYTHROCYTE SEDIMENTATION RATE: ICD-10-CM

## 2022-02-15 DIAGNOSIS — Z86.018 PERSONAL HISTORY OF OTHER BENIGN NEOPLASM: ICD-10-CM

## 2022-02-15 DIAGNOSIS — R10.2 PELVIC AND PERINEAL PAIN: ICD-10-CM

## 2022-02-15 DIAGNOSIS — N84.0 POLYP OF CORPUS UTERI: ICD-10-CM

## 2022-02-15 DIAGNOSIS — M25.50 PAIN IN UNSPECIFIED JOINT: ICD-10-CM

## 2022-02-15 DIAGNOSIS — N90.89 OTHER SPECIFIED NONINFLAMMATORY DISORDERS OF VULVA AND PERINEUM: ICD-10-CM

## 2022-02-15 DIAGNOSIS — Z12.12 ENCOUNTER FOR SCREENING FOR MALIGNANT NEOPLASM OF COLON: ICD-10-CM

## 2022-02-15 PROCEDURE — 99215 OFFICE O/P EST HI 40 MIN: CPT

## 2022-02-15 RX ORDER — TRANEXAMIC ACID 100 MG/ML
1000 INJECTION, SOLUTION INTRAVENOUS ONCE
Refills: 0 | Status: DISCONTINUED | OUTPATIENT
Start: 2022-02-18 | End: 2022-02-18

## 2022-02-15 RX ORDER — CEFAZOLIN SODIUM 1 G
3000 VIAL (EA) INJECTION ONCE
Refills: 0 | Status: DISCONTINUED | OUTPATIENT
Start: 2022-02-18 | End: 2022-02-18

## 2022-02-15 NOTE — HISTORY OF PRESENT ILLNESS
[No Pertinent Cardiac History] : no history of aortic stenosis, atrial fibrillation, coronary artery disease, recent myocardial infarction, or implantable device/pacemaker [Sleep Apnea] : sleep apnea [Family Member] : family member with adverse anesthesia reaction/sudden death [Spouse] : spouse [Asthma] : no asthma [COPD] : no COPD [Smoker] : not a smoker [Self] : no previous adverse anesthesia reaction [Chronic Anticoagulation] : no chronic anticoagulation [Chronic Kidney Disease] : no chronic kidney disease [Diabetes] : no diabetes [FreeTextEntry1] : right knee replacement  [FreeTextEntry2] : 2/18 [FreeTextEntry4] : pt is having right knee replacement Long hx of knee pain  [FreeTextEntry5] : sister had trouble waking up from anesthesia once

## 2022-02-15 NOTE — PHYSICAL EXAM
[No Acute Distress] : no acute distress [Well Nourished] : well nourished [Well Developed] : well developed [Well-Appearing] : well-appearing [Normal Sclera/Conjunctiva] : normal sclera/conjunctiva [PERRL] : pupils equal round and reactive to light [Normal Outer Ear/Nose] : the outer ears and nose were normal in appearance [EOMI] : extraocular movements intact [Normal Oropharynx] : the oropharynx was normal [No JVD] : no jugular venous distention [No Lymphadenopathy] : no lymphadenopathy [Supple] : supple [Thyroid Normal, No Nodules] : the thyroid was normal and there were no nodules present [No Respiratory Distress] : no respiratory distress  [No Accessory Muscle Use] : no accessory muscle use [Clear to Auscultation] : lungs were clear to auscultation bilaterally [Normal Rate] : normal rate  [Regular Rhythm] : with a regular rhythm [Normal S1, S2] : normal S1 and S2 [No Murmur] : no murmur heard [No Carotid Bruits] : no carotid bruits [No Abdominal Bruit] : a ~M bruit was not heard ~T in the abdomen [No Varicosities] : no varicosities [Pedal Pulses Present] : the pedal pulses are present [No Edema] : there was no peripheral edema [No Palpable Aorta] : no palpable aorta [No Extremity Clubbing/Cyanosis] : no extremity clubbing/cyanosis [Soft] : abdomen soft [Non Tender] : non-tender [Non-distended] : non-distended [No Masses] : no abdominal mass palpated [No HSM] : no HSM [Normal Bowel Sounds] : normal bowel sounds [Normal Posterior Cervical Nodes] : no posterior cervical lymphadenopathy [Normal Anterior Cervical Nodes] : no anterior cervical lymphadenopathy [No CVA Tenderness] : no CVA  tenderness [No Spinal Tenderness] : no spinal tenderness [No Joint Swelling] : no joint swelling [Grossly Normal Strength/Tone] : grossly normal strength/tone [No Rash] : no rash [Coordination Grossly Intact] : coordination grossly intact [No Focal Deficits] : no focal deficits [Deep Tendon Reflexes (DTR)] : deep tendon reflexes were 2+ and symmetric [Normal Gait] : normal gait [Normal Affect] : the affect was normal [Normal Insight/Judgement] : insight and judgment were intact

## 2022-02-15 NOTE — ASSESSMENT
[Patient Optimized for Surgery] : Patient optimized for surgery [No Further Testing Recommended] : no further testing recommended [Continue anticoagulant treatment as is] : Continue current anticoagulant treatment [Continue anti-platelet treatment as is] : Continue current anti-platelet treatment [Continue medications as is] : Continue current medications [FreeTextEntry4] : Pt is medically cleared  No complaints

## 2022-02-17 ENCOUNTER — FORM ENCOUNTER (OUTPATIENT)
Age: 67
End: 2022-02-17

## 2022-02-18 ENCOUNTER — TRANSCRIPTION ENCOUNTER (OUTPATIENT)
Age: 67
End: 2022-02-18

## 2022-02-18 ENCOUNTER — INPATIENT (INPATIENT)
Facility: HOSPITAL | Age: 67
LOS: 3 days | Discharge: HOME | DRG: 470 | End: 2022-02-22
Attending: ORTHOPAEDIC SURGERY | Admitting: ORTHOPAEDIC SURGERY
Payer: COMMERCIAL

## 2022-02-18 ENCOUNTER — APPOINTMENT (OUTPATIENT)
Dept: ORTHOPEDIC SURGERY | Facility: HOSPITAL | Age: 67
End: 2022-02-18

## 2022-02-18 VITALS
DIASTOLIC BLOOD PRESSURE: 84 MMHG | TEMPERATURE: 98 F | RESPIRATION RATE: 16 BRPM | HEART RATE: 96 BPM | OXYGEN SATURATION: 100 % | SYSTOLIC BLOOD PRESSURE: 143 MMHG | WEIGHT: 225.09 LBS

## 2022-02-18 DIAGNOSIS — Z90.13 ACQUIRED ABSENCE OF BILATERAL BREASTS AND NIPPLES: Chronic | ICD-10-CM

## 2022-02-18 DIAGNOSIS — Z96.642 PRESENCE OF LEFT ARTIFICIAL HIP JOINT: Chronic | ICD-10-CM

## 2022-02-18 DIAGNOSIS — Z96.643 PRESENCE OF ARTIFICIAL HIP JOINT, BILATERAL: Chronic | ICD-10-CM

## 2022-02-18 DIAGNOSIS — M17.11 UNILATERAL PRIMARY OSTEOARTHRITIS, RIGHT KNEE: ICD-10-CM

## 2022-02-18 DIAGNOSIS — Z96.652 PRESENCE OF LEFT ARTIFICIAL KNEE JOINT: Chronic | ICD-10-CM

## 2022-02-18 DIAGNOSIS — Z98.890 OTHER SPECIFIED POSTPROCEDURAL STATES: Chronic | ICD-10-CM

## 2022-02-18 LAB
GLUCOSE BLDC GLUCOMTR-MCNC: 116 MG/DL — HIGH (ref 70–99)
GLUCOSE BLDC GLUCOMTR-MCNC: 149 MG/DL — HIGH (ref 70–99)

## 2022-02-18 PROCEDURE — 20985 CPTR-ASST DIR MS PX: CPT

## 2022-02-18 PROCEDURE — 27447 TOTAL KNEE ARTHROPLASTY: CPT | Mod: RT

## 2022-02-18 PROCEDURE — 27447 TOTAL KNEE ARTHROPLASTY: CPT | Mod: AS,RT

## 2022-02-18 PROCEDURE — 73560 X-RAY EXAM OF KNEE 1 OR 2: CPT | Mod: 26,RT

## 2022-02-18 DEVICE — PATELLA  ALL POLY VE 32MM: Type: IMPLANTABLE DEVICE | Status: FUNCTIONAL

## 2022-02-18 DEVICE — SURF ART PERSONA RT 6-9 EF 10MM: Type: IMPLANTABLE DEVICE | Status: FUNCTIONAL

## 2022-02-18 DEVICE — STEM EXT PERSONA 14MM PLUS 30M: Type: IMPLANTABLE DEVICE | Status: FUNCTIONAL

## 2022-02-18 DEVICE — SCREW PSN FEMALE 2.5X25MM: Type: IMPLANTABLE DEVICE | Status: FUNCTIONAL

## 2022-02-18 DEVICE — PIN HEADLESS TROCHAR 3.2X75MM: Type: IMPLANTABLE DEVICE | Status: FUNCTIONAL

## 2022-02-18 DEVICE — PIN THREADED HEADED STRL: Type: IMPLANTABLE DEVICE | Status: FUNCTIONAL

## 2022-02-18 DEVICE — STEM TIB PERSONA SZ E R 5 DEG: Type: IMPLANTABLE DEVICE | Status: FUNCTIONAL

## 2022-02-18 DEVICE — FEM PERSONA PS CMT CCR NRW S27 R: Type: IMPLANTABLE DEVICE | Status: FUNCTIONAL

## 2022-02-18 DEVICE — SCREW HD HEX 3.5MM: Type: IMPLANTABLE DEVICE | Status: FUNCTIONAL

## 2022-02-18 DEVICE — CEMENT BONE PALACOS R W/O GENTAMICIN 1X40: Type: IMPLANTABLE DEVICE | Status: FUNCTIONAL

## 2022-02-18 RX ORDER — HYDROMORPHONE HYDROCHLORIDE 2 MG/ML
0.5 INJECTION INTRAMUSCULAR; INTRAVENOUS; SUBCUTANEOUS
Refills: 0 | Status: DISCONTINUED | OUTPATIENT
Start: 2022-02-18 | End: 2022-02-22

## 2022-02-18 RX ORDER — GABAPENTIN 400 MG/1
600 CAPSULE ORAL DAILY
Refills: 0 | Status: DISCONTINUED | OUTPATIENT
Start: 2022-02-18 | End: 2022-02-22

## 2022-02-18 RX ORDER — TAMOXIFEN CITRATE 20 MG/1
20 TABLET, FILM COATED ORAL DAILY
Refills: 0 | Status: DISCONTINUED | OUTPATIENT
Start: 2022-02-18 | End: 2022-02-22

## 2022-02-18 RX ORDER — HYDROCHLOROTHIAZIDE 25 MG
25 TABLET ORAL DAILY
Refills: 0 | Status: DISCONTINUED | OUTPATIENT
Start: 2022-02-20 | End: 2022-02-22

## 2022-02-18 RX ORDER — ONDANSETRON 8 MG/1
4 TABLET, FILM COATED ORAL EVERY 6 HOURS
Refills: 0 | Status: DISCONTINUED | OUTPATIENT
Start: 2022-02-18 | End: 2022-02-22

## 2022-02-18 RX ORDER — PANTOPRAZOLE SODIUM 20 MG/1
40 TABLET, DELAYED RELEASE ORAL
Refills: 0 | Status: DISCONTINUED | OUTPATIENT
Start: 2022-02-18 | End: 2022-02-22

## 2022-02-18 RX ORDER — AMLODIPINE BESYLATE 2.5 MG/1
5 TABLET ORAL DAILY
Refills: 0 | Status: DISCONTINUED | OUTPATIENT
Start: 2022-02-20 | End: 2022-02-22

## 2022-02-18 RX ORDER — ASPIRIN/CALCIUM CARB/MAGNESIUM 324 MG
81 TABLET ORAL DAILY
Refills: 0 | Status: DISCONTINUED | OUTPATIENT
Start: 2022-02-18 | End: 2022-02-22

## 2022-02-18 RX ORDER — SODIUM CHLORIDE 9 MG/ML
1000 INJECTION INTRAMUSCULAR; INTRAVENOUS; SUBCUTANEOUS
Refills: 0 | Status: DISCONTINUED | OUTPATIENT
Start: 2022-02-18 | End: 2022-02-22

## 2022-02-18 RX ORDER — LOSARTAN POTASSIUM 100 MG/1
100 TABLET, FILM COATED ORAL DAILY
Refills: 0 | Status: DISCONTINUED | OUTPATIENT
Start: 2022-02-20 | End: 2022-02-22

## 2022-02-18 RX ORDER — OXYCODONE HYDROCHLORIDE 5 MG/1
10 TABLET ORAL
Refills: 0 | Status: DISCONTINUED | OUTPATIENT
Start: 2022-02-18 | End: 2022-02-22

## 2022-02-18 RX ORDER — ACETAMINOPHEN 500 MG
975 TABLET ORAL ONCE
Refills: 0 | Status: COMPLETED | OUTPATIENT
Start: 2022-02-18 | End: 2022-02-18

## 2022-02-18 RX ORDER — KETOROLAC TROMETHAMINE 30 MG/ML
15 SYRINGE (ML) INJECTION EVERY 6 HOURS
Refills: 0 | Status: DISCONTINUED | OUTPATIENT
Start: 2022-02-18 | End: 2022-02-19

## 2022-02-18 RX ORDER — ACETAMINOPHEN 500 MG
975 TABLET ORAL EVERY 8 HOURS
Refills: 0 | Status: DISCONTINUED | OUTPATIENT
Start: 2022-02-18 | End: 2022-02-22

## 2022-02-18 RX ORDER — SENNA PLUS 8.6 MG/1
2 TABLET ORAL AT BEDTIME
Refills: 0 | Status: DISCONTINUED | OUTPATIENT
Start: 2022-02-18 | End: 2022-02-22

## 2022-02-18 RX ORDER — CEFAZOLIN SODIUM 1 G
2000 VIAL (EA) INJECTION
Refills: 0 | Status: COMPLETED | OUTPATIENT
Start: 2022-02-18 | End: 2022-02-19

## 2022-02-18 RX ORDER — ENOXAPARIN SODIUM 100 MG/ML
30 INJECTION SUBCUTANEOUS
Refills: 0 | Status: DISCONTINUED | OUTPATIENT
Start: 2022-02-19 | End: 2022-02-22

## 2022-02-18 RX ORDER — MAGNESIUM HYDROXIDE 400 MG/1
30 TABLET, CHEWABLE ORAL DAILY
Refills: 0 | Status: DISCONTINUED | OUTPATIENT
Start: 2022-02-18 | End: 2022-02-22

## 2022-02-18 RX ORDER — APREPITANT 80 MG/1
40 CAPSULE ORAL ONCE
Refills: 0 | Status: COMPLETED | OUTPATIENT
Start: 2022-02-18 | End: 2022-02-18

## 2022-02-18 RX ORDER — SODIUM CHLORIDE 9 MG/ML
500 INJECTION INTRAMUSCULAR; INTRAVENOUS; SUBCUTANEOUS ONCE
Refills: 0 | Status: COMPLETED | OUTPATIENT
Start: 2022-02-18 | End: 2022-02-18

## 2022-02-18 RX ORDER — FENTANYL CITRATE 50 UG/ML
25 INJECTION INTRAVENOUS
Refills: 0 | Status: DISCONTINUED | OUTPATIENT
Start: 2022-02-18 | End: 2022-02-18

## 2022-02-18 RX ORDER — OXYCODONE HYDROCHLORIDE 5 MG/1
5 TABLET ORAL
Refills: 0 | Status: DISCONTINUED | OUTPATIENT
Start: 2022-02-18 | End: 2022-02-22

## 2022-02-18 RX ORDER — SODIUM CHLORIDE 9 MG/ML
1000 INJECTION, SOLUTION INTRAVENOUS
Refills: 0 | Status: DISCONTINUED | OUTPATIENT
Start: 2022-02-18 | End: 2022-02-18

## 2022-02-18 RX ORDER — HYDROMORPHONE HYDROCHLORIDE 2 MG/ML
4 INJECTION INTRAMUSCULAR; INTRAVENOUS; SUBCUTANEOUS
Refills: 0 | Status: DISCONTINUED | OUTPATIENT
Start: 2022-02-18 | End: 2022-02-22

## 2022-02-18 RX ADMIN — Medication 975 MILLIGRAM(S): at 12:11

## 2022-02-18 RX ADMIN — Medication 975 MILLIGRAM(S): at 22:09

## 2022-02-18 RX ADMIN — Medication 15 MILLIGRAM(S): at 23:23

## 2022-02-18 RX ADMIN — APREPITANT 40 MILLIGRAM(S): 80 CAPSULE ORAL at 12:11

## 2022-02-18 RX ADMIN — HYDROMORPHONE HYDROCHLORIDE 4 MILLIGRAM(S): 2 INJECTION INTRAMUSCULAR; INTRAVENOUS; SUBCUTANEOUS at 22:11

## 2022-02-18 RX ADMIN — HYDROMORPHONE HYDROCHLORIDE 4 MILLIGRAM(S): 2 INJECTION INTRAMUSCULAR; INTRAVENOUS; SUBCUTANEOUS at 21:53

## 2022-02-18 RX ADMIN — SODIUM CHLORIDE 500 MILLILITER(S): 9 INJECTION INTRAMUSCULAR; INTRAVENOUS; SUBCUTANEOUS at 17:20

## 2022-02-18 RX ADMIN — Medication 100 MILLIGRAM(S): at 21:53

## 2022-02-18 RX ADMIN — Medication 975 MILLIGRAM(S): at 21:53

## 2022-02-18 RX ADMIN — GABAPENTIN 600 MILLIGRAM(S): 400 CAPSULE ORAL at 22:05

## 2022-02-18 NOTE — DISCHARGE NOTE PROVIDER - NSDCMRMEDTOKEN_GEN_ALL_CORE_FT
acetaminophen 325 mg oral tablet: 3 tab(s) orally prn  AMLODIPINE BESYLATE 5MG TAB: tab(s) orally once a day  Calcium 600+D oral tablet: orally once a day  Fish Oil: orally once a day  gabapentin 600 mg oral tablet: 1 tab(s) orally once a day  Garlic oral tablet: orally once a day  HYDROCHLOROTHIAZIDE 25 MG TAB: TAKE 1 TABLET BY MOUTH EVERY DAY  losartan 100 mg oral tablet: 1 tab(s) orally once a day  Multi Vitamin+: orally once a day  omeprazole 20 mg oral delayed release capsule: 1 cap(s) orally once a day  TAMOXIFEN 20 MG TABLET: TAKE 1 TABLET BY MOUTH EVERY DAY  TRIAMCINOLONE ACETONIDE 0.025% OIN:   Vitamin C 100 mg oral tablet:   vitamin E oral capsule:    acetaminophen 325 mg oral tablet: 3 tab(s) orally prn  AMLODIPINE BESYLATE 5MG TAB: tab(s) orally once a day  aspirin 81 mg oral delayed release tablet: 1 tab(s) orally 2 times a day. Take while on lovenox.   Aspirin Enteric Coated 325 mg oral delayed release tablet: 1 tab(s) orally 2 times a day. Patient will begin on 3/6/22 AFTER completion of Lovenox. Patient will not take baby aspirin while on 325mg Aspirin.   Calcium 600+D oral tablet: orally once a day  Fish Oil: orally once a day  Garlic oral tablet: orally once a day  HYDROCHLOROTHIAZIDE 25 MG TAB: each orally once a day  losartan 100 mg oral tablet: 1 tab(s) orally once a day  Lovenox 30 mg/0.3 mL injectable solution: 1 dose(s) subcutaneously 2 times a day. Last dose will be on 3/5/22.  meloxicam 7.5 mg oral tablet: 1 tab(s) orally 2 times a day   Multi Vitamin+: orally once a day  omeprazole 20 mg oral delayed release tablet: 1 tab(s) orally once a day before breakfast. Begin on 3/6/22 while taking Aspirin 325mg.  oxyCODONE 5 mg oral tablet: 1 tab(s) orally every 6 hours, As Needed  for moderate to severe pain. MDD:4  Senna S 50 mg-8.6 mg oral tablet: 2 tab(s) orally once a day (at bedtime), As Needed   TAMOXIFEN 20 MG TABLET: each orally once a day  TRIAMCINOLONE ACETONIDE 0.025% OIN: Apply topically to affected area 2 times a day  Vitamin C 100 mg oral tablet:   vitamin E oral capsule:

## 2022-02-18 NOTE — DISCHARGE NOTE PROVIDER - NSDCFUSCHEDAPPT_GEN_ALL_CORE_FT
NANCI MATIAS  ; 03/11/2022 ; [facility] [address] ; 04/12/2022 ; [facility] [address] ; 05/11/2022 ; [facility] [address]

## 2022-02-18 NOTE — DISCHARGE NOTE PROVIDER - HOSPITAL COURSE
The patient underwent a RIGHT TOTAL KNEE REPLACEMENT on 2/18. The patient received antibiotics consistent with SCIP guidelines. The patient underwent the procedure and had no intra-operative complications. Post-operatively, the patient was seen by medicine and PT. The patient received LOVENOX and ASPIRIN for DVTP. The patient received pain medications per orthopedic pain management pathway and the pain was appropriately controlled. The patient did not have any post-operative medical complications. The patient was discharged in stable condition. The patient underwent a RIGHT TOTAL KNEE REPLACEMENT on 2/18/22. The patient received antibiotics consistent with SCIP guidelines. The patient underwent the procedure and had no intra-operative complications. Post-operatively, the patient was seen by medicine and PT. The patient received LOVENOX and ASPIRIN for DVTP. The patient received pain medications per orthopedic pain management pathway and the pain was appropriately controlled. The patient did not have any post-operative medical complications. The patient was discharged in stable condition.

## 2022-02-18 NOTE — DISCHARGE NOTE PROVIDER - NSDCFUADDINST_GEN_ALL_CORE_FT
The patient will be seen in the office between 2-3 weeks for wound check.   **Your first post-operative visit has been scheduled prior to your admission. PLEASE CONTACT OFFICE TO CONFIRM THE APPOINTMENT DATE. Tape will be removed at that time.  **  The silver based dressing is to be removed 7 days from the date of surgery (2/25).   ** CONTACT THE OFFICE IF THE FOLLOWING DEVELOP:  - the dressing becomes soiled or saturated  - you develop a fever greater that 101F  - the wound becomes red or you develop blistering around the wound  * Patient may shower after post-op day #3 (2/21).   * The patient will continue home PT consistent with  total knee replacement protocol. Transition to outpatient PT will occur at the time of the first office visit.   * The patient will practice knee extension exercises regularly to minimize hamstring contraction.   * The patient is FULL weight bearing.  * The patient will continue LOVENOX and ASPIRIN 81mg for 2 weeks and then begin ASPIRIN 325 twice daily for 4 weeks for blood clot prevention.   *** While on aspirin, the patient will take daily omeprazole or other similar medication to protect the stomach from irritation.   * The patient will take OXYCODONE AND TYLENOL for pain control and adjust according to prescription and patient needs. Contact the office if pain increases while taking prescribed pain medications or related concerns develop.  * The patient will take Senna S while taking oxycodone to prevent narcotic associated constipation.  Additionally, increase water intake (drink at least 8 glasses of water daily) and try adding fiber to the diet by eating fruits, vegetables and foods that are rich in grains. If constipation is experienced, contact the medical/primary care provider to discuss further treatment options.  * To avoid injury at home:  - continue use of rolling walker until cleared by physical therapist  - have family or friend remove all throw rug or objects in hallways that may present a trip hazard.  - if you experience any dizziness or medical concerns, call your medical doctor or 911.  * The implant may activate metal detection devices.  The patient will be seen in the office between 2-3 weeks for wound check.   **Your first post-operative visit has been scheduled prior to your admission. PLEASE CONTACT OFFICE TO CONFIRM THE APPOINTMENT DATE. Tape will be removed at that time.  **  The silver based dressing is to be removed 7 days from the date of surgery (2/25).   ** CONTACT THE OFFICE IF THE FOLLOWING DEVELOP:  - the dressing becomes soiled or saturated  - you develop a fever greater that 101F  - the wound becomes red or you develop blistering around the wound  * Patient may shower after post-op day #3 (2/21).   * The patient will continue home PT consistent with  total knee replacement protocol. Transition to outpatient PT will occur at the time of the first office visit.   * The patient will practice knee extension exercises regularly to minimize hamstring contraction.   * The patient is FULL weight bearing.  * The patient will continue LOVENOX and ASPIRIN 81mg for 2 weeks and then begin ASPIRIN 325 twice daily for 4 weeks for blood clot prevention.   *** While on aspirin, the patient will take daily omeprazole or other similar medication to protect the stomach from irritation.   * The patient will take OXYCODONE AND TYLENOL for pain control and adjust according to prescription and patient needs. Contact the office if pain increases while taking prescribed pain medications or related concerns develop.  * Patient will take Mobic 7.5 mg twice a day for 21 days.   * The patient will take Senna S while taking oxycodone to prevent narcotic associated constipation.  Additionally, increase water intake (drink at least 8 glasses of water daily) and try adding fiber to the diet by eating fruits, vegetables and foods that are rich in grains. If constipation is experienced, contact the medical/primary care provider to discuss further treatment options.  * To avoid injury at home:  - continue use of rolling walker until cleared by physical therapist  - have family or friend remove all throw rug or objects in hallways that may present a trip hazard.  - if you experience any dizziness or medical concerns, call your medical doctor or 911.  * The implant may activate metal detection devices.

## 2022-02-18 NOTE — DISCHARGE NOTE PROVIDER - NSDCHHNEEDSERVICE_GEN_ALL_CORE
Problem: SAFETY  Goal: Free from accidental physical injury  Outcome: Ongoing  Note: Pt is a Med fall risk. Explained fall risk precautions to pt and rationale behind their use to keep the patient safe. Belongings are in reach. Pt encouraged to notify staff for any and all assistance. Staff present in tx suite throughout entirety of pts treatment to monitor and protect from falls. Assistance provided when ambulating to restroom utilizing Stay With Me. Problem: KNOWLEDGE DEFICIT  Goal: Patient/S.O. demonstrates understanding of disease process, treatment plan, medications, and discharge instructions. Outcome: Ongoing  Note: Pt seen in [facility] OPO for Xolair 300 Mg subcutaneous, 2 injections to L arm. Pt carrying Epi pen on person, expiration date of 09/08/2021 noted, LOT [id number]. Pt provided with written pamphlet regarding administration of subcutaneous Xolair injections, pt verbalizes understanding. Pt's baseline vitals stable, vital signs taken 30 minutes post injection, pt tolerated well and without incident. Pt ambulatory with steady gait, discharged to home per self.
Rehabilitation services

## 2022-02-19 ENCOUNTER — TRANSCRIPTION ENCOUNTER (OUTPATIENT)
Age: 67
End: 2022-02-19

## 2022-02-19 LAB
ANION GAP SERPL CALC-SCNC: 11 MMOL/L — SIGNIFICANT CHANGE UP (ref 5–17)
BUN SERPL-MCNC: 20 MG/DL — SIGNIFICANT CHANGE UP (ref 8–20)
CALCIUM SERPL-MCNC: 8.4 MG/DL — LOW (ref 8.6–10.2)
CHLORIDE SERPL-SCNC: 102 MMOL/L — SIGNIFICANT CHANGE UP (ref 98–107)
CO2 SERPL-SCNC: 24 MMOL/L — SIGNIFICANT CHANGE UP (ref 22–29)
CREAT SERPL-MCNC: 0.74 MG/DL — SIGNIFICANT CHANGE UP (ref 0.5–1.3)
GLUCOSE SERPL-MCNC: 140 MG/DL — HIGH (ref 70–99)
HCT VFR BLD CALC: 33.2 % — LOW (ref 34.5–45)
HGB BLD-MCNC: 10.6 G/DL — LOW (ref 11.5–15.5)
MCHC RBC-ENTMCNC: 28.7 PG — SIGNIFICANT CHANGE UP (ref 27–34)
MCHC RBC-ENTMCNC: 31.9 GM/DL — LOW (ref 32–36)
MCV RBC AUTO: 90 FL — SIGNIFICANT CHANGE UP (ref 80–100)
PLATELET # BLD AUTO: 269 K/UL — SIGNIFICANT CHANGE UP (ref 150–400)
POTASSIUM SERPL-MCNC: 4.1 MMOL/L — SIGNIFICANT CHANGE UP (ref 3.5–5.3)
POTASSIUM SERPL-SCNC: 4.1 MMOL/L — SIGNIFICANT CHANGE UP (ref 3.5–5.3)
RBC # BLD: 3.69 M/UL — LOW (ref 3.8–5.2)
RBC # FLD: 15.5 % — HIGH (ref 10.3–14.5)
SODIUM SERPL-SCNC: 137 MMOL/L — SIGNIFICANT CHANGE UP (ref 135–145)
WBC # BLD: 11.76 K/UL — HIGH (ref 3.8–10.5)
WBC # FLD AUTO: 11.76 K/UL — HIGH (ref 3.8–10.5)

## 2022-02-19 PROCEDURE — 99222 1ST HOSP IP/OBS MODERATE 55: CPT

## 2022-02-19 RX ORDER — OXYCODONE HYDROCHLORIDE 5 MG/1
1 TABLET ORAL
Qty: 28 | Refills: 0
Start: 2022-02-19 | End: 2022-02-25

## 2022-02-19 RX ORDER — TAMOXIFEN CITRATE 20 MG/1
0 TABLET, FILM COATED ORAL
Qty: 0 | Refills: 0 | DISCHARGE

## 2022-02-19 RX ORDER — GABAPENTIN 400 MG/1
1 CAPSULE ORAL
Qty: 0 | Refills: 0 | DISCHARGE

## 2022-02-19 RX ORDER — ASPIRIN/CALCIUM CARB/MAGNESIUM 324 MG
1 TABLET ORAL
Qty: 28 | Refills: 0
Start: 2022-02-19 | End: 2022-03-04

## 2022-02-19 RX ORDER — ENOXAPARIN SODIUM 100 MG/ML
1 INJECTION SUBCUTANEOUS
Qty: 28 | Refills: 0
Start: 2022-02-19 | End: 2022-03-04

## 2022-02-19 RX ORDER — MELOXICAM 15 MG/1
7.5 TABLET ORAL
Refills: 0 | Status: DISCONTINUED | OUTPATIENT
Start: 2022-02-20 | End: 2022-02-22

## 2022-02-19 RX ORDER — OMEPRAZOLE 10 MG/1
1 CAPSULE, DELAYED RELEASE ORAL
Qty: 0 | Refills: 0 | DISCHARGE

## 2022-02-19 RX ORDER — SENNOSIDES/DOCUSATE SODIUM 8.6MG-50MG
2 TABLET ORAL
Qty: 20 | Refills: 0
Start: 2022-02-19 | End: 2022-02-28

## 2022-02-19 RX ADMIN — Medication 975 MILLIGRAM(S): at 22:02

## 2022-02-19 RX ADMIN — HYDROMORPHONE HYDROCHLORIDE 4 MILLIGRAM(S): 2 INJECTION INTRAMUSCULAR; INTRAVENOUS; SUBCUTANEOUS at 17:23

## 2022-02-19 RX ADMIN — Medication 975 MILLIGRAM(S): at 15:36

## 2022-02-19 RX ADMIN — Medication 15 MILLIGRAM(S): at 17:23

## 2022-02-19 RX ADMIN — HYDROMORPHONE HYDROCHLORIDE 4 MILLIGRAM(S): 2 INJECTION INTRAMUSCULAR; INTRAVENOUS; SUBCUTANEOUS at 12:59

## 2022-02-19 RX ADMIN — Medication 975 MILLIGRAM(S): at 21:11

## 2022-02-19 RX ADMIN — Medication 15 MILLIGRAM(S): at 05:03

## 2022-02-19 RX ADMIN — PANTOPRAZOLE SODIUM 40 MILLIGRAM(S): 20 TABLET, DELAYED RELEASE ORAL at 05:04

## 2022-02-19 RX ADMIN — GABAPENTIN 600 MILLIGRAM(S): 400 CAPSULE ORAL at 11:17

## 2022-02-19 RX ADMIN — Medication 975 MILLIGRAM(S): at 13:00

## 2022-02-19 RX ADMIN — ENOXAPARIN SODIUM 30 MILLIGRAM(S): 100 INJECTION SUBCUTANEOUS at 05:03

## 2022-02-19 RX ADMIN — Medication 15 MILLIGRAM(S): at 11:17

## 2022-02-19 RX ADMIN — HYDROMORPHONE HYDROCHLORIDE 4 MILLIGRAM(S): 2 INJECTION INTRAMUSCULAR; INTRAVENOUS; SUBCUTANEOUS at 18:44

## 2022-02-19 RX ADMIN — HYDROMORPHONE HYDROCHLORIDE 4 MILLIGRAM(S): 2 INJECTION INTRAMUSCULAR; INTRAVENOUS; SUBCUTANEOUS at 06:28

## 2022-02-19 RX ADMIN — HYDROMORPHONE HYDROCHLORIDE 4 MILLIGRAM(S): 2 INJECTION INTRAMUSCULAR; INTRAVENOUS; SUBCUTANEOUS at 05:02

## 2022-02-19 RX ADMIN — Medication 975 MILLIGRAM(S): at 05:02

## 2022-02-19 RX ADMIN — HYDROMORPHONE HYDROCHLORIDE 4 MILLIGRAM(S): 2 INJECTION INTRAMUSCULAR; INTRAVENOUS; SUBCUTANEOUS at 15:37

## 2022-02-19 RX ADMIN — Medication 81 MILLIGRAM(S): at 11:20

## 2022-02-19 RX ADMIN — Medication 100 MILLIGRAM(S): at 05:03

## 2022-02-19 RX ADMIN — Medication 15 MILLIGRAM(S): at 18:44

## 2022-02-19 RX ADMIN — Medication 15 MILLIGRAM(S): at 13:02

## 2022-02-19 RX ADMIN — HYDROMORPHONE HYDROCHLORIDE 4 MILLIGRAM(S): 2 INJECTION INTRAMUSCULAR; INTRAVENOUS; SUBCUTANEOUS at 10:26

## 2022-02-19 RX ADMIN — ENOXAPARIN SODIUM 30 MILLIGRAM(S): 100 INJECTION SUBCUTANEOUS at 17:23

## 2022-02-19 RX ADMIN — HYDROMORPHONE HYDROCHLORIDE 4 MILLIGRAM(S): 2 INJECTION INTRAMUSCULAR; INTRAVENOUS; SUBCUTANEOUS at 09:18

## 2022-02-19 RX ADMIN — Medication 15 MILLIGRAM(S): at 00:09

## 2022-02-19 RX ADMIN — SODIUM CHLORIDE 150 MILLILITER(S): 9 INJECTION INTRAMUSCULAR; INTRAVENOUS; SUBCUTANEOUS at 12:58

## 2022-02-19 RX ADMIN — TAMOXIFEN CITRATE 20 MILLIGRAM(S): 20 TABLET, FILM COATED ORAL at 11:24

## 2022-02-19 NOTE — DISCHARGE NOTE NURSING/CASE MANAGEMENT/SOCIAL WORK - NSDCPEFALRISK_GEN_ALL_CORE
For information on Fall & Injury Prevention, visit: [email or web address] OR  [email or web address] OR  https://www.cdc.gov/steadi/patient.html

## 2022-02-19 NOTE — PHYSICAL THERAPY INITIAL EVALUATION ADULT - RANGE OF MOTION EXAMINATION, REHAB EVAL
except right knee 0-45 degree/bilateral upper extremity ROM was WFL (within functional limits)/bilateral lower extremity ROM was WFL (within functional limits)

## 2022-02-19 NOTE — DISCHARGE NOTE NURSING/CASE MANAGEMENT/SOCIAL WORK - PATIENT PORTAL LINK FT
You can access the FollowMyHealth Patient Portal offered by [facility] by registering at the following website: [email or web address]. By joining [facility]’s FollowMyHealth portal, you will also be able to view your health information using other applications (apps) compatible with our system.

## 2022-02-19 NOTE — CONSULT NOTE ADULT - SUBJECTIVE AND OBJECTIVE BOX
Patient is a 66y old  Female who is s/p R TKA , POD # 1. Medicine consulted for postop mgmt .   Patient seen and examined , sitting comfortable at chair . Pain well controlled , no n/v , voiding without difficulty ,   participating with physical therapy .     CC: R knee chronic pain , hx of sob on exertion       HPI:  Pt 67 y/o Female with known hx of bone on bone medial compartmental osteoarthritis of the right knee, report pain has progressively gotten worse within the past few months. Pt pain is localized in the medial aspect of the knee. Pt  is also s/p right URBANO on 8/10/21, left URBANO 2020 and left knee replacement 2021. PMH of HTN, OA, Breast cancer s/p bilateral mastectomy on Tamoxifen.  Pt requires the use of a cane, walker and wheelchair to assist with mobility.       PAST MEDICAL & SURGICAL HISTORY:  Hypertension    Breast cancer in female    Gallstones    Osteoarthritis    Anemia    Obese    Unilateral primary osteoarthritis, right hip    Cancer of breast  right    Super obese  BMI 62.4    At risk for sleep apnea    History of tonsillectomy    S/P mastectomy, bilateral  4 years ago    H/O total knee replacement, left  2/11/2021    History of total hip replacement, left  3/3/2020    H/O bilateral hip replacements        Social History:  Tobacco - denies   ETOH - occasionally   Illicit drug abuse - denies    FAMILY HISTORY:  Maternal family history of emphysema (Mother)    Family history of hypertension in father (Father)    Family history of breast cancer (Sibling)        Allergies    latex (Rash)  sulfa drugs (Hives)  tramadol (Nausea)    Intolerances    codeine (Vomiting)      HOME MEDICATIONS :     AMLODIPINE BESYLATE 5MG TAB: tab(s) orally once a day (18 Feb 2022 11:55)  Calcium 600+D oral tablet: orally once a day (18 Feb 2022 11:55)  Fish Oil: orally once a day (18 Feb 2022 11:55)  Garlic oral tablet: orally once a day (18 Feb 2022 11:55)  HYDROCHLOROTHIAZIDE 25 MG TAB: each orally once a day (19 Feb 2022 11:12)  losartan 100 mg oral tablet: 1 tab(s) orally once a day (18 Feb 2022 11:55)  Multi Vitamin+: orally once a day (18 Feb 2022 11:55)  TAMOXIFEN 20 MG TABLET: each orally once a day (19 Feb 2022 11:12)  TRIAMCINOLONE ACETONIDE 0.025% OIN: Apply topically to affected area 2 times a day (19 Feb 2022 11:12)  Vitamin C 100 mg oral tablet:  (18 Feb 2022 11:55)  vitamin E oral capsule:  (18 Feb 2022 11:55)      REVIEW OF SYSTEMS:    As above , all other systems are reviewed and are negative     MEDICATIONS  (STANDING):  acetaminophen     Tablet .. 975 milliGRAM(s) Oral every 8 hours  aspirin enteric coated 81 milliGRAM(s) Oral daily  enoxaparin Injectable 30 milliGRAM(s) SubCutaneous two times a day  gabapentin 600 milliGRAM(s) Oral daily  ketorolac   Injectable 15 milliGRAM(s) IV Push every 6 hours  pantoprazole    Tablet 40 milliGRAM(s) Oral before breakfast  senna 2 Tablet(s) Oral at bedtime  sodium chloride 0.9%. 1000 milliLiter(s) (150 mL/Hr) IV Continuous <Continuous>  tamoxifen 20 milliGRAM(s) Oral daily    MEDICATIONS  (PRN):  HYDROmorphone   Tablet 4 milliGRAM(s) Oral every 3 hours PRN Severe Pain (7 - 10)  HYDROmorphone  Injectable 0.5 milliGRAM(s) IV Push every 3 hours PRN breakthrough pain  magnesium hydroxide Suspension 30 milliLiter(s) Oral daily PRN Constipation  ondansetron Injectable 4 milliGRAM(s) IV Push every 6 hours PRN Nausea and/or Vomiting  oxyCODONE    IR 5 milliGRAM(s) Oral every 3 hours PRN Mild Pain (1 - 3)  oxyCODONE    IR 10 milliGRAM(s) Oral every 3 hours PRN Moderate Pain (4 - 6)  triamcinolone 0.025% Cream 1 Application(s) Topical two times a day PRN Rash and/or Itching      Vital Signs Last 24 Hrs  T(C): 36.8 (19 Feb 2022 08:24), Max: 36.8 (19 Feb 2022 08:24)  T(F): 98.2 (19 Feb 2022 08:24), Max: 98.2 (19 Feb 2022 08:24)  HR: 74 (19 Feb 2022 08:24) (63 - 83)  BP: 125/79 (19 Feb 2022 08:24) (85/43 - 125/79)  BP(mean): --  RR: 18 (19 Feb 2022 08:24) (14 - 18)  SpO2: 92% (19 Feb 2022 08:24) (91% - 99%)    PHYSICAL EXAM:    GENERAL: NAD, well-groomed, severely obese    HEAD:  Atraumatic, Normocephalic  EYES: EOMI, PERRLA, conjunctiva and sclera clear  NECK: Supple, No JVD, Normal thyroid  NERVOUS SYSTEM:  Alert & Oriented X4 , no focal deficit   CHEST/LUNG: CTA  b/l,  no rales, rhonchi, wheezing, or rubs  HEART: Regular rate and rhythm; No murmurs, rubs, or gallops  ABDOMEN: Soft, Nontender, Nondistended; Bowel sounds present  EXTREMITIES:  2+ Peripheral Pulses, No clubbing, cyanosis, or edema ,   LYMPH: No lymphadenopathy noted  SKIN: No rashes or lesions    LABS:                        10.6   11.76 )-----------( 269      ( 19 Feb 2022 06:23 )             33.2     02-19    137  |  102  |  20.0  ----------------------------<  140<H>  4.1   |  24.0  |  0.74    Ca    8.4<L>      19 Feb 2022 06:23      RADIOLOGY & ADDITIONAL STUDIES:    < from: Xray Knee 1 or 2 Views, Right (02.18.22 @ 17:23) >    ACC: [id number] EXAM:  XR KNEE 1-2 VIEWS RT                          PROCEDURE DATE:  02/18/2022          INTERPRETATION:  HISTORY: Postoperative  knee replacement.    TECHNIQUE: Two views of the RIGHT knee are submitted.    FINDINGS: Status post tricompartmental knee replacement with the femoral,   tibial and patellar components in anatomic alignment.  There is no fracture .    IMPRESSION:  Knee prosthetic components in proper anatomical alignment.    --- End of Report ---    < end of copied text >

## 2022-02-19 NOTE — CONSULT NOTE ADULT - ASSESSMENT
Pt 67 y/o Female  has a known hx of bone on bone medial compartmental osteoarthritis of the right knee, report pain has progressively gotten worse within the past few months. Pt pain is localized in the medial aspect of the knee. Pt  is also s/p right URBANO on 8/10/21, left URBANO 2020 and left knee replacement 2021. PMH of HTN, OA, Breast cancer s/p bilateral mastectomy on Tamoxifen.  Pt requires the use of a cane, walker and wheelchair to assist with mobility.       1. R knee OA , S/P R TKA POD#1,   PT/OT/pain mgmt  DVT prophylaxis- as per ortho  Abx as per SCIP- given   Incentive spirometry  Prophylaxis of opioid  induced constipation.    2. HTN - continue home meds with parameters     3 . Hx of breast cancer , s/p b/l mastectomy 4 yrs ago , on Tamoxifen - continue     4. Severely obese  - BMI 44 - diet / life changes recommended to patient     5. At risk of sleep apnea - snores - Sleep study as on outpatient recommended     6. Leucocytosis post op - no S/S of infection - likely reactive     7. Anemia - acute blood lost anemia - secondary to surgical blood lost -  asymptomatic.     Medically stable to d/c once cleared by physical therapy / ortho.

## 2022-02-19 NOTE — PHYSICAL THERAPY INITIAL EVALUATION ADULT - ACTIVE RANGE OF MOTION EXAMINATION, REHAB EVAL
except right knee 0-45 degree/bilateral upper extremity Active ROM was WFL (within functional limits)/bilateral  lower extremity Active ROM was WFL (within functional limits)

## 2022-02-19 NOTE — PHYSICAL THERAPY INITIAL EVALUATION ADULT - PASSIVE RANGE OF MOTION EXAMINATION, REHAB EVAL
except right knee 0-45 degree/bilateral upper extremity Passive ROM was WFL (within functional limits)/bilateral lower extremity Passive ROM was WFL (within functional limits)

## 2022-02-19 NOTE — PHYSICAL THERAPY INITIAL EVALUATION ADULT - GENERAL OBSERVATIONS, REHAB EVAL
Patient was sitting on a high back chair, edema on roght LE with dressing and ACE wrap on right knee, + IV

## 2022-02-20 LAB
ANION GAP SERPL CALC-SCNC: 10 MMOL/L — SIGNIFICANT CHANGE UP (ref 5–17)
BUN SERPL-MCNC: 18.9 MG/DL — SIGNIFICANT CHANGE UP (ref 8–20)
CALCIUM SERPL-MCNC: 8.3 MG/DL — LOW (ref 8.6–10.2)
CHLORIDE SERPL-SCNC: 104 MMOL/L — SIGNIFICANT CHANGE UP (ref 98–107)
CO2 SERPL-SCNC: 24 MMOL/L — SIGNIFICANT CHANGE UP (ref 22–29)
CREAT SERPL-MCNC: 0.58 MG/DL — SIGNIFICANT CHANGE UP (ref 0.5–1.3)
GLUCOSE SERPL-MCNC: 126 MG/DL — HIGH (ref 70–99)
HCT VFR BLD CALC: 33.2 % — LOW (ref 34.5–45)
HGB BLD-MCNC: 10.7 G/DL — LOW (ref 11.5–15.5)
MCHC RBC-ENTMCNC: 28.9 PG — SIGNIFICANT CHANGE UP (ref 27–34)
MCHC RBC-ENTMCNC: 32.2 GM/DL — SIGNIFICANT CHANGE UP (ref 32–36)
MCV RBC AUTO: 89.7 FL — SIGNIFICANT CHANGE UP (ref 80–100)
PLATELET # BLD AUTO: 268 K/UL — SIGNIFICANT CHANGE UP (ref 150–400)
POTASSIUM SERPL-MCNC: 3.7 MMOL/L — SIGNIFICANT CHANGE UP (ref 3.5–5.3)
POTASSIUM SERPL-SCNC: 3.7 MMOL/L — SIGNIFICANT CHANGE UP (ref 3.5–5.3)
RBC # BLD: 3.7 M/UL — LOW (ref 3.8–5.2)
RBC # FLD: 15.3 % — HIGH (ref 10.3–14.5)
SODIUM SERPL-SCNC: 138 MMOL/L — SIGNIFICANT CHANGE UP (ref 135–145)
WBC # BLD: 11.05 K/UL — HIGH (ref 3.8–10.5)
WBC # FLD AUTO: 11.05 K/UL — HIGH (ref 3.8–10.5)

## 2022-02-20 PROCEDURE — 99232 SBSQ HOSP IP/OBS MODERATE 35: CPT

## 2022-02-20 RX ORDER — MELOXICAM 15 MG/1
1 TABLET ORAL
Qty: 42 | Refills: 0
Start: 2022-02-20 | End: 2022-03-12

## 2022-02-20 RX ADMIN — HYDROMORPHONE HYDROCHLORIDE 4 MILLIGRAM(S): 2 INJECTION INTRAMUSCULAR; INTRAVENOUS; SUBCUTANEOUS at 13:03

## 2022-02-20 RX ADMIN — HYDROMORPHONE HYDROCHLORIDE 0.5 MILLIGRAM(S): 2 INJECTION INTRAMUSCULAR; INTRAVENOUS; SUBCUTANEOUS at 16:06

## 2022-02-20 RX ADMIN — MELOXICAM 7.5 MILLIGRAM(S): 15 TABLET ORAL at 22:05

## 2022-02-20 RX ADMIN — HYDROMORPHONE HYDROCHLORIDE 0.5 MILLIGRAM(S): 2 INJECTION INTRAMUSCULAR; INTRAVENOUS; SUBCUTANEOUS at 15:51

## 2022-02-20 RX ADMIN — ENOXAPARIN SODIUM 30 MILLIGRAM(S): 100 INJECTION SUBCUTANEOUS at 05:14

## 2022-02-20 RX ADMIN — Medication 25 MILLIGRAM(S): at 08:06

## 2022-02-20 RX ADMIN — Medication 975 MILLIGRAM(S): at 13:01

## 2022-02-20 RX ADMIN — HYDROMORPHONE HYDROCHLORIDE 0.5 MILLIGRAM(S): 2 INJECTION INTRAMUSCULAR; INTRAVENOUS; SUBCUTANEOUS at 08:22

## 2022-02-20 RX ADMIN — GABAPENTIN 600 MILLIGRAM(S): 400 CAPSULE ORAL at 11:15

## 2022-02-20 RX ADMIN — Medication 975 MILLIGRAM(S): at 14:01

## 2022-02-20 RX ADMIN — HYDROMORPHONE HYDROCHLORIDE 4 MILLIGRAM(S): 2 INJECTION INTRAMUSCULAR; INTRAVENOUS; SUBCUTANEOUS at 22:05

## 2022-02-20 RX ADMIN — TAMOXIFEN CITRATE 20 MILLIGRAM(S): 20 TABLET, FILM COATED ORAL at 11:15

## 2022-02-20 RX ADMIN — Medication 975 MILLIGRAM(S): at 06:28

## 2022-02-20 RX ADMIN — HYDROMORPHONE HYDROCHLORIDE 4 MILLIGRAM(S): 2 INJECTION INTRAMUSCULAR; INTRAVENOUS; SUBCUTANEOUS at 21:05

## 2022-02-20 RX ADMIN — HYDROMORPHONE HYDROCHLORIDE 0.5 MILLIGRAM(S): 2 INJECTION INTRAMUSCULAR; INTRAVENOUS; SUBCUTANEOUS at 08:07

## 2022-02-20 RX ADMIN — Medication 975 MILLIGRAM(S): at 05:14

## 2022-02-20 RX ADMIN — HYDROMORPHONE HYDROCHLORIDE 4 MILLIGRAM(S): 2 INJECTION INTRAMUSCULAR; INTRAVENOUS; SUBCUTANEOUS at 01:10

## 2022-02-20 RX ADMIN — PANTOPRAZOLE SODIUM 40 MILLIGRAM(S): 20 TABLET, DELAYED RELEASE ORAL at 05:14

## 2022-02-20 RX ADMIN — HYDROMORPHONE HYDROCHLORIDE 4 MILLIGRAM(S): 2 INJECTION INTRAMUSCULAR; INTRAVENOUS; SUBCUTANEOUS at 02:34

## 2022-02-20 RX ADMIN — MELOXICAM 7.5 MILLIGRAM(S): 15 TABLET ORAL at 09:06

## 2022-02-20 RX ADMIN — HYDROMORPHONE HYDROCHLORIDE 4 MILLIGRAM(S): 2 INJECTION INTRAMUSCULAR; INTRAVENOUS; SUBCUTANEOUS at 14:01

## 2022-02-20 RX ADMIN — MELOXICAM 7.5 MILLIGRAM(S): 15 TABLET ORAL at 21:05

## 2022-02-20 RX ADMIN — Medication 81 MILLIGRAM(S): at 11:15

## 2022-02-20 RX ADMIN — MELOXICAM 7.5 MILLIGRAM(S): 15 TABLET ORAL at 08:06

## 2022-02-20 RX ADMIN — ENOXAPARIN SODIUM 30 MILLIGRAM(S): 100 INJECTION SUBCUTANEOUS at 17:04

## 2022-02-20 RX ADMIN — LOSARTAN POTASSIUM 100 MILLIGRAM(S): 100 TABLET, FILM COATED ORAL at 08:06

## 2022-02-20 RX ADMIN — AMLODIPINE BESYLATE 5 MILLIGRAM(S): 2.5 TABLET ORAL at 08:06

## 2022-02-20 RX ADMIN — Medication 975 MILLIGRAM(S): at 22:05

## 2022-02-20 RX ADMIN — Medication 975 MILLIGRAM(S): at 21:05

## 2022-02-21 PROCEDURE — 99232 SBSQ HOSP IP/OBS MODERATE 35: CPT

## 2022-02-21 RX ADMIN — Medication 975 MILLIGRAM(S): at 12:40

## 2022-02-21 RX ADMIN — PANTOPRAZOLE SODIUM 40 MILLIGRAM(S): 20 TABLET, DELAYED RELEASE ORAL at 05:24

## 2022-02-21 RX ADMIN — GABAPENTIN 600 MILLIGRAM(S): 400 CAPSULE ORAL at 11:49

## 2022-02-21 RX ADMIN — AMLODIPINE BESYLATE 5 MILLIGRAM(S): 2.5 TABLET ORAL at 05:21

## 2022-02-21 RX ADMIN — Medication 81 MILLIGRAM(S): at 11:49

## 2022-02-21 RX ADMIN — Medication 975 MILLIGRAM(S): at 11:49

## 2022-02-21 RX ADMIN — ENOXAPARIN SODIUM 30 MILLIGRAM(S): 100 INJECTION SUBCUTANEOUS at 17:11

## 2022-02-21 RX ADMIN — OXYCODONE HYDROCHLORIDE 10 MILLIGRAM(S): 5 TABLET ORAL at 22:20

## 2022-02-21 RX ADMIN — Medication 975 MILLIGRAM(S): at 06:22

## 2022-02-21 RX ADMIN — MELOXICAM 7.5 MILLIGRAM(S): 15 TABLET ORAL at 07:46

## 2022-02-21 RX ADMIN — TAMOXIFEN CITRATE 20 MILLIGRAM(S): 20 TABLET, FILM COATED ORAL at 11:49

## 2022-02-21 RX ADMIN — ENOXAPARIN SODIUM 30 MILLIGRAM(S): 100 INJECTION SUBCUTANEOUS at 05:22

## 2022-02-21 RX ADMIN — OXYCODONE HYDROCHLORIDE 10 MILLIGRAM(S): 5 TABLET ORAL at 02:19

## 2022-02-21 RX ADMIN — SENNA PLUS 2 TABLET(S): 8.6 TABLET ORAL at 22:19

## 2022-02-21 RX ADMIN — Medication 975 MILLIGRAM(S): at 05:22

## 2022-02-21 RX ADMIN — MELOXICAM 7.5 MILLIGRAM(S): 15 TABLET ORAL at 22:19

## 2022-02-21 RX ADMIN — Medication 25 MILLIGRAM(S): at 05:21

## 2022-02-21 RX ADMIN — Medication 975 MILLIGRAM(S): at 22:18

## 2022-02-21 RX ADMIN — Medication 975 MILLIGRAM(S): at 23:00

## 2022-02-21 RX ADMIN — OXYCODONE HYDROCHLORIDE 10 MILLIGRAM(S): 5 TABLET ORAL at 03:19

## 2022-02-21 RX ADMIN — MELOXICAM 7.5 MILLIGRAM(S): 15 TABLET ORAL at 23:30

## 2022-02-21 RX ADMIN — LOSARTAN POTASSIUM 100 MILLIGRAM(S): 100 TABLET, FILM COATED ORAL at 05:21

## 2022-02-21 NOTE — OCCUPATIONAL THERAPY INITIAL EVALUATION ADULT - GENERAL OBSERVATIONS, REHAB EVAL
Received pt sitting in bedside chair, NAD, +IV lock, +on RA, A&Ox4. Pre pain level 6/10, post pain level 8/10-Right LE; RN aware. Patient agreeable to OT evaluation

## 2022-02-21 NOTE — OCCUPATIONAL THERAPY INITIAL EVALUATION ADULT - LIVES WITH, PROFILE
Pt reports lives in a private house with her  who can assist. [address] with hand rail, no steps inside to negotiate/spouse

## 2022-02-21 NOTE — OCCUPATIONAL THERAPY INITIAL EVALUATION ADULT - IMPAIRED TRANSFERS: SIT/STAND, REHAB EVAL
Panel Management Review      Patient has the following on his problem list:     Diabetes    ASA: Passed    Last A1C  Lab Results   Component Value Date    A1C 6.1 07/19/2018    A1C 5.6 07/11/2017    A1C 5.8 03/03/2016    A1C 5.9 07/16/2014    A1C 5.9 11/06/2013     A1C tested: Passed    Last LDL:    Lab Results   Component Value Date    CHOL 206 07/19/2018     Lab Results   Component Value Date    HDL 66 07/19/2018     Lab Results   Component Value Date     07/19/2018     Lab Results   Component Value Date    TRIG 81 07/19/2018     Lab Results   Component Value Date    CHOLHDLRATIO 2.0 11/06/2013     Lab Results   Component Value Date    NHDL 140 07/19/2018       Is the patient on a Statin? YES             Is the patient on Aspirin? YES    Medications     HMG CoA Reductase Inhibitors     atorvastatin (LIPITOR) 10 MG tablet        STATIN NOT PRESCRIBED, INTENTIONAL,       Salicylates     aspirin 81 MG tablet             Last three blood pressure readings:  BP Readings from Last 3 Encounters:   08/13/18 125/85   07/11/17 129/89   03/03/16 124/88       Date of last diabetes office visit: 08/13/2018     Tobacco History:     History   Smoking Status     Never Smoker   Smokeless Tobacco     Never Used           Composite cancer screening  Chart review shows that this patient is due/due soon for the following None  Summary:    Patient is due/failing the following:   Diabetes and A1C    Action needed:   Patient needs office visit for a diabetic check.    Type of outreach:    Phone, left message for patient to call back.     Questions for provider review:    None                                                                                                                                    Esme Welsh CMA     Chart routed to self.          
pain/decreased strength

## 2022-02-21 NOTE — OCCUPATIONAL THERAPY INITIAL EVALUATION ADULT - RANGE OF MOTION EXAMINATION
Left UE AROM all joints WFL. Right UE all joints WFL except for shoulder flexion only to 90 degrees. Pt reports previous injury

## 2022-02-21 NOTE — OCCUPATIONAL THERAPY INITIAL EVALUATION ADULT - ADDITIONAL COMMENTS
Pt has a tub with curtrains and no grab bars. Pt owns a RW, cane, shower chair and commode. Pt is right handed and drives. Pt states spouse responsible for IADLs

## 2022-02-21 NOTE — OCCUPATIONAL THERAPY INITIAL EVALUATION ADULT - LOWER BODY DRESSING, PREVIOUS LEVEL OF FUNCTION, OT EVAL
Pt states spouse would assist with socks and shoes but she was able to help with her underwear and pants/independent/needed assist

## 2022-02-22 VITALS — SYSTOLIC BLOOD PRESSURE: 144 MMHG | DIASTOLIC BLOOD PRESSURE: 68 MMHG

## 2022-02-22 PROCEDURE — 97116 GAIT TRAINING THERAPY: CPT

## 2022-02-22 PROCEDURE — 97167 OT EVAL HIGH COMPLEX 60 MIN: CPT

## 2022-02-22 PROCEDURE — 97163 PT EVAL HIGH COMPLEX 45 MIN: CPT

## 2022-02-22 PROCEDURE — 73560 X-RAY EXAM OF KNEE 1 OR 2: CPT

## 2022-02-22 PROCEDURE — 80048 BASIC METABOLIC PNL TOTAL CA: CPT

## 2022-02-22 PROCEDURE — C1713: CPT

## 2022-02-22 PROCEDURE — 99232 SBSQ HOSP IP/OBS MODERATE 35: CPT

## 2022-02-22 PROCEDURE — 82962 GLUCOSE BLOOD TEST: CPT

## 2022-02-22 PROCEDURE — 36415 COLL VENOUS BLD VENIPUNCTURE: CPT

## 2022-02-22 PROCEDURE — C1776: CPT

## 2022-02-22 PROCEDURE — 97110 THERAPEUTIC EXERCISES: CPT

## 2022-02-22 PROCEDURE — 85027 COMPLETE CBC AUTOMATED: CPT

## 2022-02-22 RX ORDER — BNT162B2 0.23 MG/2.25ML
0.3 INJECTION, SUSPENSION INTRAMUSCULAR ONCE
Refills: 0 | Status: COMPLETED | OUTPATIENT
Start: 2022-02-22 | End: 2022-02-22

## 2022-02-22 RX ADMIN — TAMOXIFEN CITRATE 20 MILLIGRAM(S): 20 TABLET, FILM COATED ORAL at 11:55

## 2022-02-22 RX ADMIN — BNT162B2 0.3 MILLILITER(S): 0.23 INJECTION, SUSPENSION INTRAMUSCULAR at 15:04

## 2022-02-22 RX ADMIN — OXYCODONE HYDROCHLORIDE 10 MILLIGRAM(S): 5 TABLET ORAL at 05:58

## 2022-02-22 RX ADMIN — Medication 975 MILLIGRAM(S): at 05:57

## 2022-02-22 RX ADMIN — Medication 81 MILLIGRAM(S): at 11:55

## 2022-02-22 RX ADMIN — PANTOPRAZOLE SODIUM 40 MILLIGRAM(S): 20 TABLET, DELAYED RELEASE ORAL at 07:37

## 2022-02-22 RX ADMIN — MELOXICAM 7.5 MILLIGRAM(S): 15 TABLET ORAL at 08:07

## 2022-02-22 RX ADMIN — GABAPENTIN 600 MILLIGRAM(S): 400 CAPSULE ORAL at 11:55

## 2022-02-22 RX ADMIN — MELOXICAM 7.5 MILLIGRAM(S): 15 TABLET ORAL at 07:37

## 2022-02-22 RX ADMIN — OXYCODONE HYDROCHLORIDE 10 MILLIGRAM(S): 5 TABLET ORAL at 06:28

## 2022-02-22 RX ADMIN — Medication 975 MILLIGRAM(S): at 06:27

## 2022-02-22 RX ADMIN — ENOXAPARIN SODIUM 30 MILLIGRAM(S): 100 INJECTION SUBCUTANEOUS at 05:56

## 2022-02-22 NOTE — PROGRESS NOTE ADULT - ASSESSMENT
Pt 65 y/o Female  has a known hx of bone on bone medial compartmental osteoarthritis of the right knee, report pain has progressively gotten worse within the past few months. Pt pain is localized in the medial aspect of the knee. Pt  is also s/p right URBANO on 8/10/21, left URBANO 2020 and left knee replacement 2021. PMH of HTN, OA, Breast cancer s/p bilateral mastectomy on Tamoxifen.  Pt requires the use of a cane, walker and wheelchair to assist with mobility.       1. R knee OA , S/P R TKA POD#2   PT/OT/pain mgmt  DVT prophylaxis- as per ortho  Abx as per SCIP- given   Incentive spirometry  Prophylaxis of opioid  induced constipation.    2. HTN - continue home meds with parameters .   BP well controlled     3 . Hx of breast cancer , s/p b/l mastectomy 4 yrs ago , on Tamoxifen - continue     4. Severely obese  - BMI recalculated this am and is 63  - diet / life changes/   bariatric surgery to be considered recommended to patient     5. At risk of sleep apnea - snores - Sleep study as on outpatient recommended     6. Leucocytosis post op - no S/S of infection - likely reactive     7. Anemia - acute blood lost anemia - secondary to surgical blood lost -  asymptomatic.    Stairs training pending .   Medically stable to d/c once cleared by physical therapy / ortho. 
Pt 67 y/o Female  has a known hx of bone on bone medial compartmental osteoarthritis of the right knee, report pain has progressively gotten worse within the past few months. Pt pain is localized in the medial aspect of the knee. Pt  is also s/p right URBANO on 8/10/21, left URBANO 2020 and left knee replacement 2021. PMH of HTN, OA, Breast cancer s/p bilateral mastectomy on Tamoxifen.  Pt requires the use of a cane, walker and wheelchair to assist with mobility.       1. R knee OA , S/P R TKA POD#3   PT/OT/pain mgmt  DVT prophylaxis- as per ortho  Abx as per SCIP- given   Incentive spirometry  Prophylaxis of opioid  induced constipation.  Wound care as per primary team     2. HTN - continue home meds with parameters .   BP well controlled     3 . Hx of breast cancer , s/p b/l mastectomy 4 yrs ago , on Tamoxifen - continue     4. Severely obese  - BMI recalculated this am and is 63  - diet / life changes/   bariatric surgery to be considered recommended to patient     5. At risk of sleep apnea - snores - Sleep study as on outpatient recommended     6. Leucocytosis post op - no S/S of infection - likely reactive     7. Anemia - acute blood lost anemia - secondary to surgical blood lost -  asymptomatic.    Stairs training in progress    Medically stable to d/c once cleared by physical therapy / ortho. 
Pt 65 y/o Female  has a known hx of bone on bone medial compartmental osteoarthritis of the right knee, report pain has progressively gotten worse within the past few months. Pt pain is localized in the medial aspect of the knee. Pt  is also s/p right URBANO on 8/10/21, left URBANO 2020 and left knee replacement 2021. PMH of HTN, OA, Breast cancer s/p bilateral mastectomy on Tamoxifen.  Pt requires the use of a cane, walker and wheelchair to assist with mobility.       1. R knee OA , S/P R TKA POD#4   PT/OT/pain mgmt  DVT prophylaxis- as per ortho  Abx as per SCIP- given   Incentive spirometry  Prophylaxis of opioid  induced constipation.  Wound care as per primary team     2. HTN - continue home meds with parameters .   BP well controlled     3 . Hx of breast cancer , s/p b/l mastectomy 4 yrs ago , on Tamoxifen - continue     4. Severely obese  - BMI recalculated this am and is 63  - diet / life changes/   bariatric surgery to be considered recommended to patient     5. At risk of sleep apnea - snores - Sleep study as on outpatient recommended     6. Leucocytosis post op - no S/S of infection - likely reactive     7. Anemia - acute blood lost anemia - secondary to surgical blood lost -  asymptomatic.    Stairs training in progress    Medically stable to d/c once cleared by physical therapy / ortho.

## 2022-02-22 NOTE — PROGRESS NOTE ADULT - SUBJECTIVE AND OBJECTIVE BOX
Patient seen and examined . S/p R TKA  , POD # 4. Pain well controlled , no n/v , had few BM yesterday ,   no diarrhea , participating with physical therapy , stairs training in progress . States had episodes of restless   legs over night .    CC : R knee chronic pain postop well controlled , frequent BM yesterday,   restless legs over night       MEDICATIONS  (STANDING):  acetaminophen     Tablet .. 975 milliGRAM(s) Oral every 8 hours  amLODIPine   Tablet 5 milliGRAM(s) Oral daily  aspirin enteric coated 81 milliGRAM(s) Oral daily  enoxaparin Injectable 30 milliGRAM(s) SubCutaneous two times a day  gabapentin 600 milliGRAM(s) Oral daily  hydrochlorothiazide 25 milliGRAM(s) Oral daily  losartan 100 milliGRAM(s) Oral daily  meloxicam 7.5 milliGRAM(s) Oral <User Schedule>  pantoprazole    Tablet 40 milliGRAM(s) Oral before breakfast  senna 2 Tablet(s) Oral at bedtime  sodium chloride 0.9%. 1000 milliLiter(s) (150 mL/Hr) IV Continuous <Continuous>  tamoxifen 20 milliGRAM(s) Oral daily    MEDICATIONS  (PRN):  bisacodyl Suppository 10 milliGRAM(s) Rectal once PRN Constipation  HYDROmorphone   Tablet 4 milliGRAM(s) Oral every 3 hours PRN Severe Pain (7 - 10)  HYDROmorphone  Injectable 0.5 milliGRAM(s) IV Push every 3 hours PRN breakthrough pain  magnesium hydroxide Suspension 30 milliLiter(s) Oral daily PRN Constipation  ondansetron Injectable 4 milliGRAM(s) IV Push every 6 hours PRN Nausea and/or Vomiting  oxyCODONE    IR 5 milliGRAM(s) Oral every 3 hours PRN Mild Pain (1 - 3)  oxyCODONE    IR 10 milliGRAM(s) Oral every 3 hours PRN Moderate Pain (4 - 6)  triamcinolone 0.025% Cream 1 Application(s) Topical two times a day PRN Rash and/or Itching          REVIEW OF SYSTEMS:      Vital Signs Last 24 Hrs  T(C): 37.1 (22 Feb 2022 08:03), Max: 37.1 (22 Feb 2022 08:03)  T(F): 98.8 (22 Feb 2022 08:03), Max: 98.8 (22 Feb 2022 08:03)  HR: 82 (22 Feb 2022 08:03) (76 - 92)  BP: 145/81 (22 Feb 2022 08:03) (113/74 - 145/81)  BP(mean): --  RR: 18 (22 Feb 2022 08:03) (18 - 18)  SpO2: 93% (22 Feb 2022 08:03) (93% - 96%)    PHYSICAL EXAM:    GENERAL: NAD, well-groomed, severely obese    HEAD:  Atraumatic, Normocephalic  EYES: EOMI, PERRLA, conjunctiva and sclera clear  NECK: Supple, No JVD, Normal thyroid  NERVOUS SYSTEM:  Alert & Oriented X4, no focal deficit  CHEST/LUNG: CTA b/l ,  no  rales, rhonchi, wheezing, or rubs  HEART: Regular rate and rhythm; No murmurs, rubs, or gallops  ABDOMEN: Soft, Nontender, Nondistended; Bowel sounds present,   obese  EXTREMITIES:  2+ Peripheral Pulses, No clubbing, cyanosis, or edema,   R knee dressing + , clean and dry   LYMPH: No lymphadenopathy noted  SKIN: No rashes or lesions  
History: Patient is status post right total knee arthroplasty on 2/18, POD # 0.   Patient is doing well and is comfortable.   The patient's pain is controlled using the prescribed pain medications.   The patient is participating in physical therapy.   Denies nausea, vomiting, chest pain, shortness of breath, abdominal pain or fever. No new complaints.    Vital Signs Last 24 Hrs  T(C): 36.7 (18 Feb 2022 21:22), Max: 36.7 (18 Feb 2022 16:29)  T(F): 98 (18 Feb 2022 21:22), Max: 98 (18 Feb 2022 16:29)  HR: 73 (18 Feb 2022 21:22) (63 - 96)  BP: 121/76 (18 Feb 2022 21:22) (85/43 - 143/84)  BP(mean): --  RR: 18 (18 Feb 2022 21:22) (14 - 18)  SpO2: 94% (18 Feb 2022 21:22) (91% - 100%)      Physical exam: The Right knee wrapped with ace, CDI. No drainage or discharge.  The calf is supple nontender.   Passive range of motion is acceptable to due postoperative pain.   No calf tenderness. Sensation to light touch is grossly intact distally.   Motor function distally is 5/5. Extensor hallucis longus and flexor hallucis longus are intact.   No foot drop. 2+ dorsalis pedis pulse. Capillary refill is less than 2 seconds. No cyanosis.    Primary Orthopedic Assessment:  • s/p RIGHT total knee replacement    Plan:   • DVT prophylaxis as prescribed, including use of compression devices and ankle pumps  • Continue physical therapy  • Weightbearing as tolerated of the Right lower extremity with assistance of a walker  • Incentive spirometry encouraged  • Pain control as clinically indicated  • Ancef per scip
NANCI MATIAS    [id number]    History: 66y Female is status post right total knee arthroplasty on POD #2. Patient is doing well and is comfortable. The patient's pain is controlled using the prescribed pain medications. The patient is participating in physical therapy. Denies nausea, vomiting, chest pain, shortness of breath, abdominal pain or fever. No new complaints.                          10.7   11.05 )-----------( 268      ( 20 Feb 2022 06:35 )             33.2     02-20    138  |  104  |  18.9  ----------------------------<  126<H>  3.7   |  24.0  |  0.58    Ca    8.3<L>      20 Feb 2022 06:35        MEDICATIONS  (STANDING):  acetaminophen     Tablet .. 975 milliGRAM(s) Oral every 8 hours  amLODIPine   Tablet 5 milliGRAM(s) Oral daily  aspirin enteric coated 81 milliGRAM(s) Oral daily  enoxaparin Injectable 30 milliGRAM(s) SubCutaneous two times a day  gabapentin 600 milliGRAM(s) Oral daily  hydrochlorothiazide 25 milliGRAM(s) Oral daily  losartan 100 milliGRAM(s) Oral daily  meloxicam 7.5 milliGRAM(s) Oral <User Schedule>  pantoprazole    Tablet 40 milliGRAM(s) Oral before breakfast  senna 2 Tablet(s) Oral at bedtime  sodium chloride 0.9%. 1000 milliLiter(s) (150 mL/Hr) IV Continuous <Continuous>  tamoxifen 20 milliGRAM(s) Oral daily    MEDICATIONS  (PRN):  bisacodyl Suppository 10 milliGRAM(s) Rectal once PRN Constipation  HYDROmorphone   Tablet 4 milliGRAM(s) Oral every 3 hours PRN Severe Pain (7 - 10)  HYDROmorphone  Injectable 0.5 milliGRAM(s) IV Push every 3 hours PRN breakthrough pain  magnesium hydroxide Suspension 30 milliLiter(s) Oral daily PRN Constipation  ondansetron Injectable 4 milliGRAM(s) IV Push every 6 hours PRN Nausea and/or Vomiting  oxyCODONE    IR 5 milliGRAM(s) Oral every 3 hours PRN Mild Pain (1 - 3)  oxyCODONE    IR 10 milliGRAM(s) Oral every 3 hours PRN Moderate Pain (4 - 6)  triamcinolone 0.025% Cream 1 Application(s) Topical two times a day PRN Rash and/or Itching      Physical exam: The right knee dressing is clean, dry and intact. No drainage or discharge. No erythema is noted. No blistering. No ecchymosis. The calf is supple nontender. Passive range of motion is acceptable to due postoperative pain. Sensation to light touch is grossly intact distally. Motor function distally is 5/5. Extensor hallucis longus and flexor hallucis longus are intact. No foot drop. 2+ dorsalis pedis pulse. Capillary refill is less than 2 seconds. No cyanosis.    Primary Orthopedic Assessment:  • s/p RIGHT total knee replacement    Secondary  Orthopedic Assessment(s):   •     Secondary  Medical Assessment(s):   •     Plan:   • DVT prophylaxis as prescribed, including use of compression devices and ankle pumps  • Continue physical therapy  • Weightbearing as tolerated of the right lower extremity with assistance of a walker  • Incentive spirometry encouraged  • Pain control as clinically indicated  • Discharge planning – anticipated discharge is Home today
NANCI MATIAS    [id number]    History: 66y Female is status post right total knee arthroplasty, POD # 1. Patient is doing well and is comfortable. The patient's pain is controlled using the prescribed pain medications. The patient is participating in physical therapy. She ambulated to the bathroom this am with RW.  Denies nausea, vomiting, chest pain, shortness of breath, abdominal pain or fever. No new complaints.                              10.6   11.76 )-----------( 269      ( 19 Feb 2022 06:23 )             33.2     02-19    137  |  102  |  20.0  ----------------------------<  140<H>  4.1   |  24.0  |  0.74    Ca    8.4<L>      19 Feb 2022 06:23        MEDICATIONS  (STANDING):  acetaminophen     Tablet .. 975 milliGRAM(s) Oral every 8 hours  aspirin enteric coated 81 milliGRAM(s) Oral daily  enoxaparin Injectable 30 milliGRAM(s) SubCutaneous two times a day  gabapentin 600 milliGRAM(s) Oral daily  ketorolac   Injectable 15 milliGRAM(s) IV Push every 6 hours  pantoprazole    Tablet 40 milliGRAM(s) Oral before breakfast  senna 2 Tablet(s) Oral at bedtime  sodium chloride 0.9%. 1000 milliLiter(s) (150 mL/Hr) IV Continuous <Continuous>  tamoxifen 20 milliGRAM(s) Oral daily    MEDICATIONS  (PRN):  HYDROmorphone   Tablet 4 milliGRAM(s) Oral every 3 hours PRN Severe Pain (7 - 10)  HYDROmorphone  Injectable 0.5 milliGRAM(s) IV Push every 3 hours PRN breakthrough pain  magnesium hydroxide Suspension 30 milliLiter(s) Oral daily PRN Constipation  ondansetron Injectable 4 milliGRAM(s) IV Push every 6 hours PRN Nausea and/or Vomiting  oxyCODONE    IR 5 milliGRAM(s) Oral every 3 hours PRN Mild Pain (1 - 3)  oxyCODONE    IR 10 milliGRAM(s) Oral every 3 hours PRN Moderate Pain (4 - 6)  triamcinolone 0.025% Cream 1 Application(s) Topical two times a day PRN Rash and/or Itching    Vital Signs Last 24 Hrs  T(C): 36.6 (19 Feb 2022 04:24), Max: 36.7 (18 Feb 2022 16:29)  T(F): 97.9 (19 Feb 2022 04:24), Max: 98 (18 Feb 2022 16:29)  HR: 83 (19 Feb 2022 04:24) (63 - 96)  BP: 114/74 (19 Feb 2022 04:24) (85/43 - 143/84)  BP(mean): --  RR: 18 (19 Feb 2022 04:24) (14 - 18)  SpO2: 93% (19 Feb 2022 04:24) (91% - 100%)  Lying in bed in NAD, awake and alert    Physical exam: Right lower extremity- The right knee dressing is clean, dry and intact. No drainage or discharge. No erythema is noted. No blistering. No ecchymosis. The calf is supple. No calf tenderness. Sensation to light touch is grossly intact distally. Motor function distally is 5/5. Extensor hallucis longus and flexor hallucis longus are intact. No foot drop. 2+ dorsalis pedis pulse. Capillary refill is less than 2 seconds. No cyanosis.    Primary Orthopedic Assessment:  • s/p RIGHT total knee replacement, POD#1      Plan:   • DVT prophylaxis as prescribed- Lov, including use of compression devices and ankle pumps  • Continue physical therapy  • Weightbearing as tolerated of the right lower extremity with assistance of a walker  • Incentive spirometry encouraged  • Pain control as clinically indicated  • Discharge planning – anticipated discharge is Home after cleared by PT and medicine   
NANCI MATIAS  [id number]    History: 66y Female is status post right total knee arthroplasty POD #3. Patient is doing well and is comfortable. The patient's pain is controlled using the prescribed pain medications. Patient states that she was unable to work with PT yesterday secondary to pain. Patient states that she feels better today and states wants to start moving around. Denies CP, SOB, dizziness, HA, fever/chills, numbness or tingling. No new complaints.    Vital Signs Last 24 Hrs  T(C): 36.8 (21 Feb 2022 04:08), Max: 36.8 (20 Feb 2022 08:05)  T(F): 98.3 (21 Feb 2022 04:08), Max: 98.3 (21 Feb 2022 04:08)  HR: 80 (21 Feb 2022 04:08) (79 - 90)  BP: 128/72 (21 Feb 2022 04:08) (124/79 - 157/84)  BP(mean): --  RR: 18 (21 Feb 2022 04:08) (18 - 18)  SpO2: 96% (21 Feb 2022 04:08) (92% - 96%)                          10.7   11.05 )-----------( 268      ( 20 Feb 2022 06:35 )             33.2     02-20    138  |  104  |  18.9  ----------------------------<  126<H>  3.7   |  24.0  |  0.58    Ca    8.3<L>      20 Feb 2022 06:35        General: Alert, awake, NAD  Physical exam: The right knee Mepliex dressing is clean, dry and intact. No drainage, discharge noted. No erythema, blistering or ecchymosis noted to visible skin.    The calf is supple nontender b/l.   Sensation grossly intact to light touch distally. +AT/GC/EHL/FHL.   2+ DP pulse. BCR. No cyanosis.    Plan:   - DVT prophylaxis as prescribed, including use of compression devices and ankle pumps  - Continue physical therapy  - Weight bearing status of surgical extremity: WBAT  - Incentive spirometry encouraged  - Pain control as clinically indicated  - Discharge planning – anticipated discharge is Home when cleared by PT and Medicine 
Patient seen and examined . S/p R TKA  , POD #3. Pain well controlled , no n/v , voiding without difficulty,   participating with physical therapy .      CC : R knee chronic pain postop well controlled       MEDICATIONS  (STANDING):  acetaminophen     Tablet .. 975 milliGRAM(s) Oral every 8 hours  amLODIPine   Tablet 5 milliGRAM(s) Oral daily  aspirin enteric coated 81 milliGRAM(s) Oral daily  enoxaparin Injectable 30 milliGRAM(s) SubCutaneous two times a day  gabapentin 600 milliGRAM(s) Oral daily  hydrochlorothiazide 25 milliGRAM(s) Oral daily  losartan 100 milliGRAM(s) Oral daily  meloxicam 7.5 milliGRAM(s) Oral <User Schedule>  pantoprazole    Tablet 40 milliGRAM(s) Oral before breakfast  senna 2 Tablet(s) Oral at bedtime  sodium chloride 0.9%. 1000 milliLiter(s) (150 mL/Hr) IV Continuous <Continuous>  tamoxifen 20 milliGRAM(s) Oral daily    MEDICATIONS  (PRN):  bisacodyl Suppository 10 milliGRAM(s) Rectal once PRN Constipation  HYDROmorphone   Tablet 4 milliGRAM(s) Oral every 3 hours PRN Severe Pain (7 - 10)  HYDROmorphone  Injectable 0.5 milliGRAM(s) IV Push every 3 hours PRN breakthrough pain  magnesium hydroxide Suspension 30 milliLiter(s) Oral daily PRN Constipation  ondansetron Injectable 4 milliGRAM(s) IV Push every 6 hours PRN Nausea and/or Vomiting  oxyCODONE    IR 5 milliGRAM(s) Oral every 3 hours PRN Mild Pain (1 - 3)  oxyCODONE    IR 10 milliGRAM(s) Oral every 3 hours PRN Moderate Pain (4 - 6)  triamcinolone 0.025% Cream 1 Application(s) Topical two times a day PRN Rash and/or Itching      LABS:                          10.7   11.05 )-----------( 268      ( 20 Feb 2022 06:35 )             33.2     02-20    138  |  104  |  18.9  ----------------------------<  126<H>  3.7   |  24.0  |  0.58    Ca    8.3<L>      20 Feb 2022 06:35            RADIOLOGY & ADDITIONAL TESTS:      REVIEW OF SYSTEMS:    R knee chronic pain postop well controlled , all other systems are reviewed   and are negative     Vital Signs Last 24 Hrs  T(C): 36.7 (21 Feb 2022 09:11), Max: 36.8 (20 Feb 2022 23:30)  T(F): 98 (21 Feb 2022 09:11), Max: 98.3 (21 Feb 2022 04:08)  HR: 82 (21 Feb 2022 09:11) (80 - 90)  BP: 106/66 (21 Feb 2022 09:11) (106/66 - 157/84)  BP(mean): --  RR: 18 (21 Feb 2022 09:11) (18 - 18)  SpO2: 93% (21 Feb 2022 09:11) (92% - 96%)    PHYSICAL EXAM:    GENERAL: NAD, well-groomed, obese  HEAD:  Atraumatic, Normocephalic  EYES: EOMI, PERRLA, conjunctiva and sclera clear  NECK: Supple, No JVD, Normal thyroid  NERVOUS SYSTEM:  Alert & Oriented X3, no focal deficit  CHEST/LUNG: CTA b/l ,  no  rales, rhonchi, wheezing, or rubs  HEART: Regular rate and rhythm; No murmurs, rubs, or gallops  ABDOMEN: Obese Soft, Nontender, Nondistended; Bowel sounds present  EXTREMITIES:  2+ Peripheral Pulses, No clubbing, cyanosis, or edema,   R knee dressing + , clean and dry   LYMPH: No lymphadenopathy noted  SKIN: No rashes or lesions  
Patient seen and examined at bedside. comfortable in bed, pain controlled. Denies fever/chills, SOB/chest pain, abdominal pain, numbness/tingling. no complaints.    Vital Signs Last 24 Hrs  T(C): 36.4 (22 Feb 2022 05:14), Max: 36.7 (21 Feb 2022 09:11)  T(F): 97.6 (22 Feb 2022 05:14), Max: 98 (21 Feb 2022 09:11)  HR: 76 (22 Feb 2022 05:14) (76 - 92)  BP: 114/70 (22 Feb 2022 05:14) (106/66 - 114/70)  BP(mean): --  RR: 18 (22 Feb 2022 05:14) (18 - 18)  SpO2: 96% (22 Feb 2022 05:14) (93% - 96%)    RLE: Mepilex dressing C/D/I. SILT. + dorsi/plantarflexion. DP 2+. calf soft NT B/L.    A/P: 66 Y.O f S/P RIGHT tka pod #4  - WBAT  - DVTP  - d/c planning - home today if cleared by PT/medicine
Patient seen and examined . S/p R TKA  , POD # 2 . Pain well controlled , no n/v ,   voiding without difficulty , participating with physical therapy , stairs training pending .     CC : R knee chronic pain post op well controlled       MEDICATIONS  (STANDING):  acetaminophen     Tablet .. 975 milliGRAM(s) Oral every 8 hours  amLODIPine   Tablet 5 milliGRAM(s) Oral daily  aspirin enteric coated 81 milliGRAM(s) Oral daily  enoxaparin Injectable 30 milliGRAM(s) SubCutaneous two times a day  gabapentin 600 milliGRAM(s) Oral daily  hydrochlorothiazide 25 milliGRAM(s) Oral daily  losartan 100 milliGRAM(s) Oral daily  meloxicam 7.5 milliGRAM(s) Oral <User Schedule>  pantoprazole    Tablet 40 milliGRAM(s) Oral before breakfast  senna 2 Tablet(s) Oral at bedtime  sodium chloride 0.9%. 1000 milliLiter(s) (150 mL/Hr) IV Continuous <Continuous>  tamoxifen 20 milliGRAM(s) Oral daily    MEDICATIONS  (PRN):  bisacodyl Suppository 10 milliGRAM(s) Rectal once PRN Constipation  HYDROmorphone   Tablet 4 milliGRAM(s) Oral every 3 hours PRN Severe Pain (7 - 10)  HYDROmorphone  Injectable 0.5 milliGRAM(s) IV Push every 3 hours PRN breakthrough pain  magnesium hydroxide Suspension 30 milliLiter(s) Oral daily PRN Constipation  ondansetron Injectable 4 milliGRAM(s) IV Push every 6 hours PRN Nausea and/or Vomiting  oxyCODONE    IR 5 milliGRAM(s) Oral every 3 hours PRN Mild Pain (1 - 3)  oxyCODONE    IR 10 milliGRAM(s) Oral every 3 hours PRN Moderate Pain (4 - 6)  triamcinolone 0.025% Cream 1 Application(s) Topical two times a day PRN Rash and/or Itching      LABS:                          10.7   11.05 )-----------( 268      ( 20 Feb 2022 06:35 )             33.2     02-20    138  |  104  |  18.9  ----------------------------<  126<H>  3.7   |  24.0  |  0.58    Ca    8.3<L>      20 Feb 2022 06:35      REVIEW OF SYSTEMS:    R knee chronic pain post op well controlled , all other systems are reviewed   and are negative     Vital Signs Last 24 Hrs  T(C): 36.8 (20 Feb 2022 08:05), Max: 37.1 (19 Feb 2022 20:03)  T(F): 98.2 (20 Feb 2022 08:05), Max: 98.7 (19 Feb 2022 20:03)  HR: 79 (20 Feb 2022 08:05) (79 - 90)  BP: 135/83 (20 Feb 2022 08:05) (103/63 - 135/83)  BP(mean): --  RR: 18 (20 Feb 2022 08:05) (18 - 18)  SpO2: 92% (20 Feb 2022 08:05) (92% - 95%)    PHYSICAL EXAM:    GENERAL: NAD, well-groomed, well-developed  HEAD:  Atraumatic, Normocephalic  EYES: EOMI, PERRLA, conjunctiva and sclera clear  NECK: Supple, No JVD, Normal thyroid  NERVOUS SYSTEM:  Alert & Oriented X3, no focal deficit  CHEST/LUNG: CTA b/l ,  no  rales, rhonchi, wheezing, or rubs  HEART: Regular rate and rhythm; No murmurs, rubs, or gallops  ABDOMEN: Soft, Nontender, Nondistended; Bowel sounds present  EXTREMITIES:  2+ Peripheral Pulses, No clubbing, cyanosis, or edema,   R knee dressing + , clean and dry   LYMPH: No lymphadenopathy noted  SKIN: No rashes or lesions

## 2022-02-23 ENCOUNTER — RX RENEWAL (OUTPATIENT)
Age: 67
End: 2022-02-23

## 2022-02-24 ENCOUNTER — NON-APPOINTMENT (OUTPATIENT)
Age: 67
End: 2022-02-24

## 2022-03-06 RX ORDER — OMEPRAZOLE 10 MG/1
1 CAPSULE, DELAYED RELEASE ORAL
Qty: 28 | Refills: 0
Start: 2022-03-06 | End: 2022-04-02

## 2022-03-06 RX ORDER — ASPIRIN/CALCIUM CARB/MAGNESIUM 324 MG
1 TABLET ORAL
Qty: 56 | Refills: 0
Start: 2022-03-06 | End: 2022-04-02

## 2022-03-11 ENCOUNTER — APPOINTMENT (OUTPATIENT)
Dept: ORTHOPEDIC SURGERY | Facility: CLINIC | Age: 67
End: 2022-03-11
Payer: MEDICARE

## 2022-03-11 VITALS
DIASTOLIC BLOOD PRESSURE: 75 MMHG | BODY MASS INDEX: 57.52 KG/M2 | HEIGHT: 60 IN | SYSTOLIC BLOOD PRESSURE: 125 MMHG | WEIGHT: 293 LBS | HEART RATE: 91 BPM

## 2022-03-11 PROCEDURE — 99024 POSTOP FOLLOW-UP VISIT: CPT

## 2022-03-11 PROCEDURE — 73562 X-RAY EXAM OF KNEE 3: CPT | Mod: RT

## 2022-03-11 NOTE — END OF VISIT
[FreeTextEntry3] : I, Willard Falk, acted solely as a scribe for Dr. Germán Henderson on this date 03/11/2022.

## 2022-03-11 NOTE — HISTORY OF PRESENT ILLNESS
[Xray (Date:___)] : [unfilled] Xray -  [Doing Well] : is doing well [No Sign of Infection] : is showing no signs of infection [3] : the patient reports pain that is 3/10 in severity [Clean/Dry/Intact] : clean, dry and intact [Healed] : healed [Swelling] : swollen [Neuro Intact] : an unremarkable neurological exam [Vascular Intact] : ~T peripheral vascular exam normal [Negative Marcel's] : maneuvers demonstrated a negative Marcel's sign [Chills] : no chills [Constipation] : no constipation [Diarrhea] : no diarrhea [Dysuria] : no dysuria [Fever] : no fever [Nausea] : no nausea [Vomiting] : no vomiting [Erythema] : not erythematous [Discharge] : absent of discharge [Dehiscence] : not dehisced [de-identified] : Right total knee arthroplasty.\par  s/p Computer-assisted tibial resection, [facility] procedure DOS : 02/18/22 [de-identified] : Pt is 3 weeks post-op. She completed Lovenox and is now on aspirin for DVTP. She is in home PT. She is in a wheelchair today and ambulates with a walker. She is only taking Tylenol and ibuprofen for pain. She was having muscle pain radiating to her foot. [de-identified] : Right knee exam shows healing incision with no sign of infection. ROM 0-90 degrees. [de-identified] : 3V xray of the right knee done in the office today and reviewed by Dr. Germán Henderson demonstrates s/p implants in good positioning with no evidence of wear, loosening, or subsidence.  [de-identified] : Pt should continue taking aspirin BID for DVTP. She should continue to do low impact exercises. Pt understands the importance of prophylaxis for invasive dental procedures. F/u with us in 3 weeks.

## 2022-03-16 ENCOUNTER — RX RENEWAL (OUTPATIENT)
Age: 67
End: 2022-03-16

## 2022-03-21 ENCOUNTER — RX RENEWAL (OUTPATIENT)
Age: 67
End: 2022-03-21

## 2022-03-21 PROBLEM — Z91.89 OTHER SPECIFIED PERSONAL RISK FACTORS, NOT ELSEWHERE CLASSIFIED: Chronic | Status: ACTIVE | Noted: 2022-02-09

## 2022-03-23 NOTE — END OF VISIT
[Time Spent: ___ minutes] : I have spent [unfilled] minutes of time on the encounter. [FreeTextEntry3] : Written by Lise Bliss, acting as a scribe for Dr. Stacie Recio.\par This note accurately reflects the work and decisions made by me.

## 2022-03-23 NOTE — ASSESSMENT
[FreeTextEntry1] : In evaluating for any precancer/abnormalities, her biopsy from her initial visit with me are very reassuring showing only ulcerations and chronic inflammation. I am recommending patient continue with topical ointments as she may have been on the road to healing following her initial biopsies with Dr. Felder. For irritation and inflammation, it's important to understand the source - advised patient to closely keep track of laundry detergents', softeners or soaps that she uses. She should switch to unscented, natural products. Ulcers may be due to chronic itching/rubbing. She should avoid sanitary pads to avoid persistent irritation. She understands that if she sees no resolution with topical ointments, she may consult with wound care for additional management.

## 2022-03-23 NOTE — HISTORY OF PRESENT ILLNESS
[FreeTextEntry1] : 67 y/o patient currently on tamoxifen since 2018 and follows with Dr. Jara for continued SVL for h/o  right breast invasive ductal carcinoma (ER+/OH+/HER2-) s/p bilateral mastectomy in 2018 with Dr. Gregg without any adjuvant treatment needed. She was initially seen on 1/27/22 for valuation of persistent vulvar itching, pain and burning sensation beginning approx. 6 months ago - partial relief with Vagisil and lidocaine gel. She then tested positive for herpes via IgG testing, treated with oral Valtrex. She again had partial relief of her symptoms. Upon examination at her initial visit, vulvar ulcers and perineal fissure on PE were noted -- both biopsied for further evaluation. The patient presents today to review these biopsy results and final treatment plan. She admits to feeling well today aside from residual itching. \par \par left lateral perineal biopsy/left mid labia majora biopsy - Ulceration with acute and chronic inflammation and granulation tissue

## 2022-03-23 NOTE — REASON FOR VISIT
[Home] : at home, [unfilled] , at the time of the visit. [place] [place] : at the medical office located in  [Verbal consent obtained from patient] : the patient, [unfilled] [FreeTextEntry1] : [facility] Location\par \par [facility] [phone] at [address] [place] [id number]\par  \par Vulvar rash

## 2022-04-12 ENCOUNTER — APPOINTMENT (OUTPATIENT)
Dept: ORTHOPEDIC SURGERY | Facility: CLINIC | Age: 67
End: 2022-04-12
Payer: MEDICARE

## 2022-04-12 VITALS
HEART RATE: 75 BPM | BODY MASS INDEX: 57.52 KG/M2 | DIASTOLIC BLOOD PRESSURE: 84 MMHG | WEIGHT: 293 LBS | HEIGHT: 60 IN | SYSTOLIC BLOOD PRESSURE: 130 MMHG

## 2022-04-12 PROCEDURE — 99024 POSTOP FOLLOW-UP VISIT: CPT

## 2022-04-12 PROCEDURE — 73562 X-RAY EXAM OF KNEE 3: CPT | Mod: RT

## 2022-04-12 NOTE — HISTORY OF PRESENT ILLNESS
[2] : the patient reports pain that is 2/10 in severity [Xray (Date:___)] : [unfilled] Xray -  [Doing Well] : is doing well [No Sign of Infection] : is showing no signs of infection [Adequate Pain Control] : has adequate pain control [Chills] : no chills [Constipation] : no constipation [Diarrhea] : no diarrhea [Dysuria] : no dysuria [Fever] : no fever [Nausea] : no nausea [Vomiting] : no vomiting [de-identified] : S/P: Right total knee arthroplasty. Computer-assisted tibial resection, [employer] procedure DOS : 02/18/22.  [de-identified] : she is having home PT\par taking ibuprofen for pain.\par she is walking with walker\par  [de-identified] : Right knee exam shows healing incision with no sign of infection. ROM 0-95 degrees. The surgical incision site(s) swollen, but clean, dry and intact, healed, not erythematous, absent of discharge and not dehisced. Additional findings included an unremarkable neurological exam, peripheral vascular exam normal and maneuvers demonstrated a negative Marcel's sign.  [de-identified] : 3V xray of the right knee done in the office today and  demonstrates s/p implants in good positioning with no evidence of wear, loosening, or subsidence. \par  [de-identified] : She should continue to do low impact exercises. Pt understands the importance of prophylaxis for invasive dental procedures. F/u with us in 3 weeks. \par  \par

## 2022-04-15 ENCOUNTER — APPOINTMENT (OUTPATIENT)
Dept: WOUND CARE | Facility: CLINIC | Age: 67
End: 2022-04-15
Payer: MEDICARE

## 2022-04-15 VITALS — BODY MASS INDEX: 57.52 KG/M2 | WEIGHT: 293 LBS | TEMPERATURE: 98.1 F | HEIGHT: 60 IN

## 2022-04-15 PROCEDURE — 99204 OFFICE O/P NEW MOD 45 MIN: CPT

## 2022-04-18 NOTE — HISTORY OF PRESENT ILLNESS
[FreeTextEntry1] : Ms. NANCI MATIAS   presents to the office with wounds since June of 2021. The wounds are located on the abdomen, and posterior superior thighs, with MAD/candidiasis on perineum and groin. The patient has complaints of pain and itching.  The patient has been dressing the wound with triamcinolone and nystatin as prescribed by GYN.  The patient denies fevers or chills. The patient has localized pain to the wound upon dressing changes. The patient has no other complaints or associated symptoms.\par \par  Patient's GYN Dr. Recio prescribed Triamcinolone Acetonide-cream usp 0.1% and nystatin cream\par \par

## 2022-04-18 NOTE — PLAN
[FreeTextEntry1] : Plan-\par Apply lidocaine or topical anesthetic if needed to reduce pain upon washing the wound.\par Wash wound with ---- or Dove skin sensitive soap and clean water \par Apply --- medical grade honey, gauze and hypafix on open wounds of abdomen, and posterior thighs\par Apply ----wild antifungal  on MAD areas\par Change dressing ---daily\par Interdry Ag recommeded for in abdominal fold.\par \par Encouraged ambulation or exercise.\par Optimization of nutrition.\par Offloading to the wound site.\par \par -----Wound supplies ordered via DME\par Patient given contact information to DME\par \par Follow up appointment scheduled for 2 weeks\par \par TeleHealth Services discussed with the patient and/or family.  Discharge instructions given including download of [facility] information regarding:\par 1)  [facility] to obtain medical records\par 2)  [facility] to conduct Face-to-Face TeleHealth visit\par 3)  [employer] for the Patient (patient takes a picture of their wound which is sent to the patient's chart for review)\par \par

## 2022-04-18 NOTE — ASSESSMENT
[FreeTextEntry1] : 4-15-22:\par Bilateral Candidiasis  of groin and perinium\par MAD of abdominal pannus/fold with superficial open wounds. \par B/l superior posterior thigh wounds which appear to have started as MAD and now full thickness wounds.\par \par No clinical sign of infection

## 2022-04-18 NOTE — PHYSICAL EXAM
[Skin Ulcer] : ulcer [Alert] : alert [Oriented to Person] : oriented to person [Oriented to Place] : oriented to place [Oriented to Time] : oriented to time [Calm] : calm [Please See PDF for [employer]] : Please See PDF for [employer]. [Abdomen Tenderness] : ~T ~M No abdominal tenderness [de-identified] : NAD [de-identified] : EOMI  [de-identified] : supple [de-identified] : u

## 2022-05-05 ENCOUNTER — EMERGENCY (EMERGENCY)
Facility: HOSPITAL | Age: 67
LOS: 1 days | Discharge: HOME | End: 2022-05-05
Attending: EMERGENCY MEDICINE
Payer: MEDICARE

## 2022-05-05 VITALS
DIASTOLIC BLOOD PRESSURE: 85 MMHG | OXYGEN SATURATION: 96 % | HEIGHT: 60 IN | RESPIRATION RATE: 18 BRPM | WEIGHT: 293 LBS | SYSTOLIC BLOOD PRESSURE: 129 MMHG | TEMPERATURE: 100 F | HEART RATE: 104 BPM

## 2022-05-05 DIAGNOSIS — Z96.643 PRESENCE OF ARTIFICIAL HIP JOINT, BILATERAL: Chronic | ICD-10-CM

## 2022-05-05 DIAGNOSIS — Z96.652 PRESENCE OF LEFT ARTIFICIAL KNEE JOINT: Chronic | ICD-10-CM

## 2022-05-05 DIAGNOSIS — Z90.13 ACQUIRED ABSENCE OF BILATERAL BREASTS AND NIPPLES: Chronic | ICD-10-CM

## 2022-05-05 DIAGNOSIS — Z98.890 OTHER SPECIFIED POSTPROCEDURAL STATES: Chronic | ICD-10-CM

## 2022-05-05 DIAGNOSIS — Z96.642 PRESENCE OF LEFT ARTIFICIAL HIP JOINT: Chronic | ICD-10-CM

## 2022-05-05 PROCEDURE — 12001 RPR S/N/AX/GEN/TRNK 2.5CM/<: CPT

## 2022-05-05 PROCEDURE — 99284 EMERGENCY DEPT VISIT MOD MDM: CPT | Mod: 25

## 2022-05-05 PROCEDURE — 99285 EMERGENCY DEPT VISIT HI MDM: CPT | Mod: 25

## 2022-05-05 PROCEDURE — 70450 CT HEAD/BRAIN W/O DYE: CPT | Mod: 26,MA

## 2022-05-05 PROCEDURE — 70450 CT HEAD/BRAIN W/O DYE: CPT | Mod: MA

## 2022-05-05 PROCEDURE — 72125 CT NECK SPINE W/O DYE: CPT | Mod: MA

## 2022-05-05 PROCEDURE — 72125 CT NECK SPINE W/O DYE: CPT | Mod: 26,MA

## 2022-05-05 NOTE — ED ADULT TRIAGE NOTE - CHIEF COMPLAINT QUOTE
pt states she was walking & fell, walker broke & she fell, c/o pain to right side of head , + wound  A&Ox3, resp wnl

## 2022-05-05 NOTE — ED PROVIDER NOTE - PATIENT PORTAL LINK FT
You can access the FollowMyHealth Patient Portal offered by [facility] by registering at the following website: [email or web address]. By joining [employer]’s FollowMyHealth portal, you will also be able to view your health information using other applications (apps) compatible with our system.

## 2022-05-05 NOTE — ED PROVIDER NOTE - PROGRESS NOTE DETAILS
FLASH Jennings: CT head/c-spine negative. results d/w pt. instructed to return in 7-10 days for staple removal.

## 2022-05-05 NOTE — ED PROVIDER NOTE - CLINICAL SUMMARY MEDICAL DECISION MAKING FREE TEXT BOX
Patient presents with head injury.  CT scan demonstrates no acute pathology. exam as above.  laceration closed by undersigned.  Non toxic.  Well appearing. Uneventful ED observation period. Discussed signs and symptoms and reasons for return with good teachback. Discussed results and outcome of testing with the patient.  Patient advised to please follow up with their primary care doctor within the next 24 hours and return to the Emergency Department for worsening symptoms or any other concerns.  Patient advised that their doctor may call  to follow up on the specific results of the tests performed today in the emergency department.

## 2022-05-05 NOTE — ED PROVIDER NOTE - PHYSICAL EXAMINATION
Gen: Alert, NAD  Head: NC, AT, PERRL, EOMI, normal lids/conjunctiva  ENT: normal hearing, patent oropharynx without erythema/exudate, uvula midline  Neck: +supple, no tenderness/meningismus/JVD, +Trachea midline  Pulm: Bilateral BS, normal resp effort, no wheeze/stridor/retractions  CV: RRR, no R/G, +dist pulses  Abd: soft, NT/ND, +BS, no hepatosplenomegaly  Mskel: no edema/erythema/cyanosis  Skin: no rash, 2cm linear laceration on right parietal scalp  Neuro: AAOx3, no gross sensory/motor deficits,

## 2022-05-05 NOTE — ED PROVIDER NOTE - NSFOLLOWUPINSTRUCTIONS_ED_ALL_ED_FT
- Return to the ED for any new or worsening symptoms.   - Return to ED in 7-10 days for staple removal  - Keep area clean and dry    Closed Head Injury    A closed head injury is an injury to your head that may or may not involve a traumatic brain injury (TBI). Symptoms of TBI can be short or long lasting and include headache, dizziness, interference with memory or speech, fatigue, confusion, changes in sleep, mood changes, nausea, depression/anxiety, and dulling of senses. Make sure to obtain proper rest which includes getting plenty of sleep, avoiding excessive visual stimulation, and avoiding activities that may cause physical or mental stress. Avoid any situation where there is potential for another head injury, including sports.    SEEK IMMEDIATE MEDICAL CARE IF YOU HAVE ANY OF THE FOLLOWING SYMPTOMS: unusual drowsiness, vomiting, severe dizziness, seizures, lightheadedness, muscular weakness, different pupil sizes, visual changes, or clear or bloody discharge from your ears or nose.     Laceration    A laceration is a cut that goes through all of the layers of the skin and into the tissue that is right under the skin. Some lacerations heal on their own. Others need to be closed with skin adhesive strips, skin glue, stitches (sutures), or staples. Proper laceration care minimizes the risk of infection and helps the laceration to heal better.  If non-absorbable stitches or staples have been placed, they must be taken out within the time frame instructed by your healthcare provider.    SEEK IMMEDIATE MEDICAL CARE IF YOU HAVE ANY OF THE FOLLOWING SYMPTOMS: swelling around the wound, worsening pain, drainage from the wound, red streaking going away from your wound, inability to move finger or toe near the laceration, or discoloration of skin near the laceration.

## 2022-05-05 NOTE — ED PROVIDER NOTE - OBJECTIVE STATEMENT
Pertinent PMH/PSH/FHx/SHx and Review of Systems contained within:  Patient presents to the ED for fall with head injury.  VSS.  PMH of HTN, HLD.  Patient was seated in rolling walker and the walker broke.  Patient hit her head on the edge of a table.  Occurred ~3 hours PTA.  No LOC.  no NV.  significant other bedside and has been with her and patient does not have any s/sx of moderate or severe concussion.  no other concerns.  Non toxic.  Well appearing. No aggravating or relieving factors. No other pertinent PMH.  No other pertinent PSH.  No other pertinent FHx.  Patient denies EtOH/tobacco/illicit substance use. No fever/chills, No photophobia/eye pain/changes in vision, No ear pain/sore throat/dysphagia, No chest pain/palpitations, no SOB/cough/wheeze/stridor, No abdominal pain, No N/V/D, no dysuria/frequency/discharge, No neck/back pain, no rash, no changes in neurological status/function.

## 2022-05-05 NOTE — ED PROVIDER NOTE - NS ED ATTENDING STATEMENT MOD
This was a shared visit with the SHUN. I reviewed and verified the documentation and independently performed the documented:

## 2022-05-12 ENCOUNTER — NON-APPOINTMENT (OUTPATIENT)
Age: 67
End: 2022-05-12

## 2022-05-12 ENCOUNTER — APPOINTMENT (OUTPATIENT)
Dept: WOUND CARE | Facility: CLINIC | Age: 67
End: 2022-05-12
Payer: MEDICARE

## 2022-05-12 VITALS
SYSTOLIC BLOOD PRESSURE: 137 MMHG | HEART RATE: 79 BPM | BODY MASS INDEX: 63.47 KG/M2 | WEIGHT: 293 LBS | RESPIRATION RATE: 16 BRPM | DIASTOLIC BLOOD PRESSURE: 83 MMHG

## 2022-05-12 VITALS — TEMPERATURE: 97.3 F

## 2022-05-12 PROCEDURE — 11042 DBRDMT SUBQ TIS 1ST 20SQCM/<: CPT

## 2022-05-12 PROCEDURE — 99213 OFFICE O/P EST LOW 20 MIN: CPT | Mod: 25

## 2022-05-12 NOTE — PHYSICAL EXAM
[Skin Ulcer] : ulcer [Alert] : alert [Oriented to Person] : oriented to person [Oriented to Place] : oriented to place [Oriented to Time] : oriented to time [Calm] : calm [Please See PDF for [employer]] : Please See PDF for [employer]. [Abdomen Tenderness] : ~T ~M No abdominal tenderness [de-identified] : NAD [de-identified] : EOMI  [de-identified] : supple

## 2022-05-12 NOTE — PLAN
[FreeTextEntry1] : Plan-\par Apply lidocaine or topical anesthetic if needed to reduce pain upon washing the wound.\par Wash wound with ---- or Dove skin sensitive soap and clean water \par Apply --- medical grade honey, gauze and hypafix on open wounds of abdomen, and posterior thighs\par Apply ----wild antifungal  on MAD areas\par Change dressing ---daily\par Interdry Ag recommeded for in abdominal fold.\par \par Encouraged ambulation or exercise.\par Optimization of nutrition.\par Offloading to the wound site.\par \par -----Wound supplies ordered via [facility]\par Patient given contact information to [facility]\par \par Follow up appointment scheduled for 2 weeks\par \par TeleHealth Services discussed with the patient and/or family.  Discharge instructions given including download of [facility] information regarding:\par 5/12/22\par \par 5/12/22\par bilateral thigh wounds sharp debrisment done \par continue with medihoney add adhesive foam to each thigh wd \par oral Nystatin ordered change to triamcinolone acetonide 1 % to fungal areas abdominal folds / groin 2 x a day \par reinforced need to get interdry to aid in keeping area/ folds dry \par patient to f/u 3 weeks \par

## 2022-05-12 NOTE — ASSESSMENT
[FreeTextEntry1] : 4-15-22:\par Bilateral Candidiasis  of groin and perinium\par MAD of abdominal pannus/fold with superficial open wounds. \par B/l superior posterior thigh wounds which appear to have started as MAD and now full thickness wounds.\par \par No clinical sign of infection\par 5/12/22\par bilateral thigh wounds sharp debrisment done \par continue with medihoney add adhesive foam to each thigh wd \par oral Nystatin ordered change to triamcinolone acetonide 1 % to fungal areas abdominal folds / groin 2 x a day \par reinforced need to get interdry to aid in keeping area/ folds dry \par patient to f/u 3 weeks

## 2022-05-22 ENCOUNTER — RX RENEWAL (OUTPATIENT)
Age: 67
End: 2022-05-22

## 2022-05-25 ENCOUNTER — RX RENEWAL (OUTPATIENT)
Age: 67
End: 2022-05-25

## 2022-06-02 ENCOUNTER — APPOINTMENT (OUTPATIENT)
Dept: WOUND CARE | Facility: CLINIC | Age: 67
End: 2022-06-02

## 2022-06-07 ENCOUNTER — APPOINTMENT (OUTPATIENT)
Dept: WOUND CARE | Facility: CLINIC | Age: 67
End: 2022-06-07
Payer: MEDICARE

## 2022-06-07 PROCEDURE — 11042 DBRDMT SUBQ TIS 1ST 20SQCM/<: CPT

## 2022-06-07 PROCEDURE — 99213 OFFICE O/P EST LOW 20 MIN: CPT | Mod: 25

## 2022-06-13 ENCOUNTER — APPOINTMENT (OUTPATIENT)
Dept: ORTHOPEDIC SURGERY | Facility: CLINIC | Age: 67
End: 2022-06-13
Payer: MEDICARE

## 2022-06-13 DIAGNOSIS — Z96.651 PRESENCE OF RIGHT ARTIFICIAL KNEE JOINT: ICD-10-CM

## 2022-06-13 DIAGNOSIS — Z47.1 AFTERCARE FOLLOWING JOINT REPLACEMENT SURGERY: ICD-10-CM

## 2022-06-13 DIAGNOSIS — Z96.651 AFTERCARE FOLLOWING JOINT REPLACEMENT SURGERY: ICD-10-CM

## 2022-06-13 PROCEDURE — 99214 OFFICE O/P EST MOD 30 MIN: CPT

## 2022-06-13 PROCEDURE — 73562 X-RAY EXAM OF KNEE 3: CPT | Mod: RT

## 2022-06-13 NOTE — PHYSICAL EXAM
[Abdomen Tenderness] : ~T ~M No abdominal tenderness [Skin Ulcer] : ulcer [Alert] : alert [Oriented to Person] : oriented to person [Oriented to Place] : oriented to place [Oriented to Time] : oriented to time [Calm] : calm [de-identified] : NAD [de-identified] : EOMI  [de-identified] : supple [Please See PDF for [employer]] : Please See PDF for [employer].

## 2022-06-13 NOTE — END OF VISIT
[FreeTextEntry3] : I, Willard Falk, acted solely as a scribe for Dr. Germán Henderson on this date 06/13/2022.

## 2022-06-13 NOTE — PHYSICAL EXAM
[LE] : Sensory: Intact in bilateral lower extremities [ALL] : dorsalis pedis, posterior tibial, femoral, popliteal, and radial 2+ and symmetric bilaterally [Normal] : Alert and in no acute distress [Walker] : ambulates with walker [Wheelchair] : uses a wheelchair [Rt] : lateral area of right knee is numb [Lt] : lateral area of left knee is numb [Poor Appearance] : well-appearing [de-identified] : GENERAL APPEARANCE: Well nourished and hydrated, pleasant, alert, and oriented x 3. Appears their stated age. \par HEENT: Normocephalic, extraocular eye motion intact. Nasal septum midline. Oral cavity clear. External auditory canal clear. \par RESPIRATORY: Breath sounds clear and audible in all lobes. No wheezing, No accessory muscle use.\par CARDIOVASCULAR: No apparent abnormalities. No lower leg edema. No varicosities. Pedal pulses are palpable.\par NEUROLOGIC: Sensation is normal, no muscle weakness in the upper or lower extremities.\par DERMATOLOGIC: No apparent skin lesions, moist, warm, no rash.\par SPINE: Cervical spine appears normal and moves freely; thoracic spine appears normal and moves freely; lumbosacral spine appears normal and moves freely, normal, nontender.\par MUSCULOSKELETAL: Hands, wrists, and elbows are normal and move freely, shoulders are normal and move freely.  [de-identified] : Right knee exam shows healing incision with no sign of infection. ROM 0-120 degrees.  [de-identified] : 3V xray of the right knee done in the office today and reviewed by Dr. Germán Henderson demonstrates s/p implants in good positioning with no evidence of wear, loosening, or subsidence.

## 2022-06-13 NOTE — DISCUSSION/SUMMARY
[de-identified] : 65 y/o F s/p right TKR 2/18/22. The patient is doing well. We reassured the pt that her hardware is in good positioning with no evidence of wear, loosening, or subsidence. She should continue to do low impact exercises. Pt understands the importance of prophylaxis for invasive dental procedures. F/u with us a year from surgery.

## 2022-06-13 NOTE — HISTORY OF PRESENT ILLNESS
[3] : a current pain level of 3/10 [1] : a minimum pain level of 1/10 [5] : a maximum pain level of 5/10 [Rest] : relieved by rest [de-identified] : 65 y/o F presents for follow up of the right knee. She is s/p right TKR 2/18/22. She finished PT, but is doing the exercises at home. She still has some pain with going down stairs. She sometimes has sharp pain at night. She is in a wheelchair today but sometimes uses a walker. She is in a lot less pain now than prior to surgery. She fell last month in her kitchen. She is having LE numbness [de-identified] : stairs

## 2022-06-13 NOTE — ASSESSMENT
[FreeTextEntry1] : 4-15-22:\par Bilateral Candidiasis  of groin and perinium\par MAD of abdominal pannus/fold with superficial open wounds. \par B/l superior posterior thigh wounds which appear to have started as MAD and now full thickness wounds.\par \par No clinical sign of infection\par 5/12/22\par bilateral thigh wounds sharp debrisment done \par continue with medihoney add adhesive foam to each thigh wd \par oral Nystatin ordered change to triamcinolone acetonide 1 % to fungal areas abdominal folds / groin 2 x a day \par reinforced need to get interdry to aid in keeping area/ folds dry \par patient to f/u 3 weeks \par \par 6/7/22 new natalio mad left groin,  thigh wounds clean less deep with medihoney\par continue for now

## 2022-06-13 NOTE — PHYSICAL EXAM
[LE] : Sensory: Intact in bilateral lower extremities [ALL] : dorsalis pedis, posterior tibial, femoral, popliteal, and radial 2+ and symmetric bilaterally [Normal] : Alert and in no acute distress [Walker] : ambulates with walker [Wheelchair] : uses a wheelchair [Rt] : lateral area of right knee is numb [Lt] : lateral area of left knee is numb [Poor Appearance] : well-appearing [de-identified] : GENERAL APPEARANCE: Well nourished and hydrated, pleasant, alert, and oriented x 3. Appears their stated age. \par HEENT: Normocephalic, extraocular eye motion intact. Nasal septum midline. Oral cavity clear. External auditory canal clear. \par RESPIRATORY: Breath sounds clear and audible in all lobes. No wheezing, No accessory muscle use.\par CARDIOVASCULAR: No apparent abnormalities. No lower leg edema. No varicosities. Pedal pulses are palpable.\par NEUROLOGIC: Sensation is normal, no muscle weakness in the upper or lower extremities.\par DERMATOLOGIC: No apparent skin lesions, moist, warm, no rash.\par SPINE: Cervical spine appears normal and moves freely; thoracic spine appears normal and moves freely; lumbosacral spine appears normal and moves freely, normal, nontender.\par MUSCULOSKELETAL: Hands, wrists, and elbows are normal and move freely, shoulders are normal and move freely.  [de-identified] : Right knee exam shows healing incision with no sign of infection. ROM 0-120 degrees.  [de-identified] : 3V xray of the right knee done in the office today and reviewed by Dr. Germán Henderson demonstrates s/p implants in good positioning with no evidence of wear, loosening, or subsidence.

## 2022-06-13 NOTE — HISTORY OF PRESENT ILLNESS
[3] : a current pain level of 3/10 [1] : a minimum pain level of 1/10 [5] : a maximum pain level of 5/10 [Rest] : relieved by rest [de-identified] : 65 y/o F presents for follow up of the right knee. She is s/p right TKR 2/18/22. She finished PT, but is doing the exercises at home. She still has some pain with going down stairs. She sometimes has sharp pain at night. She is in a wheelchair today but sometimes uses a walker. She is in a lot less pain now than prior to surgery. She fell last month in her kitchen. She is having LE numbness [de-identified] : stairs

## 2022-06-13 NOTE — DISCUSSION/SUMMARY
[de-identified] : 65 y/o F s/p right TKR 2/18/22. The patient is doing well. We reassured the pt that her hardware is in good positioning with no evidence of wear, loosening, or subsidence. She should continue to do low impact exercises. Pt understands the importance of prophylaxis for invasive dental procedures. F/u with us a year from surgery.

## 2022-06-28 ENCOUNTER — APPOINTMENT (OUTPATIENT)
Dept: WOUND CARE | Facility: CLINIC | Age: 67
End: 2022-06-28

## 2022-06-28 VITALS — BODY MASS INDEX: 57.52 KG/M2 | TEMPERATURE: 97.3 F | WEIGHT: 293 LBS | HEIGHT: 60 IN

## 2022-06-28 PROCEDURE — 99213 OFFICE O/P EST LOW 20 MIN: CPT

## 2022-06-29 NOTE — H&P PST ADULT - GRAVIDA, OB PROFILE
Additional Notes: Advised patient to see PCP if itch does not improve to discuss potential gabapentin initiation if needed \\nAdvised patient to get sarna anti itch and use it PRN itch, keep refrigerated Detail Level: Simple Render Risk Assessment In Note?: no Additional Notes: Patient instructed to schedule FBE at earliest convenience Additional Notes: Patient consent was obtained to proceed with the visit and recommended plan of care after discussion of all risks and benefits, including the risks of COVID-19 exposure. Render Risk Assessment In Note?: yes 0

## 2022-07-02 NOTE — PHYSICAL EXAM
[Skin Ulcer] : ulcer [Alert] : alert [Oriented to Person] : oriented to person [Oriented to Place] : oriented to place [Oriented to Time] : oriented to time [Calm] : calm [Please See PDF for [employer]] : Please See PDF for [employer]. [Abdomen Tenderness] : ~T ~M No abdominal tenderness [de-identified] : EOMI  [de-identified] : NAD [de-identified] : supple

## 2022-07-02 NOTE — ASSESSMENT
[FreeTextEntry1] : 4-15-22:\par Bilateral Candidiasis  of groin and perinium\par MAD of abdominal pannus/fold with superficial open wounds. \par B/l superior posterior thigh wounds which appear to have started as MAD and now full thickness wounds.\par \par No clinical sign of infection\par 5/12/22\par bilateral thigh wounds sharp debrisment done \par continue with medihoney add adhesive foam to each thigh wd \par oral Nystatin ordered change to triamcinolone acetonide 1 % to fungal areas abdominal folds / groin 2 x a day \par reinforced need to get interdry to aid in keeping area/ folds dry \par patient to f/u 3 weeks \par \par 6/7/22 new natalio mad left groin,  thigh wounds clean less deep with medihoney\par continue for now\par 6/28- some improvement, needs renewal

## 2022-07-22 ENCOUNTER — RX RENEWAL (OUTPATIENT)
Age: 67
End: 2022-07-22

## 2022-07-29 ENCOUNTER — RX RENEWAL (OUTPATIENT)
Age: 67
End: 2022-07-29

## 2022-08-02 ENCOUNTER — APPOINTMENT (OUTPATIENT)
Dept: WOUND CARE | Facility: CLINIC | Age: 67
End: 2022-08-02

## 2022-08-02 VITALS — TEMPERATURE: 97.7 F | HEIGHT: 60 IN | BODY MASS INDEX: 57.52 KG/M2 | WEIGHT: 293 LBS

## 2022-08-02 PROCEDURE — 11042 DBRDMT SUBQ TIS 1ST 20SQCM/<: CPT

## 2022-08-02 PROCEDURE — 99213 OFFICE O/P EST LOW 20 MIN: CPT | Mod: 25

## 2022-08-08 NOTE — ASSESSMENT
[FreeTextEntry1] : 66 yr old with erosions into dermis from mad / candida, MO\par trial of hydraofera blue and duoderm to decure\par fluconazole and lidopriol ordered\par \par 4-15-22:\par Bilateral Candidiasis  of groin and perinium\par MAD of abdominal pannus/fold with superficial open wounds. \par B/l superior posterior thigh wounds which appear to have started as MAD and now full thickness wounds.\par \par No clinical sign of infection\par 5/12/22\par bilateral thigh wounds sharp debrisment done \par continue with medihoney add adhesive foam to each thigh wd \par oral Nystatin ordered change to triamcinolone acetonide 1 % to fungal areas abdominal folds / groin 2 x a day \par reinforced need to get interdry to aid in keeping area/ folds dry \par patient to f/u 3 weeks \par \par 6/7/22 new natalio mad left groin,  thigh wounds clean less deep with medihoney\par continue for now\par 6/28- some improvement, needs renewal

## 2022-08-08 NOTE — PHYSICAL EXAM
[Skin Ulcer] : ulcer [Alert] : alert [Oriented to Person] : oriented to person [Oriented to Place] : oriented to place [Oriented to Time] : oriented to time [Calm] : calm [Please See PDF for [employer]] : Please See PDF for [employer]. [Abdomen Tenderness] : ~T ~M No abdominal tenderness [de-identified] : NAD [de-identified] : EOMI  [de-identified] : supple

## 2022-08-15 ENCOUNTER — RX RENEWAL (OUTPATIENT)
Age: 67
End: 2022-08-15

## 2022-08-15 NOTE — OCCUPATIONAL THERAPY INITIAL EVALUATION ADULT - EATING, PREVIOUS LEVEL OF FUNCTION, OT EVAL
Yudith Harvey  AUDIOLOGIST  [address] [place] [address] [phone]          Physical Therapy Locations:   [facility]  [address] [place] [id number] [phone]    [facility] [address] [place]  [address]  [address]  [place] [address] [phone]    [facility] [place]  [address] [place] [address] [place] [address] [phone]            
independent

## 2022-08-17 ENCOUNTER — OUTPATIENT (OUTPATIENT)
Dept: OUTPATIENT SERVICES | Facility: HOSPITAL | Age: 67
LOS: 1 days | Discharge: HOME | End: 2022-08-17

## 2022-08-17 DIAGNOSIS — Z90.13 ACQUIRED ABSENCE OF BILATERAL BREASTS AND NIPPLES: Chronic | ICD-10-CM

## 2022-08-17 DIAGNOSIS — D05.90 UNSPECIFIED TYPE OF CARCINOMA IN SITU OF UNSPECIFIED BREAST: ICD-10-CM

## 2022-08-17 DIAGNOSIS — Z96.642 PRESENCE OF LEFT ARTIFICIAL HIP JOINT: Chronic | ICD-10-CM

## 2022-08-17 DIAGNOSIS — Z96.652 PRESENCE OF LEFT ARTIFICIAL KNEE JOINT: Chronic | ICD-10-CM

## 2022-08-17 DIAGNOSIS — Z98.890 OTHER SPECIFIED POSTPROCEDURAL STATES: Chronic | ICD-10-CM

## 2022-08-17 DIAGNOSIS — Z96.643 PRESENCE OF ARTIFICIAL HIP JOINT, BILATERAL: Chronic | ICD-10-CM

## 2022-08-22 ENCOUNTER — RX RENEWAL (OUTPATIENT)
Age: 67
End: 2022-08-22

## 2022-08-23 RX ORDER — HYDROCORTISONE 25 MG/G
2.5 CREAM TOPICAL 3 TIMES DAILY
Qty: 1 | Refills: 3 | Status: ACTIVE | COMMUNITY
Start: 2022-06-07 | End: 1900-01-01

## 2022-08-29 NOTE — ASU PREOP CHECKLIST - AICD PRESENT
Patient called stating he would appreciate a suggestion or referral for a PCP/Internist. Patient states his physician moved away and would like to know if there is one located at [facility]? Please call and advise. no

## 2022-08-30 ENCOUNTER — APPOINTMENT (OUTPATIENT)
Dept: WOUND CARE | Facility: CLINIC | Age: 67
End: 2022-08-30

## 2022-09-13 ENCOUNTER — APPOINTMENT (OUTPATIENT)
Dept: WOUND CARE | Facility: CLINIC | Age: 67
End: 2022-09-13

## 2022-09-13 VITALS
RESPIRATION RATE: 18 BRPM | SYSTOLIC BLOOD PRESSURE: 167 MMHG | DIASTOLIC BLOOD PRESSURE: 88 MMHG | HEART RATE: 94 BPM | TEMPERATURE: 97.4 F

## 2022-09-13 PROCEDURE — 11042 DBRDMT SUBQ TIS 1ST 20SQCM/<: CPT

## 2022-09-13 PROCEDURE — 99213 OFFICE O/P EST LOW 20 MIN: CPT | Mod: 25

## 2022-09-15 NOTE — HISTORY OF PRESENT ILLNESS
[FreeTextEntry1] : Ms. NANCI MATIAS   presents to the office with wounds since June of 2021. \par some improvement with anti candida med\par \par The wounds are located on the abdomen, and posterior superior thighs, with MAD/candidiasis on perineum \par and groin. The patient has complaints of pain and itching. \par  The patient has been dressing the wound with triamcinolone and nystatin as prescribed by GYN. \par  The patient denies fevers or chills. The patient has localized pain to the wound upon dressing changes. The patient has no other complaints or associated symptoms.\par \par  Patient's GYN Dr. Recio prescribed Triamcinolone Acetonide-cream usp 0.1% and nystatin cream\par \par

## 2022-09-15 NOTE — ASSESSMENT
[FreeTextEntry1] : 66 yr old with erosions into dermis from mad / candida, MO\par continue  hydraofera blue and duoderm to secure\par fluconazole and lido-priol ordered\par \par B/l superior posterior thigh wounds which appear to have started as MAD and now full thickness wounds.\par \par No clinical sign of infection\par 5/12/22\par bilateral thigh wounds sharp debrisment done \par continue with medihoney add adhesive foam to each thigh wd \par oral Nystatin ordered change to triamcinolone acetonide 1 % to fungal areas abdominal folds / groin 2 x a day \par reinforced need to get interdry to aid in keeping area/ folds dry \par patient to f/u 3 weeks \par \par 6/7/22 new natalio mad left groin, \par  thigh wounds clean less deep with medihoney\par continue for now\par 6/28- some improvement, needs renewal\par 9/13/2022\par continue  hydrofera blue much improved\par mad in left groin-wild\par lido for pain

## 2022-09-15 NOTE — PHYSICAL EXAM
[Skin Ulcer] : ulcer [Alert] : alert [Oriented to Person] : oriented to person [Oriented to Place] : oriented to place [Oriented to Time] : oriented to time [Calm] : calm [Please See PDF for [employer]] : Please See PDF for [employer]. [Abdomen Tenderness] : ~T ~M No abdominal tenderness [de-identified] : NAD [de-identified] : EOMI  [de-identified] : supple

## 2022-09-15 NOTE — DATA REVIEWED
[FreeTextEntry1] : \par 4-15-22:\par Bilateral Candidiasis  of groin and perinium\par MAD of abdominal pannus/fold with superficial open wounds.

## 2022-10-06 ENCOUNTER — NON-APPOINTMENT (OUTPATIENT)
Age: 67
End: 2022-10-06

## 2022-10-10 ENCOUNTER — RX RENEWAL (OUTPATIENT)
Age: 67
End: 2022-10-10

## 2022-10-10 ENCOUNTER — APPOINTMENT (OUTPATIENT)
Dept: FAMILY MEDICINE | Facility: CLINIC | Age: 67
End: 2022-10-10

## 2022-10-10 NOTE — END OF VISIT
"Protocol advises patient to be seen within 3 days for ringing in left ear that started about a week ago. Patient is scheduled today with covering provider.  Next 5 appointments (look out 90 days)    Oct 10, 2022  3:30 PM  (Arrive by 3:15 PM)  SHORT with Brandy Wise NP  [facility] ([facility] ) [address] [facility] [address]  [phone]            Reason for Disposition    Symptoms only or mainly in 1 ear (unilateral tinnitus)    Additional Information    Negative: Followed an ear injury    Negative: Hearing loss is main symptom    Negative: Patient sounds very sick or weak to the triager    Negative: [1] Hearing loss in one or both ears AND [2] sudden onset AND [3] present now    Negative: Ear is painful    Negative: Decreased hearing followed sudden, extremely loud noise (e.g., explosion, not just loud concert)    Negative: Taking medication that can damage hearing (i.e., gentamycin, tobramycin, furosemide, ethacrynic acid, cisplatin, quinidine)    Negative: Long-term (current) use of aspirin (Exception: just taking one baby aspirin a day)    Negative: MODERATE-SEVERE tinnitus (i.e., interferes with work, school, or sleep)    Answer Assessment - Initial Assessment Questions  1. DESCRIPTION: \"Describe the sound you are hearing.\" (e.g., hissing, humming, pounding, ringing)      Ringing or flute sound in the ear  2. LOCATION: \"One or both ears?\" If one, ask: \"Which ear?\"      Left ear   3. SEVERITY: \"How bad is it?\"     - MILD - doesn't interfere with normal activities, only can hear in a quiet room     - MODERATE-SEVERE (Bothersome): interferes with work, school, sleep, or other activities       Moderate-severe  4. ONSET: \"When did this begin?\" \"Did it start suddenly or come on gradually?\"      A week ago. gradual  5. PATTERN: \"Does this come and go, or has it been constant since it started?\"      constant  6. HEARING LOSS: \"Is your " "hearing decreased?\" (e.g., normal, decreased)        Decreased   7. OTHER SYMPTOMS: \"Do you have any other symptoms?\" (e.g., dizziness, earache)      No   8. PREGNANCY: \"Is there any chance you are pregnant?\" \"When was your last menstrual period?\"      No on menstrual cycle now    Protocols used: TINNITUS-A-AH      " [>50% of Time Spent on Counseling and Coordination of Care for  ___] : Greater than 50% of the encounter time was spent on counseling and coordination of care for [unfilled]

## 2022-10-11 ENCOUNTER — NON-APPOINTMENT (OUTPATIENT)
Age: 67
End: 2022-10-11

## 2022-10-13 RX ORDER — CLOBETASOL PROPIONATE 0.5 MG/G
0.05 CREAM TOPICAL
Qty: 1 | Refills: 0 | Status: DISCONTINUED | COMMUNITY
Start: 2022-07-22 | End: 2022-10-13

## 2022-11-09 NOTE — PROGRESS NOTE ADULT - SUBJECTIVE AND OBJECTIVE BOX
NANCI MATIAS  [id number]    History: Patient is status post right posterior total hip arthroplasty POD #1. Patient is doing well and is comfortable. The patient's pain is controlled using the prescribed pain medications. The patient is participating in physical therapy. Tolerating PO diet and fluids. Patient voiding without complications - patient currently with primafit on. Denies CP, SOB, dizziness, HA, fever/chills, numbness or tingling. No new complaints.    Vital Signs Last 24 Hrs  T(C): 36.7 (11 Aug 2021 05:00), Max: 36.8 (10 Aug 2021 17:45)  T(F): 98.1 (11 Aug 2021 05:00), Max: 98.3 (10 Aug 2021 17:45)  HR: 76 (11 Aug 2021 05:00) (61 - 100)  BP: 126/79 (11 Aug 2021 05:00) (98/70 - 132/70)  BP(mean): 85 (10 Aug 2021 21:00) (84 - 92)  RR: 18 (11 Aug 2021 05:00) (12 - 18)  SpO2: 96% (11 Aug 2021 05:00) (92% - 100%)                          11.3   11.53 )-----------( 267      ( 11 Aug 2021 06:01 )             34.4     08-11    137  |  100  |  16.8  ----------------------------<  130<H>  3.8   |  22.0  |  0.69    Ca    8.8      11 Aug 2021 06:01      General: Alert, awake, NAD, abduction pillow in place  Physical exam: The right hip dressing is clean, dry and intact with No drainage, discharge noted on dressing. No erythema noted to visible skin. The calf is supple nontender b/l. SILT. +EHL/FHL/AT/GC. 2+ DP pulse. BCR. No cyanosis.    Plan:   - DVT prophylaxis as prescribed, including use of compression devices and ankle pumps  -  Continue physical therapy  - Weight bearing status of surgical extremity: WBAT  - Incentive spirometry encouraged  - Pain control as clinically indicated  - Posterior hip precautions reviewed with patient  - Discharge planning – anticipated discharge is Home today vs tomorrow when cleared by PT and Medicine 
Orthopedic PA Postop Note  Patient S/P RIGHT URBANO  Patient in bed comfortable   RIGHT Leg  Dressing C/D/I  DP Pulse intact  Calf Soft NT  Dorsi/Plantar Flexion/EHL/FHL Intact  Sensation intact to light touch  Abduction Pillow in place     Vital Signs Last 24 Hrs  T(C): 36.6 (10 Aug 2021 19:30), Max: 36.8 (10 Aug 2021 17:45)  T(F): 97.8 (10 Aug 2021 19:30), Max: 98.3 (10 Aug 2021 17:45)  HR: 62 (10 Aug 2021 19:30) (62 - 100)  BP: 114/71 (10 Aug 2021 19:30) (98/70 - 132/70)  BP(mean): 86 (10 Aug 2021 19:30) (86 - 86)  RR: 13 (10 Aug 2021 19:30) (13 - 17)  SpO2: 100% (10 Aug 2021 19:30) (92% - 100%)      A/P:  S/P RIGHT URBANO  1. DVTP - LOVENOX  2. Physical Therapy - Posterior Precautions  3. Pain Control as clinically indicated 
Pelvis & hip films reviewed. Implants are in appropriate position. No fracture or dislocation noted. Patient is WBAT of the surgical extremity. 
NANCI MATIAS    [id number]    History: Patient seen and eval at bedside. Patient is doing well and is comfortable but states she has had 2 BM that were diarrhea this morning. The patient's pain is controlled using the prescribed pain medications. The patient is participating in physical therapy. Denies nausea, vomiting, chest pain, shortness of breath, abdominal pain or fever.     T(C): 37.1 (08-12-21 @ 05:22), Max: 37.1 (08-12-21 @ 05:22)  HR: 79 (08-12-21 @ 05:22) (79 - 96)  BP: 122/75 (08-12-21 @ 05:22) (100/66 - 133/75)  RR: 18 (08-12-21 @ 05:22) (18 - 19)  SpO2: 95% (08-12-21 @ 05:22) (93% - 98%)                          9.9    10.65 )-----------( 240      ( 12 Aug 2021 07:26 )             30.4     08-11    137  |  100  |  16.8  ----------------------------<  130<H>  3.8   |  22.0  |  0.69    Ca    8.8      11 Aug 2021 06:01    PE: NAD, alert, awake  Right LE:   Hip dressing C/D/I, no drainage, no bleeding  EHL/TA/FHL/GS intact, DP pulse 2+  Gross sensation to LT intact distally s/s/DP/SP/tib distrib, Calf soft, NT B/L    Primary Orthopedic Assessment:  • s/p RIGHT POSTERIOR total hip replacement POD#2    Plan:   ·	DVT prophylaxis as prescribed -  lov/ASA,  including use of compression devices and ankle pumps  ·	Continue physical therapy: Weightbearing as tolerated of the right lower extremity with assistance of a walker  ·	Incentive spirometry encouraged  ·	Pain control as clinically indicated  ·	Med following - on Imodium - will monitor. Anticipate resolving later today.  ·	Posterior hip precautions   ·	Discharge planning: home today pending PT and med clearance   
CC: s/p R URBANO     INTERVAL HPI/OVERNIGHT EVENTS: Patient seen and examined. Reports 2 episodes diarrhea this am. States she has diarrhea if she eats high fiber foods like oatmeal and bran muffing. Received senna on 8/10. Denies nausea, vomiting, abdominal pain. Pulse ox overnight into low 80's. States Cardiologist has told her she needs a sleep study but has not pursued. Denies chest pain, +JI, no fever, chills.     Vital Signs Last 24 Hrs  T(C): 36.7 (12 Aug 2021 08:53), Max: 37.1 (12 Aug 2021 05:22)  T(F): 98.1 (12 Aug 2021 08:53), Max: 98.8 (12 Aug 2021 05:22)  HR: 71 (12 Aug 2021 08:53) (71 - 96)  BP: 102/65 (12 Aug 2021 08:53) (100/66 - 122/75)  BP(mean): --  RR: 19 (12 Aug 2021 08:53) (18 - 19)  SpO2: 93% (12 Aug 2021 08:53) (93% - 97%)    PHYSICAL EXAM:    GENERAL: NAD, well-groomed, obese   HEAD:  Atraumatic, Normocephalic  EYES: EOMI, PERRLA, conjunctiva and sclera clear  NECK: Supple, No JVD, Normal thyroid  NERVOUS SYSTEM:  Alert & Oriented X3, no focal deficit   CHEST/LUNG: CTA  b/l,  no rales, rhonchi, wheezing, or rubs  HEART: Regular rate and rhythm; No murmurs, rubs, or gallops  ABDOMEN: Soft, Nontender, Nondistended; Bowel sounds present  EXTREMITIES:  2+ Peripheral Pulses, No clubbing, cyanosis, or edema , R hip posterior dressing + , clean and dry   SKIN: No rashes or lesions    I&O's Detail    11 Aug 2021 07:01  -  12 Aug 2021 07:00  --------------------------------------------------------  IN:  Total IN: 0 mL    OUT:    Voided (mL): 500 mL  Total OUT: 500 mL    Total NET: -500 mL                                    9.9    10.65 )-----------( 240      ( 12 Aug 2021 07:26 )             30.4     12 Aug 2021 07:26    134    |  100    |  19.5   ----------------------------<  144    3.9     |  25.0   |  0.64     Ca    8.4        12 Aug 2021 07:26        CAPILLARY BLOOD GLUCOSE              MEDICATIONS  (STANDING):  acetaminophen   Tablet .. 975 milliGRAM(s) Oral every 8 hours  aspirin enteric coated 81 milliGRAM(s) Oral daily  enoxaparin Injectable 40 milliGRAM(s) SubCutaneous daily  ferrous    sulfate 325 milliGRAM(s) Oral daily  gabapentin 600 milliGRAM(s) Oral four times a day  losartan 100 milliGRAM(s) Oral daily  multivitamin 1 Tablet(s) Oral daily  pantoprazole    Tablet 40 milliGRAM(s) Oral before breakfast  polyethylene glycol 3350 17 Gram(s) Oral at bedtime  senna 2 Tablet(s) Oral at bedtime  sodium chloride 0.9% lock flush 3 milliLiter(s) IV Push every 8 hours  sodium chloride 0.9%. 1000 milliLiter(s) (100 mL/Hr) IV Continuous <Continuous>  tranexamic acid IVPB 1000 milliGRAM(s) IV Intermittent once  tranexamic acid IVPB 1000 milliGRAM(s) IV Intermittent once    MEDICATIONS  (PRN):  acetaminophen   Tablet .. 650 milliGRAM(s) Oral every 6 hours PRN Temp greater or equal to 38C (100.4F)  HYDROmorphone   Tablet 4 milliGRAM(s) Oral every 4 hours PRN Severe Pain (7 - 10)  HYDROmorphone  Injectable 0.5 milliGRAM(s) IV Push every 4 hours PRN breakthrough pain  magnesium hydroxide Suspension 30 milliLiter(s) Oral daily PRN Constipation  ondansetron Injectable 4 milliGRAM(s) IV Push every 6 hours PRN Nausea and/or Vomiting  oxyCODONE    IR 5 milliGRAM(s) Oral every 3 hours PRN Mild Pain (1 - 3)  oxyCODONE    IR 10 milliGRAM(s) Oral every 3 hours PRN Moderate Pain (4 - 6)      RADIOLOGY & ADDITIONAL TESTS:  
Mild intermittent asthma with acute exacerbation

## 2022-11-17 ENCOUNTER — RX RENEWAL (OUTPATIENT)
Age: 67
End: 2022-11-17

## 2022-11-20 ENCOUNTER — RX RENEWAL (OUTPATIENT)
Age: 67
End: 2022-11-20

## 2022-11-21 ENCOUNTER — RX RENEWAL (OUTPATIENT)
Age: 67
End: 2022-11-21

## 2022-11-22 ENCOUNTER — APPOINTMENT (OUTPATIENT)
Dept: WOUND CARE | Facility: CLINIC | Age: 67
End: 2022-11-22

## 2022-11-25 ENCOUNTER — OUTPATIENT (OUTPATIENT)
Dept: OUTPATIENT SERVICES | Facility: HOSPITAL | Age: 67
LOS: 1 days | Discharge: HOME | End: 2022-11-25

## 2022-11-25 DIAGNOSIS — Z98.890 OTHER SPECIFIED POSTPROCEDURAL STATES: Chronic | ICD-10-CM

## 2022-11-25 DIAGNOSIS — Z96.642 PRESENCE OF LEFT ARTIFICIAL HIP JOINT: Chronic | ICD-10-CM

## 2022-11-25 DIAGNOSIS — Z96.652 PRESENCE OF LEFT ARTIFICIAL KNEE JOINT: Chronic | ICD-10-CM

## 2022-11-25 DIAGNOSIS — Z90.13 ACQUIRED ABSENCE OF BILATERAL BREASTS AND NIPPLES: Chronic | ICD-10-CM

## 2022-11-25 DIAGNOSIS — Z96.643 PRESENCE OF ARTIFICIAL HIP JOINT, BILATERAL: Chronic | ICD-10-CM

## 2022-11-25 DIAGNOSIS — D05.90 UNSPECIFIED TYPE OF CARCINOMA IN SITU OF UNSPECIFIED BREAST: ICD-10-CM

## 2022-12-07 ENCOUNTER — RX RENEWAL (OUTPATIENT)
Age: 67
End: 2022-12-07

## 2022-12-13 ENCOUNTER — NON-APPOINTMENT (OUTPATIENT)
Age: 67
End: 2022-12-13

## 2022-12-20 ENCOUNTER — APPOINTMENT (OUTPATIENT)
Dept: WOUND CARE | Facility: CLINIC | Age: 67
End: 2022-12-20

## 2022-12-20 VITALS — BODY MASS INDEX: 57.52 KG/M2 | HEIGHT: 60 IN | WEIGHT: 293 LBS

## 2022-12-20 VITALS — HEART RATE: 88 BPM | SYSTOLIC BLOOD PRESSURE: 140 MMHG | DIASTOLIC BLOOD PRESSURE: 83 MMHG | TEMPERATURE: 97.2 F

## 2022-12-20 PROCEDURE — 99213 OFFICE O/P EST LOW 20 MIN: CPT

## 2022-12-20 RX ORDER — FLUCONAZOLE 150 MG/1
150 TABLET ORAL DAILY
Qty: 10 | Refills: 1 | Status: DISCONTINUED | COMMUNITY
Start: 2022-06-07 | End: 2022-12-20

## 2022-12-20 RX ORDER — FLUCONAZOLE 150 MG/1
150 TABLET ORAL DAILY
Qty: 10 | Refills: 1 | Status: DISCONTINUED | COMMUNITY
Start: 2022-06-28 | End: 2022-12-20

## 2022-12-22 NOTE — PLAN
[FreeTextEntry1] : 12/20/2022-\par Plan: Cavilon, duoderm to wound beds\par Interdry sheets to placed in skin folds with candida dermatitis \par Follow up in 3 weeks\par

## 2022-12-22 NOTE — PHYSICAL EXAM
[Skin Ulcer] : ulcer [Alert] : alert [Oriented to Person] : oriented to person [Oriented to Place] : oriented to place [Oriented to Time] : oriented to time [Calm] : calm [Please See PDF for [employer]] : Please See PDF for [employer]. [Abdomen Tenderness] : ~T ~M No abdominal tenderness [de-identified] : NAD [de-identified] : EOMI  [de-identified] : supple

## 2022-12-22 NOTE — DATA REVIEWED
[FreeTextEntry1] : \par 4-15-22:\par Bilateral Candidiasis  of groin and perinium\par MAD of abdominal pannus/fold with superficial open wounds. \par \par CLINICAL INFORMATION: 63 year old female with breast carcinoma; referred to \par evaluate for osseous metastases. \par \par COMPARISON: No previous bone scans were available for comparison. \par \par TECHNIQUE: Anterior and posterior whole body images were obtained \par approximately 2 hours following radiopharmaceutical administration. \par \par FINDINGS: There are degenerative changes in the spine and major joints, \par notably the left hip. There is physiologic distribution of \par radiopharmaceutical throughout the remainder of the visualized osseous \par structures. No foci of abnormally increased or decreased activity suggestive \par of osseous metastasis are identified. \par \par Both kidneys are visualized; the right kidney is ptotic. \par \par IMPRESSION: Essentially normal bone scan. No radionuclide evidence of \par osseous metastasis. BIPIN POSEY M.D., CHIEF OF NUCLEAR MEDICINE \par This document has been electronically signed. Feb 1 2019 5:14PM \par \par \par \par \par \par \par    \par  \par  \par \par \par Bookmarks \par   \par    \par \par \par \par \par \par \par \par    \par  \par  \par \par \par Notes \par   \par    \par \par \par \par \par \par \par \par \par \par \par  \par \par \par  \par \par \par \par \par \par \par \par \par \par \par \par \par \par \par \par \par \par \par \par

## 2022-12-22 NOTE — ASSESSMENT
[FreeTextEntry1] : 67 yr old with erosions into dermis from mad / candida, MO, stage1 breast ca\par continue  hydraofera blue and duoderm to secure\par trying to walk hip arthritis an issue can't do hip surgery until wounds are closed\par fluconazole and lido-priol ordered\par 12/20/2022-\par Pt presents with partner for follow up for multiple thigh and groin wounds.\par nutritional eval for obesity\par  Pt is currently using hydrofera blue foam but states "it is difficult to get it to stick to the wounds". \par periwound still inflammed/ moisture dermatiis in pannicula, no sti, no imaging will need CT\par discussed that plastics rarely will do panniculectomy in 65 yr old, however , if cleared, should review with them\par B/l superior posterior thigh wounds which appear to have started as MAD and now full thickness wounds.\par continue with  cavilon/ absorbant  foam. alginate/ duoderm interdry \par consider ID/ eval- immnunity may be compromised\par \par No clinical sign of infection\par 5/12/22\par bilateral thigh wounds sharp debrisment done \par continue with medihoney add adhesive foam to each thigh wd \par oral Nystatin ordered change to triamcinolone acetonide 1 % to fungal areas abdominal folds / groin 2 x a day \par reinforced need to get interdry to aid in keeping area/ folds dry \par patient to f/u 3 weeks \par \par 6/7/22 new natalio mad left groin, \par  thigh wounds clean less deep with medihoney\par continue for now\par 6/28- some improvement, needs renewal\par 9/13/2022\par continue  hydrofera blue much improved\par mad in left groin-wild\par lido for pain\par \par \par

## 2022-12-22 NOTE — HISTORY OF PRESENT ILLNESS
[FreeTextEntry1] : Ms. NANCI MATIAS   presents to the office with wounds since June of 2021. \par some improvement with anti candida med\par discussed need for fID/ immnue assessment\par no fevers, not responding to nystatin or fluconazole\par \par The wounds are located on the abdomen, and posterior superior thighs, with MAD/candidiasis on perineum \par and groin. The patient has complaints of pain and itching. \par  The patient has been dressing the wound with triamcinolone and nystatin as prescribed by GYN. \par  The patient denies fevers or chills. The patient has localized pain to the wound upon dressing changes. The patient has no other complaints or associated symptoms.\par \par  Patient's GYN Dr. Recio prescribed Triamcinolone Acetonide-cream usp 0.1% and nystatin cream\par \par

## 2023-01-03 ENCOUNTER — RX RENEWAL (OUTPATIENT)
Age: 68
End: 2023-01-03

## 2023-01-03 NOTE — PHYSICAL EXAM
[No Acute Distress] : no acute distress [Well Nourished] : well nourished [Well Developed] : well developed [Well-Appearing] : well-appearing [PERRL] : pupils equal round and reactive to light [Normal Outer Ear/Nose] : the outer ears and nose were normal in appearance [Normal Oropharynx] : the oropharynx was normal [No JVD] : no jugular venous distention [Supple] : supple [No Respiratory Distress] : no respiratory distress  [Clear to Auscultation] : lungs were clear to auscultation bilaterally [No Accessory Muscle Use] : no accessory muscle use [Normal Rate] : normal rate  [Regular Rhythm] : with a regular rhythm [Normal S1, S2] : normal S1 and S2 [No Murmur] : no murmur heard [Pedal Pulses Present] : the pedal pulses are present [No Edema] : there was no peripheral edema [Soft] : abdomen soft [Non Tender] : non-tender [No Masses] : no abdominal mass palpated [No HSM] : no HSM [Normal Bowel Sounds] : normal bowel sounds [No CVA Tenderness] : no CVA  tenderness [No Spinal Tenderness] : no spinal tenderness [No Joint Swelling] : no joint swelling [Grossly Normal Strength/Tone] : grossly normal strength/tone [No Rash] : no rash [Coordination Grossly Intact] : coordination grossly intact [No Focal Deficits] : no focal deficits [Deep Tendon Reflexes (DTR)] : deep tendon reflexes were 2+ and symmetric [Normal Affect] : the affect was normal [Normal Insight/Judgement] : insight and judgment were intact [de-identified] : Uses walker Speaking Coherently

## 2023-01-05 ENCOUNTER — RX RENEWAL (OUTPATIENT)
Age: 68
End: 2023-01-05

## 2023-01-09 ENCOUNTER — RX RENEWAL (OUTPATIENT)
Age: 68
End: 2023-01-09

## 2023-01-10 ENCOUNTER — APPOINTMENT (OUTPATIENT)
Dept: WOUND CARE | Facility: CLINIC | Age: 68
End: 2023-01-10
Payer: MEDICARE

## 2023-01-10 DIAGNOSIS — N89.8 OTHER SPECIFIED NONINFLAMMATORY DISORDERS OF VAGINA: ICD-10-CM

## 2023-01-10 DIAGNOSIS — R32 UNSPECIFIED URINARY INCONTINENCE: ICD-10-CM

## 2023-01-10 PROCEDURE — 99213 OFFICE O/P EST LOW 20 MIN: CPT

## 2023-01-19 PROBLEM — R32 URINARY INCONTINENCE IN FEMALE: Status: ACTIVE | Noted: 2019-09-23

## 2023-01-19 PROBLEM — N89.8 VAGINAL IRRITATION: Status: ACTIVE | Noted: 2022-03-17

## 2023-01-19 NOTE — PLAN
[FreeTextEntry1] :  \par Plan: Cavilon, duoderm to wound beds\par Interdry sheets to placed in skin folds with candida dermatitis \par Follow up in 3 weeks\par

## 2023-01-19 NOTE — PHYSICAL EXAM
[Skin Ulcer] : ulcer [Alert] : alert [Oriented to Person] : oriented to person [Oriented to Place] : oriented to place [Oriented to Time] : oriented to time [Calm] : calm [Please See PDF for [employer]] : Please See PDF for [employer]. [Abdomen Tenderness] : ~T ~M No abdominal tenderness [de-identified] : NAD [de-identified] : EOMI  [de-identified] : supple

## 2023-01-19 NOTE — ASSESSMENT
[FreeTextEntry1] : 67 yr old with erosions into dermis from mad / candida, MO, stage1 breast ca\par continue  hydraofera blue and duoderm to secure\par trying to walk hip arthritis an issue can't do hip surgery until wounds are closed\par fluconazole and lido-priol ordered\par 12/20/2022-\par Pt presents with partner for follow up for multiple thigh and groin wounds.\par nutritional eval for obesity\par  Pt is currently using hydrofera blue foam but states "it is difficult to get it to stick to the wounds". \par periwound still inflammed/ moisture dermatiis in pannicula, no sti, no imaging will need CT\par discussed that plastics rarely will do panniculectomy in 65 yr old, however , if cleared, should review with them\par B/l superior posterior thigh wounds which appear to have started as MAD and now full thickness wounds.\par continue with  cavilon/ absorbant  foam. alginate/ duoderm interdry \par consider ID/ eval- immnunity may be compromised\par   \par 1/10 /23 \par using interdry and  hydrofera blue \par renewed meds, seeing ID\par

## 2023-02-05 ENCOUNTER — OUTPATIENT (OUTPATIENT)
Dept: OUTPATIENT SERVICES | Facility: HOSPITAL | Age: 68
LOS: 1 days | Discharge: HOME | End: 2023-02-05

## 2023-02-05 DIAGNOSIS — Z96.642 PRESENCE OF LEFT ARTIFICIAL HIP JOINT: Chronic | ICD-10-CM

## 2023-02-05 DIAGNOSIS — Z90.13 ACQUIRED ABSENCE OF BILATERAL BREASTS AND NIPPLES: Chronic | ICD-10-CM

## 2023-02-05 DIAGNOSIS — D05.90 UNSPECIFIED TYPE OF CARCINOMA IN SITU OF UNSPECIFIED BREAST: ICD-10-CM

## 2023-02-05 DIAGNOSIS — Z96.643 PRESENCE OF ARTIFICIAL HIP JOINT, BILATERAL: Chronic | ICD-10-CM

## 2023-02-05 DIAGNOSIS — Z98.890 OTHER SPECIFIED POSTPROCEDURAL STATES: Chronic | ICD-10-CM

## 2023-02-05 DIAGNOSIS — Z96.652 PRESENCE OF LEFT ARTIFICIAL KNEE JOINT: Chronic | ICD-10-CM

## 2023-02-06 ENCOUNTER — APPOINTMENT (OUTPATIENT)
Dept: GYNECOLOGIC ONCOLOGY | Facility: CLINIC | Age: 68
End: 2023-02-06
Payer: MEDICARE

## 2023-02-06 VITALS
BODY MASS INDEX: 57.52 KG/M2 | DIASTOLIC BLOOD PRESSURE: 81 MMHG | HEIGHT: 60 IN | WEIGHT: 293 LBS | HEART RATE: 111 BPM | SYSTOLIC BLOOD PRESSURE: 117 MMHG | OXYGEN SATURATION: 98 %

## 2023-02-06 DIAGNOSIS — L29.2 PRURITUS VULVAE: ICD-10-CM

## 2023-02-06 PROCEDURE — 99214 OFFICE O/P EST MOD 30 MIN: CPT

## 2023-02-08 NOTE — REVIEW OF SYSTEMS
[Negative] : Musculoskeletal [Hematuria] : no hematuria [Dysuria] : no dysuria [Vaginal Discharge] : no vaginal discharge [Abn Vag Bleeding] : no abnormal vaginal bleeding [Dyspareunia] : no dyspareunia [Incontinence] : no incontinence [FreeTextEntry4] : vulvar itching

## 2023-02-08 NOTE — ADDENDUM
[FreeTextEntry1] : Discussed with Dr. Gonsales, patient was not discharged from the practice... she is due for return follow-up now. Dr. Gonsales concerned that patient has an underlying immunity issue which is prohibiting response from treatment. Pt. with appt with Dr. MARCELO on 2/21. Pt. is not a candidate for hyperbaric therapy as ulcers are not venous. Dr. Gonsales also suggested nutrition consult, all of the above relayed to patient with additional referral to Dr. Sevilla for weight loss provided. Patient otherwise knows to establish care with Dr. Felder but will return as needed or if felt necessary by Dr. Felder.

## 2023-02-08 NOTE — REASON FOR VISIT
[FreeTextEntry1] : [place] Location\par \par [facility] [phone] at [address] [place] [id number]\par  \par Vulvar rash

## 2023-02-08 NOTE — HISTORY OF PRESENT ILLNESS
[FreeTextEntry1] : 65 yo patient currently on tamoxifen since 2018 and follows with Dr. Jara for continued SVL for h/o right breast invasive ductal carcinoma (ER+/HI+/HER2-) s/p bilateral mastectomy in 2018 with Dr. Gregg without any adjuvant treatment needed. She was initially seen on 1/27/22 for evaluation of persistent vulvar itching, pain and burning sensation beginning approx. 6 months prior to this. At that time she had partial relief with Vagisil and lidocaine gel. She then tested positive for herpes via IgG testing, treated with oral Valtrex. She again had partial relief of her symptoms. Upon examination at her initial visit, vulvar ulcers and perineal fissure on PE were noted -- both biopsied for further evaluation with benign results. Patient has since been treated by our office with nystatin and triamcinolone cream for persistent itching. She has been following with wound care routinely, last visit Jan 10th 2023. Patient reports that she was discharged when last seen, she states she has followed with them for 6 months now. She did have resolution for a period of time but now with open wounds again. Patient denies fevers. She has appt with Dr. Henry KINCAID on 2/21/23. Patient reports irritation when urinating, she cleans but not fully as she does not want to wipe cream off. She reports itching to be her most bothersome complaint. She reports getting the most relief with hydrocortisone cream and nystatin cream. She does not need refills at this time. She denies abnormal vaginal discharge, irritation or odor. \par \par Left lateral perineal biopsy/left mid labia majora biopsy Jan 2022 - Ulceration with acute and chronic inflammation and granulation tissue

## 2023-02-08 NOTE — ASSESSMENT
[FreeTextEntry1] : 68yo with chronic vulvar itching. Candida dermatitis, open wounds on bilateral thighs and vulva. \par \par I discussed with patient that per biopsies performed by Dr. Recio in Jan 2022 this is not an oncologic issue. With her current weight, her continued issues with delayed wound healing and irritation will likely not improve. Patient is willing to pursue any options to help with her current condition, referral to bariatric surgeon provided. Again, counseled patient on importance of proper cleaning. Advised she fully clean herself with soap and water after urination despite if this wipes away creams. Patient to re-apply creams thereafter. Pt advised to continue to avoid irritating products. Patient also advised trial of corn starch in abdominal folds to help with widespread rash. She will purchase a handheld fan to help with moisture. She knows to contact us right away should she develop fevers. \par \par Otherwise I believe her current issue is pressing and appropriate for continued wound care due to open ulcers noted on exam today. I will reach out to them to further discuss discrepancy. Patient reports being discharged but per last note recommendation was for continued follow-up in 3 weeks. Otherwise recommendation was to continue with cavilon/duoderm to wound beds & interdry sheets to be placed within skin folds for candida dermatitis. They also recommended consideration of plastics consultation to discuss panniculectomy. \par \par

## 2023-02-08 NOTE — PHYSICAL EXAM
[Abnormal] : External genitalia: Abnormal [Normal] : Mood and affect: Normal [FreeTextEntry1] : morbidly obese [de-identified] : pannus overlying external genitalia making full examination difficult, widespread red rash of vulva and bilateral thighs, R. groin erythema worse than left. Not warm to touch. Multiple open wounds ~ 1 inch each. One on either side of internal thigh, one on superior aspect of vulva.

## 2023-02-14 ENCOUNTER — RESULT REVIEW (OUTPATIENT)
Age: 68
End: 2023-02-14

## 2023-02-14 ENCOUNTER — APPOINTMENT (OUTPATIENT)
Dept: HEMATOLOGY ONCOLOGY | Facility: CLINIC | Age: 68
End: 2023-02-14
Payer: MEDICARE

## 2023-02-14 ENCOUNTER — APPOINTMENT (OUTPATIENT)
Dept: OBGYN | Facility: CLINIC | Age: 68
End: 2023-02-14

## 2023-02-14 VITALS
DIASTOLIC BLOOD PRESSURE: 79 MMHG | HEIGHT: 60 IN | BODY MASS INDEX: 57.52 KG/M2 | HEART RATE: 81 BPM | OXYGEN SATURATION: 98 % | SYSTOLIC BLOOD PRESSURE: 150 MMHG | WEIGHT: 293 LBS | TEMPERATURE: 97.3 F

## 2023-02-14 LAB
BASOPHILS # BLD AUTO: 0.1 K/UL — SIGNIFICANT CHANGE UP (ref 0–0.2)
BASOPHILS NFR BLD AUTO: 1.5 % — SIGNIFICANT CHANGE UP (ref 0–2)
EOSINOPHIL # BLD AUTO: 0.3 K/UL — SIGNIFICANT CHANGE UP (ref 0–0.5)
EOSINOPHIL NFR BLD AUTO: 3.5 % — SIGNIFICANT CHANGE UP (ref 0–6)
HCT VFR BLD CALC: 37.7 % — SIGNIFICANT CHANGE UP (ref 34.5–45)
HGB BLD-MCNC: 12.6 G/DL — SIGNIFICANT CHANGE UP (ref 11.5–15.5)
LYMPHOCYTES # BLD AUTO: 1.7 K/UL — SIGNIFICANT CHANGE UP (ref 1–3.3)
LYMPHOCYTES # BLD AUTO: 20.7 % — SIGNIFICANT CHANGE UP (ref 13–44)
MCHC RBC-ENTMCNC: 29.6 PG — SIGNIFICANT CHANGE UP (ref 27–34)
MCHC RBC-ENTMCNC: 33.3 G/DL — SIGNIFICANT CHANGE UP (ref 32–36)
MCV RBC AUTO: 88.9 FL — SIGNIFICANT CHANGE UP (ref 80–100)
MONOCYTES # BLD AUTO: 0.7 K/UL — SIGNIFICANT CHANGE UP (ref 0–0.9)
MONOCYTES NFR BLD AUTO: 8 % — SIGNIFICANT CHANGE UP (ref 2–14)
NEUTROPHILS # BLD AUTO: 5.5 K/UL — SIGNIFICANT CHANGE UP (ref 1.8–7.4)
NEUTROPHILS NFR BLD AUTO: 66.2 % — SIGNIFICANT CHANGE UP (ref 43–77)
PLATELET # BLD AUTO: 372 K/UL — SIGNIFICANT CHANGE UP (ref 150–400)
RBC # BLD: 4.24 M/UL — SIGNIFICANT CHANGE UP (ref 3.8–5.2)
RBC # FLD: 13.6 % — SIGNIFICANT CHANGE UP (ref 10.3–14.5)
WBC # BLD: 8.2 K/UL — SIGNIFICANT CHANGE UP (ref 3.8–10.5)
WBC # FLD AUTO: 8.2 K/UL — SIGNIFICANT CHANGE UP (ref 3.8–10.5)

## 2023-02-14 PROCEDURE — 99214 OFFICE O/P EST MOD 30 MIN: CPT

## 2023-02-14 NOTE — HISTORY OF PRESENT ILLNESS
[T: ___] : T[unfilled] [N: ___] : N[unfilled] [AJCC Stage: ____] : AJCC Stage: [unfilled] [de-identified] : Ms. Toussaint was diagnosed with right breast cancer in November 2017 at age 61. \par \par In Fall 2017, she presented to ED with inflamed and erythematous right breast and was given antibiotics with resolution of symptoms.  She was evaluated by Dr. Arlene Gregg of breast surgery.\par \par She subsequently had a diagnostic mammo + US on 10/18/17: Multiple irregular masses in the upper outer right breast. Recommend representative ultrasound-guided core biopsy of two masses at the 11:00 axis and 10:00 axis, as above. Numerous associated distended ducts containing intraductal masses and calcifications throughout the upper outer right breast, in region of palpable concern, 10:00 axis, suspicious for extensive DCIS.Rounded indeterminate mass in the right breast at the 8:00 position, 16 \par cm the nipple, possible abnormal lymph node. Ultrasound-guided core biopsy is advised.\par \par On 11/1/18 she had a core biopsies:\par Final Diagnosis\par 1     Breast biopsy core, right 10:00 4 cm FN, Site #1:- detached fragments of papillary and micropapillar ductal carcinoma with extensive necrosis, probably ductal carcinoma in situ with intermediate to high nuclear grade\par 2     Breast biopsy core, right 11:00 16 cm FN, Site #;- Invasive moderately differentiated ductal carcinoma, [place] 7/9, 1.1 cm. ER>90%, ND> 90%, HER2 negative.\par +DCIS, cribriform and solid with intermediate grade. \par 3     Breast biopsy core, right 8:00 16 cm FN;-   Ductal carcinoma in situ, micropapillary type with intermediate nuclear grade.\par \par She subsequently had non compliance with obtaining an MRI as well as follow ups with Dr. Gregg.  She eventually underwent bilateral mastectomy and bilateral SNL biopsy on 2/1/2018.  \par \par Pathology:\par Right breast simple mastectomy - Infiltrating moderately differentiated ductal carcinoma and DCIS, micropapillary type with tumor necrosis. 1 axillary LN negative.  Approximately 2 cm, grade 2.  Isolate tumor cells in 3 LNs.  ER>90%, ND 90%, HER2 negative.  pT1c pN0(i+)\par \par Left mastectomy and left SNL biopsy - negative\par \par 2/1/18 Oncotype Dx - 17 (low risk) - 5 year recurrence with Tamoxifen 11%, with gutierrez+ chemo 12%\par 11/17/2017 BRCA1/2 negative\par \par Family history is significant for a sister with breast cancer at age 43, and neice (brothers daugther) in her 40s.  Paternal GM had uterine cancer.  Paternal aunt - brain cancer.\par \par She started anastrazole on 3/9/18 and switched to Letrozole on 5/21/18 given hot flashes. She stopped Letrozole on 12/4/18 due to multiple MSK symptoms including muscle pain and generalized joint pains. Since stopping Letrozole, her pain did not get better in her hips and knees but was better in her hands. She was subsequently switched to Tamoxifen on 4/15/19. \par \par s/p colonoscopy on 10/15/18 -->negative per patient [de-identified] : Patient returns for a delayed follow-up with spouse. Last seen in 1/2022. Ambulates in wheelchair. \par Continues on Tamoxifen. \par S/p B/l hip and Left knee replacements since ~2020, R knee replacement on 02/18/2022\par \par Reports following up with wound specialist, Dr. Zarate d/t thigh and groin wounds , will be seeing Infectious Disease specialist next week \par Reports weight loss, ~16 lbs and trouble losing more weight d/t inactivity d/t healing wounds and back pain \par Reports acute back pain since last sx in 2/2022 that has not progressed, is follow up with orthopedic, Dr. Henderson \par Denies vaginal bleeding, spotting \par Denies SE on Tamoxifen

## 2023-02-14 NOTE — ADDENDUM
[FreeTextEntry1] : Documented by Kristina Venegas acting as scribe for Dr. Jara on 02/14/2023.\par \par All Medical record entries made by the Scribe were at my, Dr. Jara, direction and personally dictated by me on 02/14/2023. I have reviewed the chart and agree that the record accurately reflects my personal performance of the history, physical exam, assessment and plan. I have also personally directed, reviewed, and agreed with the discharge instructions.

## 2023-02-14 NOTE — CONSULT LETTER
[Dear  ___] : Dear  [unfilled], [Consult Letter:] : I had the pleasure of evaluating your patient, [unfilled]. [Please see my note below.] : Please see my note below. [Consult Closing:] : Thank you very much for allowing me to participate in the care of this patient.  If you have any questions, please do not hesitate to contact me. [Sincerely,] : Sincerely, [FreeTextEntry3] : Francine Jara MD\par Medical Oncology/Hematology\par [facility] [place]\par [facility] [place]

## 2023-02-14 NOTE — PHYSICAL EXAM
[Normal] : well developed, well nourished, in no acute distress [Obese] : obese [de-identified] : bilateral mastectomy scars, no palpable mass or axillary adenopathy [de-identified] : morbidly obese abdomen

## 2023-02-14 NOTE — ASSESSMENT
[FreeTextEntry1] : 67 year old female with stage IA right breast infiltrating ductal carcinoma (T1c N0(i+)). ER>90%, IN 90%, HER2 negative, S/p bilateral mastectomy + SNL biopsy.  BRCA negative.  Oncotype Dx 17 (low risk).   Began anastrazole on 3/9/18 and switched for Letrozole on 5/21/18 given hot flashes impacting QOL.  Letrozole stopped on 12/4/18 due to pain in multiple joints.  She was switched to Tamoxifen on 4/15/19. \par 4/3/18 bone density - normal.\par Patient not seen 05/05/2020- 1/27/2022\par \par Continues on Tamoxifen, overall tolerating well. No vaginal bleeding/spotting. \par 02/14/2023 Today's CBC: WBC 8.2 K, HGB 12.6 g, HCT 37.7 %,  K, ANC 5500\par \par Plan:\par Continue Tamoxifen. Will be reaching 5 years of endocrine therapy in 4/2023 with a 5 month delay between transition from AI to Tamoxifen. Discussed minimum 5 years of therapy, and consider extended duration to 7 years, though risk of recurrence is low for node negative and smaller tumors. \par new back pain - Bone scan ordered\par Follow up in 6 months\par \par

## 2023-02-21 ENCOUNTER — APPOINTMENT (OUTPATIENT)
Dept: INTERNAL MEDICINE | Facility: CLINIC | Age: 68
End: 2023-02-21

## 2023-02-22 ENCOUNTER — NON-APPOINTMENT (OUTPATIENT)
Age: 68
End: 2023-02-22

## 2023-02-22 LAB
25(OH)D3 SERPL-MCNC: 24.9 NG/ML
ALBUMIN SERPL ELPH-MCNC: 4 G/DL
ALP BLD-CCNC: 66 U/L
ALT SERPL-CCNC: 35 U/L
ANION GAP SERPL CALC-SCNC: 15 MMOL/L
AST SERPL-CCNC: 40 U/L
BILIRUB SERPL-MCNC: 0.2 MG/DL
BUN SERPL-MCNC: 16 MG/DL
CALCIUM SERPL-MCNC: 9.8 MG/DL
CHLORIDE SERPL-SCNC: 100 MMOL/L
CO2 SERPL-SCNC: 25 MMOL/L
CREAT SERPL-MCNC: 0.74 MG/DL
EGFR: 89 ML/MIN/1.73M2
GLUCOSE SERPL-MCNC: 173 MG/DL
POTASSIUM SERPL-SCNC: 3.9 MMOL/L
PROT SERPL-MCNC: 7.2 G/DL
SODIUM SERPL-SCNC: 140 MMOL/L

## 2023-02-28 ENCOUNTER — APPOINTMENT (OUTPATIENT)
Dept: INTERNAL MEDICINE | Facility: CLINIC | Age: 68
End: 2023-02-28

## 2023-03-20 ENCOUNTER — RX RENEWAL (OUTPATIENT)
Age: 68
End: 2023-03-20

## 2023-03-27 ENCOUNTER — NON-APPOINTMENT (OUTPATIENT)
Age: 68
End: 2023-03-27

## 2023-03-27 ENCOUNTER — APPOINTMENT (OUTPATIENT)
Dept: CARDIOLOGY | Facility: CLINIC | Age: 68
End: 2023-03-27
Payer: MEDICARE

## 2023-03-27 ENCOUNTER — APPOINTMENT (OUTPATIENT)
Dept: INTERNAL MEDICINE | Facility: CLINIC | Age: 68
End: 2023-03-27
Payer: MEDICARE

## 2023-03-27 VITALS
SYSTOLIC BLOOD PRESSURE: 132 MMHG | HEIGHT: 60 IN | BODY MASS INDEX: 57.52 KG/M2 | TEMPERATURE: 98 F | OXYGEN SATURATION: 97 % | DIASTOLIC BLOOD PRESSURE: 84 MMHG | WEIGHT: 293 LBS | HEART RATE: 83 BPM

## 2023-03-27 VITALS
SYSTOLIC BLOOD PRESSURE: 120 MMHG | BODY MASS INDEX: 57.52 KG/M2 | WEIGHT: 293 LBS | HEIGHT: 60 IN | DIASTOLIC BLOOD PRESSURE: 65 MMHG

## 2023-03-27 DIAGNOSIS — N90.89 OTHER SPECIFIED NONINFLAMMATORY DISORDERS OF VULVA AND PERINEUM: ICD-10-CM

## 2023-03-27 PROCEDURE — 99205 OFFICE O/P NEW HI 60 MIN: CPT | Mod: 25

## 2023-03-27 PROCEDURE — 93000 ELECTROCARDIOGRAM COMPLETE: CPT

## 2023-03-27 PROCEDURE — 99214 OFFICE O/P EST MOD 30 MIN: CPT

## 2023-03-27 PROCEDURE — 36415 COLL VENOUS BLD VENIPUNCTURE: CPT

## 2023-03-27 RX ORDER — CLOBETASOL PROPIONATE 0.5 MG/G
0.05 CREAM TOPICAL TWICE DAILY
Qty: 1 | Refills: 1 | Status: DISCONTINUED | COMMUNITY
Start: 2022-05-12 | End: 2023-03-27

## 2023-03-27 RX ORDER — TRIAMCINOLONE ACETONIDE 1 MG/G
0.1 OINTMENT TOPICAL TWICE DAILY
Qty: 30 | Refills: 0 | Status: DISCONTINUED | COMMUNITY
Start: 2022-03-17 | End: 2023-03-27

## 2023-03-27 RX ORDER — GABAPENTIN 600 MG/1
600 TABLET, COATED ORAL 4 TIMES DAILY
Qty: 120 | Refills: 3 | Status: DISCONTINUED | COMMUNITY
Start: 2021-11-16 | End: 2023-03-27

## 2023-03-27 RX ORDER — NYSTATIN 100000 [USP'U]/G
100000 CREAM TOPICAL
Qty: 1 | Refills: 1 | Status: DISCONTINUED | COMMUNITY
Start: 2022-03-17 | End: 2023-03-27

## 2023-03-27 RX ORDER — MELOXICAM 7.5 MG/1
7.5 TABLET ORAL
Qty: 42 | Refills: 0 | Status: DISCONTINUED | COMMUNITY
Start: 2022-02-19 | End: 2023-03-27

## 2023-03-27 RX ORDER — SILVER SULFADIAZINE 10 MG/G
1 CREAM TOPICAL DAILY
Qty: 1 | Refills: 2 | Status: DISCONTINUED | COMMUNITY
Start: 2022-08-08 | End: 2023-03-27

## 2023-03-27 RX ORDER — CLOTRIMAZOLE AND BETAMETHASONE DIPROPIONATE 10; .5 MG/G; MG/G
1-0.05 CREAM TOPICAL TWICE DAILY
Qty: 1 | Refills: 2 | Status: DISCONTINUED | COMMUNITY
Start: 2022-10-06 | End: 2023-03-27

## 2023-03-27 RX ORDER — CYCLOBENZAPRINE HYDROCHLORIDE 10 MG/1
10 TABLET, FILM COATED ORAL
Qty: 30 | Refills: 0 | Status: DISCONTINUED | COMMUNITY
Start: 2021-08-27 | End: 2023-03-27

## 2023-03-27 RX ORDER — SILVER SULFADIAZINE 10 G/1000G
1 CREAM TOPICAL DAILY
Qty: 1 | Refills: 2 | Status: DISCONTINUED | COMMUNITY
Start: 2022-06-13 | End: 2023-03-27

## 2023-03-27 RX ORDER — PHENAZOPYRIDINE 100 MG/1
100 TABLET, FILM COATED ORAL 3 TIMES DAILY
Qty: 3 | Refills: 0 | Status: DISCONTINUED | COMMUNITY
Start: 2023-01-19 | End: 2023-03-27

## 2023-03-27 RX ORDER — OXYCODONE 5 MG/1
5 TABLET ORAL
Qty: 28 | Refills: 0 | Status: DISCONTINUED | COMMUNITY
Start: 2022-02-19 | End: 2023-03-27

## 2023-03-27 RX ORDER — ENOXAPARIN SODIUM 30 MG/.3ML
30 INJECTION, SOLUTION SUBCUTANEOUS
Qty: 8 | Refills: 0 | Status: DISCONTINUED | COMMUNITY
Start: 2022-02-19 | End: 2023-03-27

## 2023-03-27 RX ORDER — LIDOCAINE/TETRACAINE/BENZOCAIN 10-10-20 %
OINTMENT (GRAM) TOPICAL DAILY
Qty: 1 | Refills: 3 | Status: DISCONTINUED | COMMUNITY
Start: 2022-10-06 | End: 2023-03-27

## 2023-03-27 RX ORDER — TRIAMCINOLONE ACETONIDE 1 MG/G
0.1 CREAM TOPICAL DAILY
Qty: 1 | Refills: 1 | Status: DISCONTINUED | COMMUNITY
Start: 2023-01-23 | End: 2023-03-27

## 2023-03-27 RX ORDER — LIDOCAINE HCL 4 %
4 CREAM (GRAM) TOPICAL
Qty: 1 | Refills: 0 | Status: DISCONTINUED | COMMUNITY
Start: 2022-08-08 | End: 2023-03-27

## 2023-03-27 RX ORDER — LIDOCAINE AND PRILOCAINE 25; 25 MG/G; MG/G
2.5-2.5 CREAM TOPICAL
Qty: 1 | Refills: 5 | Status: DISCONTINUED | COMMUNITY
Start: 2022-08-02 | End: 2023-03-27

## 2023-03-27 NOTE — ASSESSMENT
[FreeTextEntry1] : There is no evidence that patient has systemic candidiasis or even a significant candidal infection of the perineum.  It would not be surprising if this was part of the floor but her real problem is a combination of poor hygiene and morbid obesity causing problems.  I had a long conversation with patient and  that she must shower in a chair on a daily basis with a hose attachment that she can used to clean out her perineal area with extra pressure to the ulcers to do likely chemical debridement.  I discussed this must be done every day so that she can clean the areas off and then dry them off and use at least talc for the short-term.  I do not see any indication to add either topical or systemic antifungal therapy as a sole treatment.\par Patient expressed she is having difficulty going to place excess for care and has issues finding places due to insurance.  She is given the name of a local wound healing place that she will go to if she is able to see them in her insurance.  In addition patient needs aggressive local wound care referred her to dermatologist again in the area for either topical biopsy and aggressive local treatment.  No indication patient has drug resistant nasal yeast infection and no indication for oral antifungals at this time.  2 wound cultures done.  Blood work for diabetes drawn and patient will follow-up.  This was discussed with the patient and her  at great length.  All issues regarding patient's health and medical problems have been discussed. The patient understands and concurs with the treatment plan.\par

## 2023-03-27 NOTE — PHYSICAL EXAM
[Bowel Sounds] : normal bowel sounds [Abdomen Soft] : soft [Abdomen Tenderness] : non-tender [] : no hepato-splenomegaly [Abdomen Mass (___ Cm)] : no abdominal mass palpated [Costovertebral Angle Tenderness] : no CVA tenderness [FreeTextEntry1] : Examination reveals a 3 x 2 cm stage II decubitus ulcer what appears to be the right external genitalia with a smaller 1 what appears to be the left external genitalia.  Patient also with extensive dermatitis irritation of the buttock and perineum

## 2023-03-27 NOTE — REVIEW OF SYSTEMS
[As Noted in HPI] : as noted in HPI [Skin Lesions] : skin lesion [Skin Wound] : skin wound [Itching] : itching [Negative] : Gastrointestinal [de-identified] : Patient with painful lesions on her vulva to her buttocks with pruritus

## 2023-03-27 NOTE — HISTORY OF PRESENT ILLNESS
[FreeTextEntry1] : 67 Morbidly obese - prediabetic- no recent labs\par H/O Breast Cancer\par Seeing [facility] healing - [facility]\par Vaginal wound - bx with inflammation - no yeast elements\par No cultures in chart positive for yeast\par Wnds x 6 months  - external genitalia\par Told yeast - no improvement with diflucan x 1 - no improvement\par Patient is a 67-year-old morbidly obese woman who is referred here for treatment of yeast infections of panniculus and groin.\par Patient has a BMI at approximately 60 but was able to have hip replacement surgery.  She is always been troubled with wet dermatitis under her breasts and in her inguinal area but most recently has developed ulcerations on her vulva.  She has been treated with various topical agents without success.  There are no cultures positive in the labs that showed yeast and in fact no cultures whatsoever.  She has had vulva biopsies that show acute and chronic inflammation.  Patient has great difficulty showering and only showers with a chair 1-2 times a week.  The lesions are painful\par

## 2023-03-28 ENCOUNTER — APPOINTMENT (OUTPATIENT)
Dept: FAMILY MEDICINE | Facility: CLINIC | Age: 68
End: 2023-03-28
Payer: MEDICARE

## 2023-03-28 VITALS
BODY MASS INDEX: 57.52 KG/M2 | RESPIRATION RATE: 16 BRPM | HEART RATE: 74 BPM | DIASTOLIC BLOOD PRESSURE: 74 MMHG | SYSTOLIC BLOOD PRESSURE: 130 MMHG | TEMPERATURE: 97.2 F | WEIGHT: 293 LBS | HEIGHT: 60 IN | OXYGEN SATURATION: 98 %

## 2023-03-28 DIAGNOSIS — I10 ESSENTIAL (PRIMARY) HYPERTENSION: ICD-10-CM

## 2023-03-28 DIAGNOSIS — M19.90 UNSPECIFIED OSTEOARTHRITIS, UNSPECIFIED SITE: ICD-10-CM

## 2023-03-28 DIAGNOSIS — E66.01 MORBID (SEVERE) OBESITY DUE TO EXCESS CALORIES: ICD-10-CM

## 2023-03-28 LAB
DEPRECATED KAPPA LC FREE/LAMBDA SER: 1.05 RATIO
ESTIMATED AVERAGE GLUCOSE: 148 MG/DL
HBA1C MFR BLD HPLC: 6.8 %
IGA SER QL IEP: 459 MG/DL
IGG SER QL IEP: 1577 MG/DL
IGM SER QL IEP: 118 MG/DL
KAPPA LC CSF-MCNC: 3.92 MG/DL
KAPPA LC SERPL-MCNC: 4.11 MG/DL
M PROTEIN SPEC IFE-MCNC: NORMAL

## 2023-03-28 PROCEDURE — 99214 OFFICE O/P EST MOD 30 MIN: CPT | Mod: 25

## 2023-03-28 PROCEDURE — 36415 COLL VENOUS BLD VENIPUNCTURE: CPT

## 2023-03-28 RX ORDER — TRIAMCINOLONE ACETONIDE 1 MG/G
0.1 CREAM TOPICAL
Qty: 30 | Refills: 0 | Status: ACTIVE | COMMUNITY
Start: 2022-03-21

## 2023-03-28 RX ORDER — NYSTATIN 100000 [USP'U]/ML
100000 SUSPENSION ORAL 3 TIMES DAILY
Qty: 105 | Refills: 1 | Status: ACTIVE | COMMUNITY
Start: 2022-10-06

## 2023-03-28 NOTE — HISTORY OF PRESENT ILLNESS
[Family Member] : family member [FreeTextEntry1] : follow up visit [de-identified] : 66 y/o female with PMH with HTN, obesity, GERD, vit d deficiency. Patient wanted to discuss getting weight loss surgery, . Patient has been having anxiety for the past couple of months and when she gets attacks it is hard for her to breath. It doesn't happen every day but it is bother some.

## 2023-03-28 NOTE — PHYSICAL EXAM
[No Acute Distress] : no acute distress [Well Nourished] : well nourished [Well Developed] : well developed [Well-Appearing] : well-appearing [Normal Sclera/Conjunctiva] : normal sclera/conjunctiva [PERRL] : pupils equal round and reactive to light [EOMI] : extraocular movements intact [Normal Outer Ear/Nose] : the outer ears and nose were normal in appearance [Normal Oropharynx] : the oropharynx was normal [No JVD] : no jugular venous distention [No Lymphadenopathy] : no lymphadenopathy [Supple] : supple [Thyroid Normal, No Nodules] : the thyroid was normal and there were no nodules present [No Respiratory Distress] : no respiratory distress  [No Accessory Muscle Use] : no accessory muscle use [Clear to Auscultation] : lungs were clear to auscultation bilaterally [Normal Rate] : normal rate  [Regular Rhythm] : with a regular rhythm [Normal S1, S2] : normal S1 and S2 [No Murmur] : no murmur heard [No Carotid Bruits] : no carotid bruits [No Abdominal Bruit] : a ~M bruit was not heard ~T in the abdomen [No Varicosities] : no varicosities [Pedal Pulses Present] : the pedal pulses are present [No Edema] : there was no peripheral edema [No Palpable Aorta] : no palpable aorta [No Extremity Clubbing/Cyanosis] : no extremity clubbing/cyanosis [Soft] : abdomen soft [Non Tender] : non-tender [Non-distended] : non-distended [No Masses] : no abdominal mass palpated [No HSM] : no HSM [Normal Bowel Sounds] : normal bowel sounds [Normal Posterior Cervical Nodes] : no posterior cervical lymphadenopathy [Normal Anterior Cervical Nodes] : no anterior cervical lymphadenopathy [No CVA Tenderness] : no CVA  tenderness [No Spinal Tenderness] : no spinal tenderness [No Joint Swelling] : no joint swelling [Grossly Normal Strength/Tone] : grossly normal strength/tone [No Rash] : no rash [Coordination Grossly Intact] : coordination grossly intact [No Focal Deficits] : no focal deficits [Normal Gait] : normal gait [Deep Tendon Reflexes (DTR)] : deep tendon reflexes were 2+ and symmetric [Normal Affect] : the affect was normal [Normal Insight/Judgement] : insight and judgment were intact [de-identified] : obese

## 2023-03-28 NOTE — ASSESSMENT
[FreeTextEntry1] : 66 y/o female with PMH with HTN, obesity, GERD, vit d deficiency. Patient wanted to discuss getting weight loss surgery. Patient has been having anxiety for the past couple of months and when she gets attacks it is hard for her to breath. It doesn't happen every day but it is bother some.\par \par care plan reviewed\par labs obtained\par med reviewed\par Fluoxetine started for anxiety\par Therapy card given for anxiety\par Bariatric referral\par \par RTC 3 month\par

## 2023-03-29 ENCOUNTER — NON-APPOINTMENT (OUTPATIENT)
Age: 68
End: 2023-03-29

## 2023-03-29 LAB
24R-OH-CALCIDIOL SERPL-MCNC: 53 PG/ML
ALBUMIN SERPL ELPH-MCNC: 4.1 G/DL
ALP BLD-CCNC: 71 U/L
ALT SERPL-CCNC: 32 U/L
ANION GAP SERPL CALC-SCNC: 17 MMOL/L
AST SERPL-CCNC: 32 U/L
BASOPHILS # BLD AUTO: 0.1 K/UL
BASOPHILS NFR BLD AUTO: 1.1 %
BILIRUB SERPL-MCNC: 0.4 MG/DL
BUN SERPL-MCNC: 18 MG/DL
CALCIUM SERPL-MCNC: 10.1 MG/DL
CHLORIDE SERPL-SCNC: 100 MMOL/L
CO2 SERPL-SCNC: 24 MMOL/L
CREAT SERPL-MCNC: 0.72 MG/DL
EGFR: 92 ML/MIN/1.73M2
EOSINOPHIL # BLD AUTO: 0.29 K/UL
EOSINOPHIL NFR BLD AUTO: 3.2 %
GLUCOSE SERPL-MCNC: 115 MG/DL
HCT VFR BLD CALC: 39.6 %
HGB BLD-MCNC: 12.3 G/DL
IMM GRANULOCYTES NFR BLD AUTO: 0.4 %
LYMPHOCYTES # BLD AUTO: 2.08 K/UL
LYMPHOCYTES NFR BLD AUTO: 22.7 %
MAN DIFF?: NORMAL
MCHC RBC-ENTMCNC: 28.3 PG
MCHC RBC-ENTMCNC: 31.1 GM/DL
MCV RBC AUTO: 91 FL
MONOCYTES # BLD AUTO: 1.05 K/UL
MONOCYTES NFR BLD AUTO: 11.5 %
NEUTROPHILS # BLD AUTO: 5.6 K/UL
NEUTROPHILS NFR BLD AUTO: 61.1 %
PLATELET # BLD AUTO: 363 K/UL
POTASSIUM SERPL-SCNC: 4 MMOL/L
PROT SERPL-MCNC: 7.1 G/DL
RBC # BLD: 4.35 M/UL
RBC # FLD: 15.9 %
SODIUM SERPL-SCNC: 141 MMOL/L
TSH SERPL-ACNC: 3.05 UIU/ML
WBC # FLD AUTO: 9.16 K/UL

## 2023-03-30 ENCOUNTER — APPOINTMENT (OUTPATIENT)
Dept: WOUND CARE | Facility: CLINIC | Age: 68
End: 2023-03-30
Payer: MEDICARE

## 2023-03-30 DIAGNOSIS — N76.6 ULCERATION OF VULVA: ICD-10-CM

## 2023-03-30 DIAGNOSIS — S71.109A UNSPECIFIED OPEN WOUND, UNSPECIFIED THIGH, INITIAL ENCOUNTER: ICD-10-CM

## 2023-03-30 LAB
BACTERIA WND CULT: ABNORMAL
ESTIMATED AVERAGE GLUCOSE: 146 MG/DL
HBA1C MFR BLD HPLC: 6.7 %

## 2023-03-30 PROCEDURE — 99213 OFFICE O/P EST LOW 20 MIN: CPT

## 2023-03-30 RX ORDER — BENZOCAINE 11.4 G/57G
20 AEROSOL, SPRAY TOPICAL
Qty: 1 | Refills: 2 | Status: ACTIVE | COMMUNITY
Start: 2023-03-30 | End: 1900-01-01

## 2023-03-30 NOTE — ASSESSMENT
[FreeTextEntry1] : 67 yr old with erosions into dermis from mad / candida, MO, stage1 breast ca\par continue  hydrafera blue and duoderm to secure\par duoderm for shallow wound after cleansing\par for left labial wound ssd\par puritis on thighs  nystatin / steroid cream\par recommend cotton under pants to keep dressings in place and separate pannus from suprapubic area- interdry or cotton strips to wick away moisture\par consider plastic eval\par will fup with ID re- culture\par \par \par trying to walk hip arthritis an issue can't do hip surgery until wounds are closed\par  \par Pt presents with partner for follow up for multiple thigh and groin wounds.\par nutritional eval for obesity\par  Pt is currently using hydrofera blue foam but states "it is difficult to get it to stick to the wounds". \par periwound still inflammed/ moisture dermatiis in pannicula, no sti, no imaging will need CT\par discussed that plastics rarely will do panniculectomy in 65 yr old, however , if cleared, should review with them\par B/l superior posterior thigh wounds which appear to have started as MAD and now full thickness wounds.\par continue with  cavilon/ absorbant  foam. alginate/ duoderm interdry \par consider ID/ eval- immnunity may be compromised\par   \par 1/10 /23 \par using interdry and  hydrofera blue \par renewed meds, seeing ID\par

## 2023-03-30 NOTE — HISTORY OF PRESENT ILLNESS
[FreeTextEntry1] : Ms. NANCI MATIAS   presents to the office with wounds since June of 2021. \par some improvement with anti candida med\par discussed need for ID/ immnue assessment   went- they did not think she had candida\par there was a culture done\par no fevers, still with drainage\par Puritis, has several allergies, has not been toallergist\par \par no fevers, not responding to nystatin or fluconazole\par \par The wounds are located on the abdomen, and posterior superior thighs, with MAD/candidiasis on perineum \par and groin. The patient has complaints of pain and itching. \par  The patient has been dressing the wound with triamcinolone and nystatin as prescribed by GYN. \par  The patient denies fevers or chills. The patient has localized pain to the wound upon dressing changes. The patient has no other complaints or associated symptoms.\par \par  Patient's GYN Dr. Recio prescribed Triamcinolone Acetonide-cream usp 0.1% and nystatin cream\par \par

## 2023-03-30 NOTE — PLAN
[FreeTextEntry1] :  \par Plan: Cavilon, duoderm to wound beds/ hydrofera blue for deeper wounds\par ssd for left labial wound\par Interdry sheets to placed in skin folds with candida dermatitis \par Follow up in 3 weeks\par

## 2023-03-30 NOTE — PHYSICAL EXAM
[Abdomen Tenderness] : ~T ~M No abdominal tenderness [Skin Ulcer] : ulcer [Alert] : alert [Oriented to Person] : oriented to person [Oriented to Place] : oriented to place [Oriented to Time] : oriented to time [Calm] : calm [de-identified] : NAD [de-identified] : EOMI  [de-identified] : supple [Please See PDF for [employer]] : Please See PDF for [employer].

## 2023-04-01 LAB — BACTERIA WND CULT: ABNORMAL

## 2023-04-03 LAB
CHOLEST SERPL-MCNC: 151 MG/DL
HDLC SERPL-MCNC: 43 MG/DL
LDLC SERPL CALC-MCNC: 74 MG/DL
NONHDLC SERPL-MCNC: 108 MG/DL
TRIGL SERPL-MCNC: 172 MG/DL

## 2023-04-04 ENCOUNTER — OFFICE (OUTPATIENT)
Dept: URBAN - METROPOLITAN AREA CLINIC 94 | Facility: CLINIC | Age: 68
Setting detail: OPHTHALMOLOGY
End: 2023-04-04
Payer: COMMERCIAL

## 2023-04-04 DIAGNOSIS — H25.13: ICD-10-CM

## 2023-04-04 DIAGNOSIS — H35.033: ICD-10-CM

## 2023-04-04 PROCEDURE — 92134 CPTRZ OPH DX IMG PST SGM RTA: CPT | Performed by: OPHTHALMOLOGY

## 2023-04-04 PROCEDURE — 92004 COMPRE OPH EXAM NEW PT 1/>: CPT | Performed by: OPHTHALMOLOGY

## 2023-04-04 ASSESSMENT — TONOMETRY
OS_IOP_MMHG: 18
OD_IOP_MMHG: 17

## 2023-04-04 ASSESSMENT — VISUAL ACUITY
OD_BCVA: 20/100
OS_BCVA: 20/300

## 2023-04-06 ENCOUNTER — NON-APPOINTMENT (OUTPATIENT)
Age: 68
End: 2023-04-06

## 2023-04-11 ENCOUNTER — APPOINTMENT (OUTPATIENT)
Dept: CARDIOLOGY | Facility: CLINIC | Age: 68
End: 2023-04-11
Payer: MEDICARE

## 2023-04-11 PROCEDURE — 93306 TTE W/DOPPLER COMPLETE: CPT

## 2023-04-12 ENCOUNTER — APPOINTMENT (OUTPATIENT)
Dept: CARDIOLOGY | Facility: CLINIC | Age: 68
End: 2023-04-12
Payer: MEDICARE

## 2023-04-12 VITALS — WEIGHT: 293 LBS | BODY MASS INDEX: 59.02 KG/M2

## 2023-04-12 PROCEDURE — 93352 ADMIN ECG CONTRAST AGENT: CPT

## 2023-04-12 PROCEDURE — 93320 DOPPLER ECHO COMPLETE: CPT

## 2023-04-12 PROCEDURE — 93351 STRESS TTE COMPLETE: CPT

## 2023-04-13 ENCOUNTER — NON-APPOINTMENT (OUTPATIENT)
Age: 68
End: 2023-04-13

## 2023-04-17 ENCOUNTER — APPOINTMENT (OUTPATIENT)
Dept: CARDIOLOGY | Facility: CLINIC | Age: 68
End: 2023-04-17

## 2023-04-17 ENCOUNTER — RX CHANGE (OUTPATIENT)
Age: 68
End: 2023-04-17

## 2023-04-17 ENCOUNTER — RX RENEWAL (OUTPATIENT)
Age: 68
End: 2023-04-17

## 2023-04-17 ENCOUNTER — NON-APPOINTMENT (OUTPATIENT)
Age: 68
End: 2023-04-17

## 2023-04-18 NOTE — H&P PST ADULT - PROBLEM/PLAN-3
Zina Garcia is a 48 year old female presenting for CPX      Patient would like communication of their results via:        Cell Phone:   Telephone Information:   Mobile [phone]     Okay to leave a message containing results? Yes      Pt has a known Latex allergy or symptoms of Latex sensitivity.  Medications reviewed and updated.  Tobacco reviewed    Health Maintenance Due   Topic Date Due   • Hepatitis B Vaccine (1 of 3 - 3-dose series) Never done   • Depression Screening  Never done   • Hepatitis C Screening  Never done   • Colorectal Cancer Screen-  Never done       Patient is due for topics as listed above but is not proceeding with Immunization(s) Hep B, Colorectal Cancer Screening: Colonoscopy and Hepatitis C Screening at this time. PCP to address with patient          DISPLAY PLAN FREE TEXT

## 2023-04-19 ENCOUNTER — RX RENEWAL (OUTPATIENT)
Age: 68
End: 2023-04-19

## 2023-04-24 ENCOUNTER — RX RENEWAL (OUTPATIENT)
Age: 68
End: 2023-04-24

## 2023-04-30 ENCOUNTER — RX RENEWAL (OUTPATIENT)
Age: 68
End: 2023-04-30

## 2023-05-02 ENCOUNTER — OFFICE (OUTPATIENT)
Dept: URBAN - METROPOLITAN AREA CLINIC 94 | Facility: CLINIC | Age: 68
Setting detail: OPHTHALMOLOGY
End: 2023-05-02
Payer: MEDICARE

## 2023-05-02 ENCOUNTER — APPOINTMENT (OUTPATIENT)
Dept: WOUND CARE | Facility: CLINIC | Age: 68
End: 2023-05-02

## 2023-05-02 DIAGNOSIS — H35.033: ICD-10-CM

## 2023-05-02 DIAGNOSIS — H25.11: ICD-10-CM

## 2023-05-02 DIAGNOSIS — H25.13: ICD-10-CM

## 2023-05-02 PROBLEM — H25.12 CATARACT SENILE NUCLEAR SCLEROSIS; RIGHT EYE, LEFT EYE, BOTH EYES: Status: ACTIVE | Noted: 2023-05-02

## 2023-05-02 PROCEDURE — 92136 OPHTHALMIC BIOMETRY: CPT | Performed by: OPHTHALMOLOGY

## 2023-05-02 PROCEDURE — 99214 OFFICE O/P EST MOD 30 MIN: CPT | Performed by: OPHTHALMOLOGY

## 2023-05-02 PROCEDURE — 92250 FUNDUS PHOTOGRAPHY W/I&R: CPT | Performed by: OPHTHALMOLOGY

## 2023-05-02 ASSESSMENT — KERATOMETRY
OS_CYLAXISANGLE_DEGREES: 173
OS_AXISANGLE_DEGREES: 83
OD_K2POWER_DIOPTERS: 45.00
OS_CYLPOWER_DEGREES: 0.25
OD_K1POWER_DIOPTERS: 44.75
OD_CYLAXISANGLE_DEGREES: 163
OS_K1K2_AVERAGE: 44.625
OS_AXISANGLE_DEGREES: 173
OS_K2POWER_DIOPTERS: 44.75
OD_CYLPOWER_DEGREES: 0.25
OS_AXISANGLE2_DEGREES: 173
OS_K1POWER_DIOPTERS: 44.50
OS_K2POWER_DIOPTERS: 44.75
OD_AXISANGLE_DEGREES: 163
OD_AXISANGLE_DEGREES: 73
OD_AXISANGLE2_DEGREES: 163
OS_K1POWER_DIOPTERS: 44.50
OD_K1POWER_DIOPTERS: 44.75
OD_K2POWER_DIOPTERS: 45.00
OD_K1K2_AVERAGE: 44.875

## 2023-05-02 ASSESSMENT — REFRACTION_MANIFEST
OD_VA1: 20/250
OS_SPHERE: -3.50
OS_AXIS: 011
OD_SPHERE: -5.25
OS_CYLINDER: -0.25
OD_CYLINDER: -0.75
OS_VA1: 20/80
OD_AXIS: 164

## 2023-05-02 ASSESSMENT — SPHEQUIV_DERIVED
OS_SPHEQUIV: -3.625
OD_SPHEQUIV: -5.625
OD_SPHEQUIV: -5.625
OS_SPHEQUIV: -3.625

## 2023-05-02 ASSESSMENT — TONOMETRY: OS_IOP_MMHG: 16

## 2023-05-02 ASSESSMENT — VISUAL ACUITY
OS_BCVA: 20/250
OD_BCVA: 20/80

## 2023-05-02 ASSESSMENT — REFRACTION_AUTOREFRACTION
OD_SPHERE: -5.25
OD_AXIS: 164
OD_CYLINDER: -0.75
OS_SPHERE: -3.50
OS_CYLINDER: -0.25
OS_AXIS: 011

## 2023-05-02 ASSESSMENT — CONFRONTATIONAL VISUAL FIELD TEST (CVF)
OS_FINDINGS: FULL
OD_FINDINGS: FULL

## 2023-05-02 ASSESSMENT — AXIALLENGTH_DERIVED
OD_AL: 25.4082
OS_AL: 24.6352
OD_AL: 25.4082
OS_AL: 24.6352

## 2023-05-08 ENCOUNTER — APPOINTMENT (OUTPATIENT)
Dept: CARDIOLOGY | Facility: CLINIC | Age: 68
End: 2023-05-08

## 2023-06-05 ENCOUNTER — APPOINTMENT (OUTPATIENT)
Dept: FAMILY MEDICINE | Facility: CLINIC | Age: 68
End: 2023-06-05
Payer: MEDICARE

## 2023-06-05 NOTE — HISTORY OF PRESENT ILLNESS
[Family Member] : family member [FreeTextEntry1] : follow up visit [de-identified] : 68 y/o female with PMH with HTN, obesity, GERD, vit d deficiency. Patient wanted to discuss getting weight loss surgery, . Patient has been having anxiety for the past couple of months and when she gets attacks it is hard for her to breath. It doesn't happen every day but it is bother some.

## 2023-06-05 NOTE — PHYSICAL EXAM
[No Acute Distress] : no acute distress [Well Nourished] : well nourished [Well Developed] : well developed [Well-Appearing] : well-appearing [Normal Sclera/Conjunctiva] : normal sclera/conjunctiva [PERRL] : pupils equal round and reactive to light [EOMI] : extraocular movements intact [Normal Outer Ear/Nose] : the outer ears and nose were normal in appearance [Normal Oropharynx] : the oropharynx was normal [No JVD] : no jugular venous distention [No Lymphadenopathy] : no lymphadenopathy [Supple] : supple [Thyroid Normal, No Nodules] : the thyroid was normal and there were no nodules present [No Respiratory Distress] : no respiratory distress  [No Accessory Muscle Use] : no accessory muscle use [Clear to Auscultation] : lungs were clear to auscultation bilaterally [Normal Rate] : normal rate  [Regular Rhythm] : with a regular rhythm [Normal S1, S2] : normal S1 and S2 [No Murmur] : no murmur heard [No Carotid Bruits] : no carotid bruits [No Abdominal Bruit] : a ~M bruit was not heard ~T in the abdomen [No Varicosities] : no varicosities [Pedal Pulses Present] : the pedal pulses are present [No Edema] : there was no peripheral edema [No Palpable Aorta] : no palpable aorta [No Extremity Clubbing/Cyanosis] : no extremity clubbing/cyanosis [Soft] : abdomen soft [Non Tender] : non-tender [Non-distended] : non-distended [No Masses] : no abdominal mass palpated [No HSM] : no HSM [Normal Bowel Sounds] : normal bowel sounds [Normal Posterior Cervical Nodes] : no posterior cervical lymphadenopathy [Normal Anterior Cervical Nodes] : no anterior cervical lymphadenopathy [No CVA Tenderness] : no CVA  tenderness [No Spinal Tenderness] : no spinal tenderness [No Joint Swelling] : no joint swelling [Grossly Normal Strength/Tone] : grossly normal strength/tone [No Rash] : no rash [Coordination Grossly Intact] : coordination grossly intact [No Focal Deficits] : no focal deficits [Normal Gait] : normal gait [Deep Tendon Reflexes (DTR)] : deep tendon reflexes were 2+ and symmetric [Normal Affect] : the affect was normal [Normal Insight/Judgement] : insight and judgment were intact [de-identified] : obese

## 2023-06-07 ENCOUNTER — RX RENEWAL (OUTPATIENT)
Age: 68
End: 2023-06-07

## 2023-06-12 ENCOUNTER — RX RENEWAL (OUTPATIENT)
Age: 68
End: 2023-06-12

## 2023-07-10 ENCOUNTER — APPOINTMENT (OUTPATIENT)
Dept: FAMILY MEDICINE | Facility: CLINIC | Age: 68
End: 2023-07-10
Payer: MEDICARE

## 2023-07-10 VITALS
DIASTOLIC BLOOD PRESSURE: 74 MMHG | OXYGEN SATURATION: 98 % | WEIGHT: 293 LBS | HEART RATE: 86 BPM | HEIGHT: 60 IN | RESPIRATION RATE: 14 BRPM | SYSTOLIC BLOOD PRESSURE: 140 MMHG | BODY MASS INDEX: 57.52 KG/M2

## 2023-07-10 DIAGNOSIS — R06.09 OTHER FORMS OF DYSPNEA: ICD-10-CM

## 2023-07-10 DIAGNOSIS — H26.9 UNSPECIFIED CATARACT: ICD-10-CM

## 2023-07-10 PROCEDURE — 99214 OFFICE O/P EST MOD 30 MIN: CPT

## 2023-07-10 NOTE — HISTORY OF PRESENT ILLNESS
[No Pertinent Cardiac History] : no history of aortic stenosis, atrial fibrillation, coronary artery disease, recent myocardial infarction, or implantable device/pacemaker [No Pertinent Pulmonary History] : no history of asthma, COPD, sleep apnea, or smoking [No Adverse Anesthesia Reaction] : no adverse anesthesia reaction in self or family member [Spouse] : spouse [(Patient denies any chest pain, claudication, dyspnea on exertion, orthopnea, palpitations or syncope)] : Patient denies any chest pain, claudication, dyspnea on exertion, orthopnea, palpitations or syncope [Family Member] : no family member with adverse anesthesia reaction/sudden death [Self] : no previous adverse anesthesia reaction [FreeTextEntry1] : 7/12/23, 7/29/2023 [FreeTextEntry2] : 7/12/2023, 7/29/2023 [FreeTextEntry3] : Dr. Robin souza  [FreeTextEntry4] : cataract surgery right eye at sight MD

## 2023-07-12 ENCOUNTER — ASC (OUTPATIENT)
Dept: URBAN - METROPOLITAN AREA SURGERY 8 | Facility: SURGERY | Age: 68
Setting detail: OPHTHALMOLOGY
End: 2023-07-12
Payer: MEDICARE

## 2023-07-12 DIAGNOSIS — H25.11: ICD-10-CM

## 2023-07-12 DIAGNOSIS — H52.211: ICD-10-CM

## 2023-07-12 PROCEDURE — FEMTO FEMTOSECOND LASER: Performed by: OPHTHALMOLOGY

## 2023-07-12 PROCEDURE — 66984 XCAPSL CTRC RMVL W/O ECP: CPT | Performed by: OPHTHALMOLOGY

## 2023-07-13 ENCOUNTER — RX ONLY (RX ONLY)
Age: 68
End: 2023-07-13

## 2023-07-13 ENCOUNTER — OFFICE (OUTPATIENT)
Dept: URBAN - METROPOLITAN AREA CLINIC 94 | Facility: CLINIC | Age: 68
Setting detail: OPHTHALMOLOGY
End: 2023-07-13
Payer: MEDICARE

## 2023-07-13 DIAGNOSIS — Z96.1: ICD-10-CM

## 2023-07-13 PROCEDURE — 99024 POSTOP FOLLOW-UP VISIT: CPT | Performed by: OPTOMETRIST

## 2023-07-13 ASSESSMENT — AXIALLENGTH_DERIVED
OS_AL: 24.8974
OD_AL: 25.1447
OD_AL: 23.4426
OS_AL: 24.7357

## 2023-07-13 ASSESSMENT — KERATOMETRY
OS_K2POWER_DIOPTERS: 44.50
OD_AXISANGLE_DEGREES: 110
OD_K1POWER_DIOPTERS: 45.25
OS_K1POWER_DIOPTERS: 44.25
OS_AXISANGLE_DEGREES: 105
OD_K2POWER_DIOPTERS: 45.75

## 2023-07-13 ASSESSMENT — REFRACTION_MANIFEST
OD_AXIS: 164
OD_VA1: 20/250
OS_VA1: 20/80
OD_SPHERE: -5.25
OS_CYLINDER: -0.25
OS_SPHERE: -3.50
OS_AXIS: 011
OD_CYLINDER: -0.75

## 2023-07-13 ASSESSMENT — CORNEAL EDEMA CLINICAL DESCRIPTION: OD_CORNEALEDEMA: 2+

## 2023-07-13 ASSESSMENT — SPHEQUIV_DERIVED
OS_SPHEQUIV: -4
OD_SPHEQUIV: -1.5
OD_SPHEQUIV: -5.625
OS_SPHEQUIV: -3.625

## 2023-07-13 ASSESSMENT — CONFRONTATIONAL VISUAL FIELD TEST (CVF)
OS_FINDINGS: FULL
OD_FINDINGS: FULL

## 2023-07-13 ASSESSMENT — REFRACTION_AUTOREFRACTION
OS_CYLINDER: -0.50
OS_SPHERE: -3.75
OD_SPHERE: -0.75
OD_CYLINDER: -1.50
OS_AXIS: 040
OD_AXIS: 010

## 2023-07-13 ASSESSMENT — VISUAL ACUITY
OS_BCVA: 20/30
OD_BCVA: 20/80

## 2023-07-13 ASSESSMENT — CORNEAL EDEMA - FOLDS/STRIAE: OD_FOLDSSTRIAE: 2+

## 2023-07-13 ASSESSMENT — TONOMETRY: OS_IOP_MMHG: 20

## 2023-07-17 ENCOUNTER — APPOINTMENT (OUTPATIENT)
Dept: OBGYN | Facility: CLINIC | Age: 68
End: 2023-07-17
Payer: MEDICARE

## 2023-07-17 VITALS
DIASTOLIC BLOOD PRESSURE: 83 MMHG | BODY MASS INDEX: 57.52 KG/M2 | HEIGHT: 60 IN | WEIGHT: 293 LBS | SYSTOLIC BLOOD PRESSURE: 130 MMHG

## 2023-07-17 PROCEDURE — 99213 OFFICE O/P EST LOW 20 MIN: CPT

## 2023-07-17 NOTE — CHIEF COMPLAINT
[Urgent Visit] : Urgent Visit [FreeTextEntry1] : The patient presents complaining of vaginal bleeding.  She has a history of breast cancer.  She is taking tamoxifen.

## 2023-07-17 NOTE — PHYSICAL EXAM
[Chaperone Present] : A chaperone was present in the examining room during all aspects of the physical examination [Awake] : awake [Alert] : alert [Acute Distress] : no acute distress [Mass] : no breast mass [Nipple Discharge] : no nipple discharge [Axillary LAD] : no axillary lymphadenopathy [Soft] : soft [Tender] : non tender [Oriented x3] : oriented to person, place, and time [Normal] : cervix [No Bleeding] : there was no active vaginal bleeding [FreeTextEntry7] : nonpalpable   The adnexa were nonpalpable

## 2023-07-18 ENCOUNTER — APPOINTMENT (OUTPATIENT)
Dept: NUCLEAR MEDICINE | Facility: CLINIC | Age: 68
End: 2023-07-18

## 2023-07-18 ENCOUNTER — OUTPATIENT (OUTPATIENT)
Dept: OUTPATIENT SERVICES | Facility: HOSPITAL | Age: 68
LOS: 1 days | End: 2023-07-18

## 2023-07-18 DIAGNOSIS — Z90.13 ACQUIRED ABSENCE OF BILATERAL BREASTS AND NIPPLES: Chronic | ICD-10-CM

## 2023-07-18 DIAGNOSIS — Z96.643 PRESENCE OF ARTIFICIAL HIP JOINT, BILATERAL: Chronic | ICD-10-CM

## 2023-07-18 DIAGNOSIS — Z98.890 OTHER SPECIFIED POSTPROCEDURAL STATES: Chronic | ICD-10-CM

## 2023-07-18 DIAGNOSIS — Z96.642 PRESENCE OF LEFT ARTIFICIAL HIP JOINT: Chronic | ICD-10-CM

## 2023-07-18 DIAGNOSIS — Z00.8 ENCOUNTER FOR OTHER GENERAL EXAMINATION: ICD-10-CM

## 2023-07-18 DIAGNOSIS — Z96.652 PRESENCE OF LEFT ARTIFICIAL KNEE JOINT: Chronic | ICD-10-CM

## 2023-07-19 ENCOUNTER — OFFICE (OUTPATIENT)
Dept: URBAN - METROPOLITAN AREA CLINIC 94 | Facility: CLINIC | Age: 68
Setting detail: OPHTHALMOLOGY
End: 2023-07-19
Payer: MEDICARE

## 2023-07-19 DIAGNOSIS — Z96.1: ICD-10-CM

## 2023-07-19 DIAGNOSIS — H25.12: ICD-10-CM

## 2023-07-19 PROCEDURE — 99024 POSTOP FOLLOW-UP VISIT: CPT | Performed by: OPHTHALMOLOGY

## 2023-07-19 PROCEDURE — 92136 OPHTHALMIC BIOMETRY: CPT | Performed by: OPHTHALMOLOGY

## 2023-07-19 ASSESSMENT — SPHEQUIV_DERIVED
OS_SPHEQUIV: -4.375
OS_SPHEQUIV: -3.625
OD_SPHEQUIV: -0.25
OD_SPHEQUIV: -5.625

## 2023-07-19 ASSESSMENT — REFRACTION_MANIFEST
OS_AXIS: 011
OS_VA1: 20/80
OS_CYLINDER: -0.25
OD_CYLINDER: -0.75
OD_VA1: 20/250
OD_AXIS: 164
OD_SPHERE: -5.25
OS_SPHERE: -3.50

## 2023-07-19 ASSESSMENT — VISUAL ACUITY
OS_BCVA: 20/20
OD_BCVA: 20/30-1

## 2023-07-19 ASSESSMENT — KERATOMETRY
OS_AXISANGLE_DEGREES: 096
OD_AXISANGLE_DEGREES: 090
OS_K1POWER_DIOPTERS: 44.50
OD_K1POWER_DIOPTERS: 45.00
OD_K2POWER_DIOPTERS: 45.00
OS_K2POWER_DIOPTERS: 44.75

## 2023-07-19 ASSESSMENT — REFRACTION_AUTOREFRACTION
OD_CYLINDER: -0.50
OS_CYLINDER: -1.25
OS_SPHERE: -3.75
OD_AXIS: 106
OD_SPHERE: 0.00
OS_AXIS: 076

## 2023-07-19 ASSESSMENT — AXIALLENGTH_DERIVED
OD_AL: 25.3551
OS_AL: 24.958
OS_AL: 24.6352
OD_AL: 23.1469

## 2023-07-19 ASSESSMENT — TONOMETRY
OS_IOP_MMHG: 20
OD_IOP_MMHG: 20

## 2023-07-19 ASSESSMENT — CONFRONTATIONAL VISUAL FIELD TEST (CVF)
OD_FINDINGS: FULL
OS_FINDINGS: FULL

## 2023-07-24 ENCOUNTER — APPOINTMENT (OUTPATIENT)
Dept: FAMILY MEDICINE | Facility: CLINIC | Age: 68
End: 2023-07-24

## 2023-07-31 ENCOUNTER — OUTPATIENT (OUTPATIENT)
Dept: OUTPATIENT SERVICES | Facility: HOSPITAL | Age: 68
LOS: 1 days | End: 2023-07-31

## 2023-07-31 DIAGNOSIS — Z96.642 PRESENCE OF LEFT ARTIFICIAL HIP JOINT: Chronic | ICD-10-CM

## 2023-07-31 DIAGNOSIS — Z96.652 PRESENCE OF LEFT ARTIFICIAL KNEE JOINT: Chronic | ICD-10-CM

## 2023-07-31 DIAGNOSIS — Z90.13 ACQUIRED ABSENCE OF BILATERAL BREASTS AND NIPPLES: Chronic | ICD-10-CM

## 2023-07-31 DIAGNOSIS — Z00.8 ENCOUNTER FOR OTHER GENERAL EXAMINATION: ICD-10-CM

## 2023-07-31 DIAGNOSIS — Z98.890 OTHER SPECIFIED POSTPROCEDURAL STATES: Chronic | ICD-10-CM

## 2023-07-31 DIAGNOSIS — Z96.643 PRESENCE OF ARTIFICIAL HIP JOINT, BILATERAL: Chronic | ICD-10-CM

## 2023-08-03 ENCOUNTER — APPOINTMENT (OUTPATIENT)
Dept: WOUND CARE | Facility: CLINIC | Age: 68
End: 2023-08-03
Payer: MEDICARE

## 2023-08-03 VITALS
HEART RATE: 82 BPM | SYSTOLIC BLOOD PRESSURE: 135 MMHG | TEMPERATURE: 97.5 F | OXYGEN SATURATION: 95 % | DIASTOLIC BLOOD PRESSURE: 76 MMHG

## 2023-08-03 DIAGNOSIS — I10 ESSENTIAL (PRIMARY) HYPERTENSION: ICD-10-CM

## 2023-08-03 PROCEDURE — 99213 OFFICE O/P EST LOW 20 MIN: CPT

## 2023-08-07 ENCOUNTER — OUTPATIENT (OUTPATIENT)
Dept: OUTPATIENT SERVICES | Facility: HOSPITAL | Age: 68
LOS: 1 days | Discharge: HOME | End: 2023-08-07

## 2023-08-07 DIAGNOSIS — Z96.642 PRESENCE OF LEFT ARTIFICIAL HIP JOINT: Chronic | ICD-10-CM

## 2023-08-07 DIAGNOSIS — Z96.643 PRESENCE OF ARTIFICIAL HIP JOINT, BILATERAL: Chronic | ICD-10-CM

## 2023-08-07 DIAGNOSIS — Z90.13 ACQUIRED ABSENCE OF BILATERAL BREASTS AND NIPPLES: Chronic | ICD-10-CM

## 2023-08-07 DIAGNOSIS — Z96.652 PRESENCE OF LEFT ARTIFICIAL KNEE JOINT: Chronic | ICD-10-CM

## 2023-08-07 DIAGNOSIS — Z98.890 OTHER SPECIFIED POSTPROCEDURAL STATES: Chronic | ICD-10-CM

## 2023-08-07 DIAGNOSIS — D05.90 UNSPECIFIED TYPE OF CARCINOMA IN SITU OF UNSPECIFIED BREAST: ICD-10-CM

## 2023-08-15 ENCOUNTER — APPOINTMENT (OUTPATIENT)
Dept: ULTRASOUND IMAGING | Facility: CLINIC | Age: 68
End: 2023-08-15

## 2023-08-21 ENCOUNTER — APPOINTMENT (OUTPATIENT)
Dept: OBGYN | Facility: CLINIC | Age: 68
End: 2023-08-21

## 2023-08-24 ENCOUNTER — APPOINTMENT (OUTPATIENT)
Dept: WOUND CARE | Facility: CLINIC | Age: 68
End: 2023-08-24

## 2023-08-24 ENCOUNTER — NON-APPOINTMENT (OUTPATIENT)
Age: 68
End: 2023-08-24

## 2023-08-28 ENCOUNTER — APPOINTMENT (OUTPATIENT)
Dept: ULTRASOUND IMAGING | Facility: CLINIC | Age: 68
End: 2023-08-28

## 2023-08-28 PROBLEM — I10 BENIGN ESSENTIAL HYPERTENSION: Status: ACTIVE | Noted: 2018-01-30

## 2023-08-28 NOTE — HISTORY OF PRESENT ILLNESS
[FreeTextEntry1] : Ms. NANCI MATIAS   presents to the office with wounds since June of 2021.  continues to have issues with candida and panniculitis afebrile ulcers of labia and groins and thighs  with exudate management  wants something for puritis  some improvement with anti candida med discussed need for ID/ immnue assessment   went- they did not think she had candida there was a culture done no fevers, still with drainage Puritis, has several allergies, has not been toallergist  no fevers, not responding to nystatin or fluconazole  The wounds are located on the abdomen, and posterior superior thighs, with MAD/candidiasis on perineum  and groin. The patient has complaints of pain and itching.   The patient has been dressing the wound with triamcinolone and nystatin as prescribed by GYN.   The patient denies fevers or chills. The patient has localized pain to the wound upon dressing changes. The patient has no other complaints or associated symptoms.   Patient's GYN Dr. Recio prescribed Triamcinolone Acetonide-cream usp 0.1% and nystatin cream

## 2023-08-28 NOTE — REASON FOR VISIT
[Follow-Up: _____] : a [unfilled] follow-up visit [Spouse] : spouse [Family Member] : family member [FreeTextEntry1] : multiple thigh wounds 0 = independent

## 2023-08-28 NOTE — PHYSICAL EXAM
[Abdomen Tenderness] : ~T ~M No abdominal tenderness [Skin Ulcer] : ulcer [Alert] : alert [Oriented to Person] : oriented to person [Oriented to Place] : oriented to place [Calm] : calm [Oriented to Time] : oriented to time [de-identified] : NAD [de-identified] : EOMI  [Please See PDF for [employer]] : Please See PDF for [employer]. [de-identified] : supple

## 2023-08-28 NOTE — ASSESSMENT
[FreeTextEntry1] : 67 yr old with erosions into dermis from mad / candida, MO, stage1 breast ca continue  hydrafera blue and duoderm to secure  consider [plastics eval recurrent candida poses threat for cellulitis consider panniculectomy- bariatrics or plastics  duoderm for shallow wound after cleansing for left labial wound ssd puritis on thighs  nystatin / steroid cream recommend cotton under pants to keep dressings in place and separate pannus from suprapubic area- interdry or cotton strips to wick away moisture consider plastic eval will fup with ID re- culture   trying to walk hip arthritis an issue can't do hip surgery until wounds are closed   Pt presents with partner for follow up for multiple thigh and groin wounds. nutritional eval for obesity  Pt is currently using hydrofera blue foam but states "it is difficult to get it to stick to the wounds".  periwound still inflammed/ moisture dermatiis in pannicula, no sti, no imaging will need CT discussed that plastics rarely will do panniculectomy in 65 yr old, however , if cleared, should review with them B/l superior posterior thigh wounds which appear to have started as MAD and now full thickness wounds. continue with  cavilon/ absorbant  foam. alginate/ duoderm interdry  consider ID/ eval- immnunity may be compromised    1/10 /23  using interdry and  hydrofera blue  renewed meds, seeing ID

## 2023-09-08 RX ORDER — NYSTATIN AND TRIAMCINOLONE ACETONIDE 100000; 1 MG/G; MG/G
100000-0.1 CREAM TOPICAL
Qty: 1 | Refills: 3 | Status: ACTIVE | COMMUNITY
Start: 2023-03-30 | End: 1900-01-01

## 2023-09-08 RX ORDER — NYSTATIN 100000 [USP'U]/ML
100000 SUSPENSION ORAL 3 TIMES DAILY
Qty: 105 | Refills: 1 | Status: ACTIVE | COMMUNITY
Start: 2022-05-12 | End: 1900-01-01

## 2023-09-08 RX ORDER — NYSTATIN 100000 [USP'U]/G
100000 CREAM TOPICAL 3 TIMES DAILY
Qty: 1 | Refills: 5 | Status: ACTIVE | COMMUNITY
Start: 2022-06-28 | End: 1900-01-01

## 2023-09-21 ENCOUNTER — APPOINTMENT (OUTPATIENT)
Dept: WOUND CARE | Facility: CLINIC | Age: 68
End: 2023-09-21
Payer: MEDICARE

## 2023-09-21 ENCOUNTER — OUTPATIENT (OUTPATIENT)
Dept: OUTPATIENT SERVICES | Facility: HOSPITAL | Age: 68
LOS: 1 days | End: 2023-09-21
Payer: MEDICARE

## 2023-09-21 VITALS
WEIGHT: 293 LBS | TEMPERATURE: 97.2 F | BODY MASS INDEX: 57.52 KG/M2 | HEIGHT: 60 IN | DIASTOLIC BLOOD PRESSURE: 88 MMHG | SYSTOLIC BLOOD PRESSURE: 143 MMHG | HEART RATE: 90 BPM | RESPIRATION RATE: 16 BRPM | OXYGEN SATURATION: 93 %

## 2023-09-21 DIAGNOSIS — Z98.890 OTHER SPECIFIED POSTPROCEDURAL STATES: Chronic | ICD-10-CM

## 2023-09-21 DIAGNOSIS — Z96.642 PRESENCE OF LEFT ARTIFICIAL HIP JOINT: Chronic | ICD-10-CM

## 2023-09-21 DIAGNOSIS — L89.312 PRESSURE ULCER OF RIGHT BUTTOCK, STAGE 2: ICD-10-CM

## 2023-09-21 DIAGNOSIS — Z96.643 PRESENCE OF ARTIFICIAL HIP JOINT, BILATERAL: Chronic | ICD-10-CM

## 2023-09-21 DIAGNOSIS — Z90.13 ACQUIRED ABSENCE OF BILATERAL BREASTS AND NIPPLES: Chronic | ICD-10-CM

## 2023-09-21 DIAGNOSIS — Z96.652 PRESENCE OF LEFT ARTIFICIAL KNEE JOINT: Chronic | ICD-10-CM

## 2023-09-21 PROCEDURE — 11042 DBRDMT SUBQ TIS 1ST 20SQCM/<: CPT

## 2023-09-21 PROCEDURE — 99213 OFFICE O/P EST LOW 20 MIN: CPT | Mod: 25

## 2023-09-21 PROCEDURE — G0463: CPT | Mod: 25

## 2023-09-25 PROBLEM — L89.312 PRESSURE INJURY OF RIGHT BUTTOCK, STAGE 2: Status: ACTIVE | Noted: 2023-03-27

## 2023-09-26 DIAGNOSIS — F41.9 ANXIETY DISORDER, UNSPECIFIED: ICD-10-CM

## 2023-09-26 DIAGNOSIS — L89.312 PRESSURE ULCER OF RIGHT BUTTOCK, STAGE 2: ICD-10-CM

## 2023-10-19 ENCOUNTER — OUTPATIENT (OUTPATIENT)
Dept: OUTPATIENT SERVICES | Facility: HOSPITAL | Age: 68
LOS: 1 days | Discharge: HOME | End: 2023-10-19

## 2023-10-19 DIAGNOSIS — Z96.643 PRESENCE OF ARTIFICIAL HIP JOINT, BILATERAL: Chronic | ICD-10-CM

## 2023-10-19 DIAGNOSIS — Z90.13 ACQUIRED ABSENCE OF BILATERAL BREASTS AND NIPPLES: Chronic | ICD-10-CM

## 2023-10-19 DIAGNOSIS — Z96.652 PRESENCE OF LEFT ARTIFICIAL KNEE JOINT: Chronic | ICD-10-CM

## 2023-10-19 DIAGNOSIS — D05.90 UNSPECIFIED TYPE OF CARCINOMA IN SITU OF UNSPECIFIED BREAST: ICD-10-CM

## 2023-10-19 DIAGNOSIS — Z98.890 OTHER SPECIFIED POSTPROCEDURAL STATES: Chronic | ICD-10-CM

## 2023-10-19 DIAGNOSIS — Z96.642 PRESENCE OF LEFT ARTIFICIAL HIP JOINT: Chronic | ICD-10-CM

## 2023-10-26 ENCOUNTER — APPOINTMENT (OUTPATIENT)
Dept: HEMATOLOGY ONCOLOGY | Facility: CLINIC | Age: 68
End: 2023-10-26
Payer: MEDICARE

## 2023-10-26 ENCOUNTER — APPOINTMENT (OUTPATIENT)
Dept: ULTRASOUND IMAGING | Facility: CLINIC | Age: 68
End: 2023-10-26

## 2023-10-26 ENCOUNTER — RESULT REVIEW (OUTPATIENT)
Age: 68
End: 2023-10-26

## 2023-10-26 ENCOUNTER — OUTPATIENT (OUTPATIENT)
Dept: OUTPATIENT SERVICES | Facility: HOSPITAL | Age: 68
LOS: 1 days | End: 2023-10-26
Payer: MEDICARE

## 2023-10-26 VITALS
HEIGHT: 60 IN | DIASTOLIC BLOOD PRESSURE: 88 MMHG | HEART RATE: 86 BPM | BODY MASS INDEX: 57.52 KG/M2 | WEIGHT: 293 LBS | SYSTOLIC BLOOD PRESSURE: 142 MMHG | OXYGEN SATURATION: 96 %

## 2023-10-26 DIAGNOSIS — N95.0 POSTMENOPAUSAL BLEEDING: ICD-10-CM

## 2023-10-26 DIAGNOSIS — Z79.810 LONG TERM (CURRENT) USE OF SELECTIVE ESTROGEN RECEPTOR MODULATORS (SERMS): ICD-10-CM

## 2023-10-26 DIAGNOSIS — Z96.643 PRESENCE OF ARTIFICIAL HIP JOINT, BILATERAL: Chronic | ICD-10-CM

## 2023-10-26 DIAGNOSIS — Z98.890 OTHER SPECIFIED POSTPROCEDURAL STATES: Chronic | ICD-10-CM

## 2023-10-26 DIAGNOSIS — Z90.13 ACQUIRED ABSENCE OF BILATERAL BREASTS AND NIPPLES: Chronic | ICD-10-CM

## 2023-10-26 DIAGNOSIS — Z96.642 PRESENCE OF LEFT ARTIFICIAL HIP JOINT: Chronic | ICD-10-CM

## 2023-10-26 DIAGNOSIS — Z96.652 PRESENCE OF LEFT ARTIFICIAL KNEE JOINT: Chronic | ICD-10-CM

## 2023-10-26 LAB
BASOPHILS # BLD AUTO: 0.1 K/UL — SIGNIFICANT CHANGE UP (ref 0–0.2)
BASOPHILS # BLD AUTO: 0.1 K/UL — SIGNIFICANT CHANGE UP (ref 0–0.2)
BASOPHILS NFR BLD AUTO: 1.5 % — SIGNIFICANT CHANGE UP (ref 0–2)
BASOPHILS NFR BLD AUTO: 1.5 % — SIGNIFICANT CHANGE UP (ref 0–2)
EOSINOPHIL # BLD AUTO: 0.8 K/UL — HIGH (ref 0–0.5)
EOSINOPHIL # BLD AUTO: 0.8 K/UL — HIGH (ref 0–0.5)
EOSINOPHIL NFR BLD AUTO: 8.8 % — HIGH (ref 0–6)
EOSINOPHIL NFR BLD AUTO: 8.8 % — HIGH (ref 0–6)
HCT VFR BLD CALC: 38.4 % — SIGNIFICANT CHANGE UP (ref 34.5–45)
HCT VFR BLD CALC: 38.4 % — SIGNIFICANT CHANGE UP (ref 34.5–45)
HGB BLD-MCNC: 12.5 G/DL — SIGNIFICANT CHANGE UP (ref 11.5–15.5)
HGB BLD-MCNC: 12.5 G/DL — SIGNIFICANT CHANGE UP (ref 11.5–15.5)
LYMPHOCYTES # BLD AUTO: 1.6 K/UL — SIGNIFICANT CHANGE UP (ref 1–3.3)
LYMPHOCYTES # BLD AUTO: 1.6 K/UL — SIGNIFICANT CHANGE UP (ref 1–3.3)
LYMPHOCYTES # BLD AUTO: 17.7 % — SIGNIFICANT CHANGE UP (ref 13–44)
LYMPHOCYTES # BLD AUTO: 17.7 % — SIGNIFICANT CHANGE UP (ref 13–44)
MCHC RBC-ENTMCNC: 29 PG — SIGNIFICANT CHANGE UP (ref 27–34)
MCHC RBC-ENTMCNC: 29 PG — SIGNIFICANT CHANGE UP (ref 27–34)
MCHC RBC-ENTMCNC: 32.5 G/DL — SIGNIFICANT CHANGE UP (ref 32–36)
MCHC RBC-ENTMCNC: 32.5 G/DL — SIGNIFICANT CHANGE UP (ref 32–36)
MCV RBC AUTO: 89.2 FL — SIGNIFICANT CHANGE UP (ref 80–100)
MCV RBC AUTO: 89.2 FL — SIGNIFICANT CHANGE UP (ref 80–100)
MONOCYTES # BLD AUTO: 1.1 K/UL — HIGH (ref 0–0.9)
MONOCYTES # BLD AUTO: 1.1 K/UL — HIGH (ref 0–0.9)
MONOCYTES NFR BLD AUTO: 12.4 % — SIGNIFICANT CHANGE UP (ref 2–14)
MONOCYTES NFR BLD AUTO: 12.4 % — SIGNIFICANT CHANGE UP (ref 2–14)
NEUTROPHILS # BLD AUTO: 5.4 K/UL — SIGNIFICANT CHANGE UP (ref 1.8–7.4)
NEUTROPHILS # BLD AUTO: 5.4 K/UL — SIGNIFICANT CHANGE UP (ref 1.8–7.4)
NEUTROPHILS NFR BLD AUTO: 59.5 % — SIGNIFICANT CHANGE UP (ref 43–77)
NEUTROPHILS NFR BLD AUTO: 59.5 % — SIGNIFICANT CHANGE UP (ref 43–77)
PLATELET # BLD AUTO: 389 K/UL — SIGNIFICANT CHANGE UP (ref 150–400)
PLATELET # BLD AUTO: 389 K/UL — SIGNIFICANT CHANGE UP (ref 150–400)
RBC # BLD: 4.31 M/UL — SIGNIFICANT CHANGE UP (ref 3.8–5.2)
RBC # BLD: 4.31 M/UL — SIGNIFICANT CHANGE UP (ref 3.8–5.2)
RBC # FLD: 13.1 % — SIGNIFICANT CHANGE UP (ref 10.3–14.5)
RBC # FLD: 13.1 % — SIGNIFICANT CHANGE UP (ref 10.3–14.5)
WBC # BLD: 9 K/UL — SIGNIFICANT CHANGE UP (ref 3.8–10.5)
WBC # BLD: 9 K/UL — SIGNIFICANT CHANGE UP (ref 3.8–10.5)
WBC # FLD AUTO: 9 K/UL — SIGNIFICANT CHANGE UP (ref 3.8–10.5)
WBC # FLD AUTO: 9 K/UL — SIGNIFICANT CHANGE UP (ref 3.8–10.5)

## 2023-10-26 PROCEDURE — 76830 TRANSVAGINAL US NON-OB: CPT | Mod: 26

## 2023-10-26 PROCEDURE — 99214 OFFICE O/P EST MOD 30 MIN: CPT

## 2023-10-26 PROCEDURE — 76856 US EXAM PELVIC COMPLETE: CPT | Mod: 26

## 2023-10-26 RX ORDER — NYSTATIN AND TRIAMCINOLONE ACETONIDE 100000; 1 [USP'U]/G; MG/G
100000-0.1 OINTMENT TOPICAL TWICE DAILY
Qty: 1 | Refills: 2 | Status: DISCONTINUED | COMMUNITY
Start: 2022-02-11 | End: 2023-10-26

## 2023-10-26 RX ORDER — FLUCONAZOLE 200 MG/1
200 TABLET ORAL DAILY
Qty: 1 | Refills: 0 | Status: DISCONTINUED | COMMUNITY
Start: 2023-04-24 | End: 2023-10-26

## 2023-10-26 RX ORDER — CEPHALEXIN 500 MG/1
500 CAPSULE ORAL 4 TIMES DAILY
Qty: 28 | Refills: 2 | Status: DISCONTINUED | COMMUNITY
Start: 2023-04-04 | End: 2023-10-26

## 2023-10-26 RX ORDER — TAMOXIFEN CITRATE 20 MG/1
20 TABLET, FILM COATED ORAL DAILY
Qty: 90 | Refills: 3 | Status: ACTIVE | COMMUNITY
Start: 2019-04-15 | End: 1900-01-01

## 2023-10-30 ENCOUNTER — NON-APPOINTMENT (OUTPATIENT)
Age: 68
End: 2023-10-30

## 2023-10-31 RX ORDER — FLUOXETINE HYDROCHLORIDE 20 MG/1
20 CAPSULE ORAL
Qty: 90 | Refills: 2 | Status: DISCONTINUED | COMMUNITY
Start: 2023-03-28 | End: 2023-10-31

## 2023-11-03 LAB
ALBUMIN SERPL ELPH-MCNC: 4 G/DL
ALP BLD-CCNC: 76 U/L
ALT SERPL-CCNC: 17 U/L
ANION GAP SERPL CALC-SCNC: 12 MMOL/L
AST SERPL-CCNC: 21 U/L
BILIRUB SERPL-MCNC: 0.4 MG/DL
BUN SERPL-MCNC: 12 MG/DL
CALCIUM SERPL-MCNC: 10.1 MG/DL
CHLORIDE SERPL-SCNC: 100 MMOL/L
CO2 SERPL-SCNC: 27 MMOL/L
CREAT SERPL-MCNC: 0.86 MG/DL
EGFR: 74 ML/MIN/1.73M2
GLUCOSE SERPL-MCNC: 103 MG/DL
POTASSIUM SERPL-SCNC: 4.5 MMOL/L
PROT SERPL-MCNC: 7.5 G/DL
SODIUM SERPL-SCNC: 139 MMOL/L

## 2023-11-20 ENCOUNTER — APPOINTMENT (OUTPATIENT)
Dept: OBGYN | Facility: CLINIC | Age: 68
End: 2023-11-20
Payer: MEDICARE

## 2023-11-20 VITALS
BODY MASS INDEX: 57.52 KG/M2 | WEIGHT: 293 LBS | DIASTOLIC BLOOD PRESSURE: 76 MMHG | SYSTOLIC BLOOD PRESSURE: 110 MMHG | HEIGHT: 60 IN

## 2023-11-20 DIAGNOSIS — Z92.29 PERSONAL HISTORY OF OTHER DRUG THERAPY: ICD-10-CM

## 2023-11-20 PROCEDURE — 99213 OFFICE O/P EST LOW 20 MIN: CPT

## 2023-11-30 ENCOUNTER — NON-APPOINTMENT (OUTPATIENT)
Age: 68
End: 2023-11-30

## 2023-11-30 ENCOUNTER — RX RENEWAL (OUTPATIENT)
Age: 68
End: 2023-11-30

## 2023-12-01 ENCOUNTER — OFFICE (OUTPATIENT)
Dept: URBAN - METROPOLITAN AREA CLINIC 94 | Facility: CLINIC | Age: 68
Setting detail: OPHTHALMOLOGY
End: 2023-12-01
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
## 2023-12-01 PROCEDURE — NO SHOW FE NO SHOW FEE: Performed by: PHYSICIAN ASSISTANT

## 2023-12-07 ENCOUNTER — APPOINTMENT (OUTPATIENT)
Dept: WOUND CARE | Facility: HOSPITAL | Age: 68
End: 2023-12-07

## 2023-12-12 ENCOUNTER — APPOINTMENT (OUTPATIENT)
Dept: WOUND CARE | Facility: HOSPITAL | Age: 68
End: 2023-12-12
Payer: MEDICARE

## 2023-12-12 ENCOUNTER — OUTPATIENT (OUTPATIENT)
Dept: OUTPATIENT SERVICES | Facility: HOSPITAL | Age: 68
LOS: 1 days | End: 2023-12-12
Payer: MEDICARE

## 2023-12-12 DIAGNOSIS — Z96.643 PRESENCE OF ARTIFICIAL HIP JOINT, BILATERAL: Chronic | ICD-10-CM

## 2023-12-12 DIAGNOSIS — S31.40XA UNSPECIFIED OPEN WOUND OF VAGINA AND VULVA, INITIAL ENCOUNTER: ICD-10-CM

## 2023-12-12 DIAGNOSIS — L30.8 OTHER SPECIFIED DERMATITIS: ICD-10-CM

## 2023-12-12 DIAGNOSIS — B37.2 CANDIDIASIS OF SKIN AND NAIL: ICD-10-CM

## 2023-12-12 DIAGNOSIS — Z96.652 PRESENCE OF LEFT ARTIFICIAL KNEE JOINT: Chronic | ICD-10-CM

## 2023-12-12 DIAGNOSIS — Z90.13 ACQUIRED ABSENCE OF BILATERAL BREASTS AND NIPPLES: Chronic | ICD-10-CM

## 2023-12-12 DIAGNOSIS — Z98.890 OTHER SPECIFIED POSTPROCEDURAL STATES: Chronic | ICD-10-CM

## 2023-12-12 DIAGNOSIS — Z96.642 PRESENCE OF LEFT ARTIFICIAL HIP JOINT: Chronic | ICD-10-CM

## 2023-12-12 DIAGNOSIS — M79.3 PANNICULITIS, UNSPECIFIED: ICD-10-CM

## 2023-12-12 PROCEDURE — 99213 OFFICE O/P EST LOW 20 MIN: CPT

## 2023-12-12 PROCEDURE — G0463: CPT

## 2023-12-12 RX ORDER — CLOTRIMAZOLE 10 MG/G
1 CREAM TOPICAL
Qty: 1 | Refills: 0 | Status: ACTIVE | COMMUNITY
Start: 2023-12-12 | End: 1900-01-01

## 2023-12-12 RX ORDER — SILVER SULFADIAZINE 10 MG/G
1 CREAM TOPICAL DAILY
Qty: 1 | Refills: 2 | Status: COMPLETED | COMMUNITY
Start: 2023-03-30 | End: 2023-12-12

## 2023-12-14 ENCOUNTER — OUTPATIENT (OUTPATIENT)
Dept: OUTPATIENT SERVICES | Facility: HOSPITAL | Age: 68
LOS: 1 days | End: 2023-12-14
Payer: MEDICARE

## 2023-12-14 VITALS
HEIGHT: 60 IN | HEART RATE: 94 BPM | DIASTOLIC BLOOD PRESSURE: 70 MMHG | WEIGHT: 293 LBS | RESPIRATION RATE: 18 BRPM | SYSTOLIC BLOOD PRESSURE: 130 MMHG | TEMPERATURE: 97 F | OXYGEN SATURATION: 98 %

## 2023-12-14 DIAGNOSIS — Z01.818 ENCOUNTER FOR OTHER PREPROCEDURAL EXAMINATION: ICD-10-CM

## 2023-12-14 DIAGNOSIS — N95.0 POSTMENOPAUSAL BLEEDING: ICD-10-CM

## 2023-12-14 DIAGNOSIS — Z90.13 ACQUIRED ABSENCE OF BILATERAL BREASTS AND NIPPLES: Chronic | ICD-10-CM

## 2023-12-14 DIAGNOSIS — Z91.89 OTHER SPECIFIED PERSONAL RISK FACTORS, NOT ELSEWHERE CLASSIFIED: ICD-10-CM

## 2023-12-14 DIAGNOSIS — Z29.9 ENCOUNTER FOR PROPHYLACTIC MEASURES, UNSPECIFIED: ICD-10-CM

## 2023-12-14 DIAGNOSIS — I10 ESSENTIAL (PRIMARY) HYPERTENSION: ICD-10-CM

## 2023-12-14 DIAGNOSIS — Z96.642 PRESENCE OF LEFT ARTIFICIAL HIP JOINT: Chronic | ICD-10-CM

## 2023-12-14 DIAGNOSIS — Z98.890 OTHER SPECIFIED POSTPROCEDURAL STATES: Chronic | ICD-10-CM

## 2023-12-14 DIAGNOSIS — C50.919 MALIGNANT NEOPLASM OF UNSPECIFIED SITE OF UNSPECIFIED FEMALE BREAST: ICD-10-CM

## 2023-12-14 DIAGNOSIS — Z96.652 PRESENCE OF LEFT ARTIFICIAL KNEE JOINT: Chronic | ICD-10-CM

## 2023-12-14 DIAGNOSIS — Z96.643 PRESENCE OF ARTIFICIAL HIP JOINT, BILATERAL: Chronic | ICD-10-CM

## 2023-12-14 LAB
A1C WITH ESTIMATED AVERAGE GLUCOSE RESULT: 6.5 % — HIGH (ref 4–5.6)
A1C WITH ESTIMATED AVERAGE GLUCOSE RESULT: 6.5 % — HIGH (ref 4–5.6)
ALBUMIN SERPL ELPH-MCNC: 3.9 G/DL — SIGNIFICANT CHANGE UP (ref 3.3–5.2)
ALBUMIN SERPL ELPH-MCNC: 3.9 G/DL — SIGNIFICANT CHANGE UP (ref 3.3–5.2)
ALP SERPL-CCNC: 64 U/L — SIGNIFICANT CHANGE UP (ref 40–120)
ALP SERPL-CCNC: 64 U/L — SIGNIFICANT CHANGE UP (ref 40–120)
ALT FLD-CCNC: 18 U/L — SIGNIFICANT CHANGE UP
ALT FLD-CCNC: 18 U/L — SIGNIFICANT CHANGE UP
ANION GAP SERPL CALC-SCNC: 14 MMOL/L — SIGNIFICANT CHANGE UP (ref 5–17)
ANION GAP SERPL CALC-SCNC: 14 MMOL/L — SIGNIFICANT CHANGE UP (ref 5–17)
AST SERPL-CCNC: 26 U/L — SIGNIFICANT CHANGE UP
AST SERPL-CCNC: 26 U/L — SIGNIFICANT CHANGE UP
BASOPHILS # BLD AUTO: 0.08 K/UL — SIGNIFICANT CHANGE UP (ref 0–0.2)
BASOPHILS # BLD AUTO: 0.08 K/UL — SIGNIFICANT CHANGE UP (ref 0–0.2)
BASOPHILS NFR BLD AUTO: 1 % — SIGNIFICANT CHANGE UP (ref 0–2)
BASOPHILS NFR BLD AUTO: 1 % — SIGNIFICANT CHANGE UP (ref 0–2)
BILIRUB SERPL-MCNC: 0.4 MG/DL — SIGNIFICANT CHANGE UP (ref 0.4–2)
BILIRUB SERPL-MCNC: 0.4 MG/DL — SIGNIFICANT CHANGE UP (ref 0.4–2)
BUN SERPL-MCNC: 12.4 MG/DL — SIGNIFICANT CHANGE UP (ref 8–20)
BUN SERPL-MCNC: 12.4 MG/DL — SIGNIFICANT CHANGE UP (ref 8–20)
CALCIUM SERPL-MCNC: 9.2 MG/DL — SIGNIFICANT CHANGE UP (ref 8.4–10.5)
CALCIUM SERPL-MCNC: 9.2 MG/DL — SIGNIFICANT CHANGE UP (ref 8.4–10.5)
CHLORIDE SERPL-SCNC: 99 MMOL/L — SIGNIFICANT CHANGE UP (ref 96–108)
CHLORIDE SERPL-SCNC: 99 MMOL/L — SIGNIFICANT CHANGE UP (ref 96–108)
CO2 SERPL-SCNC: 27 MMOL/L — SIGNIFICANT CHANGE UP (ref 22–29)
CO2 SERPL-SCNC: 27 MMOL/L — SIGNIFICANT CHANGE UP (ref 22–29)
CREAT SERPL-MCNC: 0.78 MG/DL — SIGNIFICANT CHANGE UP (ref 0.5–1.3)
CREAT SERPL-MCNC: 0.78 MG/DL — SIGNIFICANT CHANGE UP (ref 0.5–1.3)
EGFR: 83 ML/MIN/1.73M2 — SIGNIFICANT CHANGE UP
EGFR: 83 ML/MIN/1.73M2 — SIGNIFICANT CHANGE UP
EOSINOPHIL # BLD AUTO: 0.46 K/UL — SIGNIFICANT CHANGE UP (ref 0–0.5)
EOSINOPHIL # BLD AUTO: 0.46 K/UL — SIGNIFICANT CHANGE UP (ref 0–0.5)
EOSINOPHIL NFR BLD AUTO: 5.6 % — SIGNIFICANT CHANGE UP (ref 0–6)
EOSINOPHIL NFR BLD AUTO: 5.6 % — SIGNIFICANT CHANGE UP (ref 0–6)
ESTIMATED AVERAGE GLUCOSE: 140 MG/DL — HIGH (ref 68–114)
ESTIMATED AVERAGE GLUCOSE: 140 MG/DL — HIGH (ref 68–114)
GLUCOSE SERPL-MCNC: 160 MG/DL — HIGH (ref 70–99)
GLUCOSE SERPL-MCNC: 160 MG/DL — HIGH (ref 70–99)
HCT VFR BLD CALC: 38.2 % — SIGNIFICANT CHANGE UP (ref 34.5–45)
HCT VFR BLD CALC: 38.2 % — SIGNIFICANT CHANGE UP (ref 34.5–45)
HGB BLD-MCNC: 11.9 G/DL — SIGNIFICANT CHANGE UP (ref 11.5–15.5)
HGB BLD-MCNC: 11.9 G/DL — SIGNIFICANT CHANGE UP (ref 11.5–15.5)
IMM GRANULOCYTES NFR BLD AUTO: 0.9 % — SIGNIFICANT CHANGE UP (ref 0–0.9)
IMM GRANULOCYTES NFR BLD AUTO: 0.9 % — SIGNIFICANT CHANGE UP (ref 0–0.9)
LYMPHOCYTES # BLD AUTO: 1.76 K/UL — SIGNIFICANT CHANGE UP (ref 1–3.3)
LYMPHOCYTES # BLD AUTO: 1.76 K/UL — SIGNIFICANT CHANGE UP (ref 1–3.3)
LYMPHOCYTES # BLD AUTO: 21.5 % — SIGNIFICANT CHANGE UP (ref 13–44)
LYMPHOCYTES # BLD AUTO: 21.5 % — SIGNIFICANT CHANGE UP (ref 13–44)
MCHC RBC-ENTMCNC: 28.1 PG — SIGNIFICANT CHANGE UP (ref 27–34)
MCHC RBC-ENTMCNC: 28.1 PG — SIGNIFICANT CHANGE UP (ref 27–34)
MCHC RBC-ENTMCNC: 31.2 GM/DL — LOW (ref 32–36)
MCHC RBC-ENTMCNC: 31.2 GM/DL — LOW (ref 32–36)
MCV RBC AUTO: 90.1 FL — SIGNIFICANT CHANGE UP (ref 80–100)
MCV RBC AUTO: 90.1 FL — SIGNIFICANT CHANGE UP (ref 80–100)
MONOCYTES # BLD AUTO: 0.76 K/UL — SIGNIFICANT CHANGE UP (ref 0–0.9)
MONOCYTES # BLD AUTO: 0.76 K/UL — SIGNIFICANT CHANGE UP (ref 0–0.9)
MONOCYTES NFR BLD AUTO: 9.3 % — SIGNIFICANT CHANGE UP (ref 2–14)
MONOCYTES NFR BLD AUTO: 9.3 % — SIGNIFICANT CHANGE UP (ref 2–14)
NEUTROPHILS # BLD AUTO: 5.05 K/UL — SIGNIFICANT CHANGE UP (ref 1.8–7.4)
NEUTROPHILS # BLD AUTO: 5.05 K/UL — SIGNIFICANT CHANGE UP (ref 1.8–7.4)
NEUTROPHILS NFR BLD AUTO: 61.7 % — SIGNIFICANT CHANGE UP (ref 43–77)
NEUTROPHILS NFR BLD AUTO: 61.7 % — SIGNIFICANT CHANGE UP (ref 43–77)
PLATELET # BLD AUTO: 422 K/UL — HIGH (ref 150–400)
PLATELET # BLD AUTO: 422 K/UL — HIGH (ref 150–400)
POTASSIUM SERPL-MCNC: 3.5 MMOL/L — SIGNIFICANT CHANGE UP (ref 3.5–5.3)
POTASSIUM SERPL-MCNC: 3.5 MMOL/L — SIGNIFICANT CHANGE UP (ref 3.5–5.3)
POTASSIUM SERPL-SCNC: 3.5 MMOL/L — SIGNIFICANT CHANGE UP (ref 3.5–5.3)
POTASSIUM SERPL-SCNC: 3.5 MMOL/L — SIGNIFICANT CHANGE UP (ref 3.5–5.3)
PROT SERPL-MCNC: 7.3 G/DL — SIGNIFICANT CHANGE UP (ref 6.6–8.7)
PROT SERPL-MCNC: 7.3 G/DL — SIGNIFICANT CHANGE UP (ref 6.6–8.7)
RBC # BLD: 4.24 M/UL — SIGNIFICANT CHANGE UP (ref 3.8–5.2)
RBC # BLD: 4.24 M/UL — SIGNIFICANT CHANGE UP (ref 3.8–5.2)
RBC # FLD: 15.4 % — HIGH (ref 10.3–14.5)
RBC # FLD: 15.4 % — HIGH (ref 10.3–14.5)
SODIUM SERPL-SCNC: 140 MMOL/L — SIGNIFICANT CHANGE UP (ref 135–145)
SODIUM SERPL-SCNC: 140 MMOL/L — SIGNIFICANT CHANGE UP (ref 135–145)
WBC # BLD: 8.18 K/UL — SIGNIFICANT CHANGE UP (ref 3.8–10.5)
WBC # BLD: 8.18 K/UL — SIGNIFICANT CHANGE UP (ref 3.8–10.5)
WBC # FLD AUTO: 8.18 K/UL — SIGNIFICANT CHANGE UP (ref 3.8–10.5)
WBC # FLD AUTO: 8.18 K/UL — SIGNIFICANT CHANGE UP (ref 3.8–10.5)

## 2023-12-14 PROCEDURE — G0463: CPT

## 2023-12-14 PROCEDURE — 93010 ELECTROCARDIOGRAM REPORT: CPT

## 2023-12-14 PROCEDURE — 85025 COMPLETE CBC W/AUTO DIFF WBC: CPT

## 2023-12-14 PROCEDURE — 80053 COMPREHEN METABOLIC PANEL: CPT

## 2023-12-14 PROCEDURE — 83036 HEMOGLOBIN GLYCOSYLATED A1C: CPT

## 2023-12-14 PROCEDURE — 93005 ELECTROCARDIOGRAM TRACING: CPT

## 2023-12-14 PROCEDURE — 36415 COLL VENOUS BLD VENIPUNCTURE: CPT

## 2023-12-14 NOTE — H&P PST ADULT - CARDIOVASCULAR
negative details… regular rate and rhythm/S1 S2 present/no gallops/no rub/no murmur/no JVD/normal PMI/no pedal edema

## 2023-12-14 NOTE — H&P PST ADULT - NSICDXPASTSURGICALHX_GEN_ALL_CORE_FT
PAST SURGICAL HISTORY:  H/O bilateral hip replacements     H/O total knee replacement, left 2/11/2021    History of tonsillectomy     History of total hip replacement, left 3/3/2020    S/P mastectomy, bilateral 4 years ago

## 2023-12-14 NOTE — H&P PST ADULT - HISTORY OF PRESENT ILLNESS
68 year old female pmhx of breast CA s/p mastectomy 2018 on tamoxifen     Presents for post menopausal bleeding   14 mm thickened endometrium with cystic changes  The ovaries are obscured by bowel gas.  Patient is scheduled for  68 year old female pmhx of breast CA s/p mastectomy 2018 on tamoxifen, HTN,     Presents for post menopausal bleeding   14 mm thickened endometrium with cystic changes  The ovaries are obscured by bowel gas.  Patient is scheduled for hysteroscopy dilation and curettage on 1/3/23 with Dr Felder  Medical evaluation pending  68 year old female pmhx of breast CA s/p mastectomy 2018 on tamoxifen, HTN, obesity BMI 61, left groin wound following with wound care.  Patient presents to PST for 1x week of post menopausal bleeding, found to have 14 mm thickened endometrium with cystic changes. Patient denies fever, chills, unintentional weight loss. She reports dyspnea on exertion but denies chest pain, palpitations, dizziness. Recently had cardiac work up for joint replacement, she will see Dr Sparrow for cardiac evaluation pending. Patient is scheduled for hysteroscopy dilation and curettage on 1/3/23 with Dr Felder.   Medical and cardiac evaluation pending

## 2023-12-14 NOTE — H&P PST ADULT - NS HP PST ANES REACTION
Principal Discharge DX:	Fall, initial encounter  Secondary Diagnosis:	Fever  Secondary Diagnosis:	Pneumonia
No

## 2023-12-14 NOTE — H&P PST ADULT - OTHER CARE PROVIDERS
Dr. Us [phone], cardiologist [facility] Dr. Us [phone], cardiologist [facility] Dr. Us [phone], Dr. Sparrow Cardiology, [facility] Heme/onc Dr. Us [phone], Dr. Sparrow Cardiology, [facility] Heme/onc

## 2023-12-14 NOTE — H&P PST ADULT - PROBLEM SELECTOR PLAN 1
Patient is scheduled for hysteroscopy dilation and curettage on 1/3/23 with Dr Felder. Medical and cardiac evaluation pending  left groin wound follow up with wound care

## 2023-12-14 NOTE — H&P PST ADULT - NSICDXPASTMEDICALHX_GEN_ALL_CORE_FT
PAST MEDICAL HISTORY:  Anemia     At risk for sleep apnea     Breast cancer in female     Cancer of breast right    Gallstones     Hypertension     Obese     Osteoarthritis     Super obese BMI 62.4    Unilateral primary osteoarthritis, right hip

## 2023-12-14 NOTE — H&P PST ADULT - ASSESSMENT
CAPRINI VTE 2.0 SCORE [CLOT updated 2019]    AGE RELATED RISK FACTORS                                                       MOBILITY RELATED FACTORS  [ ] Age 41-60 years                                            (1 Point)                    [ ] Bed rest                                                        (1 Point)  [x ] Age: 61-74 years                                           (2 Points)                  [ ] Plaster cast                                                   (2 Points)  [ ] Age= 75 years                                              (3 Points)                    [ ] Bed bound for more than 72 hours                 (2 Points)    DISEASE RELATED RISK FACTORS                                               GENDER SPECIFIC FACTORS  [ ] Edema in the lower extremities                       (1 Point)              [ ] Pregnancy                                                     (1 Point)  [ ] Varicose veins                                               (1 Point)                     [ ] Post-partum < 6 weeks                                   (1 Point)             [x ] BMI > 25 Kg/m2                                            (1 Point)                     [ ] Hormonal therapy  or oral contraception          (1 Point)                 [ ] Sepsis (in the previous month)                        (1 Point)               [ ] History of pregnancy complications                 (1 point)  [ ] Pneumonia or serious lung disease                                               [ ] Unexplained or recurrent                     (1 Point)           (in the previous month)                               (1 Point)  [ ] Abnormal pulmonary function test                     (1 Point)                 SURGERY RELATED RISK FACTORS  [ ] Acute myocardial infarction                              (1 Point)               [ ]  Section                                             (1 Point)  [ ] Congestive heart failure (in the previous month)  (1 Point)      [ ] Minor surgery                                                  (1 Point)   [ ] Inflammatory bowel disease                             (1 Point)               [ ] Arthroscopic surgery                                        (2 Points)  [ ] Central venous access                                      (2 Points)                [ ] General surgery lasting more than 45 minutes (2 points)  [ x] Malignancy- Present or previous                   (2 Points)                [x ] Elective arthroplasty                                         (5 points)    [ ] Stroke (in the previous month)                          (5 Points)                                                                                                                                                           HEMATOLOGY RELATED FACTORS                                                 TRAUMA RELATED RISK FACTORS  [ ] Prior episodes of VTE                                     (3 Points)                [ ] Fracture of the hip, pelvis, or leg                       (5 Points)  [ ] Positive family history for VTE                         (3 Points)             [ ] Acute spinal cord injury (in the previous month)  (5 Points)  [ ] Prothrombin 71116 A                                     (3 Points)               [ ] Paralysis  (less than 1 month)                             (5 Points)  [ ] Factor V Leiden                                             (3 Points)                  [ ] Multiple Trauma within 1 month                        (5 Points)  [ ] Lupus anticoagulants                                     (3 Points)                                                           [ ] Anticardiolipin antibodies                               (3 Points)                                                       [ ] High homocysteine in the blood                      (3 Points)                                             [ ] Other congenital or acquired thrombophilia      (3 Points)                                                [ ] Heparin induced thrombocytopenia                  (3 Points)                                     Total Score [    10      ]  OPIOID RISK TOOL    SIN EACH BOX THAT APPLIES AND ADD TOTALS AT THE END    FAMILY HISTORY OF SUBSTANCE ABUSE                 FEMALE         MALE                                                Alcohol                             [  ]1 pt          [  ]3pts                                               Illegal Durgs                     [  ]2 pts        [  ]3pts                                               Rx Drugs                           [  ]4 pts        [  ]4 pts    PERSONAL HISTORY OF SUBSTANCE ABUSE                                                                                          Alcohol                             [  ]3 pts       [  ]3 pts                                               Illegal Drugs                     [  ]4 pts        [  ]4 pts                                               Rx Drugs                           [  ]5 pts        [  ]5 pts    AGE BETWEEN 16-45 YEARS                                      [  ]1 pt         [  ]1 pt    HISTORY OF PREADOLESCENT   SEXUAL ABUSE                                                             [  ]3 pts        [  ]0pts    PSYCHOLOGICAL DISEASE                     ADD, OCD, Bipolar, Schizophrenia        [  ]2 pts         [  ]2 pts                      Depression                                               [  ]1 pt           [  ]1 pt           SCORING TOTAL   (add numbers and type here)              (*0**)                                     A score of 3 or lower indicated LOW risk for future opioid abuse  A score of 4 to 7 indicated moderate risk for future opioid abuse  A score of 8 or higher indicates a high risk for opioid abuse     CAPRINI VTE 2.0 SCORE [CLOT updated 2019]    AGE RELATED RISK FACTORS                                                       MOBILITY RELATED FACTORS  [ ] Age 41-60 years                                            (1 Point)                    [ ] Bed rest                                                        (1 Point)  [x ] Age: 61-74 years                                           (2 Points)                  [ ] Plaster cast                                                   (2 Points)  [ ] Age= 75 years                                              (3 Points)                    [ ] Bed bound for more than 72 hours                 (2 Points)    DISEASE RELATED RISK FACTORS                                               GENDER SPECIFIC FACTORS  [ ] Edema in the lower extremities                       (1 Point)              [ ] Pregnancy                                                     (1 Point)  [ ] Varicose veins                                               (1 Point)                     [ ] Post-partum < 6 weeks                                   (1 Point)             [x ] BMI > 25 Kg/m2                                            (1 Point)                     [ ] Hormonal therapy  or oral contraception          (1 Point)                 [ ] Sepsis (in the previous month)                        (1 Point)               [ ] History of pregnancy complications                 (1 point)  [ ] Pneumonia or serious lung disease                                               [ ] Unexplained or recurrent                     (1 Point)           (in the previous month)                               (1 Point)  [ ] Abnormal pulmonary function test                     (1 Point)                 SURGERY RELATED RISK FACTORS  [ ] Acute myocardial infarction                              (1 Point)               [ ]  Section                                             (1 Point)  [ ] Congestive heart failure (in the previous month)  (1 Point)      [ ] Minor surgery                                                  (1 Point)   [ ] Inflammatory bowel disease                             (1 Point)               [ ] Arthroscopic surgery                                        (2 Points)  [ ] Central venous access                                      (2 Points)                [ ] General surgery lasting more than 45 minutes (2 points)  [ x] Malignancy- Present or previous                   (2 Points)                [x ] Elective arthroplasty                                         (5 points)    [ ] Stroke (in the previous month)                          (5 Points)                                                                                                                                                           HEMATOLOGY RELATED FACTORS                                                 TRAUMA RELATED RISK FACTORS  [ ] Prior episodes of VTE                                     (3 Points)                [ ] Fracture of the hip, pelvis, or leg                       (5 Points)  [ ] Positive family history for VTE                         (3 Points)             [ ] Acute spinal cord injury (in the previous month)  (5 Points)  [ ] Prothrombin 35244 A                                     (3 Points)               [ ] Paralysis  (less than 1 month)                             (5 Points)  [ ] Factor V Leiden                                             (3 Points)                  [ ] Multiple Trauma within 1 month                        (5 Points)  [ ] Lupus anticoagulants                                     (3 Points)                                                           [ ] Anticardiolipin antibodies                               (3 Points)                                                       [ ] High homocysteine in the blood                      (3 Points)                                             [ ] Other congenital or acquired thrombophilia      (3 Points)                                                [ ] Heparin induced thrombocytopenia                  (3 Points)                                     Total Score [    10      ]  OPIOID RISK TOOL    SIN EACH BOX THAT APPLIES AND ADD TOTALS AT THE END    FAMILY HISTORY OF SUBSTANCE ABUSE                 FEMALE         MALE                                                Alcohol                             [  ]1 pt          [  ]3pts                                               Illegal Durgs                     [  ]2 pts        [  ]3pts                                               Rx Drugs                           [  ]4 pts        [  ]4 pts    PERSONAL HISTORY OF SUBSTANCE ABUSE                                                                                          Alcohol                             [  ]3 pts       [  ]3 pts                                               Illegal Drugs                     [  ]4 pts        [  ]4 pts                                               Rx Drugs                           [  ]5 pts        [  ]5 pts    AGE BETWEEN 16-45 YEARS                                      [  ]1 pt         [  ]1 pt    HISTORY OF PREADOLESCENT   SEXUAL ABUSE                                                             [  ]3 pts        [  ]0pts    PSYCHOLOGICAL DISEASE                     ADD, OCD, Bipolar, Schizophrenia        [  ]2 pts         [  ]2 pts                      Depression                                               [  ]1 pt           [  ]1 pt           SCORING TOTAL   (add numbers and type here)              (*0**)                                     A score of 3 or lower indicated LOW risk for future opioid abuse  A score of 4 to 7 indicated moderate risk for future opioid abuse  A score of 8 or higher indicates a high risk for opioid abuse   CAPRINI VTE 2.0 SCORE [CLOT updated 2019]    AGE RELATED RISK FACTORS                                                       MOBILITY RELATED FACTORS  [ ] Age 41-60 years                                            (1 Point)                    [ ] Bed rest                                                        (1 Point)  [x ] Age: 61-74 years                                           (2 Points)                  [ ] Plaster cast                                                   (2 Points)  [ ] Age= 75 years                                              (3 Points)                    [ ] Bed bound for more than 72 hours                 (2 Points)    DISEASE RELATED RISK FACTORS                                               GENDER SPECIFIC FACTORS  [ ] Edema in the lower extremities                       (1 Point)              [ ] Pregnancy                                                     (1 Point)  [ ] Varicose veins                                               (1 Point)                     [ ] Post-partum < 6 weeks                                   (1 Point)             [x ] BMI > 25 Kg/m2                                            (1 Point)                     [ ] Hormonal therapy  or oral contraception          (1 Point)                 [ ] Sepsis (in the previous month)                        (1 Point)               [ ] History of pregnancy complications                 (1 point)  [ ] Pneumonia or serious lung disease                                               [ ] Unexplained or recurrent                     (1 Point)           (in the previous month)                               (1 Point)  [ ] Abnormal pulmonary function test                     (1 Point)                 SURGERY RELATED RISK FACTORS  [ ] Acute myocardial infarction                              (1 Point)               [ ]  Section                                             (1 Point)  [ ] Congestive heart failure (in the previous month)  (1 Point)      [ ] Minor surgery                                                  (1 Point)   [ ] Inflammatory bowel disease                             (1 Point)               [ ] Arthroscopic surgery                                        (2 Points)  [ ] Central venous access                                      (2 Points)                [ x] General surgery lasting more than 45 minutes (2 points)  [ x] Malignancy- Present or previous                   (2 Points)                [ ] Elective arthroplasty                                         (5 points)    [ ] Stroke (in the previous month)                          (5 Points)                                                                                                                                                           HEMATOLOGY RELATED FACTORS                                                 TRAUMA RELATED RISK FACTORS  [ ] Prior episodes of VTE                                     (3 Points)                [ ] Fracture of the hip, pelvis, or leg                       (5 Points)  [ ] Positive family history for VTE                         (3 Points)             [ ] Acute spinal cord injury (in the previous month)  (5 Points)  [ ] Prothrombin 83642 A                                     (3 Points)               [ ] Paralysis  (less than 1 month)                             (5 Points)  [ ] Factor V Leiden                                             (3 Points)                  [ ] Multiple Trauma within 1 month                        (5 Points)  [ ] Lupus anticoagulants                                     (3 Points)                                                           [ ] Anticardiolipin antibodies                               (3 Points)                                                       [ ] High homocysteine in the blood                      (3 Points)                                             [ ] Other congenital or acquired thrombophilia      (3 Points)                                                [ ] Heparin induced thrombocytopenia                  (3 Points)                                     Total Score [    7    ]  OPIOID RISK TOOL    SIN EACH BOX THAT APPLIES AND ADD TOTALS AT THE END    FAMILY HISTORY OF SUBSTANCE ABUSE                 FEMALE         MALE                                                Alcohol                             [  ]1 pt          [  ]3pts                                               Illegal Durgs                     [  ]2 pts        [  ]3pts                                               Rx Drugs                           [  ]4 pts        [  ]4 pts    PERSONAL HISTORY OF SUBSTANCE ABUSE                                                                                          Alcohol                             [  ]3 pts       [  ]3 pts                                               Illegal Drugs                     [  ]4 pts        [  ]4 pts                                               Rx Drugs                           [  ]5 pts        [  ]5 pts    AGE BETWEEN 16-45 YEARS                                      [  ]1 pt         [  ]1 pt    HISTORY OF PREADOLESCENT   SEXUAL ABUSE                                                             [  ]3 pts        [  ]0pts    PSYCHOLOGICAL DISEASE                     ADD, OCD, Bipolar, Schizophrenia        [  ]2 pts         [  ]2 pts                      Depression                                               [  ]1 pt           [  ]1 pt           SCORING TOTAL   0  A score of 3 or lower indicated LOW risk for future opioid abuse  A score of 4 to 7 indicated moderate risk for future opioid abuse  A score of 8 or higher indicates a high risk for opioid abuse       CAPRINI VTE 2.0 SCORE [CLOT updated 2019]    AGE RELATED RISK FACTORS                                                       MOBILITY RELATED FACTORS  [ ] Age 41-60 years                                            (1 Point)                    [ ] Bed rest                                                        (1 Point)  [x ] Age: 61-74 years                                           (2 Points)                  [ ] Plaster cast                                                   (2 Points)  [ ] Age= 75 years                                              (3 Points)                    [ ] Bed bound for more than 72 hours                 (2 Points)    DISEASE RELATED RISK FACTORS                                               GENDER SPECIFIC FACTORS  [ ] Edema in the lower extremities                       (1 Point)              [ ] Pregnancy                                                     (1 Point)  [ ] Varicose veins                                               (1 Point)                     [ ] Post-partum < 6 weeks                                   (1 Point)             [x ] BMI > 25 Kg/m2                                            (1 Point)                     [ ] Hormonal therapy  or oral contraception          (1 Point)                 [ ] Sepsis (in the previous month)                        (1 Point)               [ ] History of pregnancy complications                 (1 point)  [ ] Pneumonia or serious lung disease                                               [ ] Unexplained or recurrent                     (1 Point)           (in the previous month)                               (1 Point)  [ ] Abnormal pulmonary function test                     (1 Point)                 SURGERY RELATED RISK FACTORS  [ ] Acute myocardial infarction                              (1 Point)               [ ]  Section                                             (1 Point)  [ ] Congestive heart failure (in the previous month)  (1 Point)      [ ] Minor surgery                                                  (1 Point)   [ ] Inflammatory bowel disease                             (1 Point)               [ ] Arthroscopic surgery                                        (2 Points)  [ ] Central venous access                                      (2 Points)                [ x] General surgery lasting more than 45 minutes (2 points)  [ x] Malignancy- Present or previous                   (2 Points)                [ ] Elective arthroplasty                                         (5 points)    [ ] Stroke (in the previous month)                          (5 Points)                                                                                                                                                           HEMATOLOGY RELATED FACTORS                                                 TRAUMA RELATED RISK FACTORS  [ ] Prior episodes of VTE                                     (3 Points)                [ ] Fracture of the hip, pelvis, or leg                       (5 Points)  [ ] Positive family history for VTE                         (3 Points)             [ ] Acute spinal cord injury (in the previous month)  (5 Points)  [ ] Prothrombin 14792 A                                     (3 Points)               [ ] Paralysis  (less than 1 month)                             (5 Points)  [ ] Factor V Leiden                                             (3 Points)                  [ ] Multiple Trauma within 1 month                        (5 Points)  [ ] Lupus anticoagulants                                     (3 Points)                                                           [ ] Anticardiolipin antibodies                               (3 Points)                                                       [ ] High homocysteine in the blood                      (3 Points)                                             [ ] Other congenital or acquired thrombophilia      (3 Points)                                                [ ] Heparin induced thrombocytopenia                  (3 Points)                                     Total Score [    7    ]  OPIOID RISK TOOL    SIN EACH BOX THAT APPLIES AND ADD TOTALS AT THE END    FAMILY HISTORY OF SUBSTANCE ABUSE                 FEMALE         MALE                                                Alcohol                             [  ]1 pt          [  ]3pts                                               Illegal Durgs                     [  ]2 pts        [  ]3pts                                               Rx Drugs                           [  ]4 pts        [  ]4 pts    PERSONAL HISTORY OF SUBSTANCE ABUSE                                                                                          Alcohol                             [  ]3 pts       [  ]3 pts                                               Illegal Drugs                     [  ]4 pts        [  ]4 pts                                               Rx Drugs                           [  ]5 pts        [  ]5 pts    AGE BETWEEN 16-45 YEARS                                      [  ]1 pt         [  ]1 pt    HISTORY OF PREADOLESCENT   SEXUAL ABUSE                                                             [  ]3 pts        [  ]0pts    PSYCHOLOGICAL DISEASE                     ADD, OCD, Bipolar, Schizophrenia        [  ]2 pts         [  ]2 pts                      Depression                                               [  ]1 pt           [  ]1 pt           SCORING TOTAL   0  A score of 3 or lower indicated LOW risk for future opioid abuse  A score of 4 to 7 indicated moderate risk for future opioid abuse  A score of 8 or higher indicates a high risk for opioid abuse       CAPRINI VTE 2.0 SCORE [CLOT updated 2019]    AGE RELATED RISK FACTORS                                                       MOBILITY RELATED FACTORS  [ ] Age 41-60 years                                            (1 Point)                    [ ] Bed rest                                                        (1 Point)  [x ] Age: 61-74 years                                           (2 Points)                  [ ] Plaster cast                                                   (2 Points)  [ ] Age= 75 years                                              (3 Points)                    [ ] Bed bound for more than 72 hours                 (2 Points)    DISEASE RELATED RISK FACTORS                                               GENDER SPECIFIC FACTORS  [ ] Edema in the lower extremities                       (1 Point)              [ ] Pregnancy                                                     (1 Point)  [ ] Varicose veins                                               (1 Point)                     [ ] Post-partum < 6 weeks                                   (1 Point)             [x ] BMI > 25 Kg/m2                                            (1 Point)                     [ ] Hormonal therapy  or oral contraception          (1 Point)                 [ ] Sepsis (in the previous month)                        (1 Point)               [ ] History of pregnancy complications                 (1 point)  [ ] Pneumonia or serious lung disease                                               [ ] Unexplained or recurrent                     (1 Point)           (in the previous month)                               (1 Point)  [ ] Abnormal pulmonary function test                     (1 Point)                 SURGERY RELATED RISK FACTORS  [ ] Acute myocardial infarction                              (1 Point)               [ ]  Section                                             (1 Point)  [ ] Congestive heart failure (in the previous month)  (1 Point)      [ ] Minor surgery                                                  (1 Point)   [ ] Inflammatory bowel disease                             (1 Point)               [ ] Arthroscopic surgery                                        (2 Points)  [ ] Central venous access                                      (2 Points)                [ x] General surgery lasting more than 45 minutes (2 points)  [ x] Malignancy- Present or previous                   (2 Points)                [ ] Elective arthroplasty                                         (5 points)    [ ] Stroke (in the previous month)                          (5 Points)                                                                                                                                                           HEMATOLOGY RELATED FACTORS                                                 TRAUMA RELATED RISK FACTORS  [ ] Prior episodes of VTE                                     (3 Points)                [ ] Fracture of the hip, pelvis, or leg                       (5 Points)  [ ] Positive family history for VTE                         (3 Points)             [ ] Acute spinal cord injury (in the previous month)  (5 Points)  [ ] Prothrombin 54457 A                                     (3 Points)               [ ] Paralysis  (less than 1 month)                             (5 Points)  [ ] Factor V Leiden                                             (3 Points)                  [ ] Multiple Trauma within 1 month                        (5 Points)  [ ] Lupus anticoagulants                                     (3 Points)                                                           [ ] Anticardiolipin antibodies                               (3 Points)                                                       [ ] High homocysteine in the blood                      (3 Points)                                             [ ] Other congenital or acquired thrombophilia      (3 Points)                                                [ ] Heparin induced thrombocytopenia                  (3 Points)                                     Total Score [    7    ]  OPIOID RISK TOOL    SIN EACH BOX THAT APPLIES AND ADD TOTALS AT THE END    FAMILY HISTORY OF SUBSTANCE ABUSE                 FEMALE         MALE                                                Alcohol                             [  ]1 pt          [  ]3pts                                               Illegal Durgs                     [  ]2 pts        [  ]3pts                                               Rx Drugs                           [  ]4 pts        [  ]4 pts    PERSONAL HISTORY OF SUBSTANCE ABUSE                                                                                          Alcohol                             [  ]3 pts       [  ]3 pts                                               Illegal Drugs                     [  ]4 pts        [  ]4 pts                                               Rx Drugs                           [  ]5 pts        [  ]5 pts    AGE BETWEEN 16-45 YEARS                                      [  ]1 pt         [  ]1 pt    HISTORY OF PREADOLESCENT   SEXUAL ABUSE                                                             [  ]3 pts        [  ]0pts    PSYCHOLOGICAL DISEASE                     ADD, OCD, Bipolar, Schizophrenia        [  ]2 pts         [  ]2 pts                      Depression                                               [  ]1 pt           [  ]1 pt           SCORING TOTAL   0  A score of 3 or lower indicated LOW risk for future opioid abuse  A score of 4 to 7 indicated moderate risk for future opioid abuse  A score of 8 or higher indicates a high risk for opioid abuse    68 year old female pmhx of breast CA s/p mastectomy 2018 on tamoxifen, HTN, obesity BMI 61, left groin wound following with wound care.  Patient presents to [facility] for 1x week of post menopausal bleeding, found to have 14 mm thickened endometrium with cystic changes. Patient denies fever, chills, unintentional weight loss. She reports dyspnea on exertion but denies chest pain, palpitations, dizziness. Recently had cardiac work up for joint replacement, she will see Dr Sparrow for cardiac evaluation pending. Patient is scheduled for hysteroscopy dilation and curettage on 1/3/23 with Dr Felder. Patient educated on ERP, preadmission instructions, medical, cardiac clearance and day of procedure medications, verbalizes understanding. Pt instructed to stop vitamins/supplements/herbal medications/ASA/NSAIDS for one week prior to surgery and discuss with PMD.    CAPRINI VTE 2.0 SCORE [CLOT updated 2019]    AGE RELATED RISK FACTORS                                                       MOBILITY RELATED FACTORS  [ ] Age 41-60 years                                            (1 Point)                    [ ] Bed rest                                                        (1 Point)  [x ] Age: 61-74 years                                           (2 Points)                  [ ] Plaster cast                                                   (2 Points)  [ ] Age= 75 years                                              (3 Points)                    [ ] Bed bound for more than 72 hours                 (2 Points)    DISEASE RELATED RISK FACTORS                                               GENDER SPECIFIC FACTORS  [ ] Edema in the lower extremities                       (1 Point)              [ ] Pregnancy                                                     (1 Point)  [ ] Varicose veins                                               (1 Point)                     [ ] Post-partum < 6 weeks                                   (1 Point)             [x ] BMI > 25 Kg/m2                                            (1 Point)                     [ ] Hormonal therapy  or oral contraception          (1 Point)                 [ ] Sepsis (in the previous month)                        (1 Point)               [ ] History of pregnancy complications                 (1 point)  [ ] Pneumonia or serious lung disease                                               [ ] Unexplained or recurrent                     (1 Point)           (in the previous month)                               (1 Point)  [ ] Abnormal pulmonary function test                     (1 Point)                 SURGERY RELATED RISK FACTORS  [ ] Acute myocardial infarction                              (1 Point)               [ ]  Section                                             (1 Point)  [ ] Congestive heart failure (in the previous month)  (1 Point)      [ ] Minor surgery                                                  (1 Point)   [ ] Inflammatory bowel disease                             (1 Point)               [ ] Arthroscopic surgery                                        (2 Points)  [ ] Central venous access                                      (2 Points)                [ x] General surgery lasting more than 45 minutes (2 points)  [ x] Malignancy- Present or previous                   (2 Points)                [ ] Elective arthroplasty                                         (5 points)    [ ] Stroke (in the previous month)                          (5 Points)                                                                                                                                                           HEMATOLOGY RELATED FACTORS                                                 TRAUMA RELATED RISK FACTORS  [ ] Prior episodes of VTE                                     (3 Points)                [ ] Fracture of the hip, pelvis, or leg                       (5 Points)  [ ] Positive family history for VTE                         (3 Points)             [ ] Acute spinal cord injury (in the previous month)  (5 Points)  [ ] Prothrombin 55122 A                                     (3 Points)               [ ] Paralysis  (less than 1 month)                             (5 Points)  [ ] Factor V Leiden                                             (3 Points)                  [ ] Multiple Trauma within 1 month                        (5 Points)  [ ] Lupus anticoagulants                                     (3 Points)                                                           [ ] Anticardiolipin antibodies                               (3 Points)                                                       [ ] High homocysteine in the blood                      (3 Points)                                             [ ] Other congenital or acquired thrombophilia      (3 Points)                                                [ ] Heparin induced thrombocytopenia                  (3 Points)                                     Total Score [    7    ]  OPIOID RISK TOOL    SIN EACH BOX THAT APPLIES AND ADD TOTALS AT THE END    FAMILY HISTORY OF SUBSTANCE ABUSE                 FEMALE         MALE                                                Alcohol                             [  ]1 pt          [  ]3pts                                               Illegal Durgs                     [  ]2 pts        [  ]3pts                                               Rx Drugs                           [  ]4 pts        [  ]4 pts    PERSONAL HISTORY OF SUBSTANCE ABUSE                                                                                          Alcohol                             [  ]3 pts       [  ]3 pts                                               Illegal Drugs                     [  ]4 pts        [  ]4 pts                                               Rx Drugs                           [  ]5 pts        [  ]5 pts    AGE BETWEEN 16-45 YEARS                                      [  ]1 pt         [  ]1 pt    HISTORY OF PREADOLESCENT   SEXUAL ABUSE                                                             [  ]3 pts        [  ]0pts    PSYCHOLOGICAL DISEASE                     ADD, OCD, Bipolar, Schizophrenia        [  ]2 pts         [  ]2 pts                      Depression                                               [  ]1 pt           [  ]1 pt           SCORING TOTAL   0  A score of 3 or lower indicated LOW risk for future opioid abuse  A score of 4 to 7 indicated moderate risk for future opioid abuse  A score of 8 or higher indicates a high risk for opioid abuse    68 year old female pmhx of breast CA s/p mastectomy 2018 on tamoxifen, HTN, obesity BMI 61, left groin wound following with wound care.  Patient presents to [facility] for 1x week of post menopausal bleeding, found to have 14 mm thickened endometrium with cystic changes. Patient denies fever, chills, unintentional weight loss. She reports dyspnea on exertion but denies chest pain, palpitations, dizziness. Recently had cardiac work up for joint replacement, she will see Dr Sparrow for cardiac evaluation pending. Patient is scheduled for hysteroscopy dilation and curettage on 1/3/23 with Dr Felder. Patient educated on ERP, preadmission instructions, medical, cardiac clearance and day of procedure medications, verbalizes understanding. Pt instructed to stop vitamins/supplements/herbal medications/ASA/NSAIDS for one week prior to surgery and discuss with PMD.

## 2023-12-14 NOTE — H&P PST ADULT - NS MD HP INPLANTS MED DEV
right knee, left knee, left hip/Artificial joint right knee, left knee, left hip, right hip/Artificial joint/Lens implant

## 2023-12-15 DIAGNOSIS — L30.8 OTHER SPECIFIED DERMATITIS: ICD-10-CM

## 2023-12-15 DIAGNOSIS — S31.40XA UNSPECIFIED OPEN WOUND OF VAGINA AND VULVA, INITIAL ENCOUNTER: ICD-10-CM
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
## 2023-12-15 DIAGNOSIS — B37.2 CANDIDIASIS OF SKIN AND NAIL: ICD-10-CM
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
## 2023-12-15 DIAGNOSIS — M79.3 PANNICULITIS, UNSPECIFIED: ICD-10-CM

## 2023-12-17 NOTE — HISTORY OF PRESENT ILLNESS
[FreeTextEntry1] : Pt in for f/u after surgery. R hip replacement 08/10/21. Pt is walking well.  [de-identified] : 65 YOF w/ Hx. left knee, left/right hip replacement, arthralgia, back pain, HTN, diverticulosis, enlarged RV, fibroid uterus, breast cancer and obesity Attending Attestation (For Attendings USE Only)...

## 2023-12-25 ENCOUNTER — RX RENEWAL (OUTPATIENT)
Age: 68
End: 2023-12-25

## 2023-12-25 RX ORDER — HYDROCHLOROTHIAZIDE 25 MG/1
25 TABLET ORAL
Qty: 90 | Refills: 3 | Status: ACTIVE | COMMUNITY
Start: 2019-08-28 | End: 1900-01-01

## 2023-12-27 NOTE — PROGRESS NOTE ADULT - SUBJECTIVE AND OBJECTIVE BOX
PPE for this encounter included facial mask    Patient seen at: 5595    Chief Complaint   Patient presents with    Abdominal Pain        History Of Present Illness  Jossie is a 89 year old female presenting with chief complaint of nausea and abdominal pain.  The patient has had nausea and abdominal pain for today.  She is unaware of any fevers.  Patient is here with her 2 sons and they deny any coughing or difficulty breathing that they can tell of.  Patient does have known large inguinal hernias.  The one of the patient's son is an anesthesiologist.  He says the hernias are easily reducible and not new.  Patient herself says that it is very difficult for her have a bowel movement.  Patient is status postcholecystectomy.      Past Medical History  Past Medical History:   Diagnosis Date    Atherosclerosis of aortic arch (CMD)     Carotid artery disease (CMD)     COPD (chronic obstructive pulmonary disease) (CMD)     Coronary artery disease     Hypertension         Surgical History  Past Surgical History:   Procedure Laterality Date    Cholecystectomy      Coronary angioplasty with stent placement      Stent x5    Hysterectomy          Social History  Social History     Tobacco Use    Smoking status: Former     Types: Cigarettes    Smokeless tobacco: Never       Family History    Family History   Problem Relation Age of Onset    Myocardial Infarction Father     Aneurysm Neg Hx     Coronary Artery Disease Neg Hx         Allergies  ALLERGIES:  Bee venom    Medications  (Not in a hospital admission)      Review of Systems  Review of Systems   Constitutional:  Negative for fever.   Respiratory:  Negative for shortness of breath.    Cardiovascular:  Negative for chest pain.   Gastrointestinal:  Positive for abdominal pain, constipation, nausea and vomiting.         Last Recorded Vitals  Blood pressure (!) 141/125, pulse 84, temperature 97.5 °F (36.4 °C), temperature source Oral, resp. rate (!) 27, weight 45.4 kg (100 lb),  Pelvis & hip films reviewed. Implants are in appropriate position. No fracture or dislocation noted. Patient is WBAT of the surgical extremity. SpO2 95 %.    Physical Exam  General Examination:  Patient is alert and pleasant, no acute distress  Skin: Warm and Dry, normal for ethnicity, no rashes  Head: Normocephalic, atraumatic  Eye: Normal conjunctiva, non icteric, Extra occular muscles are intact, vision normal for patient  ENT: Oropharynx is well hydrated, normal external ears and nose,   Neck: Trachea in the midline, supple  Lungs: No respiratory distress, equal breath sounds, occasional Rales at the bases.  Cardiac: Irregularly irregular.  No murmurs or gallops.  Abdomen: Soft, nondistended, not tympanitic, mildly tender across the lower abdomen.  The patient is large masses in her inguinal canals bilaterally but they are not very tender and there is no erythema over the skin.  With gentle pressure both of them are easily reducible after 30 to 40 seconds.  Extremities: No deformity, no edema, normal range of motion  Back: Normal range of motion, normal alignment  Neurologic: Alert and oriented x4, normal strength, normal speech and normal thought process  Psychiatric: Cooperative, normal mood and affect    My EKG interpretation at 2153.  Atrial fibrillation heart rate of 85.  QTc is 449 ms.  Indication is nausea and abdominal pain.  Normal axis.  Irregular intervals.  There is no old EKG available for comparison.    The following conditions are present on admission:renal insufficiency       Imaging  Imaging Results              XR CHEST AP OR PA (In process)                      CT ABDOMEN PELVIS W CONTRAST (Preliminary result)  Result time 12/27/23 02:23:02      Preliminary result                   Impression:    1.   Small bowel obstruction with dilated loops of jejunum and ileum  measuring up to 4 cm and focal transition point in the midline lower  abdomen.  2.   Small volume free fluid, likely reactive.  3.   Bilateral bowel-containing femoral hernias, uncomplicated.  4.   Indeterminate acuity of compression deformities described above at  T12, L2,  and L3.  5.   Moderate centrilobular emphysema.    Partial notified Dr. Sutton notified Dr. Burciaga via telephone on 12/27/2023  2:10 AM.      Dictated by: SUKHWINDER SUTTON D.O.  Dictated on: 12/27/2023 1:51 AM       * * * * * * * * * * * * * * PRELIMINARY REPORT * * * * * * * * * * * * * *                    Narrative:        * * * * * * * * * * * * * * PRELIMINARY REPORT * * * * * * * * * * * * * *     EXAM: CT ABDOMEN PELVIS W CONTRAST    CLINICAL INDICATION: abdominal pain    COMPARISON: None.    TECHNIQUE: Helical CT was performed of the abdomen and pelvis, with axial,  coronal, and sagittal reconstructions performed and provided for review. A  total of 75 ml Omnipaque 350 was administered intravenously during the  study. Automated exposure control was utilized.    FINDINGS:    LOWER CHEST: Heart is normal in size. Moderate emphysematous changes.  Bibasilar dependent atelectasis, right greater than left. Questionable  early fibrotic changes at the right lung base.    LIVER: No focal lesion.    BILIARY TREE AND GALLBLADDER: Surgically absent gallbladder. Dilated common  bile duct measuring 1 cm is within normal limits for patient's age and  postcholecystectomy status.    PANCREAS: Normal.    SPLEEN: Normal.    ADRENAL GLANDS: Normal.    KIDNEYS AND URETERS: Bilateral cortical thinning. No obstructive uropathy.    BLADDER: No wall thickening.     REPRODUCTIVE ORGANS: Surgically absent uterus. No adnexal mass.    BOWEL: Dilated loops of jejunum and ileum measuring up to 4 cm with focal  transition point in the mid lower abdomen. Colonic diverticulosis without  diverticulitis.    PERITONEUM AND RETROPERITONEUM: Small amount of free fluid. No free air.    VESSELS: Normal caliber abdominal aorta.    LYMPH NODES: Prominent nonenlarged lymph nodes surrounding transition  point, likely reactive.    BODY WALL: Bilateral bowel-containing femoral hernias, uncomplicated.    BONES: Compression deformities with near complete loss  of vertebral body  height at T12, approximately 50% loss of vertebral body height at L3, and  less than 25% loss of vertebral height at L2. These are of indeterminate  acuity.                                         XR CHEST AP OR PA (In process)                    Labs     Results for orders placed or performed during the hospital encounter of 12/26/23   Comprehensive Metabolic Panel   Result Value    Fasting Status     Sodium 136    Potassium 3.8    Chloride 98    Carbon Dioxide 27    Anion Gap 15    Glucose 115 (H)    BUN 45 (H)    Creatinine 1.01 (H)    Glomerular Filtration Rate 49 (L)     Comment: eGFR 30-59 mL/min/1.73m2 = Moderate decrease in kidney function. Stage 3 CKD (chronic kidney disease) or moderate kidney disease. Estimated GFR calculated using the CKD-EPI-R (2021) equation that does not include race in the creatinine calculation.    BUN/Cr 45 (H)    Calcium 8.9    Bilirubin, Total 1.1 (H)    GOT/AST 27    GPT/ALT 14    Alkaline Phosphatase 65    Albumin 3.2 (L)    Protein, Total 6.8    Globulin 3.6    A/G Ratio 0.9 (L)   Lactic Acid Venous With Reflex   Result Value    Lactate, Venous 1.6   Lipase   Result Value    Lipase 11 (L)   Urinalysis & Reflex Microscopy With Culture If Indicated   Result Value    COLOR, URINALYSIS Yellow    APPEARANCE, URINALYSIS Clear    GLUCOSE, URINALYSIS Negative    BILIRUBIN, URINALYSIS Negative    KETONES, URINALYSIS Trace (A)    SPECIFIC GRAVITY, URINALYSIS 1.030     Comment: Measured by refractometry    OCCULT BLOOD, URINALYSIS Negative    PH, URINALYSIS 6.0    PROTEIN, URINALYSIS 300 (A)    UROBILINOGEN, URINALYSIS 4.0 (A)    NITRITE, URINALYSIS Negative    LEUKOCYTE ESTERASE, URINALYSIS Negative    SQUAMOUS EPITHELIAL, URINALYSIS 1 to 5    ERYTHROCYTES, URINALYSIS 6 to 10 (A)    LEUKOCYTES, URINALYSIS 1 to 5    BACTERIA, URINALYSIS None Seen    HYALINE CASTS, URINALYSIS 6 to 10 (A)    MUCUS Present   CBC with Automated Differential (performable only)   Result  Value    WBC 13.0 (H)    RBC 5.33 (H)    HGB 14.8    HCT 45.3    MCV 85.0    MCH 27.8    MCHC 32.7    RDW-CV 14.6    RDW-SD 45.1        NRBC 0    Neutrophil, Percent 76    Lymphocytes, Percent 12    Mono, Percent 11    Eosinophils, Percent 0    Basophils, Percent 0    Immature Granulocytes 1    Absolute Neutrophils 10.0 (H)    Absolute Lymphocytes 1.5    Absolute Monocytes 1.4 (H)    Absolute Eosinophils  0.0    Absolute Basophils 0.0    Absolute Immature Granulocytes 0.1   COVID/Flu/RSV panel   Result Value    Rapid SARS-COV-2 by PCR Not Detected    Influenza A by PCR Detected (A)    Influenza B by PCR Not Detected    RSV BY PCR Not Detected    Isolation Guidelines      Comment: Do not use this test result as the sole decision-maker for discontinuation of isolation.   Clinical evaluation should be considered for other respiratory illness requiring transmission-based isolation.    -    No fever (<99.0 F/37.2 C) for at least 24 hours without the use of fever-reducing medications    AND  -    Respiratory symptoms have improved or resolved (e.g. cough, shortness of breath)     AND  -    COVID-19 negative test    See COVID-19 Deisolation Resource Guide    Procedural Comment      Comment: SARS-COV-2 nucleic acid has not been detected indicating the absence of COVID-19.    This test was performed using the [employer] Xpert Xpress SARS-CoV-2/Flu/RSV RT-PCR test that has been given Emergency Use Authorization (EUA) by the [place] Food and Drug Administration (FDA). These tests are considered definitive and do not need to be confirmed by another method.         ED Course as of 12/27/23 0327   Wed Dec 27, 2023   0046 Patient was reexamined after IV fluid was instilled.  She is sleepy.  She has not had any more vomiting and seems like to be a little bit more comfortable.  She continues to be alert.  Patient's son asked if we should just give her her nighttime meds and thinks that she can probably hold it down. [GT]    0203 Discussed the case with the son neck resident radiologist.  He does say that there is a small bowel obstruction with a transition point to the mid abdomen.  There are also bilateral large femoral hernias but he does not think that these are the causes of the obstruction. (Clinically these hernias are very easily reduced) [GT]   0323 I discussed the case with Dr. Schuster who will consult for surgery.  Patient is in atrial fibrillation which seems to be a new problem.  I did relay this to Dr. Watkins and they will get a cardiology consult in the morning. [GT]   0324 NG tube was placed and I did independently interpret her x-ray following this in the NG tube is in the stomach.  To suctioning there is a large amount of bilious gastric contents that are removed. [GT]      ED Course User Index  [GT] Manas Burciaga MD      Assessment & Plan   ED Diagnoses       Diagnosis Comment Associated Orders       Final diagnoses    Small bowel obstruction (CMD) -- --    Atrial fibrillation, unspecified type (CMD) -- --             Medications   LIDOCAINE HCL URETHRAL/MUCOSAL 2 % EX GEL (UROJET/GLYDO) Pyxis Override (has no administration in time range)   iohexol ORAL (OMNIPAQUE 350) oral contrast solution 12.5 mL (12.5 mLs Oral Given 12/26/23 2322)   ondansetron (ZOFRAN) injection 4 mg (4 mg Intravenous Given 12/26/23 2322)   oseltamivir (TAMIFLU) capsule 75 mg (75 mg Oral Given 12/26/23 2343)   lactated ringers bolus 1,362 mL (0 mLs Intravenous Completed 12/27/23 0043)   iohexol (OMNIPAQUE 350 INJECT) contrast solution 75 mL (75 mLs Intravenous Given 12/27/23 0042)   acetaminophen (TYLENOL) tablet 650 mg (650 mg Oral Given 12/27/23 0113)   clopidogrel (PLAVIX) tablet 75 mg (75 mg Oral Given 12/27/23 0113)   enalapril (VASOTEC) tablet 10 mg (10 mg Oral Given 12/27/23 0113)      MDM    Differential Diagnosis: Influenza, COVID 19, small bowel obstruction, appendicitis.    ED COURSE:    Patient was treated with nausea medicine  and IV fluid.  She was reexamined multiple times and continued to have some abdominal discomfort but declined any pain medicine.  CT scan is consistent with a small bowel obstruction without other inflammatory changes.  NG tube was placed and x-ray confirmed proper placement and a large amount of bilious gastric contents were removed.  I discussed the case with general surgery will be consulted.  Patient is a poor surgical candidate just because of her age and her comorbidities of high blood pressure at all.  Patient's son is an anesthesiologist and understands the risks.  I also sent a secure message to Dr. Watkins of best practices.    Studies Independently Reviewed: I did independently interpret her initial chest x-ray showing an elevated right hemidiaphragm with clear lung fields.  There are loops of small bowel that I can see that are dilated just on this x-ray.  I also independently interpreted her post NG tube x-ray which showed that the NG tube was below the diaphragm overlying the gastric bubble.    Code Status    Code Status: Not on file    Primary Care Physician  Tru Lind MD George D Tsonis, MD     This documentation accurately reflects the work and decisions made by me, Manas Burciaga MD.       Manas Burciaga MD  12/27/23 3945

## 2023-12-28 ENCOUNTER — APPOINTMENT (OUTPATIENT)
Dept: CARDIOLOGY | Facility: CLINIC | Age: 68
End: 2023-12-28

## 2024-01-03 RX ORDER — OMEPRAZOLE 20 MG/1
20 TABLET, DELAYED RELEASE ORAL
Qty: 90 | Refills: 1 | Status: ACTIVE | COMMUNITY
Start: 2021-08-26 | End: 1900-01-01

## 2024-01-08 ENCOUNTER — APPOINTMENT (OUTPATIENT)
Dept: FAMILY MEDICINE | Facility: CLINIC | Age: 69
End: 2024-01-08

## 2024-01-16 ENCOUNTER — OUTPATIENT (OUTPATIENT)
Dept: OUTPATIENT SERVICES | Facility: HOSPITAL | Age: 69
LOS: 1 days | Discharge: HOME | End: 2024-01-16

## 2024-01-16 DIAGNOSIS — Z90.13 ACQUIRED ABSENCE OF BILATERAL BREASTS AND NIPPLES: Chronic | ICD-10-CM

## 2024-01-16 DIAGNOSIS — Z96.652 PRESENCE OF LEFT ARTIFICIAL KNEE JOINT: Chronic | ICD-10-CM

## 2024-01-16 DIAGNOSIS — Z96.642 PRESENCE OF LEFT ARTIFICIAL HIP JOINT: Chronic | ICD-10-CM

## 2024-01-16 DIAGNOSIS — D05.90 UNSPECIFIED TYPE OF CARCINOMA IN SITU OF UNSPECIFIED BREAST: ICD-10-CM

## 2024-01-16 DIAGNOSIS — Z98.890 OTHER SPECIFIED POSTPROCEDURAL STATES: Chronic | ICD-10-CM

## 2024-01-16 DIAGNOSIS — Z96.643 PRESENCE OF ARTIFICIAL HIP JOINT, BILATERAL: Chronic | ICD-10-CM

## 2024-02-13 ENCOUNTER — RX RENEWAL (OUTPATIENT)
Age: 69
End: 2024-02-13

## 2024-02-13 RX ORDER — LOSARTAN POTASSIUM 100 MG/1
100 TABLET, FILM COATED ORAL
Qty: 90 | Refills: 3 | Status: ACTIVE | COMMUNITY
Start: 2019-08-28 | End: 1900-01-01

## 2024-02-29 ENCOUNTER — APPOINTMENT (OUTPATIENT)
Dept: FAMILY MEDICINE | Facility: CLINIC | Age: 69
End: 2024-02-29

## 2024-02-29 ENCOUNTER — APPOINTMENT (OUTPATIENT)
Dept: WOUND CARE | Facility: HOSPITAL | Age: 69
End: 2024-02-29

## 2024-03-15 ENCOUNTER — OUTPATIENT (OUTPATIENT)
Dept: OUTPATIENT SERVICES | Facility: HOSPITAL | Age: 69
LOS: 1 days | Discharge: HOME | End: 2024-03-15

## 2024-03-15 DIAGNOSIS — Z96.643 PRESENCE OF ARTIFICIAL HIP JOINT, BILATERAL: Chronic | ICD-10-CM

## 2024-03-15 DIAGNOSIS — Z90.13 ACQUIRED ABSENCE OF BILATERAL BREASTS AND NIPPLES: Chronic | ICD-10-CM

## 2024-03-15 DIAGNOSIS — Z98.890 OTHER SPECIFIED POSTPROCEDURAL STATES: Chronic | ICD-10-CM

## 2024-03-15 DIAGNOSIS — Z96.642 PRESENCE OF LEFT ARTIFICIAL HIP JOINT: Chronic | ICD-10-CM

## 2024-03-15 DIAGNOSIS — Z96.652 PRESENCE OF LEFT ARTIFICIAL KNEE JOINT: Chronic | ICD-10-CM

## 2024-03-15 DIAGNOSIS — D05.90 UNSPECIFIED TYPE OF CARCINOMA IN SITU OF UNSPECIFIED BREAST: ICD-10-CM

## 2024-03-26 ENCOUNTER — APPOINTMENT (OUTPATIENT)
Dept: HEMATOLOGY ONCOLOGY | Facility: CLINIC | Age: 69
End: 2024-03-26

## 2024-03-27 NOTE — HISTORY OF PRESENT ILLNESS
[FreeTextEntry1] : Ms. NANCI MATIAS   presents to the office with wounds since June of 2021. The wounds are located on the abdomen, and posterior superior thighs, with MAD/candidiasis on perineum and groin. The patient has complaints of pain and itching.  The patient has been dressing the wound with triamcinolone and nystatin as prescribed by GYN.  The patient denies fevers or chills. The patient has localized pain to the wound upon dressing changes. The patient has no other complaints or associated symptoms.\par \par  Patient's GYN Dr. Recio prescribed Triamcinolone Acetonide-cream usp 0.1% and nystatin cream\par \par  Not Applicable

## 2024-04-05 ENCOUNTER — OUTPATIENT (OUTPATIENT)
Dept: OUTPATIENT SERVICES | Facility: HOSPITAL | Age: 69
LOS: 1 days | End: 2024-04-05
Payer: MEDICARE

## 2024-04-05 ENCOUNTER — TRANSCRIPTION ENCOUNTER (OUTPATIENT)
Age: 69
End: 2024-04-05

## 2024-04-05 VITALS
WEIGHT: 293 LBS | SYSTOLIC BLOOD PRESSURE: 150 MMHG | DIASTOLIC BLOOD PRESSURE: 88 MMHG | TEMPERATURE: 97 F | HEIGHT: 60 IN | HEART RATE: 92 BPM | RESPIRATION RATE: 18 BRPM | OXYGEN SATURATION: 96 %

## 2024-04-05 DIAGNOSIS — Z29.9 ENCOUNTER FOR PROPHYLACTIC MEASURES, UNSPECIFIED: ICD-10-CM

## 2024-04-05 DIAGNOSIS — N95.0 POSTMENOPAUSAL BLEEDING: ICD-10-CM

## 2024-04-05 DIAGNOSIS — Z96.643 PRESENCE OF ARTIFICIAL HIP JOINT, BILATERAL: Chronic | ICD-10-CM

## 2024-04-05 DIAGNOSIS — Z01.818 ENCOUNTER FOR OTHER PREPROCEDURAL EXAMINATION: ICD-10-CM

## 2024-04-05 DIAGNOSIS — Z96.642 PRESENCE OF LEFT ARTIFICIAL HIP JOINT: Chronic | ICD-10-CM

## 2024-04-05 DIAGNOSIS — Z96.652 PRESENCE OF LEFT ARTIFICIAL KNEE JOINT: Chronic | ICD-10-CM

## 2024-04-05 DIAGNOSIS — I10 ESSENTIAL (PRIMARY) HYPERTENSION: ICD-10-CM

## 2024-04-05 DIAGNOSIS — Z98.890 OTHER SPECIFIED POSTPROCEDURAL STATES: Chronic | ICD-10-CM

## 2024-04-05 DIAGNOSIS — Z91.89 OTHER SPECIFIED PERSONAL RISK FACTORS, NOT ELSEWHERE CLASSIFIED: ICD-10-CM

## 2024-04-05 DIAGNOSIS — Z90.13 ACQUIRED ABSENCE OF BILATERAL BREASTS AND NIPPLES: Chronic | ICD-10-CM

## 2024-04-05 DIAGNOSIS — D64.9 ANEMIA, UNSPECIFIED: ICD-10-CM

## 2024-04-05 LAB
A1C WITH ESTIMATED AVERAGE GLUCOSE RESULT: 6.7 % — HIGH (ref 4–5.6)
ANION GAP SERPL CALC-SCNC: 12 MMOL/L — SIGNIFICANT CHANGE UP (ref 5–17)
BASOPHILS # BLD AUTO: 0.1 K/UL — SIGNIFICANT CHANGE UP (ref 0–0.2)
BASOPHILS NFR BLD AUTO: 1 % — SIGNIFICANT CHANGE UP (ref 0–2)
BLD GP AB SCN SERPL QL: SIGNIFICANT CHANGE UP
BUN SERPL-MCNC: 10.4 MG/DL — SIGNIFICANT CHANGE UP (ref 8–20)
CALCIUM SERPL-MCNC: 9.5 MG/DL — SIGNIFICANT CHANGE UP (ref 8.4–10.5)
CHLORIDE SERPL-SCNC: 99 MMOL/L — SIGNIFICANT CHANGE UP (ref 96–108)
CO2 SERPL-SCNC: 28 MMOL/L — SIGNIFICANT CHANGE UP (ref 22–29)
CREAT SERPL-MCNC: 0.6 MG/DL — SIGNIFICANT CHANGE UP (ref 0.5–1.3)
EGFR: 98 ML/MIN/1.73M2 — SIGNIFICANT CHANGE UP
EOSINOPHIL # BLD AUTO: 0.53 K/UL — HIGH (ref 0–0.5)
EOSINOPHIL NFR BLD AUTO: 5.1 % — SIGNIFICANT CHANGE UP (ref 0–6)
ESTIMATED AVERAGE GLUCOSE: 146 MG/DL — HIGH (ref 68–114)
GLUCOSE SERPL-MCNC: 116 MG/DL — HIGH (ref 70–99)
HCT VFR BLD CALC: 38.4 % — SIGNIFICANT CHANGE UP (ref 34.5–45)
HGB BLD-MCNC: 12.4 G/DL — SIGNIFICANT CHANGE UP (ref 11.5–15.5)
IMM GRANULOCYTES NFR BLD AUTO: 0.6 % — SIGNIFICANT CHANGE UP (ref 0–0.9)
LYMPHOCYTES # BLD AUTO: 1.76 K/UL — SIGNIFICANT CHANGE UP (ref 1–3.3)
LYMPHOCYTES # BLD AUTO: 17 % — SIGNIFICANT CHANGE UP (ref 13–44)
MCHC RBC-ENTMCNC: 28.4 PG — SIGNIFICANT CHANGE UP (ref 27–34)
MCHC RBC-ENTMCNC: 32.3 GM/DL — SIGNIFICANT CHANGE UP (ref 32–36)
MCV RBC AUTO: 88.1 FL — SIGNIFICANT CHANGE UP (ref 80–100)
MONOCYTES # BLD AUTO: 1.18 K/UL — HIGH (ref 0–0.9)
MONOCYTES NFR BLD AUTO: 11.4 % — SIGNIFICANT CHANGE UP (ref 2–14)
NEUTROPHILS # BLD AUTO: 6.71 K/UL — SIGNIFICANT CHANGE UP (ref 1.8–7.4)
NEUTROPHILS NFR BLD AUTO: 64.9 % — SIGNIFICANT CHANGE UP (ref 43–77)
PLATELET # BLD AUTO: 383 K/UL — SIGNIFICANT CHANGE UP (ref 150–400)
POTASSIUM SERPL-MCNC: 4.3 MMOL/L — SIGNIFICANT CHANGE UP (ref 3.5–5.3)
POTASSIUM SERPL-SCNC: 4.3 MMOL/L — SIGNIFICANT CHANGE UP (ref 3.5–5.3)
RBC # BLD: 4.36 M/UL — SIGNIFICANT CHANGE UP (ref 3.8–5.2)
RBC # FLD: 15.8 % — HIGH (ref 10.3–14.5)
SODIUM SERPL-SCNC: 139 MMOL/L — SIGNIFICANT CHANGE UP (ref 135–145)
WBC # BLD: 10.34 K/UL — SIGNIFICANT CHANGE UP (ref 3.8–10.5)
WBC # FLD AUTO: 10.34 K/UL — SIGNIFICANT CHANGE UP (ref 3.8–10.5)

## 2024-04-05 PROCEDURE — 93005 ELECTROCARDIOGRAM TRACING: CPT

## 2024-04-05 PROCEDURE — 93010 ELECTROCARDIOGRAM REPORT: CPT

## 2024-04-05 PROCEDURE — G0463: CPT

## 2024-04-05 NOTE — H&P PST ADULT - MUSCULOSKELETAL COMMENTS
she walks only very short distance, uses a wheelchsir she walks only very short distance, uses a wheelchair

## 2024-04-05 NOTE — H&P PST ADULT - OTHER CARE PROVIDERS
will see PCP Dr. Us [phone] for Medical Clearance pending, will see Dr. Sparrow for cardiac clearance pending

## 2024-04-05 NOTE — H&P PST ADULT - ASSESSMENT
68 year old female PMH of breast CA s/p mastectomy 2018 on tamoxifen, HTN, obesity BMI 62.9,  left groin wound (following with wound care) here for PST with post menopausal bleeding X1 day in 2023, found to have 14 mm thickened endometrium with cystic changes. Patient denies fever, chills, unintentional weight loss. She reports dyspnea on exertion but denies chest pain, palpitations, dizziness. Patient is scheduled for hysteroscopy dilation and curettage on 4/25/24 with Dr Felder. she was scheduled for ather same procedure in Jan 2023 but was cancelled due to not getting the cardiac clearance on time. Medical and cardiac optimization pending    68 year old female PMH of breast CA s/p mastectomy 2018 on tamoxifen, HTN, obesity BMI 62.9,  left groin wound (following with wound care) here for PST with post menopausal bleeding X1 day in , found to have 14 mm thickened endometrium with cystic changes. Patient denies fever, chills, unintentional weight loss. She reports dyspnea on exertion but denies chest pain, palpitations, dizziness. Patient is scheduled for hysteroscopy dilation and curettage on 24 with Dr Felder. she was scheduled for ather same procedure in 2023 but was cancelled due to not getting the cardiac clearance on time. Medical and cardiac optimization pending     medications reviewed, instructions given on what medications to take and what not to take.   Asked the patient to take the Blood pressure medication/ heart medication or any other important meds with a sip of water in the AM of surgery (Amlodipine, Losartan, HCTZ, Omeprazole, Fluoxetine)   Asked the patient to consult with Dr. Sparrow cardiologist about holding ASA and stop/Hold it accordingly, failure to do so may result in cancellation or postponement of your surgery, pt  agreed and verbalized understanding.   Stop Fish oil, vit E, Garlic and Ibuprofen for 7-10 days.  All other meds can be continued till the night before surgery.   ERP teaching given and the pt verbalized understanding.     CAPRINI VTE 2.0 SCORE [CLOT updated 2019]    AGE RELATED RISK FACTORS                                                       MOBILITY RELATED FACTORS  [ ] Age 41-60 years                                            (1 Point)                    [ ] Bed rest                                                        (1 Point)  [ x] Age: 61-74 years                                           (2 Points)                  [ ] Plaster cast                                                   (2 Points)  [ ] Age= 75 years                                              (3 Points)                    [ ] Bed bound for more than 72 hours                 (2 Points)    DISEASE RELATED RISK FACTORS                                               GENDER SPECIFIC FACTORS  [ ] Edema in the lower extremities                       (1 Point)              [ ] Pregnancy                                                     (1 Point)  [ ] Varicose veins                                               (1 Point)                     [ ] Post-partum < 6 weeks                                   (1 Point)             [ x] BMI > 25 Kg/m2                                            (1 Point)                     [ ] Hormonal therapy  or oral contraception          (1 Point)                 [ ] Sepsis (in the previous month)                        (1 Point)               [ ] History of pregnancy complications                 (1 point)  [ ] Pneumonia or serious lung disease                                               [ ] Unexplained or recurrent                     (1 Point)           (in the previous month)                               (1 Point)  [ ] Abnormal pulmonary function test                     (1 Point)                 SURGERY RELATED RISK FACTORS  [ ] Acute myocardial infarction                              (1 Point)               [ ]  Section                                             (1 Point)  [ ] Congestive heart failure (in the previous month)  (1 Point)      [x ] Minor surgery                                                  (1 Point)   [ ] Inflammatory bowel disease                             (1 Point)               [ ] Arthroscopic surgery                                        (2 Points)  [ ] Central venous access                                      (2 Points)                [ ] General surgery lasting more than 45 minutes (2 points)  [ ] Malignancy- Present or previous                   (2 Points)                [ ] Elective arthroplasty                                         (5 points)    [ ] Stroke (in the previous month)                          (5 Points)                                                                                                                                                           HEMATOLOGY RELATED FACTORS                                                 TRAUMA RELATED RISK FACTORS  [ ] Prior episodes of VTE                                     (3 Points)                [ ] Fracture of the hip, pelvis, or leg                       (5 Points)  [ ] Positive family history for VTE                         (3 Points)             [ ] Acute spinal cord injury (in the previous month)  (5 Points)  [ ] Prothrombin 69132 A                                     (3 Points)               [ ] Paralysis  (less than 1 month)                             (5 Points)  [ ] Factor V Leiden                                             (3 Points)                  [ ] Multiple Trauma within 1 month                        (5 Points)  [ ] Lupus anticoagulants                                     (3 Points)                                                           [ ] Anticardiolipin antibodies                               (3 Points)                                                       [ ] High homocysteine in the blood                      (3 Points)                                             [ ] Other congenital or acquired thrombophilia      (3 Points)                                                [ ] Heparin induced thrombocytopenia                  (3 Points)                                     Total Score [     4     ]  OPIOID RISK TOOL    SIN EACH BOX THAT APPLIES AND ADD TOTALS AT THE END    FAMILY HISTORY OF SUBSTANCE ABUSE                 FEMALE         MALE                                                Alcohol                             [  ]1 pt          [  ]3pts                                               Illegal Drugs                     [  ]2 pts        [  ]3pts                                               Rx Drugs                           [  ]4 pts        [  ]4 pts    PERSONAL HISTORY OF SUBSTANCE ABUSE                                                                                          Alcohol                             [  ]3 pts       [  ]3 pts                                               Illegal Drugs                     [  ]4 pts        [  ]4 pts                                               Rx Drugs                           [  ]5 pts        [  ]5 pts    AGE BETWEEN 16-45 YEARS                                      [  ]1 pt         [  ]1 pt    HISTORY OF PREADOLESCENT   SEXUAL ABUSE                                                             [  ]3 pts        [  ]0pts    PSYCHOLOGICAL DISEASE                     ADD, OCD, Bipolar, Schizophrenia        [  ]2 pts         [  ]2 pts                      Depression                                               [  ]1 pt           [  ]1 pt           SCORING TOTAL   (add numbers and type here)              ( 0)                                     A score of 3 or lower indicated LOW risk for future opioid abuse  A score of 4 to 7 indicated moderate risk for future opioid abuse  A score of 8 or higher indicates a high risk for opioid abuse 68 year old female PMH of breast CA s/p mastectomy 2018 on tamoxifen, HTN, obesity BMI 62.9,  left groin wound (following with wound care) here for PST with post menopausal bleeding X1 day in , found to have 14 mm thickened endometrium with cystic changes. Patient denies fever, chills, unintentional weight loss. She reports dyspnea on exertion but denies chest pain, palpitations, dizziness. Patient is scheduled for hysteroscopy dilation and curettage on 24 with Dr Felder. she was scheduled for ather same procedure in 2023 but was cancelled due to not getting the cardiac clearance on time. Medical and cardiac optimization pending     medications reviewed, instructions given on what medications to take and what not to take.   Asked the patient to take the Blood pressure medication/ heart medication or any other important meds with a sip of water in the AM of surgery (Amlodipine, Losartan, HCTZ, Omeprazole, Fluoxetine)   Asked the patient to consult with Dr. Sparrow cardiologist about holding ASA and stop/Hold it accordingly, failure to do so may result in cancellation or postponement of your surgery, pt  agreed and verbalized understanding.   Stop Fish oil, vit E, Garlic and Ibuprofen for 7-10 days.  All other meds can be continued till the night before surgery.   ERP teaching given and the pt verbalized understanding.     24:  A1C of 6.7, she is not a known diabetic and not on any meds, I informed her PCP dr. Us's office , she will be seen for a medical clearance on 24, sent the email to dr. Felder's office. E.Verghese NP-C     CAPRINI VTE 2.0 SCORE [CLOT updated 2019]    AGE RELATED RISK FACTORS                                                       MOBILITY RELATED FACTORS  [ ] Age 41-60 years                                            (1 Point)                    [ ] Bed rest                                                        (1 Point)  [ x] Age: 61-74 years                                           (2 Points)                  [ ] Plaster cast                                                   (2 Points)  [ ] Age= 75 years                                              (3 Points)                    [ ] Bed bound for more than 72 hours                 (2 Points)    DISEASE RELATED RISK FACTORS                                               GENDER SPECIFIC FACTORS  [ ] Edema in the lower extremities                       (1 Point)              [ ] Pregnancy                                                     (1 Point)  [ ] Varicose veins                                               (1 Point)                     [ ] Post-partum < 6 weeks                                   (1 Point)             [ x] BMI > 25 Kg/m2                                            (1 Point)                     [ ] Hormonal therapy  or oral contraception          (1 Point)                 [ ] Sepsis (in the previous month)                        (1 Point)               [ ] History of pregnancy complications                 (1 point)  [ ] Pneumonia or serious lung disease                                               [ ] Unexplained or recurrent                     (1 Point)           (in the previous month)                               (1 Point)  [ ] Abnormal pulmonary function test                     (1 Point)                 SURGERY RELATED RISK FACTORS  [ ] Acute myocardial infarction                              (1 Point)               [ ]  Section                                             (1 Point)  [ ] Congestive heart failure (in the previous month)  (1 Point)      [x ] Minor surgery                                                  (1 Point)   [ ] Inflammatory bowel disease                             (1 Point)               [ ] Arthroscopic surgery                                        (2 Points)  [ ] Central venous access                                      (2 Points)                [ ] General surgery lasting more than 45 minutes (2 points)  [ ] Malignancy- Present or previous                   (2 Points)                [ ] Elective arthroplasty                                         (5 points)    [ ] Stroke (in the previous month)                          (5 Points)                                                                                                                                                           HEMATOLOGY RELATED FACTORS                                                 TRAUMA RELATED RISK FACTORS  [ ] Prior episodes of VTE                                     (3 Points)                [ ] Fracture of the hip, pelvis, or leg                       (5 Points)  [ ] Positive family history for VTE                         (3 Points)             [ ] Acute spinal cord injury (in the previous month)  (5 Points)  [ ] Prothrombin 50965 A                                     (3 Points)               [ ] Paralysis  (less than 1 month)                             (5 Points)  [ ] Factor V Leiden                                             (3 Points)                  [ ] Multiple Trauma within 1 month                        (5 Points)  [ ] Lupus anticoagulants                                     (3 Points)                                                           [ ] Anticardiolipin antibodies                               (3 Points)                                                       [ ] High homocysteine in the blood                      (3 Points)                                             [ ] Other congenital or acquired thrombophilia      (3 Points)                                                [ ] Heparin induced thrombocytopenia                  (3 Points)                                     Total Score [     4     ]  OPIOID RISK TOOL    SIN EACH BOX THAT APPLIES AND ADD TOTALS AT THE END    FAMILY HISTORY OF SUBSTANCE ABUSE                 FEMALE         MALE                                                Alcohol                             [  ]1 pt          [  ]3pts                                               Illegal Drugs                     [  ]2 pts        [  ]3pts                                               Rx Drugs                           [  ]4 pts        [  ]4 pts    PERSONAL HISTORY OF SUBSTANCE ABUSE                                                                                          Alcohol                             [  ]3 pts       [  ]3 pts                                               Illegal Drugs                     [  ]4 pts        [  ]4 pts                                               Rx Drugs                           [  ]5 pts        [  ]5 pts    AGE BETWEEN 16-45 YEARS                                      [  ]1 pt         [  ]1 pt    HISTORY OF PREADOLESCENT   SEXUAL ABUSE                                                             [  ]3 pts        [  ]0pts    PSYCHOLOGICAL DISEASE                     ADD, OCD, Bipolar, Schizophrenia        [  ]2 pts         [  ]2 pts                      Depression                                               [  ]1 pt           [  ]1 pt           SCORING TOTAL   (add numbers and type here)              ( 0)                                     A score of 3 or lower indicated LOW risk for future opioid abuse  A score of 4 to 7 indicated moderate risk for future opioid abuse  A score of 8 or higher indicates a high risk for opioid abuse 68 year old female PMH of breast CA s/p mastectomy 2018 on tamoxifen, HTN, obesity BMI 62.9,  left groin wound (following with wound care) here for PST with post menopausal bleeding X1 day in , found to have 14 mm thickened endometrium with cystic changes. Patient denies fever, chills, unintentional weight loss. She reports dyspnea on exertion but denies chest pain, palpitations, dizziness. Patient is scheduled for hysteroscopy dilation and curettage on 24 with Dr Felder. she was scheduled for ather same procedure in 2023 but was cancelled due to not getting the cardiac clearance on time. Medical and cardiac optimization pending     medications reviewed, instructions given on what medications to take and what not to take.   Asked the patient to take the Blood pressure medication/ heart medication or any other important meds with a sip of water in the AM of surgery (Amlodipine, Losartan, HCTZ, Omeprazole, Fluoxetine)   Asked the patient to consult with Dr. Sparrow cardiologist about holding ASA and stop/Hold it accordingly, failure to do so may result in cancellation or postponement of your surgery, pt  agreed and verbalized understanding.   Stop Fish oil, vit E, Garlic and Ibuprofen for 7-10 days.  All other meds can be continued till the night before surgery.   ERP teaching given and the pt verbalized understanding.     24:  A1C of 6.7, she is not a known diabetic and not on any meds, I informed her PCP dr. Us's office(Spoke to on call ADAMA Ahuja)  , she will be seen for a medical clearance on 24, sent the email to dr. Felder's office. E.Verghese NP-C     CAPRINI VTE 2.0 SCORE [CLOT updated 2019]    AGE RELATED RISK FACTORS                                                       MOBILITY RELATED FACTORS  [ ] Age 41-60 years                                            (1 Point)                    [ ] Bed rest                                                        (1 Point)  [ x] Age: 61-74 years                                           (2 Points)                  [ ] Plaster cast                                                   (2 Points)  [ ] Age= 75 years                                              (3 Points)                    [ ] Bed bound for more than 72 hours                 (2 Points)    DISEASE RELATED RISK FACTORS                                               GENDER SPECIFIC FACTORS  [ ] Edema in the lower extremities                       (1 Point)              [ ] Pregnancy                                                     (1 Point)  [ ] Varicose veins                                               (1 Point)                     [ ] Post-partum < 6 weeks                                   (1 Point)             [ x] BMI > 25 Kg/m2                                            (1 Point)                     [ ] Hormonal therapy  or oral contraception          (1 Point)                 [ ] Sepsis (in the previous month)                        (1 Point)               [ ] History of pregnancy complications                 (1 point)  [ ] Pneumonia or serious lung disease                                               [ ] Unexplained or recurrent                     (1 Point)           (in the previous month)                               (1 Point)  [ ] Abnormal pulmonary function test                     (1 Point)                 SURGERY RELATED RISK FACTORS  [ ] Acute myocardial infarction                              (1 Point)               [ ]  Section                                             (1 Point)  [ ] Congestive heart failure (in the previous month)  (1 Point)      [x ] Minor surgery                                                  (1 Point)   [ ] Inflammatory bowel disease                             (1 Point)               [ ] Arthroscopic surgery                                        (2 Points)  [ ] Central venous access                                      (2 Points)                [ ] General surgery lasting more than 45 minutes (2 points)  [ ] Malignancy- Present or previous                   (2 Points)                [ ] Elective arthroplasty                                         (5 points)    [ ] Stroke (in the previous month)                          (5 Points)                                                                                                                                                           HEMATOLOGY RELATED FACTORS                                                 TRAUMA RELATED RISK FACTORS  [ ] Prior episodes of VTE                                     (3 Points)                [ ] Fracture of the hip, pelvis, or leg                       (5 Points)  [ ] Positive family history for VTE                         (3 Points)             [ ] Acute spinal cord injury (in the previous month)  (5 Points)  [ ] Prothrombin 22619 A                                     (3 Points)               [ ] Paralysis  (less than 1 month)                             (5 Points)  [ ] Factor V Leiden                                             (3 Points)                  [ ] Multiple Trauma within 1 month                        (5 Points)  [ ] Lupus anticoagulants                                     (3 Points)                                                           [ ] Anticardiolipin antibodies                               (3 Points)                                                       [ ] High homocysteine in the blood                      (3 Points)                                             [ ] Other congenital or acquired thrombophilia      (3 Points)                                                [ ] Heparin induced thrombocytopenia                  (3 Points)                                     Total Score [     4     ]  OPIOID RISK TOOL    SIN EACH BOX THAT APPLIES AND ADD TOTALS AT THE END    FAMILY HISTORY OF SUBSTANCE ABUSE                 FEMALE         MALE                                                Alcohol                             [  ]1 pt          [  ]3pts                                               Illegal Drugs                     [  ]2 pts        [  ]3pts                                               Rx Drugs                           [  ]4 pts        [  ]4 pts    PERSONAL HISTORY OF SUBSTANCE ABUSE                                                                                          Alcohol                             [  ]3 pts       [  ]3 pts                                               Illegal Drugs                     [  ]4 pts        [  ]4 pts                                               Rx Drugs                           [  ]5 pts        [  ]5 pts    AGE BETWEEN 16-45 YEARS                                      [  ]1 pt         [  ]1 pt    HISTORY OF PREADOLESCENT   SEXUAL ABUSE                                                             [  ]3 pts        [  ]0pts    PSYCHOLOGICAL DISEASE                     ADD, OCD, Bipolar, Schizophrenia        [  ]2 pts         [  ]2 pts                      Depression                                               [  ]1 pt           [  ]1 pt           SCORING TOTAL   (add numbers and type here)              ( 0)                                     A score of 3 or lower indicated LOW risk for future opioid abuse  A score of 4 to 7 indicated moderate risk for future opioid abuse  A score of 8 or higher indicates a high risk for opioid abuse

## 2024-04-05 NOTE — H&P PST ADULT - HISTORY OF PRESENT ILLNESS
68 year old female PMH of breast CA s/p mastectomy 2018 on tamoxifen, HTN, obesity BMI 62.9,  left groin wound (following with wound care) here for PST with post menopausal bleeding X1 day in 2023, found to have 14 mm thickened endometrium with cystic changes. Patient denies fever, chills, unintentional weight loss. She reports dyspnea on exertion but denies chest pain, palpitations, dizziness. Patient is scheduled for hysteroscopy dilation and curettage on 4/25/24 with Dr Felder. she was scheduled for ather same procedure in Jan 2023 but was cancelled due to not getting the cardiac clearance on time. Medical and cardiac optimization pending

## 2024-04-05 NOTE — H&P PST ADULT - NSICDXPASTMEDICALHX_GEN_ALL_CORE_FT
PAST MEDICAL HISTORY:  Anemia     At risk for sleep apnea     Breast cancer in female     Gallstones     Hypertension     Morbidly obese     Osteoarthritis

## 2024-04-05 NOTE — H&P PST ADULT - SKIN/BREAST COMMENTS
wound between thigh and vagina on left side, left inner thigh wound between thigh and vagina on left side, left inner thigh, fungal rashes on the skin folds

## 2024-04-05 NOTE — H&P PST ADULT - PROBLEM SELECTOR PLAN 5
Hysteroscopy dilation and curettage on 4/25/24 with Dr Felder. Medical and cardiac optimization pending

## 2024-04-05 NOTE — H&P PST ADULT - SKIN COMMENTS
noted a large open noted a large open wound on the left upper inner thigh, dressing in place. Noted excoriated skin around the wound and rash in the groin and skin folds.

## 2024-04-08 ENCOUNTER — NON-APPOINTMENT (OUTPATIENT)
Age: 69
End: 2024-04-08

## 2024-04-09 ENCOUNTER — NON-APPOINTMENT (OUTPATIENT)
Age: 69
End: 2024-04-09

## 2024-04-18 ENCOUNTER — APPOINTMENT (OUTPATIENT)
Dept: FAMILY MEDICINE | Facility: CLINIC | Age: 69
End: 2024-04-18
Payer: MEDICARE

## 2024-04-18 VITALS
SYSTOLIC BLOOD PRESSURE: 128 MMHG | TEMPERATURE: 97.2 F | WEIGHT: 293 LBS | OXYGEN SATURATION: 98 % | RESPIRATION RATE: 14 BRPM | HEIGHT: 60 IN | HEART RATE: 90 BPM | BODY MASS INDEX: 57.52 KG/M2 | DIASTOLIC BLOOD PRESSURE: 70 MMHG

## 2024-04-18 DIAGNOSIS — M54.9 DORSALGIA, UNSPECIFIED: ICD-10-CM

## 2024-04-18 DIAGNOSIS — N95.0 POSTMENOPAUSAL BLEEDING: ICD-10-CM

## 2024-04-18 DIAGNOSIS — E55.9 VITAMIN D DEFICIENCY, UNSPECIFIED: ICD-10-CM

## 2024-04-18 DIAGNOSIS — K21.9 GASTRO-ESOPHAGEAL REFLUX DISEASE W/OUT ESOPHAGITIS: ICD-10-CM

## 2024-04-18 DIAGNOSIS — F41.9 ANXIETY DISORDER, UNSPECIFIED: ICD-10-CM

## 2024-04-18 DIAGNOSIS — Z01.818 ENCOUNTER FOR OTHER PREPROCEDURAL EXAMINATION: ICD-10-CM

## 2024-04-18 PROCEDURE — 99215 OFFICE O/P EST HI 40 MIN: CPT

## 2024-04-18 PROCEDURE — G2211 COMPLEX E/M VISIT ADD ON: CPT

## 2024-04-18 NOTE — HISTORY OF PRESENT ILLNESS
[Aortic Stenosis] : no aortic stenosis [Atrial Fibrillation] : no atrial fibrillation [Coronary Artery Disease] : no coronary artery disease [Recent Myocardial Infarction] : no recent myocardial infarction [Implantable Device/Pacemaker] : no implantable device/pacemaker [Asthma] : no asthma [COPD] : no COPD [Sleep Apnea] : sleep apnea [Smoker] : not a smoker [No Adverse Anesthesia Reaction] : no adverse anesthesia reaction in self or family member [Chronic Anticoagulation] : no chronic anticoagulation [Chronic Kidney Disease] : no chronic kidney disease [Diabetes] : diabetes [FreeTextEntry1] : D/C  [FreeTextEntry2] : 04/25/24 [FreeTextEntry3] : Dr Troy Jansen [FreeTextEntry4] :  D&C with Dr. Felder and had pre-testing on 4/5/24 for post menopausal bleeding.

## 2024-04-18 NOTE — ASSESSMENT
[FreeTextEntry1] :  D&C with Dr. Felder and had pre-testing on 4/5/24 for post menopausal bleeding.  care plan reviewed labs reviewed med reviewed Patient cleared  mfor procedure Cardiac eval pending  Bariatric referral  RTC 3 month

## 2024-04-18 NOTE — PHYSICAL EXAM
[No Acute Distress] : no acute distress [Well Nourished] : well nourished [Well Developed] : well developed [Well-Appearing] : well-appearing [Normal Sclera/Conjunctiva] : normal sclera/conjunctiva [PERRL] : pupils equal round and reactive to light [EOMI] : extraocular movements intact [Normal Outer Ear/Nose] : the outer ears and nose were normal in appearance [Normal Oropharynx] : the oropharynx was normal [No JVD] : no jugular venous distention [No Lymphadenopathy] : no lymphadenopathy [Supple] : supple [Thyroid Normal, No Nodules] : the thyroid was normal and there were no nodules present [No Respiratory Distress] : no respiratory distress  [No Accessory Muscle Use] : no accessory muscle use [Clear to Auscultation] : lungs were clear to auscultation bilaterally [Normal Rate] : normal rate  [Regular Rhythm] : with a regular rhythm [Normal S1, S2] : normal S1 and S2 [No Murmur] : no murmur heard [No Carotid Bruits] : no carotid bruits [No Abdominal Bruit] : a ~M bruit was not heard ~T in the abdomen [No Varicosities] : no varicosities [Pedal Pulses Present] : the pedal pulses are present [No Edema] : there was no peripheral edema [No Palpable Aorta] : no palpable aorta [No Extremity Clubbing/Cyanosis] : no extremity clubbing/cyanosis [Soft] : abdomen soft [Non Tender] : non-tender [Non-distended] : non-distended [No Masses] : no abdominal mass palpated [No HSM] : no HSM [Normal Bowel Sounds] : normal bowel sounds [Normal Posterior Cervical Nodes] : no posterior cervical lymphadenopathy [Normal Anterior Cervical Nodes] : no anterior cervical lymphadenopathy [No CVA Tenderness] : no CVA  tenderness [No Spinal Tenderness] : no spinal tenderness [No Joint Swelling] : no joint swelling [Grossly Normal Strength/Tone] : grossly normal strength/tone [No Rash] : no rash [Coordination Grossly Intact] : coordination grossly intact [No Focal Deficits] : no focal deficits [Normal Gait] : normal gait [Deep Tendon Reflexes (DTR)] : deep tendon reflexes were 2+ and symmetric [Normal Affect] : the affect was normal [Normal Insight/Judgement] : insight and judgment were intact [de-identified] : obese

## 2024-04-19 ENCOUNTER — APPOINTMENT (OUTPATIENT)
Dept: CARDIOLOGY | Facility: CLINIC | Age: 69
End: 2024-04-19
Payer: MEDICARE

## 2024-04-19 VITALS
DIASTOLIC BLOOD PRESSURE: 81 MMHG | OXYGEN SATURATION: 95 % | HEART RATE: 96 BPM | HEIGHT: 60 IN | WEIGHT: 293 LBS | SYSTOLIC BLOOD PRESSURE: 144 MMHG | TEMPERATURE: 98.1 F | BODY MASS INDEX: 57.52 KG/M2

## 2024-04-19 VITALS — DIASTOLIC BLOOD PRESSURE: 68 MMHG | SYSTOLIC BLOOD PRESSURE: 130 MMHG

## 2024-04-19 DIAGNOSIS — Z01.810 ENCOUNTER FOR PREPROCEDURAL CARDIOVASCULAR EXAMINATION: ICD-10-CM

## 2024-04-19 PROBLEM — E66.01 MORBID (SEVERE) OBESITY DUE TO EXCESS CALORIES: Chronic | Status: ACTIVE | Noted: 2024-04-05

## 2024-04-19 PROCEDURE — 99214 OFFICE O/P EST MOD 30 MIN: CPT

## 2024-04-19 RX ORDER — CLOTRIMAZOLE 10 MG/1
10 LOZENGE ORAL 3 TIMES DAILY
Qty: 20 | Refills: 0 | Status: DISCONTINUED | COMMUNITY
Start: 2023-12-12 | End: 2024-04-19

## 2024-04-22 ENCOUNTER — APPOINTMENT (OUTPATIENT)
Dept: HEMATOLOGY ONCOLOGY | Facility: CLINIC | Age: 69
End: 2024-04-22
Payer: MEDICARE

## 2024-04-22 VITALS
DIASTOLIC BLOOD PRESSURE: 79 MMHG | WEIGHT: 293 LBS | SYSTOLIC BLOOD PRESSURE: 125 MMHG | HEART RATE: 83 BPM | BODY MASS INDEX: 57.52 KG/M2 | HEIGHT: 60 IN | RESPIRATION RATE: 16 BRPM | TEMPERATURE: 98.5 F | OXYGEN SATURATION: 96 %

## 2024-04-22 DIAGNOSIS — C50.911 MALIGNANT NEOPLASM OF UNSPECIFIED SITE OF RIGHT FEMALE BREAST: ICD-10-CM

## 2024-04-22 DIAGNOSIS — Z79.810 LONG TERM (CURRENT) USE OF SELECTIVE ESTROGEN RECEPTOR MODULATORS (SERMS): ICD-10-CM

## 2024-04-22 PROCEDURE — 99214 OFFICE O/P EST MOD 30 MIN: CPT

## 2024-04-22 NOTE — HISTORY OF PRESENT ILLNESS
[T: ___] : T[unfilled] [N: ___] : N[unfilled] [AJCC Stage: ____] : AJCC Stage: [unfilled] [0 - No Distress] : Distress Level: 0 [ECOG Performance Status: 3 - Capable of only limited self care, confined to bed or chair more than 50% of waking hours] : Performance Status: 3 - Capable of only limited self care, confined to bed or chair more than 50% of waking hours [de-identified] : Ms. Toussaint was diagnosed with right breast cancer in November 2017 at age 61. \par  \par  In Fall 2017, she presented to ED with inflamed and erythematous right breast and was given antibiotics with resolution of symptoms.  She was evaluated by Dr. Arlene Gregg of breast surgery.\par  \par  She subsequently had a diagnostic mammo + US on 10/18/17: Multiple irregular masses in the upper outer right breast. Recommend representative ultrasound-guided core biopsy of two masses at the 11:00 axis and 10:00 axis, as above. Numerous associated distended ducts containing intraductal masses and calcifications throughout the upper outer right breast, in region of palpable concern, 10:00 axis, suspicious for extensive DCIS.Rounded indeterminate mass in the right breast at the 8:00 position, 16 \par  cm the nipple, possible abnormal lymph node. Ultrasound-guided core biopsy is advised.\par  \par  On 11/1/18 she had a core biopsies:\par  Final Diagnosis\par  1     Breast biopsy core, right 10:00 4 cm FN, Site #1:- detached fragments of papillary and micropapillar ductal carcinoma with extensive necrosis, probably ductal carcinoma in situ with intermediate to high nuclear grade\par  2     Breast biopsy core, right 11:00 16 cm FN, Site #;- Invasive moderately differentiated ductal carcinoma, Bridget 7/9, 1.1 cm. ER>90%, FL> 90%, HER2 negative.\par  +DCIS, cribriform and solid with intermediate grade. \par  3     Breast biopsy core, right 8:00 16 cm FN;-   Ductal carcinoma in situ, micropapillary type with intermediate nuclear grade.\par  \par  She subsequently had non compliance with obtaining an MRI as well as follow ups with Dr. Gregg.  She eventually underwent bilateral mastectomy and bilateral SNL biopsy on 2/1/2018.  \par  \par  Pathology:\par  Right breast simple mastectomy - Infiltrating moderately differentiated ductal carcinoma and DCIS, micropapillary type with tumor necrosis. 1 axillary LN negative.  Approximately 2 cm, grade 2.  Isolate tumor cells in 3 LNs.  ER>90%, FL 90%, HER2 negative.  pT1c pN0(i+)\par  \par  Left mastectomy and left SNL biopsy - negative\par  \par  2/1/18 Oncotype Dx - 17 (low risk) - 5 year recurrence with Tamoxifen 11%, with gutierrez+ chemo 12%\par  11/17/2017 BRCA1/2 negative\par  \par  Family history is significant for a sister with breast cancer at age 43, and neice (brothers daugther) in her 40s.  Paternal GM had uterine cancer.  Paternal aunt - brain cancer.\par  \par  She started anastrazole on 3/9/18 and switched to Letrozole on 5/21/18 given hot flashes. She stopped Letrozole on 12/4/18 due to multiple MSK symptoms including muscle pain and generalized joint pains. Since stopping Letrozole, her pain did not get better in her hips and knees but was better in her hands. She was subsequently switched to Tamoxifen on 4/15/19. \par  \par  s/p colonoscopy on 10/15/18 -->negative per patient [de-identified] : She returns for follow-up today accompanied by spouse. Ambulates in wheelchair in center, ambulates with walker at home and outside, however tires easily  Continues on Tamoxifen.  Reports she had some intermittent vaginal spotting 6 months ago, No more spotting since then. Pending D&C on 4/25/24 by Dr. Felder.  Continues on bupropion, states it helps with anxiety, tried other drugs, but not helping.  Denies recent illness or hospitalization Denies fever, chills, weight loss, loss of appetite, headache, dizziness, LE swelling, cough, chest pain, palpitation, sob Denies insomnia Denies abdominal pain, nausea, vomiting, diarrhea, constipation, or urinary symptoms

## 2024-04-22 NOTE — ASSESSMENT
[FreeTextEntry1] : 67 year old morbidly obese female with stage IA right breast infiltrating ductal carcinoma (T1c N0(i+)). ER>90%, AL 90%, HER2 negative, S/p bilateral mastectomy + SNL biopsy. BRCA negative. Oncotype Dx 17 (low risk). Began anastrazole on 3/9/18 and switched for Letrozole on 5/21/18 given hot flashes impacting QOL. Letrozole stopped on 12/4/18 due to pain in multiple joints. She was switched to Tamoxifen on 4/15/19. 4/3/18 bone density - normal. Patient not seen 05/05/2020- 1/27/2022   Continues on Tamoxifen, she had some vaginal spotting 6 months ago, no spotting since then.  10/26/24 US Pelvis-14 mm thickened endometrium with cystic changes. The ovaries are obscured by bowel gas. Pending D&C on 4/25/24  Reviewed lab from 4/5/24 CBC : WBC 10.4 HGB 12.4 HCT 38.4  CMP- Normal  Plan: -Continue Tamoxifen. Plan for 7.5 to 8 years of therapy.  -Pending D&C with GYN on 4/25/24 -On Bupropion for anxiety- When Bupropion is used continuously for extended periods it can reduce the effectiveness of Tamoxifen, advised to speak with PCP to switch to an alternative  RTO in 6 month

## 2024-04-22 NOTE — PHYSICAL EXAM
[Obese] : obese [Normal] : affect appropriate [de-identified] : bilateral mastectomy, no palpable mass or axillary adenopathy [de-identified] : morbidly obese abdomen

## 2024-04-23 ENCOUNTER — RX RENEWAL (OUTPATIENT)
Age: 69
End: 2024-04-23

## 2024-04-23 RX ORDER — AMLODIPINE BESYLATE 5 MG/1
5 TABLET ORAL DAILY
Qty: 90 | Refills: 3 | Status: ACTIVE | COMMUNITY
Start: 2022-02-08 | End: 1900-01-01

## 2024-04-24 ENCOUNTER — TRANSCRIPTION ENCOUNTER (OUTPATIENT)
Age: 69
End: 2024-04-24

## 2024-04-24 NOTE — ASU PATIENT PROFILE, ADULT - NSSUBSTANCEUSE_GEN_ALL_CORE_SD
denies drug use/caffeine Oxybutynin Counseling:  I discussed with the patient the risks of oxybutynin including but not limited to skin rash, drowsiness, dry mouth, difficulty urinating, and blurred vision.

## 2024-04-25 ENCOUNTER — OUTPATIENT (OUTPATIENT)
Dept: INPATIENT UNIT | Facility: HOSPITAL | Age: 69
LOS: 1 days | End: 2024-04-25
Payer: MEDICARE

## 2024-04-25 ENCOUNTER — TRANSCRIPTION ENCOUNTER (OUTPATIENT)
Age: 69
End: 2024-04-25

## 2024-04-25 ENCOUNTER — APPOINTMENT (OUTPATIENT)
Dept: OBGYN | Facility: HOSPITAL | Age: 69
End: 2024-04-25

## 2024-04-25 ENCOUNTER — RESULT REVIEW (OUTPATIENT)
Age: 69
End: 2024-04-25

## 2024-04-25 VITALS
TEMPERATURE: 98 F | DIASTOLIC BLOOD PRESSURE: 87 MMHG | HEIGHT: 60 IN | HEART RATE: 88 BPM | OXYGEN SATURATION: 98 % | RESPIRATION RATE: 19 BRPM | SYSTOLIC BLOOD PRESSURE: 138 MMHG | WEIGHT: 293 LBS

## 2024-04-25 VITALS
SYSTOLIC BLOOD PRESSURE: 109 MMHG | RESPIRATION RATE: 20 BRPM | DIASTOLIC BLOOD PRESSURE: 69 MMHG | HEART RATE: 82 BPM | OXYGEN SATURATION: 92 %

## 2024-04-25 DIAGNOSIS — Z96.642 PRESENCE OF LEFT ARTIFICIAL HIP JOINT: Chronic | ICD-10-CM

## 2024-04-25 DIAGNOSIS — Z90.13 ACQUIRED ABSENCE OF BILATERAL BREASTS AND NIPPLES: Chronic | ICD-10-CM

## 2024-04-25 DIAGNOSIS — Z98.890 OTHER SPECIFIED POSTPROCEDURAL STATES: Chronic | ICD-10-CM

## 2024-04-25 DIAGNOSIS — Z96.643 PRESENCE OF ARTIFICIAL HIP JOINT, BILATERAL: Chronic | ICD-10-CM

## 2024-04-25 DIAGNOSIS — Z96.652 PRESENCE OF LEFT ARTIFICIAL KNEE JOINT: Chronic | ICD-10-CM

## 2024-04-25 DIAGNOSIS — N95.0 POSTMENOPAUSAL BLEEDING: ICD-10-CM

## 2024-04-25 LAB — GLUCOSE BLDC GLUCOMTR-MCNC: 152 MG/DL — HIGH (ref 70–99)

## 2024-04-25 PROCEDURE — C1782: CPT

## 2024-04-25 PROCEDURE — 58558 HYSTEROSCOPY BIOPSY: CPT

## 2024-04-25 PROCEDURE — 82962 GLUCOSE BLOOD TEST: CPT

## 2024-04-25 PROCEDURE — 88305 TISSUE EXAM BY PATHOLOGIST: CPT | Mod: 26

## 2024-04-25 PROCEDURE — 88305 TISSUE EXAM BY PATHOLOGIST: CPT

## 2024-04-25 DEVICE — AVETA SMOL RESECTING DEVICE 2.9MM: Type: IMPLANTABLE DEVICE | Status: FUNCTIONAL

## 2024-04-25 RX ORDER — SODIUM CHLORIDE 9 MG/ML
1000 INJECTION, SOLUTION INTRAVENOUS
Refills: 0 | Status: DISCONTINUED | OUTPATIENT
Start: 2024-04-25 | End: 2024-04-25

## 2024-04-25 RX ORDER — FLUOXETINE HCL 10 MG
1 CAPSULE ORAL
Refills: 0 | DISCHARGE

## 2024-04-25 RX ORDER — SODIUM CHLORIDE 9 MG/ML
3 INJECTION INTRAMUSCULAR; INTRAVENOUS; SUBCUTANEOUS ONCE
Refills: 0 | Status: DISCONTINUED | OUTPATIENT
Start: 2024-04-25 | End: 2024-04-25

## 2024-04-25 RX ORDER — ONDANSETRON 8 MG/1
4 TABLET, FILM COATED ORAL ONCE
Refills: 0 | Status: DISCONTINUED | OUTPATIENT
Start: 2024-04-25 | End: 2024-04-25

## 2024-04-25 RX ORDER — ASCORBIC ACID 60 MG
0 TABLET,CHEWABLE ORAL
Qty: 0 | Refills: 0 | DISCHARGE

## 2024-04-25 RX ORDER — VITAMIN E 100 UNIT
0 CAPSULE ORAL
Qty: 0 | Refills: 0 | DISCHARGE

## 2024-04-25 RX ORDER — LOSARTAN POTASSIUM 100 MG/1
1 TABLET, FILM COATED ORAL
Qty: 0 | Refills: 0 | DISCHARGE

## 2024-04-25 RX ORDER — HYDROCHLOROTHIAZIDE 25 MG
0 TABLET ORAL
Qty: 0 | Refills: 0 | DISCHARGE

## 2024-04-25 RX ORDER — IBUPROFEN 200 MG
2 TABLET ORAL
Refills: 0 | DISCHARGE

## 2024-04-25 RX ORDER — GARLIC 1000 MG
0 CAPSULE ORAL
Qty: 0 | Refills: 0 | DISCHARGE

## 2024-04-25 RX ORDER — AMLODIPINE BESYLATE 2.5 MG/1
0 TABLET ORAL
Qty: 0 | Refills: 0 | DISCHARGE

## 2024-04-25 RX ORDER — TAMOXIFEN CITRATE 20 MG/1
0 TABLET, FILM COATED ORAL
Qty: 0 | Refills: 0 | DISCHARGE

## 2024-04-25 RX ORDER — FENTANYL CITRATE 50 UG/ML
50 INJECTION INTRAVENOUS
Refills: 0 | Status: DISCONTINUED | OUTPATIENT
Start: 2024-04-25 | End: 2024-04-25

## 2024-04-25 RX ORDER — OMEGA-3 ACID ETHYL ESTERS 1 G
0 CAPSULE ORAL
Qty: 0 | Refills: 0 | DISCHARGE

## 2024-04-25 RX ORDER — ACETAMINOPHEN 500 MG
975 TABLET ORAL ONCE
Refills: 0 | Status: COMPLETED | OUTPATIENT
Start: 2024-04-25 | End: 2024-04-25

## 2024-04-25 NOTE — BRIEF OPERATIVE NOTE - NSICDXBRIEFPOSTOP_GEN_ALL_CORE_FT
POST-OP DIAGNOSIS:  PMB (postmenopausal bleeding) 25-Apr-2024 08:44:40  Neena Li  Endometrial polyp 25-Apr-2024 08:44:52  Neena Li

## 2024-04-25 NOTE — BRIEF OPERATIVE NOTE - NSICDXBRIEFPROCEDURE_GEN_ALL_CORE_FT
PROCEDURES:  Hysteroscopy, dilation and curettage of uterus, endometrial biopsy, and polypectomy 25-Apr-2024 08:44:20  Neena Li

## 2024-04-25 NOTE — ASU DISCHARGE PLAN (ADULT/PEDIATRIC) - NS MD DC FALL RISK RISK
For information on Fall & Injury Prevention, visit: [email or web address] OR  [email or web address] OR  https://www.cdc.gov/steadi/patient.html

## 2024-04-25 NOTE — ASU DISCHARGE PLAN (ADULT/PEDIATRIC) - CARE PROVIDER_API CALL
Troy Felder  Obstetrics and Gynecology  [address] [place] [address]  Phone: [phone]  Fax: [phone]  Follow Up Time:

## 2024-04-30 LAB — SURGICAL PATHOLOGY STUDY: SIGNIFICANT CHANGE UP

## 2024-05-09 ENCOUNTER — APPOINTMENT (OUTPATIENT)
Dept: OBGYN | Facility: CLINIC | Age: 69
End: 2024-05-09

## 2024-05-28 ENCOUNTER — RX RENEWAL (OUTPATIENT)
Age: 69
End: 2024-05-28

## 2024-05-29 RX ORDER — BUPROPION HYDROCHLORIDE 150 MG/1
150 TABLET, FILM COATED, EXTENDED RELEASE ORAL
Qty: 30 | Refills: 0 | Status: ACTIVE | COMMUNITY
Start: 2023-10-31 | End: 1900-01-01

## 2024-06-18 NOTE — PATIENT PROFILE ADULT - DOMESTIC TRAVEL HIGH RISK QUESTION
We placed an order for a CRP and an ESR for him to check his numbers.  If he could do and have this done today then we can take a look and plan from there.  Thanks!  He does not have to be fasting.   No

## 2024-06-19 ENCOUNTER — APPOINTMENT (OUTPATIENT)
Dept: OBGYN | Facility: CLINIC | Age: 69
End: 2024-06-19

## 2024-07-17 ENCOUNTER — APPOINTMENT (OUTPATIENT)
Dept: WOUND CARE | Facility: HOSPITAL | Age: 69
End: 2024-07-17

## 2024-09-05 ENCOUNTER — NON-APPOINTMENT (OUTPATIENT)
Age: 69
End: 2024-09-05

## 2024-09-12 ENCOUNTER — APPOINTMENT (OUTPATIENT)
Dept: OBGYN | Facility: CLINIC | Age: 69
End: 2024-09-12

## 2024-10-16 ENCOUNTER — OUTPATIENT (OUTPATIENT)
Dept: OUTPATIENT SERVICES | Facility: HOSPITAL | Age: 69
LOS: 1 days | Discharge: HOME | End: 2024-10-16

## 2024-10-16 DIAGNOSIS — Z96.642 PRESENCE OF LEFT ARTIFICIAL HIP JOINT: Chronic | ICD-10-CM

## 2024-10-16 DIAGNOSIS — D05.90 UNSPECIFIED TYPE OF CARCINOMA IN SITU OF UNSPECIFIED BREAST: ICD-10-CM

## 2024-10-16 DIAGNOSIS — Z90.13 ACQUIRED ABSENCE OF BILATERAL BREASTS AND NIPPLES: Chronic | ICD-10-CM

## 2024-10-16 DIAGNOSIS — Z96.643 PRESENCE OF ARTIFICIAL HIP JOINT, BILATERAL: Chronic | ICD-10-CM

## 2024-10-16 DIAGNOSIS — Z96.652 PRESENCE OF LEFT ARTIFICIAL KNEE JOINT: Chronic | ICD-10-CM

## 2024-10-16 DIAGNOSIS — Z98.890 OTHER SPECIFIED POSTPROCEDURAL STATES: Chronic | ICD-10-CM

## 2024-10-17 ENCOUNTER — APPOINTMENT (OUTPATIENT)
Dept: WOUND CARE | Facility: HOSPITAL | Age: 69
End: 2024-10-17

## 2024-10-17 ENCOUNTER — APPOINTMENT (OUTPATIENT)
Dept: FAMILY MEDICINE | Facility: CLINIC | Age: 69
End: 2024-10-17

## 2024-10-21 DIAGNOSIS — C50.911 MALIGNANT NEOPLASM OF UNSPECIFIED SITE OF RIGHT FEMALE BREAST: ICD-10-CM

## 2024-10-29 RX ORDER — BUPROPION HYDROCHLORIDE 150 MG/1
150 TABLET, EXTENDED RELEASE ORAL DAILY
Qty: 14 | Refills: 0 | Status: ACTIVE | COMMUNITY
Start: 2024-10-29 | End: 1900-01-01

## 2024-11-13 ENCOUNTER — APPOINTMENT (OUTPATIENT)
Dept: FAMILY MEDICINE | Facility: CLINIC | Age: 69
End: 2024-11-13

## 2024-11-14 DIAGNOSIS — C50.911 MALIGNANT NEOPLASM OF UNSPECIFIED SITE OF RIGHT FEMALE BREAST: ICD-10-CM

## 2024-11-18 ENCOUNTER — APPOINTMENT (OUTPATIENT)
Dept: HEMATOLOGY ONCOLOGY | Facility: CLINIC | Age: 69
End: 2024-11-18

## 2024-11-26 ENCOUNTER — APPOINTMENT (OUTPATIENT)
Dept: CARDIOLOGY | Facility: CLINIC | Age: 69
End: 2024-11-26

## 2024-12-05 ENCOUNTER — APPOINTMENT (OUTPATIENT)
Dept: WOUND CARE | Facility: CLINIC | Age: 69
End: 2024-12-05

## 2024-12-17 ENCOUNTER — APPOINTMENT (OUTPATIENT)
Dept: ORTHOPEDIC SURGERY | Facility: CLINIC | Age: 69
End: 2024-12-17

## 2025-01-06 ENCOUNTER — RX RENEWAL (OUTPATIENT)
Age: 70
End: 2025-01-06

## 2025-01-13 NOTE — PHYSICAL THERAPY INITIAL EVALUATION ADULT - LEVEL OF INDEPENDENCE: GAIT, REHAB EVAL
No protocol for requested medication.    Medication:   QUEtiapine (SEROquel) 50 MG tablet 90 tablet 1 11/12/2024 --    Sig - Route: Take 1 tablet by mouth in the morning and 1 tablet at noon and 1 tablet in the evening. - Oral      Last office visit date: 11/12/2024  Future office visit date: 2/25/2025  Pharmacy: [id number] - [place], [place] - [address]    Order pended, routed to clinician for review.       contact guard

## 2025-01-21 NOTE — ED ADULT TRIAGE NOTE - ESI TRIAGE ACUITY LEVEL, MLM
Hemodialysis treatment completed. Blood return done. Dialyzed patient for 3 hours with total net fluid removal of 2L. Patient tolerated treatment well.    Needles flushed with saline then removed. Manual pressure held for 5 minutes until hemostasis is achieved. LUE AV graft  with + bruit and thrill. No oozing or bleeding noted.    Report given to primary nurse.    3

## 2025-02-07 ENCOUNTER — RX RENEWAL (OUTPATIENT)
Age: 70
End: 2025-02-07

## 2025-03-28 NOTE — DISCHARGE NOTE PROVIDER - NSDCFUSCHEDAPPT_GEN_ALL_CORE_FT
Condition:: Suture removal Please Describe Your Condition:: Pt presents for suture removal on right shoulder, reports concern for infection. NANCI MATIAS H ; 08/24/2021 ; [facility] [address] [facility]NANCI H ; 09/01/2021 ; [facility] [address] [facility]  NANCI MATIAS  ; 09/28/2021 ; [facility] [address] [facility]  NANCI MATIAS  ; 11/01/2021 ; [facility] [address] [facility]

## 2025-04-09 ENCOUNTER — RX RENEWAL (OUTPATIENT)
Age: 70
End: 2025-04-09

## 2025-04-14 NOTE — OCCUPATIONAL THERAPY INITIAL EVALUATION ADULT - LOWER BODY DRESSING, PREVIOUS LEVEL OF FUNCTION, OT EVAL
Mayank was contacted by [facility] to identify medication adherence barriers. Patient requested a callback from the pharmacy team for further assistance.    He was identified for pharmacy outreach based on prescription fill history of his Hypertension  Diabetes  Cholesterol medicine(s), Enalapril 20 mg, Metformin 500 mg, Atorvastatin 40 mg. Last dispensed on 1/10/25, 3/26/25 for a 90,30-day supply.     The patient is taking the medication(s). He reports missing 0 doses in the past week.     The following barrier(s) to medication adherence were identified:   Forgetfulness-pt reports he has the medications and that he sometimes forget to take them, but it is not that often.    The following solution(s) for medication adherence were recommended:   Phone hung-writer suggested patient set a timer on his phone to help him remember to take the medications.    The following updates were made to medication list: none    No further follow-up is required at this time. Patient was given the pharmacy team phone number should future questions or concerns arise.     Mirna Astudillo [facility] Pharmacy Technician Specialist  [phone]     independent

## 2025-05-07 ENCOUNTER — RX RENEWAL (OUTPATIENT)
Age: 70
End: 2025-05-07

## 2025-06-26 NOTE — BRIEF OPERATIVE NOTE - POST-OP DX
Bilateral LE ischemia likely due to cardioembolus   S/p RLE fem embolectomy and anterolateral fasciotomy and LLE pop/AT embolectomy on 6/24   Breast abscess  07/27/2017    Active  Xavier Mcclelland

## 2025-07-22 ENCOUNTER — RX RENEWAL (OUTPATIENT)
Age: 70
End: 2025-07-22

## (undated) DEVICE — DRAPE SHEET XL 77X98"

## (undated) DEVICE — NDL HYPO SAFE 20G X 1.5"

## (undated) DEVICE — WRAP COMPRESSION CALF MED

## (undated) DEVICE — PACK LITHOTOMY

## (undated) DEVICE — GOWN XL

## (undated) DEVICE — ZIMMER BLUE CURETTE

## (undated) DEVICE — DRAPE 3/4 SHEET 52X76"

## (undated) DEVICE — BLADE SAGITTAL DUAL CUT 75X18X1.27MM

## (undated) DEVICE — TUBING IV EXTENSION MACRO 2Y-CLAVE 32"

## (undated) DEVICE — AVETA FLUID MANAGEMENT ACCESSORY W CAP

## (undated) DEVICE — WARMING BLANKET UPPER ADULT

## (undated) DEVICE — SOL IRR POUR NS 0.9% 1000ML

## (undated) DEVICE — SAW BLADE ZIMMER RECIPROCATING DOUBLE SIDED 12.5X96X1.19MM

## (undated) DEVICE — BLADE ZIMMER PATELLA REAMER SZ 29

## (undated) DEVICE — GLV 8 PROTEXIS (WHITE)

## (undated) DEVICE — SUT DERMABOND PRINEO 22CM

## (undated) DEVICE — SOL BAG NS 0.9% 1000ML

## (undated) DEVICE — SUT VICRYL 2-0 27" CT-2 UNDYED

## (undated) DEVICE — BLADE ZIMMER PATELLA REAMER W PILOT HOLE SZ 32

## (undated) DEVICE — PACK TOTAL KNEE

## (undated) DEVICE — TUBING IRR SET FOR CYSTOSCOPY 77"

## (undated) DEVICE — PRESSURE INFUSOR BAG 1000ML

## (undated) DEVICE — ELCTR PENCIL NEPTUNE SMOKE EVACUATION

## (undated) DEVICE — BLANKET WARMER UPPER ADULT

## (undated) DEVICE — GOWN LG

## (undated) DEVICE — DRAPE U LONG 47X70"

## (undated) DEVICE — DEVICE ORTHALIGN PLUS

## (undated) DEVICE — ZIMMER MIXING BOWL

## (undated) DEVICE — TUBING SUCTION NONCONDUCTIVE 6MM X 12FT

## (undated) DEVICE — GLV 7 PROTEXIS (WHITE)

## (undated) DEVICE — AVETA FLUID MANAGEMENT ACCESSORY

## (undated) DEVICE — DRAPE TOWEL BLUE 17" X 24"

## (undated) DEVICE — LAP PAD W RING 18 X 18"

## (undated) DEVICE — PREP TRAY DRY SKIN PREP SCRUB

## (undated) DEVICE — TAPE SILK 3"

## (undated) DEVICE — HOOD FLYTE STRYKER SURGICOOL W PEELAWAY

## (undated) DEVICE — SOL BETADINE POUCH 0.75OZ STERILE

## (undated) DEVICE — GLV 8 PROTEXIS

## (undated) DEVICE — SOL IRR POUR H2O 1000ML

## (undated) DEVICE — SYR LUER LOK 50CC

## (undated) DEVICE — URETERAL CATH RED RUBBER 16FR (ORANGE)

## (undated) DEVICE — DRAPE 1/2 SHEET 40X57"

## (undated) DEVICE — KT PULSAVAC WOUND W/ SPRAYTIP

## (undated) DEVICE — DRAPE HALF SHEET 40X57"

## (undated) DEVICE — DRAPE LIGHT HANDLE COVER (GREEN)

## (undated) DEVICE — SUT MONOCRYL 3-0 27" PS-2 UNDYED

## (undated) DEVICE — SUT VICRYL 0 18" CT-1 UNDYED

## (undated) DEVICE — PREP DYNA-HEX CHG 4% 4OZ BOTTLE (BACTOSHIELD)

## (undated) DEVICE — Device

## (undated) DEVICE — GLV 7.5 PROTEXIS (WHITE)

## (undated) DEVICE — VENODYNE/SCD SLEEVE CALF MEDIUM

## (undated) DEVICE — DRSG TELFA 3 X 4